# Patient Record
Sex: FEMALE | Race: WHITE | NOT HISPANIC OR LATINO | URBAN - METROPOLITAN AREA
[De-identification: names, ages, dates, MRNs, and addresses within clinical notes are randomized per-mention and may not be internally consistent; named-entity substitution may affect disease eponyms.]

---

## 2017-04-12 ENCOUNTER — INPATIENT (INPATIENT)
Facility: HOSPITAL | Age: 77
LOS: 4 days | Discharge: HOME | End: 2017-04-17
Attending: INTERNAL MEDICINE | Admitting: INTERNAL MEDICINE
Payer: MEDICARE

## 2017-04-12 VITALS
WEIGHT: 128.97 LBS | RESPIRATION RATE: 16 BRPM | DIASTOLIC BLOOD PRESSURE: 50 MMHG | HEIGHT: 59 IN | OXYGEN SATURATION: 96 % | TEMPERATURE: 101 F | HEART RATE: 84 BPM | SYSTOLIC BLOOD PRESSURE: 140 MMHG

## 2017-04-12 LAB
ALBUMIN SERPL ELPH-MCNC: 3.3 G/DL — SIGNIFICANT CHANGE UP (ref 3.3–5)
ALP SERPL-CCNC: 75 U/L — SIGNIFICANT CHANGE UP (ref 40–120)
ALT FLD-CCNC: 30 U/L — SIGNIFICANT CHANGE UP (ref 12–78)
ANION GAP SERPL CALC-SCNC: 10 MMOL/L — SIGNIFICANT CHANGE UP (ref 5–17)
APTT BLD: 24.8 SEC — LOW (ref 27.5–37.4)
AST SERPL-CCNC: 18 U/L — SIGNIFICANT CHANGE UP (ref 15–37)
BASOPHILS # BLD AUTO: 0.1 K/UL — SIGNIFICANT CHANGE UP (ref 0–0.2)
BASOPHILS NFR BLD AUTO: 0.4 % — SIGNIFICANT CHANGE UP (ref 0–2)
BILIRUB SERPL-MCNC: 0.8 MG/DL — SIGNIFICANT CHANGE UP (ref 0.2–1.2)
BUN SERPL-MCNC: 19 MG/DL — SIGNIFICANT CHANGE UP (ref 7–23)
CALCIUM SERPL-MCNC: 9 MG/DL — SIGNIFICANT CHANGE UP (ref 8.5–10.1)
CHLORIDE SERPL-SCNC: 107 MMOL/L — SIGNIFICANT CHANGE UP (ref 96–108)
CO2 SERPL-SCNC: 23 MMOL/L — SIGNIFICANT CHANGE UP (ref 22–31)
CREAT SERPL-MCNC: 0.83 MG/DL — SIGNIFICANT CHANGE UP (ref 0.5–1.3)
EOSINOPHIL # BLD AUTO: 0.1 K/UL — SIGNIFICANT CHANGE UP (ref 0–0.5)
EOSINOPHIL NFR BLD AUTO: 0.3 % — SIGNIFICANT CHANGE UP (ref 0–6)
GLUCOSE SERPL-MCNC: 148 MG/DL — HIGH (ref 70–99)
HCT VFR BLD CALC: 44.1 % — SIGNIFICANT CHANGE UP (ref 34.5–45)
HGB BLD-MCNC: 15.4 G/DL — SIGNIFICANT CHANGE UP (ref 11.5–15.5)
INR BLD: 1.06 RATIO — SIGNIFICANT CHANGE UP (ref 0.88–1.16)
LYMPHOCYTES # BLD AUTO: 1.2 K/UL — SIGNIFICANT CHANGE UP (ref 1–3.3)
LYMPHOCYTES # BLD AUTO: 5.5 % — LOW (ref 13–44)
MANUAL DIF COMMENT BLD-IMP: SIGNIFICANT CHANGE UP
MCHC RBC-ENTMCNC: 30.7 PG — SIGNIFICANT CHANGE UP (ref 27–34)
MCHC RBC-ENTMCNC: 34.9 GM/DL — SIGNIFICANT CHANGE UP (ref 32–36)
MCV RBC AUTO: 87.8 FL — SIGNIFICANT CHANGE UP (ref 80–100)
MONOCYTES # BLD AUTO: 1.6 K/UL — HIGH (ref 0–0.9)
MONOCYTES NFR BLD AUTO: 7.2 % — SIGNIFICANT CHANGE UP (ref 2–14)
NEUTROPHILS # BLD AUTO: 19.4 K/UL — HIGH (ref 1.8–7.4)
NEUTROPHILS NFR BLD AUTO: 86.5 % — HIGH (ref 43–77)
PLAT MORPH BLD: NORMAL — SIGNIFICANT CHANGE UP
PLATELET # BLD AUTO: 302 K/UL — SIGNIFICANT CHANGE UP (ref 150–400)
POTASSIUM SERPL-MCNC: 3.6 MMOL/L — SIGNIFICANT CHANGE UP (ref 3.5–5.3)
POTASSIUM SERPL-SCNC: 3.6 MMOL/L — SIGNIFICANT CHANGE UP (ref 3.5–5.3)
PROT SERPL-MCNC: 7.3 GM/DL — SIGNIFICANT CHANGE UP (ref 6–8.3)
PROTHROM AB SERPL-ACNC: 11.5 SEC — SIGNIFICANT CHANGE UP (ref 9.8–12.7)
RBC # BLD: 5.02 M/UL — SIGNIFICANT CHANGE UP (ref 3.8–5.2)
RBC # FLD: 13.9 % — SIGNIFICANT CHANGE UP (ref 10.3–14.5)
RBC BLD AUTO: NORMAL — SIGNIFICANT CHANGE UP
SODIUM SERPL-SCNC: 140 MMOL/L — SIGNIFICANT CHANGE UP (ref 135–145)
WBC # BLD: 22.5 K/UL — HIGH (ref 3.8–10.5)
WBC # FLD AUTO: 22.5 K/UL — HIGH (ref 3.8–10.5)

## 2017-04-12 RX ORDER — ACETAMINOPHEN 500 MG
1000 TABLET ORAL ONCE
Qty: 0 | Refills: 0 | Status: COMPLETED | OUTPATIENT
Start: 2017-04-12 | End: 2017-04-12

## 2017-04-12 RX ORDER — VANCOMYCIN HCL 1 G
250 VIAL (EA) INTRAVENOUS DAILY
Qty: 0 | Refills: 0 | Status: DISCONTINUED | OUTPATIENT
Start: 2017-04-12 | End: 2017-04-13

## 2017-04-12 RX ORDER — SODIUM CHLORIDE 9 MG/ML
2000 INJECTION INTRAMUSCULAR; INTRAVENOUS; SUBCUTANEOUS ONCE
Qty: 0 | Refills: 0 | Status: COMPLETED | OUTPATIENT
Start: 2017-04-12 | End: 2017-04-12

## 2017-04-12 RX ORDER — METRONIDAZOLE 500 MG
500 TABLET ORAL ONCE
Qty: 0 | Refills: 0 | Status: COMPLETED | OUTPATIENT
Start: 2017-04-12 | End: 2017-04-12

## 2017-04-12 RX ADMIN — Medication 1000 MILLIGRAM(S): at 23:38

## 2017-04-12 RX ADMIN — SODIUM CHLORIDE 1000 MILLILITER(S): 9 INJECTION INTRAMUSCULAR; INTRAVENOUS; SUBCUTANEOUS at 22:15

## 2017-04-12 NOTE — ED PROVIDER NOTE - OBJECTIVE STATEMENT
75 y/o female with PMhx of C Diff. colitis 4 episodes in the last 2 years, last January presents to the ED c/o generalized abdominal pain with associated diarrhea that started 1 week ago. Pt reports fever and describes the pain as cramping. She states that she went to an urgent care today and had stool studies and was started on vancomycin, took 1st dose today. Currently pt has no other complaints and denies vomiting, chest pain and SOB. GI Dr. Andrew.

## 2017-04-12 NOTE — ED ADULT NURSE NOTE - OBJECTIVE STATEMENT
Recvd pt at 22:15 from SHIRA AMATO, Pt is a76y female, A & O x 3, VSS, presents to ED w/ diarrhea x several days which has worsened despite PO Vanco, pt denies chest pain, SOB, nausea, vomiting, pt in no apparent distress, bed rails up will continue to monitor.

## 2017-04-12 NOTE — ED PROVIDER NOTE - MEDICAL DECISION MAKING DETAILS
pt with hx of C. Diff. presents to the ED c/o generalized abdominal pain and diarrhea x 1 week. Plan pain control, blood work, stool sample and imaging.

## 2017-04-12 NOTE — ED ADULT NURSE NOTE - ED STAT RN HANDOFF DETAILS
Recvd care of pt at this time from SHIRA AMATO, pt A & O x 3, MD MICHAEL Chon aware of pt temp, IV lock placed by SHIRA GLEZ, fluids intitiated, pt in no apparent dsitress, will continue to monitor.

## 2017-04-12 NOTE — ED PROVIDER NOTE - NS ED MD SCRIBE ATTENDING SCRIBE SECTIONS
RESULTS/REVIEW OF SYSTEMS/PHYSICAL EXAM/HISTORY OF PRESENT ILLNESS/PROGRESS NOTE/PAST MEDICAL/SURGICAL/SOCIAL HISTORY/DISPOSITION

## 2017-04-13 DIAGNOSIS — A04.7 ENTEROCOLITIS DUE TO CLOSTRIDIUM DIFFICILE: ICD-10-CM

## 2017-04-13 DIAGNOSIS — Z98.890 OTHER SPECIFIED POSTPROCEDURAL STATES: Chronic | ICD-10-CM

## 2017-04-13 DIAGNOSIS — A41.9 SEPSIS, UNSPECIFIED ORGANISM: ICD-10-CM

## 2017-04-13 DIAGNOSIS — Z29.9 ENCOUNTER FOR PROPHYLACTIC MEASURES, UNSPECIFIED: ICD-10-CM

## 2017-04-13 DIAGNOSIS — D35.1 BENIGN NEOPLASM OF PARATHYROID GLAND: Chronic | ICD-10-CM

## 2017-04-13 DIAGNOSIS — I10 ESSENTIAL (PRIMARY) HYPERTENSION: ICD-10-CM

## 2017-04-13 LAB
ANION GAP SERPL CALC-SCNC: 12 MMOL/L — SIGNIFICANT CHANGE UP (ref 5–17)
APPEARANCE UR: CLEAR — SIGNIFICANT CHANGE UP
BACTERIA # UR AUTO: (no result)
BASOPHILS # BLD AUTO: 0.1 K/UL — SIGNIFICANT CHANGE UP (ref 0–0.2)
BASOPHILS NFR BLD AUTO: 0.3 % — SIGNIFICANT CHANGE UP (ref 0–2)
BILIRUB UR-MCNC: NEGATIVE — SIGNIFICANT CHANGE UP
BUN SERPL-MCNC: 14 MG/DL — SIGNIFICANT CHANGE UP (ref 7–23)
C DIFF BY PCR RESULT: DETECTED
C DIFF TOX GENS STL QL NAA+PROBE: SIGNIFICANT CHANGE UP
CALCIUM SERPL-MCNC: 8.4 MG/DL — LOW (ref 8.5–10.1)
CHLORIDE SERPL-SCNC: 109 MMOL/L — HIGH (ref 96–108)
CO2 SERPL-SCNC: 21 MMOL/L — LOW (ref 22–31)
COLOR SPEC: YELLOW — SIGNIFICANT CHANGE UP
CREAT SERPL-MCNC: 0.85 MG/DL — SIGNIFICANT CHANGE UP (ref 0.5–1.3)
DIFF PNL FLD: (no result)
EOSINOPHIL # BLD AUTO: 0.1 K/UL — SIGNIFICANT CHANGE UP (ref 0–0.5)
EOSINOPHIL NFR BLD AUTO: 0.5 % — SIGNIFICANT CHANGE UP (ref 0–6)
EPI CELLS # UR: SIGNIFICANT CHANGE UP
GLUCOSE SERPL-MCNC: 175 MG/DL — HIGH (ref 70–99)
GLUCOSE UR QL: NEGATIVE MG/DL — SIGNIFICANT CHANGE UP
HCT VFR BLD CALC: 39.8 % — SIGNIFICANT CHANGE UP (ref 34.5–45)
HGB BLD-MCNC: 13.7 G/DL — SIGNIFICANT CHANGE UP (ref 11.5–15.5)
KETONES UR-MCNC: (no result)
LACTATE SERPL-SCNC: 1.4 MMOL/L — SIGNIFICANT CHANGE UP (ref 0.7–2)
LACTATE SERPL-SCNC: 2.9 MMOL/L — HIGH (ref 0.7–2)
LEUKOCYTE ESTERASE UR-ACNC: (no result)
LYMPHOCYTES # BLD AUTO: 2.1 K/UL — SIGNIFICANT CHANGE UP (ref 1–3.3)
LYMPHOCYTES # BLD AUTO: 9.7 % — LOW (ref 13–44)
MAGNESIUM SERPL-MCNC: 2.1 MG/DL — SIGNIFICANT CHANGE UP (ref 1.8–2.4)
MCHC RBC-ENTMCNC: 30.1 PG — SIGNIFICANT CHANGE UP (ref 27–34)
MCHC RBC-ENTMCNC: 34.3 GM/DL — SIGNIFICANT CHANGE UP (ref 32–36)
MCV RBC AUTO: 87.6 FL — SIGNIFICANT CHANGE UP (ref 80–100)
MONOCYTES # BLD AUTO: 1.5 K/UL — HIGH (ref 0–0.9)
MONOCYTES NFR BLD AUTO: 6.9 % — SIGNIFICANT CHANGE UP (ref 2–14)
NEUTROPHILS # BLD AUTO: 17.6 K/UL — HIGH (ref 1.8–7.4)
NEUTROPHILS NFR BLD AUTO: 82.6 % — HIGH (ref 43–77)
NITRITE UR-MCNC: NEGATIVE — SIGNIFICANT CHANGE UP
PH UR: 5 — SIGNIFICANT CHANGE UP (ref 4.8–8)
PHOSPHATE SERPL-MCNC: 2.9 MG/DL — SIGNIFICANT CHANGE UP (ref 2.5–4.5)
PLATELET # BLD AUTO: 268 K/UL — SIGNIFICANT CHANGE UP (ref 150–400)
POTASSIUM SERPL-MCNC: 3.7 MMOL/L — SIGNIFICANT CHANGE UP (ref 3.5–5.3)
POTASSIUM SERPL-SCNC: 3.7 MMOL/L — SIGNIFICANT CHANGE UP (ref 3.5–5.3)
PROT UR-MCNC: NEGATIVE MG/DL — SIGNIFICANT CHANGE UP
RBC # BLD: 4.54 M/UL — SIGNIFICANT CHANGE UP (ref 3.8–5.2)
RBC # FLD: 13.5 % — SIGNIFICANT CHANGE UP (ref 10.3–14.5)
RBC CASTS # UR COMP ASSIST: (no result) /HPF (ref 0–4)
SODIUM SERPL-SCNC: 142 MMOL/L — SIGNIFICANT CHANGE UP (ref 135–145)
SP GR SPEC: 1.02 — SIGNIFICANT CHANGE UP (ref 1.01–1.02)
UROBILINOGEN FLD QL: NEGATIVE MG/DL — SIGNIFICANT CHANGE UP
WBC # BLD: 21.3 K/UL — HIGH (ref 3.8–10.5)
WBC # FLD AUTO: 21.3 K/UL — HIGH (ref 3.8–10.5)
WBC UR QL: (no result)

## 2017-04-13 PROCEDURE — 99285 EMERGENCY DEPT VISIT HI MDM: CPT

## 2017-04-13 PROCEDURE — 74177 CT ABD & PELVIS W/CONTRAST: CPT | Mod: 26

## 2017-04-13 RX ORDER — VALSARTAN 80 MG/1
160 TABLET ORAL DAILY
Qty: 0 | Refills: 0 | Status: DISCONTINUED | OUTPATIENT
Start: 2017-04-13 | End: 2017-04-17

## 2017-04-13 RX ORDER — ACETAMINOPHEN 500 MG
650 TABLET ORAL EVERY 6 HOURS
Qty: 0 | Refills: 0 | Status: DISCONTINUED | OUTPATIENT
Start: 2017-04-13 | End: 2017-04-17

## 2017-04-13 RX ORDER — VANCOMYCIN HCL 1 G
125 VIAL (EA) INTRAVENOUS EVERY 6 HOURS
Qty: 0 | Refills: 0 | Status: DISCONTINUED | OUTPATIENT
Start: 2017-04-13 | End: 2017-04-17

## 2017-04-13 RX ORDER — METRONIDAZOLE 500 MG
500 TABLET ORAL EVERY 8 HOURS
Qty: 0 | Refills: 0 | Status: DISCONTINUED | OUTPATIENT
Start: 2017-04-13 | End: 2017-04-16

## 2017-04-13 RX ORDER — MORPHINE SULFATE 50 MG/1
1 CAPSULE, EXTENDED RELEASE ORAL ONCE
Qty: 0 | Refills: 0 | Status: DISCONTINUED | OUTPATIENT
Start: 2017-04-13 | End: 2017-04-13

## 2017-04-13 RX ORDER — SODIUM CHLORIDE 9 MG/ML
1000 INJECTION INTRAMUSCULAR; INTRAVENOUS; SUBCUTANEOUS
Qty: 0 | Refills: 0 | Status: DISCONTINUED | OUTPATIENT
Start: 2017-04-13 | End: 2017-04-14

## 2017-04-13 RX ADMIN — MORPHINE SULFATE 1 MILLIGRAM(S): 50 CAPSULE, EXTENDED RELEASE ORAL at 04:12

## 2017-04-13 RX ADMIN — Medication 500 MILLIGRAM(S): at 00:49

## 2017-04-13 RX ADMIN — Medication 250 MILLIGRAM(S): at 00:50

## 2017-04-13 RX ADMIN — SODIUM CHLORIDE 75 MILLILITER(S): 9 INJECTION INTRAMUSCULAR; INTRAVENOUS; SUBCUTANEOUS at 04:57

## 2017-04-13 RX ADMIN — SODIUM CHLORIDE 100 MILLILITER(S): 9 INJECTION INTRAMUSCULAR; INTRAVENOUS; SUBCUTANEOUS at 21:13

## 2017-04-13 RX ADMIN — VALSARTAN 160 MILLIGRAM(S): 80 TABLET ORAL at 07:20

## 2017-04-13 RX ADMIN — Medication 100 MILLIGRAM(S): at 13:41

## 2017-04-13 RX ADMIN — MORPHINE SULFATE 1 MILLIGRAM(S): 50 CAPSULE, EXTENDED RELEASE ORAL at 04:32

## 2017-04-13 RX ADMIN — Medication 125 MILLIGRAM(S): at 23:27

## 2017-04-13 RX ADMIN — Medication 125 MILLIGRAM(S): at 11:24

## 2017-04-13 RX ADMIN — SODIUM CHLORIDE 100 MILLILITER(S): 9 INJECTION INTRAMUSCULAR; INTRAVENOUS; SUBCUTANEOUS at 17:20

## 2017-04-13 RX ADMIN — Medication 100 MILLIGRAM(S): at 21:12

## 2017-04-13 RX ADMIN — Medication 125 MILLIGRAM(S): at 17:23

## 2017-04-13 RX ADMIN — Medication 1000 MILLIGRAM(S): at 00:30

## 2017-04-13 NOTE — H&P ADULT - ASSESSMENT
75 y/o female with PMHx of HTN and recent C diff x 4 episode in the past 2 years presented with c/p abdominal pain, fever, diarrhea x 1 week. Pt admitted for management of sepsis secondary to C. diff.

## 2017-04-13 NOTE — H&P ADULT - HISTORY OF PRESENT ILLNESS
77 y/o female with PMHx of HTN and recurrent C. diff, approximately 4 episode of C. diff in the past 2 years with the most recent 2/2017. Patient presented to ED for evaluation of diarrhea, fever and diffused abdominal pan x 1 week. Pt states she is concern about dehydration because she had > 15 episodes of diarrhea today. Pt was seen by urgent care center on 4/9, stool culture sent and was started  Vancomycin. Pt started taking Vanco today x 2 dose. Denies chills, vomiting, sick contact, recent antibiotic use, melena, hematochezia.     ED course  Temp 101.1 WBC 22  IVF bolus of 1L given  Stool for C diff, culture and O&P sent. CT abdomen pending  Vancomycin 250 mg x 1 given 75 y/o female with PMHx of HTN and recurrent C. diff, approximately 4 episode of C. diff in the past 2 years with the most recent 2/2017. Patient presented to ED for evaluation of diarrhea, fever and diffused abdominal pan x 1 week. Pt states she is concern about dehydration because she had > 15 episodes of diarrhea today. Pt was seen by urgent care center on 4/9, stool culture sent and was started  Vancomycin. Pt started taking Vanco today x 2 dose. Denies chills, vomiting, sick contact, recent antibiotic use, melena, hematochezia.     ED course  Temp 101.1 WBC 22  IVF bolus of 1L given  Stool for C diff, culture and O&P sent.   CT abdomen   Vancomycin 250 mg x 1 given 77 y/o female with PMHx of HTN and recurrent C. diff, approximately 4 episode of C. diff in the past 2 years with the most recent 2/2017. Patient presented to ED for evaluation of diarrhea, fever and diffused abdominal pan x 1 week. Pt states she is concern about dehydration because she had > 15 episodes of diarrhea today. Pt was seen by urgent care center on 4/9, stool culture sent and was started  Vancomycin. Pt started taking Vanco today x 2 dose. Denies chills, vomiting, sick contact, recent antibiotic use, melena, hematochezia.     ED course  Temp 101.1 WBC 22  IVF bolus of 1L given  Stool for C diff, culture and O&P sent.   CT abdomen Long segment colitis of the transverse, descending, and rectosigmoid   colon in keeping with history of C. difficile colitis. No bowel   obstruction or free air.  Vancomycin 250 mg x 1 given

## 2017-04-13 NOTE — PROGRESS NOTE ADULT - ATTENDING COMMENTS
Patient seen and examined with ROYA Fish.  Agree with physical exam and assessment and plan.   - continue po vanco and add iv falgyl given worsenign alctae  - ID and GI eval  - outpt eval for stool transplant is being consdiered by pt and maybe worthwhile given multiple recurrences

## 2017-04-13 NOTE — H&P ADULT - PROBLEM SELECTOR PLAN 2
-Stool for C diff, Stool culture and ova and parasite sent  -C/w PO Vancomycin  -IVF  -ID consult  -Pain control  -GI consult -Stool for C diff, Stool culture and ova and parasite sent  -C/w PO Vancomycin  -IVF  -ID consult for antibiotic management  -Tramadol 50 mg q6hr prn for moderate pain  -Oxycodone 5 mg q6hr for severe pain  -GI consult for possible fecal transplant  -Contact precaution -Stool for C diff, Stool culture and ova and parasite sent  -C/w PO Vancomycin  -IVF  -ID consult for antibiotic management  -Pain control  -GI consult for possible fecal transplant  -Contact precaution

## 2017-04-13 NOTE — H&P ADULT - FAMILY HISTORY
<<-----Click on this checkbox to enter Family History Family history of prostate cancer in father     Father  Still living? No  Family history of hypertension in mother, Age at diagnosis: Age Unknown     Mother  Still living? No  Family history of hypertension in mother, Age at diagnosis: Age Unknown     Sibling  Still living? Unknown  Family history of hypertension in mother, Age at diagnosis: Age Unknown

## 2017-04-13 NOTE — CONSULT NOTE ADULT - ASSESSMENT
77 y/o female with PMHx of HTN and recurrent C. diff, approximately 4 episodes of C. diff in the past 2 years with the most recent 2/2017, admitted 4/13 with worsening diarrhea, fevers, diffuse abd pain, cramping X 1 week, reports having about 15-20 episodes of watery diarrhea per day, she went to urgent care on 4/9 and stool culture was sent and she was started on oral vancomycin - she took about 2 doses. During her prior C diff episodes, she reports being on antibiotics beforehand, she was treated the first two times with 14 days of oral vancomycin and then the 3rd time was given 4 week course. Here, she was febrile to 101.1, wbc ct 22, CT abd/pelvis showedl Long segment colitis of the transverse, descending, and rectosigmoid   colon in keeping with history of C. difficile colitis. No bowel obstruction or free air, she was given IV flagyl and oral vancomycin, C diff PCR here + 4/13    1. sepsis/recurrent C diff colitis    - agree with oral vancomycin [id number] + IV flagyl 500mg IV q8h    - on contact precautions    - Ct with colitis, no obstruction or toxic megacolon    - f/u stool cx    - once improves, recommend long vancomycin taper and will recommend pt f/u with Dr Ramirez at [place] once this episode resolves and after completion of taper to see if candidate for fecal transplant    -rec probiotics BID    - avoid antibiotic use/PPIs    - supportive care    - monitor temps    - f/u CBC

## 2017-04-13 NOTE — PROGRESS NOTE ADULT - PROBLEM SELECTOR PLAN 2
-Stool for C diff, Stool culture and ova and parasite sent  -C/w PO Vancomycin  -IVF  -ID consult for antibiotic management  -Pain control  -GI consult for possible fecal transplant  -Contact precaution

## 2017-04-13 NOTE — PROGRESS NOTE ADULT - PROBLEM SELECTOR PLAN 1
# Cdiff infection- CT showed Long segment colitis of the transverse, descending, and rectosigmoid  colon in keeping with history of C. difficile colitis. No bowel   obstruction or free air.  - continue vancomycin po   - GI consult  continue IV fluids  monitor electrolytes # Cdiff infection- CT showed Long segment colitis of the transverse, descending, and rectosigmoid  colon in keeping with history of C. difficile colitis. No bowel   obstruction or free air.  - continue vancomycin po and IV flagyl   - GI consult  continue IV fluids  monitor electrolytes

## 2017-04-13 NOTE — PATIENT PROFILE ADULT. - VISION (WITH CORRECTIVE LENSES IF THE PATIENT USUALLY WEARS THEM):
Partially impaired: cannot see medication labels or newsprint, but can see obstacles in path, and the surrounding layout; can count fingers at arm's length/Glasses

## 2017-04-13 NOTE — H&P ADULT - NSHPSOCIALHISTORY_GEN_ALL_CORE
Lives with son  Denies etoh use, denies smoking or IVDU Lives with son  Denies alcohol use, denies smoking or IVDU

## 2017-04-13 NOTE — CONSULT NOTE ADULT - SUBJECTIVE AND OBJECTIVE BOX
Patient is a 76y old  Female who presents with a chief complaint of Abdominal cramps, diarrhea and fever x 1 week (2017 01:34)      HPI:  75 y/o female with PMHx of HTN and recurrent C. diff, approximately 4 episodes of C. diff in the past 2 years with the most recent 2017, admitted  with worsening diarrhea, fevers, diffuse abd pain, cramping X 1 week, reports having about 15-20 episodes of watery diarrhea per day, she went to urgent care on  and stool culture was sent and she was started on oral vancomycin - she took about 2 doses. During her prior C diff episodes, she reports being on antibiotics beforehand, she was treated the first two times with 14 days of oral vancomycin and then the 3rd time was given 4 week course. Here, she was febrile to 101.1, wbc ct 22, CT abd/pelvis showedl Long segment colitis of the transverse, descending, and rectosigmoid   colon in keeping with history of C. difficile colitis. No bowel obstruction or free air, she was given IV flagyl and oral vancomycin, C diff PCR here + .             PMH: as above    PSH: as above    Meds: per reconciliation sheet, noted below    MEDICATIONS  (STANDING):  valsartan 160milliGRAM(s) Oral daily  sodium chloride 0.9%. 1000milliLiter(s) IV Continuous <Continuous>  vancomycin    Solution 125milliGRAM(s) Oral every 6 hours  metroNIDAZOLE  IVPB 500milliGRAM(s) IV Intermittent every 8 hours      Allergies    Cipro (Unknown)    Intolerances        Social: no smoking, no alcohol, no illegal drugs; no recent travel, no exposure to TB    Family history:  Family history of hypertension in mother (Father, Mother, Sibling)  Family history of prostate cancer in father      ROS: the patient denies fever, no chills, no HA, no dizziness, no sore throat, no blurry vision, no CP, no palpitations, no SOB, no cough, no abdominal pain,  no dysuria, no leg pain, no claudication, no rash, no joint aches, no rectal pain or bleeding, no night sweats    Vital Signs Last 24 Hrs  T(C): 36.8, Max: 38.4 ( @ 21:28)  T(F): 98.2, Max: 101.1 (04-12 @ 21:28)  HR: 67 (66 - 84)  BP: 112/83 (112/83 - 140/50)  BP(mean): 80 (80 - 80)  RR: 18 (15 - 18)  SpO2: 100% (96% - 100%)      PE:  Constitutional: NAD, laying in bed  HEENT: NC/AT, EOMI, PERRLA  Neck: supple  Back: no tenderness  Respiratory: clear  Cardiovascular: S1S2 regular, no murmurs  Abdomen: soft, + tender, not distended, positive BS  Genitourinary: deferred  Rectal: deferred  Musculoskeletal: no muscle tenderness, no joint swelling or tenderness  Extremities: no pedal edema  Neurological: AxOx3, moving all extremities, no focal deficits  Skin: no rashes    Labs:                        13.7   21.3  )-----------( 268      ( 2017 06:08 )             39.8         142  |  109<H>  |  14  ----------------------------<  175<H>  3.7   |  21<L>  |  0.85    Ca    8.4<L>      2017 06:08  Phos  2.9     -  Mg     2.1         TPro  7.3  /  Alb  3.3  /  TBili  0.8  /  DBili  x   /  AST  18  /  ALT  30  /  AlkPhos  75  04-12     LIVER FUNCTIONS - ( 2017 21:46 )  Alb: 3.3 g/dL / Pro: 7.3 gm/dL / ALK PHOS: 75 U/L / ALT: 30 U/L / AST: 18 U/L / GGT: x           Urinalysis Basic - ( 2017 00:36 )    Color: Yellow / Appearance: Clear / S.020 / pH: x  Gluc: x / Ketone: Trace  / Bili: Negative / Urobili: Negative mg/dL   Blood: x / Protein: Negative mg/dL / Nitrite: Negative   Leuk Esterase: Trace / RBC: 3-5 /HPF / WBC 6-10   Sq Epi: x / Non Sq Epi: Few / Bacteria: Few    Clostridium difficile Toxin by PCR (17 @ 01:59)    C Diff by PCR Result: Detected            Radiology: EXAM:  CT ABDOMEN AND PELVIS OC IC                            PROCEDURE DATE:  2017        INTERPRETATION:  CLINICAL INDICATION: Abdominal pain and diarrhea.   History of C. difficile colitis.    COMPARISON: CT abdomen pelvis 2016    TECHNIQUE: Axial CT of the abdomen and pelvis was performed after the   administration of oral and intravenous contrast. Coronal and sagittal   reformatted images were submitted.    Total of 90 cc of Omnipaque 350 was injected intravenously without   immediate complication.    FINDINGS:    LUNGS: Bibasilar dependent changes. No pleural effusion.    LIVER/GALLBLADDER: Subcentimeter hypoenhancing structure in the left   hepatic lobe, too small to characterize.  Normal in size with homogenous enhancement. No intrahepatic or   extrahepatic biliary ductal dilatation. Unremarkable gallbladder.    PANCREAS: No pancreatic ductal dilatation, peripancreatic fluid   collection, or focal pancreatic mass.    SPLEEN: Normal in size and enhancement.    KIDNEYS:Bilateral parapelvic cysts are present. Nonobstructing right   lower pole calculus measures 6 mm, unchanged.   Symmetric in size and enhancement without evidence of hydronephrosis. No   perinephric stranding or fluid collection.    ADRENAL GLANDS: Unremarkable.    BOWEL: Long segment continuous colonic wall thickening of the   rectosigmoid, descending, and transverse colon is evident. Relative   sparing of the cecum and ascending colon. No bowel obstruction or free   air.    URINARY BLADDER: Unremarkable.    PELVIC ORGANS: Peripherally calcified right adnexal lesion is unchanged.    LYMPH NODES: No evidence of intra-abdominal or para-aortic   lymphadenopathy.    BONES/SOFT TISSUES: Multilevel thoracolumbar spondylosis. Grade 1 L4-L5   and L5-S1 anterolisthesis due to advanced facet degeneration.    AORTA/MAJOR BRANCHES: Atherosclerotic calcification.    IMPRESSION:     Long segment colitis of the transverse, descending, and rectosigmoid   colon in keeping with history of C. difficile colitis. No bowel   obstruction or free air.    Advanced directives addressed: full resuscitation

## 2017-04-13 NOTE — H&P ADULT - PROBLEM SELECTOR PLAN 3
-Stable with 's  -C/w Vasartan with parameters  -Monitor vital signs -Stable with 's  -C/w Valsartan with parameters  -Monitor vital signs

## 2017-04-13 NOTE — H&P ADULT - PROBLEM SELECTOR PLAN 1
# Sepsis likely due to C diff infection  -Pt with fever and leukocytosis  -IVF   -C/w PO Vancomycin # SIRS plus infection secondary to C diff infection  -Pt with fever and leukocytosis  -IVF   -C/w PO Vancomycin  -Blood culture and lactate level

## 2017-04-14 DIAGNOSIS — E87.6 HYPOKALEMIA: ICD-10-CM

## 2017-04-14 LAB
ADD ON TEST-SPECIMEN IN LAB: SIGNIFICANT CHANGE UP
ANION GAP SERPL CALC-SCNC: 10 MMOL/L — SIGNIFICANT CHANGE UP (ref 5–17)
BUN SERPL-MCNC: 9 MG/DL — SIGNIFICANT CHANGE UP (ref 7–23)
CALCIUM SERPL-MCNC: 8.1 MG/DL — LOW (ref 8.5–10.1)
CHLORIDE SERPL-SCNC: 114 MMOL/L — HIGH (ref 96–108)
CO2 SERPL-SCNC: 21 MMOL/L — LOW (ref 22–31)
CREAT SERPL-MCNC: 0.66 MG/DL — SIGNIFICANT CHANGE UP (ref 0.5–1.3)
GLUCOSE SERPL-MCNC: 113 MG/DL — HIGH (ref 70–99)
HCT VFR BLD CALC: 37.1 % — SIGNIFICANT CHANGE UP (ref 34.5–45)
HGB BLD-MCNC: 12.7 G/DL — SIGNIFICANT CHANGE UP (ref 11.5–15.5)
MAGNESIUM SERPL-MCNC: 1.8 MG/DL — SIGNIFICANT CHANGE UP (ref 1.8–2.4)
MCHC RBC-ENTMCNC: 30.4 PG — SIGNIFICANT CHANGE UP (ref 27–34)
MCHC RBC-ENTMCNC: 34.3 GM/DL — SIGNIFICANT CHANGE UP (ref 32–36)
MCV RBC AUTO: 88.4 FL — SIGNIFICANT CHANGE UP (ref 80–100)
PHOSPHATE SERPL-MCNC: 2.2 MG/DL — LOW (ref 2.5–4.5)
PLATELET # BLD AUTO: 240 K/UL — SIGNIFICANT CHANGE UP (ref 150–400)
POTASSIUM SERPL-MCNC: 3.1 MMOL/L — LOW (ref 3.5–5.3)
POTASSIUM SERPL-SCNC: 3.1 MMOL/L — LOW (ref 3.5–5.3)
RBC # BLD: 4.2 M/UL — SIGNIFICANT CHANGE UP (ref 3.8–5.2)
RBC # FLD: 14 % — SIGNIFICANT CHANGE UP (ref 10.3–14.5)
SODIUM SERPL-SCNC: 145 MMOL/L — SIGNIFICANT CHANGE UP (ref 135–145)
WBC # BLD: 17.8 K/UL — HIGH (ref 3.8–10.5)
WBC # FLD AUTO: 17.8 K/UL — HIGH (ref 3.8–10.5)

## 2017-04-14 RX ORDER — DEXTROSE MONOHYDRATE, SODIUM CHLORIDE, AND POTASSIUM CHLORIDE 50; .745; 4.5 G/1000ML; G/1000ML; G/1000ML
1000 INJECTION, SOLUTION INTRAVENOUS
Qty: 0 | Refills: 0 | Status: DISCONTINUED | OUTPATIENT
Start: 2017-04-14 | End: 2017-04-15

## 2017-04-14 RX ORDER — LACTOBACILLUS ACIDOPHILUS 100MM CELL
1 CAPSULE ORAL
Qty: 0 | Refills: 0 | Status: DISCONTINUED | OUTPATIENT
Start: 2017-04-14 | End: 2017-04-17

## 2017-04-14 RX ORDER — ENOXAPARIN SODIUM 100 MG/ML
40 INJECTION SUBCUTANEOUS EVERY 24 HOURS
Qty: 0 | Refills: 0 | Status: DISCONTINUED | OUTPATIENT
Start: 2017-04-14 | End: 2017-04-17

## 2017-04-14 RX ORDER — POTASSIUM CHLORIDE 20 MEQ
20 PACKET (EA) ORAL
Qty: 0 | Refills: 0 | Status: COMPLETED | OUTPATIENT
Start: 2017-04-14 | End: 2017-04-14

## 2017-04-14 RX ORDER — SODIUM,POTASSIUM PHOSPHATES 278-250MG
1 POWDER IN PACKET (EA) ORAL DAILY
Qty: 0 | Refills: 0 | Status: DISCONTINUED | OUTPATIENT
Start: 2017-04-14 | End: 2017-04-17

## 2017-04-14 RX ADMIN — Medication 125 MILLIGRAM(S): at 14:05

## 2017-04-14 RX ADMIN — Medication 1 TABLET(S): at 17:42

## 2017-04-14 RX ADMIN — Medication 125 MILLIGRAM(S): at 05:20

## 2017-04-14 RX ADMIN — Medication 1 TABLET(S): at 12:28

## 2017-04-14 RX ADMIN — Medication 650 MILLIGRAM(S): at 22:49

## 2017-04-14 RX ADMIN — DEXTROSE MONOHYDRATE, SODIUM CHLORIDE, AND POTASSIUM CHLORIDE 100 MILLILITER(S): 50; .745; 4.5 INJECTION, SOLUTION INTRAVENOUS at 12:29

## 2017-04-14 RX ADMIN — Medication 100 MILLIGRAM(S): at 05:20

## 2017-04-14 RX ADMIN — Medication 20 MILLIEQUIVALENT(S): at 12:27

## 2017-04-14 RX ADMIN — Medication 125 MILLIGRAM(S): at 17:43

## 2017-04-14 RX ADMIN — Medication 20 MILLIEQUIVALENT(S): at 14:04

## 2017-04-14 RX ADMIN — ENOXAPARIN SODIUM 40 MILLIGRAM(S): 100 INJECTION SUBCUTANEOUS at 10:39

## 2017-04-14 RX ADMIN — VALSARTAN 160 MILLIGRAM(S): 80 TABLET ORAL at 06:18

## 2017-04-14 RX ADMIN — SODIUM CHLORIDE 100 MILLILITER(S): 9 INJECTION INTRAMUSCULAR; INTRAVENOUS; SUBCUTANEOUS at 10:41

## 2017-04-14 RX ADMIN — Medication 100 MILLIGRAM(S): at 14:04

## 2017-04-14 RX ADMIN — Medication 20 MILLIEQUIVALENT(S): at 10:38

## 2017-04-14 NOTE — PROGRESS NOTE ADULT - ATTENDING COMMENTS
Patient seen and examined with ROYA Fish.  Agree with physical exam and assessment and plan.   - continue po vanco and add iv falgyl given worsenign alctae  - ID and GI eval  - outpt eval for stool transplant is being consdiered by pt and maybe worthwhile given multiple recurrences Patient seen and examined with ROYA Fish.  Agree with physical exam and assessment and plan.   - continue po vanco and add iv falgyl   - ID following  - outpt eval for stool transplant is being consdiered by pt and maybe worthwhile given multiple recurrences  - change IVF to ns+kcl  check BMP daily

## 2017-04-14 NOTE — PROGRESS NOTE ADULT - PROBLEM SELECTOR PLAN 2
-Cdiff positive  -C/w PO Vancomycin  -IVF  -ID consult for antibiotic management  -Pain control  -GI consult appreciated  -Contact precaution -Cdiff positive  -C/w PO Vancomycin and IV falgyl  - ID following  -IVF  -ID consult for antibiotic management  -Pain control  -GI consult appreciated  -Contact precaution

## 2017-04-14 NOTE — PROGRESS NOTE ADULT - PROBLEM SELECTOR PLAN 1
# Cdiff infection- CT showed Long segment colitis of the transverse, descending, and rectosigmoid  colon in keeping with history of C. difficile colitis. No bowel   obstruction or free air.  - continue vancomycin po and IV flagyl   - GI consult  - continue IV fluids  - monitor electrolytes  - as per ID recommend long term vancomycin taper and recommend f/u with Dr Ramirez at [facility] after discharge and completion of vanco therapy for possible fecal transplant

## 2017-04-14 NOTE — PROGRESS NOTE ADULT - SUBJECTIVE AND OBJECTIVE BOX
4/14- BM better this morning as per patient report- not as watery as yesterday and less frequent.    Vital Signs Last 24 Hrs  T(C): 37, Max: 37.4 (04-13 @ 21:55)  T(F): 98.6, Max: 99.3 (04-13 @ 21:55)  HR: 68 (67 - 77)  BP: 132/60 (112/83 - 136/40)  BP(mean): --  RR: 16 (16 - 18)  SpO2: 96% (96% - 100%)    MEDICATIONS  (STANDING):  valsartan 160milliGRAM(s) Oral daily  sodium chloride 0.9%. 1000milliLiter(s) IV Continuous <Continuous>  vancomycin    Solution 125milliGRAM(s) Oral every 6 hours  metroNIDAZOLE  IVPB 500milliGRAM(s) IV Intermittent every 8 hours  potassium chloride    Tablet ER 20milliEquivalent(s) Oral every 2 hours  enoxaparin Injectable 40milliGRAM(s) SubCutaneous every 24 hours  lactobacillus acidophilus 1Tablet(s) Oral two times a day with meals    MEDICATIONS  (PRN):  acetaminophen   Tablet 650milliGRAM(s) Oral every 6 hours PRN pain or fever                            12.7   17.8  )-----------( 240      ( 14 Apr 2017 06:08 )             37.1   04-14    145  |  114<H>  |  9   ----------------------------<  113<H>  3.1<L>   |  21<L>  |  0.66    Ca    8.1<L>      14 Apr 2017 06:08  Phos  2.2     04-14  Mg     1.8     04-14    TPro  7.3  /  Alb  3.3  /  TBili  0.8  /  DBili  x   /  AST  18  /  ALT  30  /  AlkPhos  75  04-12

## 2017-04-14 NOTE — PROGRESS NOTE ADULT - ASSESSMENT
75 y/o female with PMHx of HTN and recent C diff x 4 episode in the past 2 years presented with c/p abdominal pain, fever, diarrhea x 1 week. Pt admitted for management of sepsis secondary to C. diff.
77 y/o female with PMHx of HTN and recent C diff x 4 episode in the past 2 years presented with c/p abdominal pain, fever, diarrhea x 1 week. Pt admitted for management of sepsis secondary to C. diff.
77 y/o female with PMHx of HTN and recurrent C. diff, approximately 4 episodes of C. diff in the past 2 years with the most recent 2/2017, admitted 4/13 with worsening diarrhea, fevers, diffuse abd pain, cramping X 1 week, reports having about 15-20 episodes of watery diarrhea per day, she went to urgent care on 4/9 and stool culture was sent and she was started on oral vancomycin - she took about 2 doses. During her prior C diff episodes, she reports being on antibiotics beforehand, she was treated the first two times with 14 days of oral vancomycin and then the 3rd time was given 4 week course. Here, she was febrile to 101.1, wbc ct 22, CT abd/pelvis showedl Long segment colitis of the transverse, descending, and rectosigmoid   colon in keeping with history of C. difficile colitis. No bowel obstruction or free air, she was given IV flagyl and oral vancomycin, C diff PCR here + 4/13    1. sepsis/recurrent C diff colitis    - slowly improving    - continue with oral vancomycin [id number] + IV flagyl 500mg IV q8h day#2    - on contact precautions    - Ct with colitis, no obstruction or toxic megacolon    - stool cx (-)    - once improves, recommend long vancomycin taper and will recommend pt f/u with Dr Ramirez at [place] once this episode resolves and after completion of taper to see if candidate for fecal transplant    -rec probiotics BID    - avoid antibiotic use/PPIs    - supportive care    - monitor temps    - f/u CBC

## 2017-04-14 NOTE — PROGRESS NOTE ADULT - SUBJECTIVE AND OBJECTIVE BOX
75 y/o female with PMHx of HTN and recurrent C. diff, approximately 4 episodes of C. diff in the past 2 years with the most recent 2017, admitted  with worsening diarrhea, fevers, diffuse abd pain, cramping X 1 week, reports having about 15-20 episodes of watery diarrhea per day, she went to urgent care on  and stool culture was sent and she was started on oral vancomycin - she took about 2 doses. During her prior C diff episodes, she reports being on antibiotics beforehand, she was treated the first two times with 14 days of oral vancomycin and then the 3rd time was given 4 week course. Here, she was febrile to 101.1, wbc ct 22, CT abd/pelvis showedl Long segment colitis of the transverse, descending, and rectosigmoid   colon in keeping with history of C. difficile colitis. No bowel obstruction or free air, she was given IV flagyl and oral vancomycin, C diff PCR here + .     feels better  diarrhea improving   more formed    MEDICATIONS  (STANDING):  valsartan 160milliGRAM(s) Oral daily  sodium chloride 0.9%. 1000milliLiter(s) IV Continuous <Continuous>  vancomycin    Solution 125milliGRAM(s) Oral every 6 hours  metroNIDAZOLE  IVPB 500milliGRAM(s) IV Intermittent every 8 hours  potassium chloride    Tablet ER 20milliEquivalent(s) Oral every 2 hours  enoxaparin Injectable 40milliGRAM(s) SubCutaneous every 24 hours      Vital Signs Last 24 Hrs  T(C): 37, Max: 37.4 (- @ 21:55)  T(F): 98.6, Max: 99.3 (- @ 21:55)  HR: 68 (67 - 77)  BP: 132/60 (112/83 - 136/40)  BP(mean): --  RR: 16 (16 - 18)  SpO2: 96% (96% - 100%)          PE:  Constitutional: NAD, laying in bed  HEENT: NC/AT, EOMI, PERRLA  Neck: supple  Back: no tenderness  Respiratory: clear  Cardiovascular: S1S2 regular, no murmurs  Abdomen: soft, + tender, not distended, positive BS  Genitourinary: deferred  Rectal: deferred  Musculoskeletal: no muscle tenderness, no joint swelling or tenderness  Extremities: no pedal edema  Neurological: AxOx3, moving all extremities, no focal deficits  Skin: no rashes    Labs:                        12.7   17.8  )-----------( 240      ( 2017 06:08 )             37.1     -14    145  |  114<H>  |  9   ----------------------------<  113<H>  3.1<L>   |  21<L>  |  0.66    Ca    8.1<L>      2017 06:08  Phos  2.2     -  Mg     1.8         TPro  7.3  /  Alb  3.3  /  TBili  0.8  /  DBili  x   /  AST  18  /  ALT  30  /  AlkPhos  75  -           Urinalysis Basic - ( 2017 00:36 )    Color: Yellow / Appearance: Clear / S.020 / pH: x  Gluc: x / Ketone: Trace  / Bili: Negative / Urobili: Negative mg/dL   Blood: x / Protein: Negative mg/dL / Nitrite: Negative   Leuk Esterase: Trace / RBC: 3-5 /HPF / WBC 6-10   Sq Epi: x / Non Sq Epi: Few / Bacteria: Few    Culture - Blood (17 @ 06:07)    Specimen Source: .Blood None    Culture Results:   No growth to date.        Clostridium difficile Toxin by PCR (17 @ 01:59)    C Diff by PCR Result: Detected            Radiology: EXAM:  CT ABDOMEN AND PELVIS OC IC                            PROCEDURE DATE:  2017        INTERPRETATION:  CLINICAL INDICATION: Abdominal pain and diarrhea.   History of C. difficile colitis.    COMPARISON: CT abdomen pelvis 2016    TECHNIQUE: Axial CT of the abdomen and pelvis was performed after the   administration of oral and intravenous contrast. Coronal and sagittal   reformatted images were submitted.    Total of 90 cc of Omnipaque 350 was injected intravenously without   immediate complication.    FINDINGS:    LUNGS: Bibasilar dependent changes. No pleural effusion.    LIVER/GALLBLADDER: Subcentimeter hypoenhancing structure in the left   hepatic lobe, too small to characterize.  Normal in size with homogenous enhancement. No intrahepatic or   extrahepatic biliary ductal dilatation. Unremarkable gallbladder.    PANCREAS: No pancreatic ductal dilatation, peripancreatic fluid   collection, or focal pancreatic mass.    SPLEEN: Normal in size and enhancement.    KIDNEYS:Bilateral parapelvic cysts are present. Nonobstructing right   lower pole calculus measures 6 mm, unchanged.   Symmetric in size and enhancement without evidence of hydronephrosis. No   perinephric stranding or fluid collection.    ADRENAL GLANDS: Unremarkable.    BOWEL: Long segment continuous colonic wall thickening of the   rectosigmoid, descending, and transverse colon is evident. Relative   sparing of the cecum and ascending colon. No bowel obstruction or free   air.    URINARY BLADDER: Unremarkable.    PELVIC ORGANS: Peripherally calcified right adnexal lesion is unchanged.    LYMPH NODES: No evidence of intra-abdominal or para-aortic   lymphadenopathy.    BONES/SOFT TISSUES: Multilevel thoracolumbar spondylosis. Grade 1 L4-L5   and L5-S1 anterolisthesis due to advanced facet degeneration.    AORTA/MAJOR BRANCHES: Atherosclerotic calcification.    IMPRESSION:     Long segment colitis of the transverse, descending, and rectosigmoid   colon in keeping with history of C. difficile colitis. No bowel   obstruction or free air.    Advanced directives addressed: full resuscitation

## 2017-04-15 LAB
ANION GAP SERPL CALC-SCNC: 9 MMOL/L — SIGNIFICANT CHANGE UP (ref 5–17)
BUN SERPL-MCNC: 12 MG/DL — SIGNIFICANT CHANGE UP (ref 7–23)
CALCIUM SERPL-MCNC: 7.9 MG/DL — LOW (ref 8.5–10.1)
CHLORIDE SERPL-SCNC: 118 MMOL/L — HIGH (ref 96–108)
CO2 SERPL-SCNC: 22 MMOL/L — SIGNIFICANT CHANGE UP (ref 22–31)
CREAT SERPL-MCNC: 0.59 MG/DL — SIGNIFICANT CHANGE UP (ref 0.5–1.3)
CULTURE RESULTS: SIGNIFICANT CHANGE UP
GLUCOSE SERPL-MCNC: 93 MG/DL — SIGNIFICANT CHANGE UP (ref 70–99)
HCT VFR BLD CALC: 34.1 % — LOW (ref 34.5–45)
HGB BLD-MCNC: 11.6 G/DL — SIGNIFICANT CHANGE UP (ref 11.5–15.5)
MAGNESIUM SERPL-MCNC: 1.7 MG/DL — LOW (ref 1.8–2.4)
MCHC RBC-ENTMCNC: 30.2 PG — SIGNIFICANT CHANGE UP (ref 27–34)
MCHC RBC-ENTMCNC: 34 GM/DL — SIGNIFICANT CHANGE UP (ref 32–36)
MCV RBC AUTO: 88.8 FL — SIGNIFICANT CHANGE UP (ref 80–100)
PHOSPHATE SERPL-MCNC: 2.4 MG/DL — LOW (ref 2.5–4.5)
PLATELET # BLD AUTO: 246 K/UL — SIGNIFICANT CHANGE UP (ref 150–400)
POTASSIUM SERPL-MCNC: 3.9 MMOL/L — SIGNIFICANT CHANGE UP (ref 3.5–5.3)
POTASSIUM SERPL-SCNC: 3.9 MMOL/L — SIGNIFICANT CHANGE UP (ref 3.5–5.3)
RBC # BLD: 3.85 M/UL — SIGNIFICANT CHANGE UP (ref 3.8–5.2)
RBC # FLD: 13.9 % — SIGNIFICANT CHANGE UP (ref 10.3–14.5)
SODIUM SERPL-SCNC: 149 MMOL/L — HIGH (ref 135–145)
SPECIMEN SOURCE: SIGNIFICANT CHANGE UP
WBC # BLD: 13.7 K/UL — HIGH (ref 3.8–10.5)
WBC # FLD AUTO: 13.7 K/UL — HIGH (ref 3.8–10.5)

## 2017-04-15 PROCEDURE — 73630 X-RAY EXAM OF FOOT: CPT | Mod: 26,LT

## 2017-04-15 RX ORDER — KETOROLAC TROMETHAMINE 30 MG/ML
15 SYRINGE (ML) INJECTION EVERY 6 HOURS
Qty: 0 | Refills: 0 | Status: DISCONTINUED | OUTPATIENT
Start: 2017-04-15 | End: 2017-04-16

## 2017-04-15 RX ORDER — SODIUM CHLORIDE 9 MG/ML
1000 INJECTION, SOLUTION INTRAVENOUS
Qty: 0 | Refills: 0 | Status: DISCONTINUED | OUTPATIENT
Start: 2017-04-15 | End: 2017-04-16

## 2017-04-15 RX ORDER — MAGNESIUM SULFATE 500 MG/ML
2 VIAL (ML) INJECTION ONCE
Qty: 0 | Refills: 0 | Status: COMPLETED | OUTPATIENT
Start: 2017-04-15 | End: 2017-04-15

## 2017-04-15 RX ADMIN — VALSARTAN 160 MILLIGRAM(S): 80 TABLET ORAL at 06:37

## 2017-04-15 RX ADMIN — Medication 1 TABLET(S): at 07:40

## 2017-04-15 RX ADMIN — DEXTROSE MONOHYDRATE, SODIUM CHLORIDE, AND POTASSIUM CHLORIDE 100 MILLILITER(S): 50; .745; 4.5 INJECTION, SOLUTION INTRAVENOUS at 03:59

## 2017-04-15 RX ADMIN — Medication 63.75 MILLIMOLE(S): at 11:27

## 2017-04-15 RX ADMIN — Medication 15 MILLIGRAM(S): at 14:43

## 2017-04-15 RX ADMIN — Medication 125 MILLIGRAM(S): at 11:30

## 2017-04-15 RX ADMIN — Medication 100 MILLIGRAM(S): at 06:37

## 2017-04-15 RX ADMIN — Medication 1 TABLET(S): at 11:27

## 2017-04-15 RX ADMIN — Medication 100 MILLIGRAM(S): at 23:04

## 2017-04-15 RX ADMIN — SODIUM CHLORIDE 75 MILLILITER(S): 9 INJECTION, SOLUTION INTRAVENOUS at 14:38

## 2017-04-15 RX ADMIN — Medication 125 MILLIGRAM(S): at 03:41

## 2017-04-15 RX ADMIN — Medication 15 MILLIGRAM(S): at 15:18

## 2017-04-15 RX ADMIN — Medication 125 MILLIGRAM(S): at 07:41

## 2017-04-15 RX ADMIN — Medication 100 MILLIGRAM(S): at 00:23

## 2017-04-15 RX ADMIN — Medication 125 MILLIGRAM(S): at 17:37

## 2017-04-15 RX ADMIN — Medication 50 GRAM(S): at 10:02

## 2017-04-15 RX ADMIN — Medication 125 MILLIGRAM(S): at 23:04

## 2017-04-15 RX ADMIN — Medication 100 MILLIGRAM(S): at 15:58

## 2017-04-15 RX ADMIN — Medication 1 TABLET(S): at 17:34

## 2017-04-15 RX ADMIN — ENOXAPARIN SODIUM 40 MILLIGRAM(S): 100 INJECTION SUBCUTANEOUS at 09:38

## 2017-04-15 NOTE — PROGRESS NOTE ADULT - SUBJECTIVE AND OBJECTIVE BOX
4/14- BM better this morning as per patient report- not as watery as yesterday and less frequent.    4/15: Still with abd discomfort/pain and frequent loose stools though slowly improving daily.  No fever, chills, N, V.    REVIEW OF SYSTEMS:  All other review of systems is negative unless indicated above.    Vital Signs Last 24 Hrs  T(C): 36.9, Max: 37.4 (04-14 @ 21:08)  T(F): 98.4, Max: 99.3 (04-14 @ 21:08)  HR: 55 (55 - 71)  BP: 119/47 (119/42 - 126/38)  BP(mean): --  RR: 16 (16 - 17)  SpO2: 98% (98% - 98%)    PHYSICAL EXAM:    Constitutional: NAD, awake and alert, well-developed  HEENT: PERR, EOMI, Normal Hearing, MMM  Neck: Soft and supple  Respiratory: Breath sounds are clear bilaterally, No wheezing, rales or rhonchi  Cardiovascular: S1 and S2, regular rate and rhythm, no Murmurs, gallops or rubs  Gastrointestinal: Bowel Sounds present, soft, nontender, nondistended, no guarding, no rebound  Extremities: No peripheral edema  Neurological: A/O x 3, no focal deficits  Skin: No rashes                              11.6   13.7  )-----------( 246      ( 15 Apr 2017 05:59 )             34.1     04-15    149<H>  |  118<H>  |  12  ----------------------------<  93  3.9   |  22  |  0.59    Ca    7.9<L>      15 Apr 2017 05:59  Phos  2.4     04-15  Mg     1.7     04-15      CAPILLARY BLOOD GLUCOSE      MEDICATIONS  (STANDING):  valsartan 160milliGRAM(s) Oral daily  vancomycin    Solution 125milliGRAM(s) Oral every 6 hours  metroNIDAZOLE  IVPB 500milliGRAM(s) IV Intermittent every 8 hours  enoxaparin Injectable 40milliGRAM(s) SubCutaneous every 24 hours  lactobacillus acidophilus 1Tablet(s) Oral two times a day with meals  potassium acid phosphate/sodium acid phosphate tablet (K-PHOS No. 2) 1Tablet(s) Oral daily  dicyclomine 10milliGRAM(s) Oral once  dextrose 5% + sodium chloride 0.45% 1000milliLiter(s) IV Continuous <Continuous>    MEDICATIONS  (PRN):  acetaminophen   Tablet 650milliGRAM(s) Oral every 6 hours PRN pain or fever      Assessment and Plan:   		  75 y/o female with PMHx of HTN and recent C diff x 4 episode in the past 2 years presented with c/p abdominal pain, fever, diarrhea x 1 week. Pt admitted for management of sepsis secondary to C. diff.     Problem/Plan - 1: Sepsis secondary to Cdiff colitis  -CT showed Long segment colitis of the transverse, descending, and rectosigmoid  colon in keeping with history of C. difficile colitis. No bowel obstruction or free air.  - Sepsis and leukocytosis resolving  - continue vancomycin po and IV flagyl   - GI consult appreciated  - continue IV fluids  - monitor electrolytes and replete accordingly for hypomagnesemia, hypokalemia and hypophosphatemia  - as per ID recommend long term vancomycin taper and recommend f/u with Dr Ramirez at [place] after discharge and completion of vanco therapy for possible fecal transplant.   -Pain control  -Contact precautio-Cdiff positive  -C/w PO Vancomycin  -IVF  -ID consult for antibiotic management  -Pain control  -GI consult appreciated  -Contact precaution     Problem/Plan - 2:  ·  Problem: Essential hypertension.  Plan: continue current antihypertensive regimen.     Problem/Plan - 3:  ·  Problem: Prophylactic measure.  Plan: ~VTE venodynes.          - continue po vanco and add iv falgyl given worsenign alctae  - ID and GI eval  - outpt eval for stool transplant is being consdiered by pt and maybe worthwhile given multiple recurrencesAttending Statement:  >35 minutes spent on total encounter; more than 50% of the visit was spent counseling and/or coordinating care by the attending physician.  . 4/14- BM better this morning as per patient report- not as watery as yesterday and less frequent.    4/15: Still with abd discomfort/pain and frequent loose stools though slowly improving daily.  No fever, chills, N, V.    REVIEW OF SYSTEMS:  All other review of systems is negative unless indicated above.    Vital Signs Last 24 Hrs  T(C): 36.9, Max: 37.4 (04-14 @ 21:08)  T(F): 98.4, Max: 99.3 (04-14 @ 21:08)  HR: 55 (55 - 71)  BP: 119/47 (119/42 - 126/38)  BP(mean): --  RR: 16 (16 - 17)  SpO2: 98% (98% - 98%)    PHYSICAL EXAM:    Constitutional: NAD, awake and alert, well-developed  HEENT: PERR, EOMI, Normal Hearing, MMM  Neck: Soft and supple  Respiratory: Breath sounds are clear bilaterally, No wheezing, rales or rhonchi  Cardiovascular: S1 and S2, regular rate and rhythm, no Murmurs, gallops or rubs  Gastrointestinal: Bowel Sounds present, soft, nontender, nondistended, no guarding, no rebound  Extremities: No peripheral edema  Neurological: A/O x 3, no focal deficits  Skin: No rashes                              11.6   13.7  )-----------( 246      ( 15 Apr 2017 05:59 )             34.1     04-15    149<H>  |  118<H>  |  12  ----------------------------<  93  3.9   |  22  |  0.59    Ca    7.9<L>      15 Apr 2017 05:59  Phos  2.4     04-15  Mg     1.7     04-15      CAPILLARY BLOOD GLUCOSE      MEDICATIONS  (STANDING):  valsartan 160milliGRAM(s) Oral daily  vancomycin    Solution 125milliGRAM(s) Oral every 6 hours  metroNIDAZOLE  IVPB 500milliGRAM(s) IV Intermittent every 8 hours  enoxaparin Injectable 40milliGRAM(s) SubCutaneous every 24 hours  lactobacillus acidophilus 1Tablet(s) Oral two times a day with meals  potassium acid phosphate/sodium acid phosphate tablet (K-PHOS No. 2) 1Tablet(s) Oral daily  dicyclomine 10milliGRAM(s) Oral once  dextrose 5% + sodium chloride 0.45% 1000milliLiter(s) IV Continuous <Continuous>    MEDICATIONS  (PRN):  acetaminophen   Tablet 650milliGRAM(s) Oral every 6 hours PRN pain or fever      Assessment and Plan:   		  75 y/o female with PMHx of HTN and recent C diff x 4 episode in the past 2 years presented with c/p abdominal pain, fever, diarrhea x 1 week. Pt admitted for management of sepsis secondary to C. diff.     Problem/Plan - 1: Sepsis secondary to Cdiff colitis  -CT showed Long segment colitis of the transverse, descending, and rectosigmoid  colon in keeping with history of C. difficile colitis. No bowel obstruction or free air.  - Sepsis and leukocytosis resolving  - continue vancomycin po and IV flagyl   - GI consult appreciated  - continue IV fluids  - monitor electrolytes and replete accordingly for hypomagnesemia, hypokalemia and hypophosphatemia  - as per ID recommend long term vancomycin taper and recommend f/u with Dr Ramirez at [place] after discharge and completion of vanco therapy for possible fecal transplant.   -Pain control  -Contact precautio-Cdiff positive  -C/w PO Vancomycin  -IVF  -ID consult for antibiotic management  -Pain control  -GI consult appreciated  -Contact precaution    Problem/Plan - 3:   Right foot great toe pain: r/o fracture vs gout  -xray of foot  -uric acid level in am  -NSAID therapy      Problem/Plan - 4:  ·  Problem: Essential hypertension.  Plan: continue current antihypertensive regimen.     Problem/Plan - 5:  ·  Problem: Prophylactic measure.  Plan: ~VTE venodynes.          - continue po vanco and add iv falgyl given worsenign alctae  - ID and GI eval  - outpt eval for stool transplant is being consdiered by pt and maybe worthwhile given multiple recurrencesAttending Statement:  >35 minutes spent on total encounter; more than 50% of the visit was spent counseling and/or coordinating care by the attending physician.  .

## 2017-04-16 LAB
-  AMIKACIN: SIGNIFICANT CHANGE UP
-  AMPICILLIN/SULBACTAM: SIGNIFICANT CHANGE UP
-  AMPICILLIN: SIGNIFICANT CHANGE UP
-  AZTREONAM: SIGNIFICANT CHANGE UP
-  CEFAZOLIN: SIGNIFICANT CHANGE UP
-  CEFEPIME: SIGNIFICANT CHANGE UP
-  CEFOXITIN: SIGNIFICANT CHANGE UP
-  CEFTAZIDIME: SIGNIFICANT CHANGE UP
-  CEFTRIAXONE: SIGNIFICANT CHANGE UP
-  CIPROFLOXACIN: SIGNIFICANT CHANGE UP
-  ERTAPENEM: SIGNIFICANT CHANGE UP
-  GENTAMICIN: SIGNIFICANT CHANGE UP
-  LEVOFLOXACIN: SIGNIFICANT CHANGE UP
-  MEROPENEM: SIGNIFICANT CHANGE UP
-  NITROFURANTOIN: SIGNIFICANT CHANGE UP
-  PIPERACILLIN/TAZOBACTAM: SIGNIFICANT CHANGE UP
-  TOBRAMYCIN: SIGNIFICANT CHANGE UP
-  TRIMETHOPRIM/SULFAMETHOXAZOLE: SIGNIFICANT CHANGE UP
ANION GAP SERPL CALC-SCNC: 7 MMOL/L — SIGNIFICANT CHANGE UP (ref 5–17)
BUN SERPL-MCNC: 11 MG/DL — SIGNIFICANT CHANGE UP (ref 7–23)
CALCIUM SERPL-MCNC: 8.1 MG/DL — LOW (ref 8.5–10.1)
CHLORIDE SERPL-SCNC: 115 MMOL/L — HIGH (ref 96–108)
CO2 SERPL-SCNC: 22 MMOL/L — SIGNIFICANT CHANGE UP (ref 22–31)
CREAT SERPL-MCNC: 0.72 MG/DL — SIGNIFICANT CHANGE UP (ref 0.5–1.3)
CULTURE RESULTS: SIGNIFICANT CHANGE UP
GLUCOSE SERPL-MCNC: 132 MG/DL — HIGH (ref 70–99)
HCT VFR BLD CALC: 34.3 % — LOW (ref 34.5–45)
HGB BLD-MCNC: 11.4 G/DL — LOW (ref 11.5–15.5)
MAGNESIUM SERPL-MCNC: 2 MG/DL — SIGNIFICANT CHANGE UP (ref 1.8–2.4)
MCHC RBC-ENTMCNC: 29.1 PG — SIGNIFICANT CHANGE UP (ref 27–34)
MCHC RBC-ENTMCNC: 33.2 GM/DL — SIGNIFICANT CHANGE UP (ref 32–36)
MCV RBC AUTO: 87.6 FL — SIGNIFICANT CHANGE UP (ref 80–100)
METHOD TYPE: SIGNIFICANT CHANGE UP
ORGANISM # SPEC MICROSCOPIC CNT: SIGNIFICANT CHANGE UP
ORGANISM # SPEC MICROSCOPIC CNT: SIGNIFICANT CHANGE UP
PLATELET # BLD AUTO: 258 K/UL — SIGNIFICANT CHANGE UP (ref 150–400)
POTASSIUM SERPL-MCNC: 4.5 MMOL/L — SIGNIFICANT CHANGE UP (ref 3.5–5.3)
POTASSIUM SERPL-SCNC: 4.5 MMOL/L — SIGNIFICANT CHANGE UP (ref 3.5–5.3)
RBC # BLD: 3.92 M/UL — SIGNIFICANT CHANGE UP (ref 3.8–5.2)
RBC # FLD: 13.7 % — SIGNIFICANT CHANGE UP (ref 10.3–14.5)
SODIUM SERPL-SCNC: 144 MMOL/L — SIGNIFICANT CHANGE UP (ref 135–145)
SPECIMEN SOURCE: SIGNIFICANT CHANGE UP
WBC # BLD: 9.9 K/UL — SIGNIFICANT CHANGE UP (ref 3.8–10.5)
WBC # FLD AUTO: 9.9 K/UL — SIGNIFICANT CHANGE UP (ref 3.8–10.5)

## 2017-04-16 RX ORDER — KETOROLAC TROMETHAMINE 30 MG/ML
15 SYRINGE (ML) INJECTION EVERY 6 HOURS
Qty: 0 | Refills: 0 | Status: DISCONTINUED | OUTPATIENT
Start: 2017-04-16 | End: 2017-04-16

## 2017-04-16 RX ORDER — ONDANSETRON 8 MG/1
4 TABLET, FILM COATED ORAL EVERY 6 HOURS
Qty: 0 | Refills: 0 | Status: DISCONTINUED | OUTPATIENT
Start: 2017-04-16 | End: 2017-04-17

## 2017-04-16 RX ADMIN — SODIUM CHLORIDE 75 MILLILITER(S): 9 INJECTION, SOLUTION INTRAVENOUS at 06:38

## 2017-04-16 RX ADMIN — VALSARTAN 160 MILLIGRAM(S): 80 TABLET ORAL at 09:35

## 2017-04-16 RX ADMIN — Medication 125 MILLIGRAM(S): at 11:38

## 2017-04-16 RX ADMIN — Medication 125 MILLIGRAM(S): at 23:12

## 2017-04-16 RX ADMIN — ENOXAPARIN SODIUM 40 MILLIGRAM(S): 100 INJECTION SUBCUTANEOUS at 11:37

## 2017-04-16 RX ADMIN — Medication 1 TABLET(S): at 11:37

## 2017-04-16 RX ADMIN — Medication 100 MILLIGRAM(S): at 06:42

## 2017-04-16 RX ADMIN — Medication 30 MILLILITER(S): at 12:34

## 2017-04-16 RX ADMIN — Medication 1 TABLET(S): at 16:37

## 2017-04-16 RX ADMIN — Medication 1 TABLET(S): at 09:36

## 2017-04-16 RX ADMIN — ONDANSETRON 4 MILLIGRAM(S): 8 TABLET, FILM COATED ORAL at 16:36

## 2017-04-16 RX ADMIN — Medication 125 MILLIGRAM(S): at 06:43

## 2017-04-16 RX ADMIN — Medication 125 MILLIGRAM(S): at 17:33

## 2017-04-16 NOTE — PROGRESS NOTE ADULT - SUBJECTIVE AND OBJECTIVE BOX
4/14- BM better this morning as per patient report- not as watery as yesterday and less frequent.    4/15: Still with abd discomfort/pain and frequent loose stools though slowly improving daily.  No fever, chills, N, V.  4/16: Stool less frequent, abd discomfort improving daily though is now complaining of indigestion.  Denies fever, chills, N, V, CP, SOB.    REVIEW OF SYSTEMS:  All other review of systems is negative unless indicated above.    Vital Signs Last 24 Hrs  T(C): 37.1, Max: 37.1 (04-16 @ 11:14)  T(F): 98.7, Max: 98.7 (04-16 @ 11:14)  HR: 53 (53 - 57)  BP: 131/44 (124/47 - 133/45)  BP(mean): --  RR: 16 (16 - 16)  SpO2: 99% (97% - 99%)    PHYSICAL EXAM:    Constitutional: NAD, awake and alert, well-developed  HEENT: PERR, EOMI, Normal Hearing, MMM  Neck: Soft and supple  Respiratory: Breath sounds are clear bilaterally, No wheezing, rales or rhonchi  Cardiovascular: S1 and S2, regular rate and rhythm, no Murmurs, gallops or rubs  Gastrointestinal: Bowel Sounds present, soft, nontender, nondistended, no guarding, no rebound  Extremities: No peripheral edema  Neurological: A/O x 3, no focal deficits  Skin: No rashes                                11.4   9.9   )-----------( 258      ( 16 Apr 2017 07:33 )             34.3     04-16    144  |  115<H>  |  11  ----------------------------<  132<H>  4.5   |  22  |  0.72    Ca    8.1<L>      16 Apr 2017 07:33  Phos  2.4     04-15  Mg     2.0     04-16        MEDICATIONS  (STANDING):  valsartan 160milliGRAM(s) Oral daily  vancomycin    Solution 125milliGRAM(s) Oral every 6 hours  metroNIDAZOLE  IVPB 500milliGRAM(s) IV Intermittent every 8 hours  enoxaparin Injectable 40milliGRAM(s) SubCutaneous every 24 hours  lactobacillus acidophilus 1Tablet(s) Oral two times a day with meals  potassium acid phosphate/sodium acid phosphate tablet (K-PHOS No. 2) 1Tablet(s) Oral daily  dicyclomine 10milliGRAM(s) Oral once    MEDICATIONS  (PRN):  acetaminophen   Tablet 650milliGRAM(s) Oral every 6 hours PRN pain or fever  ketorolac   Injectable 15milliGRAM(s) IV Push every 6 hours PRN Severe Pain (7 - 10)  aluminum hydroxide/magnesium hydroxide/simethicone Suspension 30milliLiter(s) Oral every 4 hours PRN Dyspepsia      Assessment and Plan:   		  77 y/o female with PMHx of HTN and recent C diff x 4 episode in the past 2 years presented with c/p abdominal pain, fever, diarrhea x 1 week. Pt admitted for management of sepsis secondary to C. diff.     Problem/Plan - 1: Sepsis secondary to Cdiff colitis  -CT showed Long segment colitis of the transverse, descending, and rectosigmoid  colon in keeping with history of C. difficile colitis. No bowel obstruction or free air.  - Sepsis and leukocytosis resolving  - continue vancomycin po  - d/c IV flagyl (4/16)  - GI consult appreciated  - d/c IVFs (4/16)  - monitor electrolytes and replete accordingly for hypomagnesemia, hypokalemia and hypophosphatemia  - as per ID recommend long term vancomycin taper and recommend f/u with Dr Ramirez at [place] after discharge and completion of vanco therapy for possible fecal transplant.   Will attempt for case management to set up appointment.  -Pain control  -Contact precautio-Cdiff positive  -C/w PO Vancomycin  -IVF  -ID consult for antibiotic management  -Pain control  -GI consult appreciated  -Contact precaution    Problem/Plan - 3:   Right foot great toe pain: r/o fracture vs gout - Resolved.  -xray of foot f/u  -uric acid f/u   -NSAID therapy      Problem/Plan - 4:  ·  Problem: Essential hypertension.  Plan: continue current antihypertensive regimen.     Problem/Plan - 5:  ·  Problem: Prophylactic measure.  Plan: ~VTE venodynes.          - continue po vanco and add iv falgyl given worsenign alctae  - ID and GI eval  - outpt eval for stool transplant is being consdiered by pt and maybe worthwhile given multiple recurrencesAttending Statement:  >35 minutes spent on total encounter; more than 50% of the visit was spent counseling and/or coordinating care by the attending physician.  .

## 2017-04-17 ENCOUNTER — TRANSCRIPTION ENCOUNTER (OUTPATIENT)
Age: 77
End: 2017-04-17

## 2017-04-17 VITALS
RESPIRATION RATE: 16 BRPM | OXYGEN SATURATION: 98 % | HEART RATE: 52 BPM | DIASTOLIC BLOOD PRESSURE: 41 MMHG | TEMPERATURE: 98 F | SYSTOLIC BLOOD PRESSURE: 134 MMHG

## 2017-04-17 LAB
ANION GAP SERPL CALC-SCNC: 7 MMOL/L — SIGNIFICANT CHANGE UP (ref 5–17)
BUN SERPL-MCNC: 12 MG/DL — SIGNIFICANT CHANGE UP (ref 7–23)
CALCIUM SERPL-MCNC: 8.4 MG/DL — LOW (ref 8.5–10.1)
CHLORIDE SERPL-SCNC: 116 MMOL/L — HIGH (ref 96–108)
CO2 SERPL-SCNC: 23 MMOL/L — SIGNIFICANT CHANGE UP (ref 22–31)
CREAT SERPL-MCNC: 0.69 MG/DL — SIGNIFICANT CHANGE UP (ref 0.5–1.3)
GLUCOSE SERPL-MCNC: 95 MG/DL — SIGNIFICANT CHANGE UP (ref 70–99)
POTASSIUM SERPL-MCNC: 3.9 MMOL/L — SIGNIFICANT CHANGE UP (ref 3.5–5.3)
POTASSIUM SERPL-SCNC: 3.9 MMOL/L — SIGNIFICANT CHANGE UP (ref 3.5–5.3)
SODIUM SERPL-SCNC: 146 MMOL/L — HIGH (ref 135–145)

## 2017-04-17 RX ORDER — VANCOMYCIN HCL 1 G
2.5 VIAL (EA) INTRAVENOUS
Qty: 300 | Refills: 0 | OUTPATIENT
Start: 2017-04-17

## 2017-04-17 RX ORDER — LACTOBACILLUS ACIDOPHILUS 100MM CELL
1 CAPSULE ORAL
Qty: 120 | Refills: 0 | OUTPATIENT
Start: 2017-04-17 | End: 2017-06-16

## 2017-04-17 RX ADMIN — Medication 125 MILLIGRAM(S): at 11:17

## 2017-04-17 RX ADMIN — Medication 1 TABLET(S): at 11:15

## 2017-04-17 RX ADMIN — Medication 125 MILLIGRAM(S): at 06:11

## 2017-04-17 RX ADMIN — Medication 1 TABLET(S): at 08:20

## 2017-04-17 RX ADMIN — ENOXAPARIN SODIUM 40 MILLIGRAM(S): 100 INJECTION SUBCUTANEOUS at 11:15

## 2017-04-17 RX ADMIN — VALSARTAN 160 MILLIGRAM(S): 80 TABLET ORAL at 06:09

## 2017-04-17 RX ADMIN — Medication 125 MILLIGRAM(S): at 17:23

## 2017-04-17 RX ADMIN — Medication 1 TABLET(S): at 17:23

## 2017-04-17 NOTE — DISCHARGE NOTE ADULT - PATIENT PORTAL LINK FT
“You can access the [employer] Patient Portal, offered by [facility], by registering with the following website: [email or web address]”

## 2017-04-17 NOTE — DISCHARGE NOTE ADULT - INSTRUCTIONS
Instructed to return to ED with increase symptoms of Nausea, vomiting, diarrhea, blood in stool, fever over 102, and general malaise. Aware of follow up with other hospital.

## 2017-04-17 NOTE — PROGRESS NOTE ADULT - SUBJECTIVE AND OBJECTIVE BOX
4/17- still with diarrhea but less than previous day    REVIEW OF SYSTEMS:  All other review of systems is negative unless indicated above.    Vital Signs Last 24 Hrs  T(C): 36.7, Max: 37.4 (04-16 @ 21:30)  T(F): 98, Max: 99.4 (04-16 @ 21:30)  HR: 52 (52 - 60)  BP: 134/41 (129/56 - 143/51)  BP(mean): --  RR: 16 (16 - 18)  SpO2: 98% (96% - 98%)    PHYSICAL EXAM:    Constitutional: NAD, awake and alert, well-developed  HEENT: PERR, EOMI, Normal Hearing, MMM  Neck: Soft and supple  Respiratory: Breath sounds are clear bilaterally, No wheezing, rales or rhonchi  Cardiovascular: S1 and S2, regular rate and rhythm, no Murmurs, gallops or rubs  Gastrointestinal: Bowel Sounds present, soft, nontender, nondistended, no guarding, no rebound  Extremities: No peripheral edema  Neurological: A/O x 3, no focal deficits  Skin: No rashes                                11.4   9.9   )-----------( 258      ( 16 Apr 2017 07:33 )             34.3     04-16    144  |  115<H>  |  11  ----------------------------<  132<H>  4.5   |  22  |  0.72    Ca    8.1<L>      16 Apr 2017 07:33  Phos  2.4     04-15  Mg     2.0     04-16        MEDICATIONS  (STANDING):  valsartan 160milliGRAM(s) Oral daily  vancomycin    Solution 125milliGRAM(s) Oral every 6 hours  enoxaparin Injectable 40milliGRAM(s) SubCutaneous every 24 hours  lactobacillus acidophilus 1Tablet(s) Oral two times a day with meals  potassium acid phosphate/sodium acid phosphate tablet (K-PHOS No. 2) 1Tablet(s) Oral daily  dicyclomine 10milliGRAM(s) Oral once    MEDICATIONS  (PRN):  acetaminophen   Tablet 650milliGRAM(s) Oral every 6 hours PRN pain or fever  aluminum hydroxide/magnesium hydroxide/simethicone Suspension 30milliLiter(s) Oral every 4 hours PRN Dyspepsia  ketorolac   Injectable 15milliGRAM(s) IV Push every 6 hours PRN Severe Pain (7 - 10)  ondansetron Injectable 4milliGRAM(s) IV Push every 6 hours PRN Nausea and/or Vomiting        Assessment and Plan:   		  77 y/o female with PMHx of HTN and recent C diff x 4 episode in the past 2 years presented with c/p abdominal pain, fever, diarrhea x 1 week. Pt admitted for management of sepsis secondary to C. diff.     Problem/Plan - 1: Sepsis secondary to Cdiff colitis  -CT showed Long segment colitis of the transverse, descending, and rectosigmoid  colon in keeping with history of C. difficile colitis. No bowel obstruction or free air.  - Sepsis and leukocytosis resolving  - continue vancomycin po  - d/c IV flagyl (4/16)  - GI consult appreciated  - d/c IVFs (4/16)  - monitor electrolytes and replete accordingly for hypomagnesemia, hypokalemia and hypophosphatemia  - as per ID recommend long term vancomycin taper and recommend f/u with Dr Ramirez at [place] after discharge and completion of vanco therapy for possible fecal transplant.    - Pain control    -C/w PO Vancomycin  -IVF  -ID consult for antibiotic management  -Pain control  -GI consult appreciated  -Contact precaution    Problem/Plan - 3:   Right foot great toe pain: r/o fracture vs gout - Resolved.  -xray of foot f/u- no fracture  -uric acid f/u   -NSAID therapy      Problem/Plan - 4:  ·  Problem: Essential hypertension.  Plan: continue current antihypertensive regimen.     Problem/Plan - 5:  ·  Problem: Prophylactic measure.  Plan: ~VTE venodynes.    Case discussed with Dr Glass. Plan for discharge home today on 5 weeks Vancomycin taper for Cdiff.       - continue po vanco and add iv falgyl given worsenign alctae  - ID and GI eval  - outpt eval for stool transplant is being consdiered by pt and maybe worthwhile given multiple recurrencesAttending Statement:  >35 minutes spent on total encounter; more than 50% of the visit was spent counseling and/or coordinating care by the attending physician.  . 4/17- still with diarrhea but less than previous day    REVIEW OF SYSTEMS:  All other review of systems is negative unless indicated above.    Vital Signs Last 24 Hrs  T(C): 36.7, Max: 37.4 (04-16 @ 21:30)  T(F): 98, Max: 99.4 (04-16 @ 21:30)  HR: 52 (52 - 60)  BP: 134/41 (129/56 - 143/51)  BP(mean): --  RR: 16 (16 - 18)  SpO2: 98% (96% - 98%)    PHYSICAL EXAM:    Constitutional: NAD, awake and alert, well-developed  HEENT: PERR, EOMI, Normal Hearing, MMM  Neck: Soft and supple  Respiratory: Breath sounds are clear bilaterally, No wheezing, rales or rhonchi  Cardiovascular: S1 and S2, regular rate and rhythm, no Murmurs, gallops or rubs  Gastrointestinal: Bowel Sounds present, soft, nontender, nondistended, no guarding, no rebound  Extremities: No peripheral edema  Neurological: A/O x 3, no focal deficits  Skin: No rashes                                11.4   9.9   )-----------( 258      ( 16 Apr 2017 07:33 )             34.3     04-16    144  |  115<H>  |  11  ----------------------------<  132<H>  4.5   |  22  |  0.72    Ca    8.1<L>      16 Apr 2017 07:33  Phos  2.4     04-15  Mg     2.0     04-16        MEDICATIONS  (STANDING):  valsartan 160milliGRAM(s) Oral daily  vancomycin    Solution 125milliGRAM(s) Oral every 6 hours  enoxaparin Injectable 40milliGRAM(s) SubCutaneous every 24 hours  lactobacillus acidophilus 1Tablet(s) Oral two times a day with meals  potassium acid phosphate/sodium acid phosphate tablet (K-PHOS No. 2) 1Tablet(s) Oral daily  dicyclomine 10milliGRAM(s) Oral once    MEDICATIONS  (PRN):  acetaminophen   Tablet 650milliGRAM(s) Oral every 6 hours PRN pain or fever  aluminum hydroxide/magnesium hydroxide/simethicone Suspension 30milliLiter(s) Oral every 4 hours PRN Dyspepsia  ketorolac   Injectable 15milliGRAM(s) IV Push every 6 hours PRN Severe Pain (7 - 10)  ondansetron Injectable 4milliGRAM(s) IV Push every 6 hours PRN Nausea and/or Vomiting        Assessment and Plan:   		  77 y/o female with PMHx of HTN and recent C diff x 4 episode in the past 2 years presented with c/p abdominal pain, fever, diarrhea x 1 week. Pt admitted for management of sepsis secondary to C. diff.     Problem/Plan - 1: Sepsis secondary to Cdiff colitis  -CT showed Long segment colitis of the transverse, descending, and rectosigmoid  colon in keeping with history of C. difficile colitis. No bowel obstruction or free air.  - Sepsis and leukocytosis resolving  - continue vancomycin po  - d/c IV flagyl (4/16)  - GI consult appreciated  - d/c IVFs (4/16)  - monitor electrolytes and replete accordingly for hypomagnesemia, hypokalemia and hypophosphatemia  - as per ID recommend long term vancomycin taper and recommend f/u with Dr Ramirez at [place] after discharge and completion of vanco therapy for possible fecal transplant.    - Pain control    -C/w PO Vancomycin  -IVF  -ID consult for antibiotic management  -Pain control  -GI consult appreciated  -Contact precaution    Problem/Plan - 3:   Right foot great toe pain: r/o fracture vs gout - Resolved.  -xray of foot f/u- no fracture  -uric acid f/u   -NSAID therapy      Problem/Plan - 4:  ·  Problem: Essential hypertension.  Plan: continue current antihypertensive regimen.     Problem/Plan - 5:  ·  Problem: Prophylactic measure.  Plan: ~VTE venodynes.    Case discussed with Dr Glass. Plan for discharge home today on 5 weeks Vancomycin taper for Cdiff.       - continue po vanco and add iv falgyl given worsenign alctae  - ID and GI eval  - outpt eval for stool transplant is being consdiered by pt and maybe worthwhile given multiple recurrencesAttending Statement:  agree with above a/p  >35 minutes spent on total encounter and discharge; more than 50% of the visit was spent counseling and/or coordinating care by the attending physician.  .

## 2017-04-17 NOTE — DISCHARGE NOTE ADULT - VISION (WITH CORRECTIVE LENSES IF THE PATIENT USUALLY WEARS THEM):
Glasses/Partially impaired: cannot see medication labels or newsprint, but can see obstacles in path, and the surrounding layout; can count fingers at arm's length

## 2017-04-17 NOTE — DISCHARGE NOTE ADULT - MEDICATION SUMMARY - MEDICATIONS TO TAKE
I will START or STAY ON the medications listed below when I get home from the hospital:    valsartan 160 mg oral capsule  --  by mouth   -- Indication: For HTN    vancomycin 250 mg/5 mL oral solution  -- 125mg every 6 hours 4/17-24  125mg every 8 hours 4/25-5-1  125mg every12 hours 5/2-8  125mg daily for 5/9-15  125mg every other day 5/16-22  -- Indication: For Clostridium difficile colitis    lactobacillus acidophilus oral capsule  -- 1 cap(s) by mouth 2 times a day  -- Indication: For Clostridium difficile colitis

## 2017-04-17 NOTE — DISCHARGE NOTE ADULT - CARE PROVIDER_API CALL
Alton Ramirez), Geriatric Medicine; Infectious Disease; Internal Medicine  [address] [place] [address]  Phone: [phone]  Fax: [phone]    Minor Carballo), Cardiovascular Disease; Internal Medicine  [address] [place] [address]  Phone: [phone]  Fax: [phone]

## 2017-04-17 NOTE — DISCHARGE NOTE ADULT - CARE PLAN
Principal Discharge DX:	Clostridium difficile colitis  Goal:	prevent worsening  Instructions for follow-up, activity and diet:	continue Vancomycin oral antibiotic as per recommendation  f/u with your PCP in 1 week  Notify your PCP for worsening diarrhea  Make an appointment with Dr Ramirez at [facility] for possible fecal transplant  Secondary Diagnosis:	Essential hypertension  Goal:	prevent worsening  Instructions for follow-up, activity and diet:	continue Valsartan. Principal Discharge DX:	Clostridium difficile colitis  Goal:	prevent worsening  Instructions for follow-up, activity and diet:	continue Vancomycin oral antibiotic as per recommendation  f/u with your PCP in 1 week  Notify your PCP for worsening diarrhea  Make an appointment with Dr Ramirez at [facility] for possible fecal transplant  Secondary Diagnosis:	Essential hypertension  Goal:	prevent worsening  Instructions for follow-up, activity and diet:	continue Valsartan.

## 2017-04-17 NOTE — DISCHARGE NOTE ADULT - PLAN OF CARE
prevent worsening continue Vancomycin oral antibiotic as per recommendation  f/u with your PCP in 1 week  Notify your PCP for worsening diarrhea  Make an appointment with Dr Ramirez at [facility] for possible fecal transplant continue Valsartan.

## 2017-04-17 NOTE — DISCHARGE NOTE ADULT - CARE PROVIDERS DIRECT ADDRESSES
[email or web address]

## 2017-04-18 LAB
CULTURE RESULTS: SIGNIFICANT CHANGE UP
SPECIMEN SOURCE: SIGNIFICANT CHANGE UP

## 2017-04-19 LAB
CULTURE RESULTS: SIGNIFICANT CHANGE UP
SPECIMEN SOURCE: SIGNIFICANT CHANGE UP

## 2017-04-20 DIAGNOSIS — A41.9 SEPSIS, UNSPECIFIED ORGANISM: ICD-10-CM

## 2017-04-20 DIAGNOSIS — A04.7 ENTEROCOLITIS DUE TO CLOSTRIDIUM DIFFICILE: ICD-10-CM

## 2017-04-20 DIAGNOSIS — I10 ESSENTIAL (PRIMARY) HYPERTENSION: ICD-10-CM

## 2017-04-20 DIAGNOSIS — R19.7 DIARRHEA, UNSPECIFIED: ICD-10-CM

## 2017-04-20 DIAGNOSIS — Z88.1 ALLERGY STATUS TO OTHER ANTIBIOTIC AGENTS STATUS: ICD-10-CM

## 2017-04-20 DIAGNOSIS — E87.6 HYPOKALEMIA: ICD-10-CM

## 2017-05-18 ENCOUNTER — APPOINTMENT (OUTPATIENT)
Dept: INFECTIOUS DISEASE | Facility: CLINIC | Age: 77
End: 2017-05-18

## 2017-05-18 VITALS
SYSTOLIC BLOOD PRESSURE: 139 MMHG | TEMPERATURE: 98.2 F | WEIGHT: 130 LBS | OXYGEN SATURATION: 98 % | HEART RATE: 56 BPM | DIASTOLIC BLOOD PRESSURE: 63 MMHG | BODY MASS INDEX: 26.21 KG/M2 | HEIGHT: 59 IN

## 2017-06-14 ENCOUNTER — APPOINTMENT (OUTPATIENT)
Dept: INFECTIOUS DISEASE | Facility: CLINIC | Age: 77
End: 2017-06-14

## 2017-06-14 VITALS
WEIGHT: 127 LBS | HEIGHT: 59 IN | SYSTOLIC BLOOD PRESSURE: 156 MMHG | OXYGEN SATURATION: 100 % | HEART RATE: 51 BPM | RESPIRATION RATE: 16 BRPM | BODY MASS INDEX: 25.6 KG/M2 | TEMPERATURE: 97.3 F | DIASTOLIC BLOOD PRESSURE: 56 MMHG

## 2017-06-14 VITALS — DIASTOLIC BLOOD PRESSURE: 64 MMHG | SYSTOLIC BLOOD PRESSURE: 150 MMHG

## 2017-06-14 RX ORDER — MONTELUKAST 10 MG/1
10 TABLET, FILM COATED ORAL
Qty: 90 | Refills: 0 | Status: DISCONTINUED | COMMUNITY
Start: 2017-04-26 | End: 2017-06-14

## 2017-06-14 RX ORDER — VANCOMYCIN
50 KIT 4 TIMES DAILY
Qty: 100 | Refills: 3 | Status: DISCONTINUED | COMMUNITY
Start: 2017-05-18 | End: 2017-06-14

## 2017-06-16 ENCOUNTER — TRANSCRIPTION ENCOUNTER (OUTPATIENT)
Age: 77
End: 2017-06-16

## 2017-06-21 ENCOUNTER — APPOINTMENT (OUTPATIENT)
Dept: INFECTIOUS DISEASE | Facility: CLINIC | Age: 77
End: 2017-06-21

## 2017-06-21 VITALS
OXYGEN SATURATION: 100 % | BODY MASS INDEX: 25.6 KG/M2 | HEIGHT: 59 IN | WEIGHT: 127 LBS | DIASTOLIC BLOOD PRESSURE: 66 MMHG | SYSTOLIC BLOOD PRESSURE: 158 MMHG | HEART RATE: 48 BPM | RESPIRATION RATE: 16 BRPM | TEMPERATURE: 97 F

## 2017-06-28 ENCOUNTER — CLINICAL ADVICE (OUTPATIENT)
Age: 77
End: 2017-06-28

## 2017-06-28 ENCOUNTER — EMERGENCY (EMERGENCY)
Facility: HOSPITAL | Age: 77
LOS: 0 days | Discharge: HOME | End: 2017-06-28
Attending: EMERGENCY MEDICINE | Admitting: EMERGENCY MEDICINE
Payer: MEDICARE

## 2017-06-28 VITALS
TEMPERATURE: 99 F | OXYGEN SATURATION: 100 % | SYSTOLIC BLOOD PRESSURE: 145 MMHG | DIASTOLIC BLOOD PRESSURE: 62 MMHG | HEART RATE: 78 BPM | RESPIRATION RATE: 19 BRPM

## 2017-06-28 VITALS — HEIGHT: 59 IN | WEIGHT: 126.99 LBS

## 2017-06-28 DIAGNOSIS — A04.7 ENTEROCOLITIS DUE TO CLOSTRIDIUM DIFFICILE: ICD-10-CM

## 2017-06-28 DIAGNOSIS — D35.1 BENIGN NEOPLASM OF PARATHYROID GLAND: ICD-10-CM

## 2017-06-28 DIAGNOSIS — D35.1 BENIGN NEOPLASM OF PARATHYROID GLAND: Chronic | ICD-10-CM

## 2017-06-28 DIAGNOSIS — Z98.890 OTHER SPECIFIED POSTPROCEDURAL STATES: Chronic | ICD-10-CM

## 2017-06-28 DIAGNOSIS — R50.9 FEVER, UNSPECIFIED: ICD-10-CM

## 2017-06-28 DIAGNOSIS — I10 ESSENTIAL (PRIMARY) HYPERTENSION: ICD-10-CM

## 2017-06-28 LAB
ALBUMIN SERPL ELPH-MCNC: 3.5 G/DL — SIGNIFICANT CHANGE UP (ref 3.3–5)
ALP SERPL-CCNC: 80 U/L — SIGNIFICANT CHANGE UP (ref 40–120)
ALT FLD-CCNC: 26 U/L — SIGNIFICANT CHANGE UP (ref 12–78)
ANION GAP SERPL CALC-SCNC: 6 MMOL/L — SIGNIFICANT CHANGE UP (ref 5–17)
APPEARANCE UR: CLEAR — SIGNIFICANT CHANGE UP
APTT BLD: 27.9 SEC — SIGNIFICANT CHANGE UP (ref 27.5–37.4)
AST SERPL-CCNC: 23 U/L — SIGNIFICANT CHANGE UP (ref 15–37)
BACTERIA # UR AUTO: (no result)
BASOPHILS # BLD AUTO: 0.1 K/UL — SIGNIFICANT CHANGE UP (ref 0–0.2)
BASOPHILS NFR BLD AUTO: 0.9 % — SIGNIFICANT CHANGE UP (ref 0–2)
BILIRUB SERPL-MCNC: 0.9 MG/DL — SIGNIFICANT CHANGE UP (ref 0.2–1.2)
BILIRUB UR-MCNC: NEGATIVE — SIGNIFICANT CHANGE UP
BUN SERPL-MCNC: 20 MG/DL — SIGNIFICANT CHANGE UP (ref 7–23)
CALCIUM SERPL-MCNC: 9.7 MG/DL — SIGNIFICANT CHANGE UP (ref 8.5–10.1)
CHLORIDE SERPL-SCNC: 107 MMOL/L — SIGNIFICANT CHANGE UP (ref 96–108)
CO2 SERPL-SCNC: 25 MMOL/L — SIGNIFICANT CHANGE UP (ref 22–31)
COLOR SPEC: YELLOW — SIGNIFICANT CHANGE UP
CREAT SERPL-MCNC: 0.77 MG/DL — SIGNIFICANT CHANGE UP (ref 0.5–1.3)
DIFF PNL FLD: (no result)
EOSINOPHIL # BLD AUTO: 0.1 K/UL — SIGNIFICANT CHANGE UP (ref 0–0.5)
EOSINOPHIL NFR BLD AUTO: 1.1 % — SIGNIFICANT CHANGE UP (ref 0–6)
EPI CELLS # UR: SIGNIFICANT CHANGE UP
GLUCOSE SERPL-MCNC: 106 MG/DL — HIGH (ref 70–99)
GLUCOSE UR QL: NEGATIVE MG/DL — SIGNIFICANT CHANGE UP
HCT VFR BLD CALC: 41.5 % — SIGNIFICANT CHANGE UP (ref 34.5–45)
HGB BLD-MCNC: 13.8 G/DL — SIGNIFICANT CHANGE UP (ref 11.5–15.5)
INR BLD: 1.05 RATIO — SIGNIFICANT CHANGE UP (ref 0.88–1.16)
KETONES UR-MCNC: NEGATIVE — SIGNIFICANT CHANGE UP
LACTATE SERPL-SCNC: 1.3 MMOL/L — SIGNIFICANT CHANGE UP (ref 0.7–2)
LEUKOCYTE ESTERASE UR-ACNC: (no result)
LYMPHOCYTES # BLD AUTO: 2.8 K/UL — SIGNIFICANT CHANGE UP (ref 1–3.3)
LYMPHOCYTES # BLD AUTO: 26.4 % — SIGNIFICANT CHANGE UP (ref 13–44)
MCHC RBC-ENTMCNC: 28.9 PG — SIGNIFICANT CHANGE UP (ref 27–34)
MCHC RBC-ENTMCNC: 33.2 GM/DL — SIGNIFICANT CHANGE UP (ref 32–36)
MCV RBC AUTO: 87.1 FL — SIGNIFICANT CHANGE UP (ref 80–100)
MONOCYTES # BLD AUTO: 1.3 K/UL — HIGH (ref 0–0.9)
MONOCYTES NFR BLD AUTO: 12.2 % — SIGNIFICANT CHANGE UP (ref 2–14)
NEUTROPHILS # BLD AUTO: 6.2 K/UL — SIGNIFICANT CHANGE UP (ref 1.8–7.4)
NEUTROPHILS NFR BLD AUTO: 59.3 % — SIGNIFICANT CHANGE UP (ref 43–77)
NITRITE UR-MCNC: NEGATIVE — SIGNIFICANT CHANGE UP
PH UR: 6 — SIGNIFICANT CHANGE UP (ref 5–8)
PLATELET # BLD AUTO: 260 K/UL — SIGNIFICANT CHANGE UP (ref 150–400)
POTASSIUM SERPL-MCNC: 3.7 MMOL/L — SIGNIFICANT CHANGE UP (ref 3.5–5.3)
POTASSIUM SERPL-SCNC: 3.7 MMOL/L — SIGNIFICANT CHANGE UP (ref 3.5–5.3)
PROT SERPL-MCNC: 8 GM/DL — SIGNIFICANT CHANGE UP (ref 6–8.3)
PROT UR-MCNC: NEGATIVE MG/DL — SIGNIFICANT CHANGE UP
PROTHROM AB SERPL-ACNC: 11.3 SEC — SIGNIFICANT CHANGE UP (ref 9.8–12.7)
RBC # BLD: 4.77 M/UL — SIGNIFICANT CHANGE UP (ref 3.8–5.2)
RBC # FLD: 14.1 % — SIGNIFICANT CHANGE UP (ref 10.3–14.5)
RBC CASTS # UR COMP ASSIST: (no result) /HPF (ref 0–4)
SODIUM SERPL-SCNC: 138 MMOL/L — SIGNIFICANT CHANGE UP (ref 135–145)
SP GR SPEC: 1.01 — SIGNIFICANT CHANGE UP (ref 1.01–1.02)
UROBILINOGEN FLD QL: NEGATIVE MG/DL — SIGNIFICANT CHANGE UP
WBC # BLD: 10.4 K/UL — SIGNIFICANT CHANGE UP (ref 3.8–10.5)
WBC # FLD AUTO: 10.4 K/UL — SIGNIFICANT CHANGE UP (ref 3.8–10.5)
WBC UR QL: (no result)

## 2017-06-28 PROCEDURE — 74177 CT ABD & PELVIS W/CONTRAST: CPT | Mod: 26

## 2017-06-28 PROCEDURE — 99285 EMERGENCY DEPT VISIT HI MDM: CPT

## 2017-06-28 PROCEDURE — 71020: CPT | Mod: 26

## 2017-06-28 RX ORDER — SODIUM CHLORIDE 9 MG/ML
1000 INJECTION INTRAMUSCULAR; INTRAVENOUS; SUBCUTANEOUS ONCE
Qty: 0 | Refills: 0 | Status: COMPLETED | OUTPATIENT
Start: 2017-06-28 | End: 2017-06-28

## 2017-06-28 RX ORDER — CEPHALEXIN 500 MG
1 CAPSULE ORAL
Qty: 28 | Refills: 0 | OUTPATIENT
Start: 2017-06-28 | End: 2017-07-05

## 2017-06-28 RX ORDER — VANCOMYCIN HCL 1 G
2.5 VIAL (EA) INTRAVENOUS
Qty: 300 | Refills: 0 | OUTPATIENT
Start: 2017-06-28

## 2017-06-28 RX ADMIN — SODIUM CHLORIDE 1000 MILLILITER(S): 9 INJECTION INTRAMUSCULAR; INTRAVENOUS; SUBCUTANEOUS at 13:06

## 2017-06-28 NOTE — ED STATDOCS - PROGRESS NOTE DETAILS
Andrea Perez for Dr. 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 Pt spoke with ID Dr. Ramirez, who recommended she come in to [facility]. Also reports right abdominal/back pain, feels like a kidney stone. Pt reports she had a recent CT showing kidney stones. Will triage to [facility] for isolation.

## 2017-06-28 NOTE — ED PROVIDER NOTE - OBJECTIVE STATEMENT
75 y/o female with PMHx of Cdiff s/p fecal transplant twice in the last month presents to the ED c/o fever(highest 101) starting yesterday and abd pain starting 3 days ago. Pain lasted for 6 hours. ID told pt to come to ED for CT for concern of kidney stone. Denies sore throat. No recent sick contact. No recent travel.

## 2017-06-30 LAB
-  AMIKACIN: SIGNIFICANT CHANGE UP
-  AMPICILLIN/SULBACTAM: SIGNIFICANT CHANGE UP
-  AMPICILLIN: SIGNIFICANT CHANGE UP
-  AZTREONAM: SIGNIFICANT CHANGE UP
-  CEFAZOLIN: SIGNIFICANT CHANGE UP
-  CEFEPIME: SIGNIFICANT CHANGE UP
-  CEFOXITIN: SIGNIFICANT CHANGE UP
-  CEFTAZIDIME: SIGNIFICANT CHANGE UP
-  CEFTRIAXONE: SIGNIFICANT CHANGE UP
-  CIPROFLOXACIN: SIGNIFICANT CHANGE UP
-  ERTAPENEM: SIGNIFICANT CHANGE UP
-  GENTAMICIN: SIGNIFICANT CHANGE UP
-  LEVOFLOXACIN: SIGNIFICANT CHANGE UP
-  MEROPENEM: SIGNIFICANT CHANGE UP
-  NITROFURANTOIN: SIGNIFICANT CHANGE UP
-  PIPERACILLIN/TAZOBACTAM: SIGNIFICANT CHANGE UP
-  TOBRAMYCIN: SIGNIFICANT CHANGE UP
-  TRIMETHOPRIM/SULFAMETHOXAZOLE: SIGNIFICANT CHANGE UP
CULTURE RESULTS: SIGNIFICANT CHANGE UP
METHOD TYPE: SIGNIFICANT CHANGE UP
ORGANISM # SPEC MICROSCOPIC CNT: SIGNIFICANT CHANGE UP
ORGANISM # SPEC MICROSCOPIC CNT: SIGNIFICANT CHANGE UP
SPECIMEN SOURCE: SIGNIFICANT CHANGE UP

## 2017-07-03 LAB
CULTURE RESULTS: SIGNIFICANT CHANGE UP
CULTURE RESULTS: SIGNIFICANT CHANGE UP
SPECIMEN SOURCE: SIGNIFICANT CHANGE UP
SPECIMEN SOURCE: SIGNIFICANT CHANGE UP

## 2017-07-04 NOTE — ED POST DISCHARGE NOTE - ADDITIONAL DOCUMENTATION
Pt states that she feels better. Pt taking keflex and will finish tomorrow. Pt advised abx are the correct medication to take. ED tx appropriate. -Juan Manuel Tran

## 2017-07-10 ENCOUNTER — INPATIENT (INPATIENT)
Facility: HOSPITAL | Age: 77
LOS: 8 days | Discharge: HOME HEALTH | End: 2017-07-19
Attending: FAMILY MEDICINE | Admitting: FAMILY MEDICINE
Payer: MEDICARE

## 2017-07-10 VITALS — HEIGHT: 64 IN | WEIGHT: 143.08 LBS

## 2017-07-10 DIAGNOSIS — Z98.890 OTHER SPECIFIED POSTPROCEDURAL STATES: Chronic | ICD-10-CM

## 2017-07-10 DIAGNOSIS — N13.2 HYDRONEPHROSIS WITH RENAL AND URETERAL CALCULOUS OBSTRUCTION: ICD-10-CM

## 2017-07-10 DIAGNOSIS — D35.1 BENIGN NEOPLASM OF PARATHYROID GLAND: Chronic | ICD-10-CM

## 2017-07-10 LAB
ALBUMIN SERPL ELPH-MCNC: 3.6 G/DL — SIGNIFICANT CHANGE UP (ref 3.3–5)
ALP SERPL-CCNC: 70 U/L — SIGNIFICANT CHANGE UP (ref 40–120)
ALT FLD-CCNC: 29 U/L — SIGNIFICANT CHANGE UP (ref 12–78)
ANION GAP SERPL CALC-SCNC: 11 MMOL/L — SIGNIFICANT CHANGE UP (ref 5–17)
APPEARANCE UR: (no result)
APTT BLD: 26 SEC — LOW (ref 27.5–37.4)
AST SERPL-CCNC: 18 U/L — SIGNIFICANT CHANGE UP (ref 15–37)
BACTERIA # UR AUTO: (no result)
BASOPHILS # BLD AUTO: 0.1 K/UL — SIGNIFICANT CHANGE UP (ref 0–0.2)
BASOPHILS NFR BLD AUTO: 1 % — SIGNIFICANT CHANGE UP (ref 0–2)
BILIRUB SERPL-MCNC: 1.5 MG/DL — HIGH (ref 0.2–1.2)
BILIRUB UR-MCNC: NEGATIVE — SIGNIFICANT CHANGE UP
BLD GP AB SCN SERPL QL: SIGNIFICANT CHANGE UP
BUN SERPL-MCNC: 31 MG/DL — HIGH (ref 7–23)
CALCIUM SERPL-MCNC: 9.7 MG/DL — SIGNIFICANT CHANGE UP (ref 8.5–10.1)
CHLORIDE SERPL-SCNC: 107 MMOL/L — SIGNIFICANT CHANGE UP (ref 96–108)
CO2 SERPL-SCNC: 23 MMOL/L — SIGNIFICANT CHANGE UP (ref 22–31)
COLOR SPEC: YELLOW — SIGNIFICANT CHANGE UP
CREAT SERPL-MCNC: 1.2 MG/DL — SIGNIFICANT CHANGE UP (ref 0.5–1.3)
DIFF PNL FLD: (no result)
EOSINOPHIL # BLD AUTO: 0 K/UL — SIGNIFICANT CHANGE UP (ref 0–0.5)
EPI CELLS # UR: SIGNIFICANT CHANGE UP
GLUCOSE SERPL-MCNC: 138 MG/DL — HIGH (ref 70–99)
GLUCOSE UR QL: NEGATIVE MG/DL — SIGNIFICANT CHANGE UP
HCT VFR BLD CALC: 44.4 % — SIGNIFICANT CHANGE UP (ref 34.5–45)
HGB BLD-MCNC: 14.9 G/DL — SIGNIFICANT CHANGE UP (ref 11.5–15.5)
INR BLD: 1.11 RATIO — SIGNIFICANT CHANGE UP (ref 0.88–1.16)
KETONES UR-MCNC: (no result)
LACTATE SERPL-SCNC: 1.1 MMOL/L — SIGNIFICANT CHANGE UP (ref 0.7–2)
LEUKOCYTE ESTERASE UR-ACNC: (no result)
LYMPHOCYTES # BLD AUTO: 1.1 K/UL — SIGNIFICANT CHANGE UP (ref 1–3.3)
LYMPHOCYTES # BLD AUTO: 6 % — LOW (ref 13–44)
MCHC RBC-ENTMCNC: 29 PG — SIGNIFICANT CHANGE UP (ref 27–34)
MCHC RBC-ENTMCNC: 33.5 GM/DL — SIGNIFICANT CHANGE UP (ref 32–36)
MCV RBC AUTO: 86.4 FL — SIGNIFICANT CHANGE UP (ref 80–100)
MONOCYTES # BLD AUTO: 1.2 K/UL — HIGH (ref 0–0.9)
MONOCYTES NFR BLD AUTO: 4 % — SIGNIFICANT CHANGE UP (ref 2–14)
NEUTROPHILS # BLD AUTO: 22.4 K/UL — HIGH (ref 1.8–7.4)
NEUTROPHILS NFR BLD AUTO: 88 % — HIGH (ref 43–77)
NEUTS BAND # BLD: 1 % — SIGNIFICANT CHANGE UP (ref 0–8)
NITRITE UR-MCNC: NEGATIVE — SIGNIFICANT CHANGE UP
PH UR: 5 — SIGNIFICANT CHANGE UP (ref 5–8)
PLAT MORPH BLD: NORMAL — SIGNIFICANT CHANGE UP
PLATELET # BLD AUTO: 321 K/UL — SIGNIFICANT CHANGE UP (ref 150–400)
POTASSIUM SERPL-MCNC: 4.2 MMOL/L — SIGNIFICANT CHANGE UP (ref 3.5–5.3)
POTASSIUM SERPL-SCNC: 4.2 MMOL/L — SIGNIFICANT CHANGE UP (ref 3.5–5.3)
PROT SERPL-MCNC: 8 GM/DL — SIGNIFICANT CHANGE UP (ref 6–8.3)
PROT UR-MCNC: 30 MG/DL
PROTHROM AB SERPL-ACNC: 12 SEC — SIGNIFICANT CHANGE UP (ref 9.8–12.7)
RBC # BLD: 5.14 M/UL — SIGNIFICANT CHANGE UP (ref 3.8–5.2)
RBC # FLD: 14.3 % — SIGNIFICANT CHANGE UP (ref 10.3–14.5)
RBC BLD AUTO: NORMAL — SIGNIFICANT CHANGE UP
RBC CASTS # UR COMP ASSIST: (no result) /HPF (ref 0–4)
SODIUM SERPL-SCNC: 141 MMOL/L — SIGNIFICANT CHANGE UP (ref 135–145)
SP GR SPEC: 1.02 — SIGNIFICANT CHANGE UP (ref 1.01–1.02)
TYPE + AB SCN PNL BLD: SIGNIFICANT CHANGE UP
UROBILINOGEN FLD QL: NEGATIVE MG/DL — SIGNIFICANT CHANGE UP
WBC # BLD: 24.8 K/UL — HIGH (ref 3.8–10.5)
WBC # FLD AUTO: 24.8 K/UL — HIGH (ref 3.8–10.5)
WBC UR QL: >50

## 2017-07-10 PROCEDURE — 71010: CPT | Mod: 26

## 2017-07-10 PROCEDURE — 99285 EMERGENCY DEPT VISIT HI MDM: CPT

## 2017-07-10 PROCEDURE — 74176 CT ABD & PELVIS W/O CONTRAST: CPT | Mod: 26

## 2017-07-10 PROCEDURE — 93010 ELECTROCARDIOGRAM REPORT: CPT

## 2017-07-10 RX ORDER — KETOROLAC TROMETHAMINE 30 MG/ML
15 SYRINGE (ML) INJECTION ONCE
Qty: 0 | Refills: 0 | Status: DISCONTINUED | OUTPATIENT
Start: 2017-07-10 | End: 2017-07-10

## 2017-07-10 RX ORDER — FENTANYL CITRATE 50 UG/ML
50 INJECTION INTRAVENOUS
Qty: 0 | Refills: 0 | Status: DISCONTINUED | OUTPATIENT
Start: 2017-07-11 | End: 2017-07-11

## 2017-07-10 RX ORDER — ONDANSETRON 8 MG/1
4 TABLET, FILM COATED ORAL ONCE
Qty: 0 | Refills: 0 | Status: COMPLETED | OUTPATIENT
Start: 2017-07-10 | End: 2017-07-10

## 2017-07-10 RX ORDER — MEPERIDINE HYDROCHLORIDE 50 MG/ML
12.5 INJECTION INTRAMUSCULAR; INTRAVENOUS; SUBCUTANEOUS
Qty: 0 | Refills: 0 | Status: DISCONTINUED | OUTPATIENT
Start: 2017-07-11 | End: 2017-07-11

## 2017-07-10 RX ORDER — VANCOMYCIN HCL 1 G
125 VIAL (EA) INTRAVENOUS ONCE
Qty: 0 | Refills: 0 | Status: COMPLETED | OUTPATIENT
Start: 2017-07-10 | End: 2017-07-10

## 2017-07-10 RX ORDER — VANCOMYCIN HCL 1 G
1000 VIAL (EA) INTRAVENOUS ONCE
Qty: 0 | Refills: 0 | Status: DISCONTINUED | OUTPATIENT
Start: 2017-07-10 | End: 2017-07-10

## 2017-07-10 RX ORDER — SODIUM CHLORIDE 9 MG/ML
1000 INJECTION INTRAMUSCULAR; INTRAVENOUS; SUBCUTANEOUS
Qty: 0 | Refills: 0 | Status: DISCONTINUED | OUTPATIENT
Start: 2017-07-11 | End: 2017-07-11

## 2017-07-10 RX ORDER — ONDANSETRON 8 MG/1
4 TABLET, FILM COATED ORAL ONCE
Qty: 0 | Refills: 0 | Status: COMPLETED | OUTPATIENT
Start: 2017-07-11 | End: 2017-07-11

## 2017-07-10 RX ORDER — VANCOMYCIN HCL 1 G
1000 VIAL (EA) INTRAVENOUS ONCE
Qty: 0 | Refills: 0 | Status: COMPLETED | OUTPATIENT
Start: 2017-07-10 | End: 2017-07-10

## 2017-07-10 RX ORDER — ACETAMINOPHEN 500 MG
1000 TABLET ORAL ONCE
Qty: 0 | Refills: 0 | Status: COMPLETED | OUTPATIENT
Start: 2017-07-11 | End: 2017-07-11

## 2017-07-10 RX ORDER — CEFTRIAXONE 500 MG/1
1 INJECTION, POWDER, FOR SOLUTION INTRAMUSCULAR; INTRAVENOUS ONCE
Qty: 0 | Refills: 0 | Status: COMPLETED | OUTPATIENT
Start: 2017-07-10 | End: 2017-07-10

## 2017-07-10 RX ORDER — MORPHINE SULFATE 50 MG/1
4 CAPSULE, EXTENDED RELEASE ORAL ONCE
Qty: 0 | Refills: 0 | Status: DISCONTINUED | OUTPATIENT
Start: 2017-07-10 | End: 2017-07-10

## 2017-07-10 RX ORDER — SODIUM CHLORIDE 9 MG/ML
3 INJECTION INTRAMUSCULAR; INTRAVENOUS; SUBCUTANEOUS ONCE
Qty: 0 | Refills: 0 | Status: COMPLETED | OUTPATIENT
Start: 2017-07-10 | End: 2017-07-10

## 2017-07-10 RX ORDER — SODIUM CHLORIDE 9 MG/ML
1000 INJECTION INTRAMUSCULAR; INTRAVENOUS; SUBCUTANEOUS ONCE
Qty: 0 | Refills: 0 | Status: COMPLETED | OUTPATIENT
Start: 2017-07-10 | End: 2017-07-10

## 2017-07-10 RX ORDER — OXYCODONE HYDROCHLORIDE 5 MG/1
5 TABLET ORAL EVERY 4 HOURS
Qty: 0 | Refills: 0 | Status: DISCONTINUED | OUTPATIENT
Start: 2017-07-11 | End: 2017-07-11

## 2017-07-10 RX ADMIN — MORPHINE SULFATE 4 MILLIGRAM(S): 50 CAPSULE, EXTENDED RELEASE ORAL at 18:19

## 2017-07-10 RX ADMIN — Medication 250 MILLIGRAM(S): at 22:05

## 2017-07-10 RX ADMIN — SODIUM CHLORIDE 1000 MILLILITER(S): 9 INJECTION INTRAMUSCULAR; INTRAVENOUS; SUBCUTANEOUS at 16:45

## 2017-07-10 RX ADMIN — ONDANSETRON 4 MILLIGRAM(S): 8 TABLET, FILM COATED ORAL at 16:43

## 2017-07-10 RX ADMIN — Medication 15 MILLIGRAM(S): at 16:43

## 2017-07-10 RX ADMIN — SODIUM CHLORIDE 3 MILLILITER(S): 9 INJECTION INTRAMUSCULAR; INTRAVENOUS; SUBCUTANEOUS at 16:45

## 2017-07-10 RX ADMIN — Medication 1 TABLET(S): at 20:54

## 2017-07-10 RX ADMIN — MORPHINE SULFATE 4 MILLIGRAM(S): 50 CAPSULE, EXTENDED RELEASE ORAL at 22:24

## 2017-07-10 RX ADMIN — Medication 15 MILLIGRAM(S): at 22:24

## 2017-07-10 NOTE — ED STATDOCS - GASTROINTESTINAL, MLM
abdomen soft, non-tender, and non-distended. Bowel sounds present. abdomen soft, TTP minimally at the RLQ, no rebound or guarding

## 2017-07-10 NOTE — ED STATDOCS - PROGRESS NOTE DETAILS
77 yo female with a PMH of HTN, c diff colitis, recent stool transplant, presents with RLQ burning pain and right lateral abd pain, radiating to the right flank, intermittent for 2 years. +n/v, unable to tolerate food. Denies f/c/sweating, coguh, cp, sob, hematuria, dysuria, freq, bloody stool. -Juan Manuel Tran PA-C spoke to Dr. Nice, present at pt bedside, spoke to Dr. Barrera and updated pt family with pt need to go urgently to ir for percutaneous drng of right kidney for obstructive stone with wbc of 25,000. spoke with Dr. Ramirez, ID at [place], recommended giving po vanco with iv vanco to decrease the chance of developing c dif from current antibiotics

## 2017-07-10 NOTE — ED STATDOCS - OBJECTIVE STATEMENT
77 y/o F presents to ED for evaluation of abd pain. Pt states she was here recently with similar complaints and the workup revealed she had a UTI with a 9mm kidney stone that has remained in place and was d/c'd with ABx. Pt states today she woke up with severe abd pain located in the umbilical region. Pt also reports n/v. Pt cannot tolerate PO which prompted her to seek evaluation. Pt has had past episodes of Cdiff but this is not similar. No fever, chills, SOB, CP. LNBM today.

## 2017-07-10 NOTE — ED STATDOCS - CARE PLAN
Principal Discharge DX:	UTI (urinary tract infection)  Secondary Diagnosis:	Nephrolithiasis  Secondary Diagnosis:	Hydronephrosis with ureteral calculus

## 2017-07-10 NOTE — ED ADULT NURSE NOTE - OBJECTIVE STATEMENT
Pt with history of cdif presents with RLQ pain.  Pt states she was recently on abx for uti.  Pt states she has not had diarrhea

## 2017-07-10 NOTE — ED ADULT NURSE REASSESSMENT NOTE - NS ED NURSE REASSESS COMMENT FT1
Pt transported successfully to IR with family at bedside.  Vitals stable, no compliants noted from patient at this time.  Safety maintained

## 2017-07-11 DIAGNOSIS — I10 ESSENTIAL (PRIMARY) HYPERTENSION: ICD-10-CM

## 2017-07-11 DIAGNOSIS — Z98.891 HISTORY OF UTERINE SCAR FROM PREVIOUS SURGERY: Chronic | ICD-10-CM

## 2017-07-11 DIAGNOSIS — B96.89 OTHER SPECIFIED BACTERIAL AGENTS AS THE CAUSE OF DISEASES CLASSIFIED ELSEWHERE: ICD-10-CM

## 2017-07-11 DIAGNOSIS — N12 TUBULO-INTERSTITIAL NEPHRITIS, NOT SPECIFIED AS ACUTE OR CHRONIC: ICD-10-CM

## 2017-07-11 LAB
-  CANDIDA ALBICANS: SIGNIFICANT CHANGE UP
-  CANDIDA GLABRATA: SIGNIFICANT CHANGE UP
-  CANDIDA KRUSEI: SIGNIFICANT CHANGE UP
-  CANDIDA PARAPSILOSIS: SIGNIFICANT CHANGE UP
-  CANDIDA TROPICALIS: SIGNIFICANT CHANGE UP
-  COAGULASE NEGATIVE STAPHYLOCOCCUS: SIGNIFICANT CHANGE UP
-  K. PNEUMONIAE GROUP: SIGNIFICANT CHANGE UP
-  KPC RESISTANCE GENE: SIGNIFICANT CHANGE UP
-  STREPTOCOCCUS SP. (NOT GRP A, B OR S PNEUMONIAE): SIGNIFICANT CHANGE UP
A BAUMANNII DNA SPEC QL NAA+PROBE: SIGNIFICANT CHANGE UP
E CLOAC COMP DNA BLD POS QL NAA+PROBE: SIGNIFICANT CHANGE UP
E COLI DNA BLD POS QL NAA+NON-PROBE: SIGNIFICANT CHANGE UP
ENTEROCOC DNA BLD POS QL NAA+NON-PROBE: SIGNIFICANT CHANGE UP
ENTEROCOC DNA BLD POS QL NAA+NON-PROBE: SIGNIFICANT CHANGE UP
GP B STREP DNA BLD POS QL NAA+NON-PROBE: SIGNIFICANT CHANGE UP
GRAM STN FLD: SIGNIFICANT CHANGE UP
HAEM INFLU DNA BLD POS QL NAA+NON-PROBE: SIGNIFICANT CHANGE UP
HCT VFR BLD CALC: 34 % — LOW (ref 34.5–45)
HGB BLD-MCNC: 11.5 G/DL — SIGNIFICANT CHANGE UP (ref 11.5–15.5)
K OXYTOCA DNA BLD POS QL NAA+NON-PROBE: SIGNIFICANT CHANGE UP
L MONOCYTOG DNA BLD POS QL NAA+NON-PROBE: SIGNIFICANT CHANGE UP
MCHC RBC-ENTMCNC: 28.9 PG — SIGNIFICANT CHANGE UP (ref 27–34)
MCHC RBC-ENTMCNC: 33.8 GM/DL — SIGNIFICANT CHANGE UP (ref 32–36)
MCV RBC AUTO: 85.5 FL — SIGNIFICANT CHANGE UP (ref 80–100)
METHOD TYPE: SIGNIFICANT CHANGE UP
MRSA SPEC QL CULT: SIGNIFICANT CHANGE UP
MSSA DNA SPEC QL NAA+PROBE: SIGNIFICANT CHANGE UP
N MEN ISLT CULT: SIGNIFICANT CHANGE UP
P AERUGINOSA DNA BLD POS NAA+NON-PROBE: SIGNIFICANT CHANGE UP
PLATELET # BLD AUTO: 189 K/UL — SIGNIFICANT CHANGE UP (ref 150–400)
PROTEUS SP DNA BLD POS QL NAA+NON-PROBE: SIGNIFICANT CHANGE UP
RBC # BLD: 3.97 M/UL — SIGNIFICANT CHANGE UP (ref 3.8–5.2)
RBC # FLD: 14.3 % — SIGNIFICANT CHANGE UP (ref 10.3–14.5)
S MARCESCENS DNA BLD POS NAA+NON-PROBE: SIGNIFICANT CHANGE UP
S PNEUM DNA BLD POS QL NAA+NON-PROBE: SIGNIFICANT CHANGE UP
S PYO DNA BLD POS QL NAA+NON-PROBE: SIGNIFICANT CHANGE UP
SPECIMEN SOURCE: SIGNIFICANT CHANGE UP
SPECIMEN SOURCE: SIGNIFICANT CHANGE UP
WBC # BLD: 25 K/UL — HIGH (ref 3.8–10.5)
WBC # FLD AUTO: 25 K/UL — HIGH (ref 3.8–10.5)

## 2017-07-11 PROCEDURE — 50432 PLMT NEPHROSTOMY CATHETER: CPT | Mod: RT

## 2017-07-11 RX ORDER — HEPARIN SODIUM 5000 [USP'U]/ML
5000 INJECTION INTRAVENOUS; SUBCUTANEOUS EVERY 12 HOURS
Qty: 0 | Refills: 0 | Status: DISCONTINUED | OUTPATIENT
Start: 2017-07-11 | End: 2017-07-19

## 2017-07-11 RX ORDER — ONDANSETRON 8 MG/1
4 TABLET, FILM COATED ORAL EVERY 4 HOURS
Qty: 0 | Refills: 0 | Status: COMPLETED | OUTPATIENT
Start: 2017-07-11 | End: 2017-07-11

## 2017-07-11 RX ORDER — HYDROMORPHONE HYDROCHLORIDE 2 MG/ML
0.5 INJECTION INTRAMUSCULAR; INTRAVENOUS; SUBCUTANEOUS EVERY 4 HOURS
Qty: 0 | Refills: 0 | Status: DISCONTINUED | OUTPATIENT
Start: 2017-07-11 | End: 2017-07-12

## 2017-07-11 RX ORDER — ONDANSETRON 8 MG/1
4 TABLET, FILM COATED ORAL EVERY 8 HOURS
Qty: 0 | Refills: 0 | Status: DISCONTINUED | OUTPATIENT
Start: 2017-07-11 | End: 2017-07-11

## 2017-07-11 RX ORDER — SODIUM CHLORIDE 9 MG/ML
1000 INJECTION INTRAMUSCULAR; INTRAVENOUS; SUBCUTANEOUS
Qty: 0 | Refills: 0 | Status: DISCONTINUED | OUTPATIENT
Start: 2017-07-11 | End: 2017-07-14

## 2017-07-11 RX ORDER — VANCOMYCIN HCL 1 G
125 VIAL (EA) INTRAVENOUS EVERY 12 HOURS
Qty: 0 | Refills: 0 | Status: DISCONTINUED | OUTPATIENT
Start: 2017-07-11 | End: 2017-07-11

## 2017-07-11 RX ORDER — ACETAMINOPHEN 500 MG
650 TABLET ORAL EVERY 6 HOURS
Qty: 0 | Refills: 0 | Status: DISCONTINUED | OUTPATIENT
Start: 2017-07-11 | End: 2017-07-19

## 2017-07-11 RX ORDER — LACTOBACILLUS ACIDOPHILUS 100MM CELL
1 CAPSULE ORAL
Qty: 0 | Refills: 0 | Status: DISCONTINUED | OUTPATIENT
Start: 2017-07-11 | End: 2017-07-19

## 2017-07-11 RX ORDER — CEFTRIAXONE 500 MG/1
1 INJECTION, POWDER, FOR SOLUTION INTRAMUSCULAR; INTRAVENOUS EVERY 24 HOURS
Qty: 0 | Refills: 0 | Status: DISCONTINUED | OUTPATIENT
Start: 2017-07-11 | End: 2017-07-14

## 2017-07-11 RX ADMIN — MEPERIDINE HYDROCHLORIDE 12.5 MILLIGRAM(S): 50 INJECTION INTRAMUSCULAR; INTRAVENOUS; SUBCUTANEOUS at 00:36

## 2017-07-11 RX ADMIN — FENTANYL CITRATE 50 MICROGRAM(S): 50 INJECTION INTRAVENOUS at 00:15

## 2017-07-11 RX ADMIN — Medication 125 MILLIGRAM(S): at 05:59

## 2017-07-11 RX ADMIN — Medication 650 MILLIGRAM(S): at 18:11

## 2017-07-11 RX ADMIN — Medication 1 TABLET(S): at 16:54

## 2017-07-11 RX ADMIN — SODIUM CHLORIDE 100 MILLILITER(S): 9 INJECTION INTRAMUSCULAR; INTRAVENOUS; SUBCUTANEOUS at 00:19

## 2017-07-11 RX ADMIN — SODIUM CHLORIDE 80 MILLILITER(S): 9 INJECTION INTRAMUSCULAR; INTRAVENOUS; SUBCUTANEOUS at 17:59

## 2017-07-11 RX ADMIN — Medication 1 TABLET(S): at 09:44

## 2017-07-11 RX ADMIN — CEFTRIAXONE 100 GRAM(S): 500 INJECTION, POWDER, FOR SOLUTION INTRAMUSCULAR; INTRAVENOUS at 12:25

## 2017-07-11 RX ADMIN — HYDROMORPHONE HYDROCHLORIDE 0.5 MILLIGRAM(S): 2 INJECTION INTRAMUSCULAR; INTRAVENOUS; SUBCUTANEOUS at 23:04

## 2017-07-11 RX ADMIN — ONDANSETRON 4 MILLIGRAM(S): 8 TABLET, FILM COATED ORAL at 00:15

## 2017-07-11 RX ADMIN — ONDANSETRON 4 MILLIGRAM(S): 8 TABLET, FILM COATED ORAL at 12:35

## 2017-07-11 RX ADMIN — FENTANYL CITRATE 50 MICROGRAM(S): 50 INJECTION INTRAVENOUS at 00:37

## 2017-07-11 RX ADMIN — HYDROMORPHONE HYDROCHLORIDE 0.5 MILLIGRAM(S): 2 INJECTION INTRAMUSCULAR; INTRAVENOUS; SUBCUTANEOUS at 22:26

## 2017-07-11 RX ADMIN — Medication 400 MILLIGRAM(S): at 00:14

## 2017-07-11 RX ADMIN — HEPARIN SODIUM 5000 UNIT(S): 5000 INJECTION INTRAVENOUS; SUBCUTANEOUS at 16:55

## 2017-07-11 RX ADMIN — Medication 650 MILLIGRAM(S): at 12:26

## 2017-07-11 RX ADMIN — MEPERIDINE HYDROCHLORIDE 12.5 MILLIGRAM(S): 50 INJECTION INTRAMUSCULAR; INTRAVENOUS; SUBCUTANEOUS at 00:22

## 2017-07-11 NOTE — H&P ADULT - HISTORY OF PRESENT ILLNESS
75 y/o F presents to ED for evaluation of abd pain. Pt states she was here recently with similar complaints and the workup revealed she had a UTI with a 9mm kidney stone that has remained in place and was d/c'd with ABx. Pt states today she woke up with severe abd pain located in the umbilical region. Pt also reports n/v. Pt cannot tolerate PO which prompted her to seek evaluation. Pt has had past episodes of Cdiff but this is not similar. No fever, chills, SOB, CP. last normal BM today.	  	   no abdominal distention, no blood in stool, no burning urination, no diarrhea In Ed CT scan abd/pelvis - right hydronephrosis with large  ureteral stone with right perinephric stranding IV vanco,po vanco and ceftriaxone given in ED Patient was seen by IR and urology dr Nice in ED and patient underwent urgent percutaneopus right nephrostomy tube drainage  In PACU patient awake ,denies any complaints

## 2017-07-11 NOTE — H&P ADULT - NSHPPHYSICALEXAM_GEN_ALL_CORE
PHYSICAL EXAM:    Daily Height in cm: 162.56 (10 Jul 2017 15:21)    Daily     ICU Vital Signs Last 24 Hrs  T(C): 37.7 (11 Jul 2017 01:09), Max: 37.7 (11 Jul 2017 00:01)  T(F): 99.9 (11 Jul 2017 01:09), Max: 99.9 (11 Jul 2017 01:09)  HR: 84 (11 Jul 2017 01:09) (62 - 114)  BP: 95/33 (11 Jul 2017 01:09) (90/50 - 193/56)  BP(mean): --  ABP: --  ABP(mean): --  RR: 24 (11 Jul 2017 01:09) (16 - 27)  SpO2: 96% (11 Jul 2017 01:09) (90% - 100%)      Constitutional: NAD  HEENT: Atraumatic, CHATO, Normal, No congestion  Respiratory: Breath Sounds normal, no rhonchi/wheeze  Cardiovascular: N S1S2; SRINIVAS present  Gastrointestinal: Abdomen soft, non tender, Bowel Ssounds present  Extremities: No edema, peripheral pulses present  Neurological: AAO x 3, no gross focal motor deficits  Skin: Non cellulitic,     Back: right nephrostomy tube  with gross blood in tube ,bag is empty  Musculoskeletal: non tender  Breasts: Deferred  Genitourinary: deferred  Rectal: Deferred

## 2017-07-11 NOTE — H&P ADULT - PROBLEM SELECTOR PLAN 4
hx of recurrent c diff ,no active infection at this time   will start vanco po 125mg BID for c diff  prophylaxis while on ceftriaxone

## 2017-07-11 NOTE — CHART NOTE - NSCHARTNOTEFT_GEN_A_CORE
Pt seen and exam; d/c Jalil for CDAD prophylaxis as this is not recommended per guidelines; she also had fecal transplant 2 weeks ago; pt agrees; hold ARB borderline bp

## 2017-07-11 NOTE — H&P ADULT - NSHPLABSRESULTS_GEN_ALL_CORE
14.9   24.8  )-----------( 321      ( 10 Jul 2017 16:06 )             44.4       CBC Full  -  ( 10 Jul 2017 16:06 )  WBC Count : 24.8 K/uL  Hemoglobin : 14.9 g/dL  Hematocrit : 44.4 %  Platelet Count - Automated : 321 K/uL  Mean Cell Volume : 86.4 fl  Mean Cell Hemoglobin : 29.0 pg  Mean Cell Hemoglobin Concentration : 33.5 gm/dL  Auto Neutrophil # : 22.4 K/uL  Auto Lymphocyte # : 1.1 K/uL  Auto Monocyte # : 1.2 K/uL  Auto Eosinophil # : 0.0 K/uL  Auto Basophil # : 0.1 K/uL  Auto Neutrophil % : 88.0 %  Auto Lymphocyte % : 6.0 %  Auto Monocyte % : 4.0 %  Auto Eosinophil % : x  Auto Basophil % : 1.0 %      07-10    141  |  107  |  31<H>  ----------------------------<  138<H>  4.2   |  23  |  1.20    Ca    9.7      10 Jul 2017 16:06    TPro  8.0  /  Alb  3.6  /  TBili  1.5<H>  /  DBili  x   /  AST  18  /  ALT  29  /  AlkPhos  70  07-10      LIVER FUNCTIONS - ( 10 Jul 2017 16:06 )  Alb: 3.6 g/dL / Pro: 8.0 gm/dL / ALK PHOS: 70 U/L / ALT: 29 U/L / AST: 18 U/L / GGT: x             PT/INR - ( 10 Jul 2017 21:47 )   PT: 12.0 sec;   INR: 1.11 ratio         PTT - ( 10 Jul 2017 21:47 )  PTT:26.0 sec          Urinalysis Basic - ( 10 Jul 2017 16:55 )    Color: Yellow / Appearance: x / S.020 / pH: x  Gluc: x / Ketone: Trace  / Bili: Negative / Urobili: Negative mg/dL   Blood: x / Protein: 30 mg/dL / Nitrite: Negative   Leuk Esterase: Moderate / RBC: 6-10 /HPF / WBC >50   Sq Epi: x / Non Sq Epi: x / Bacteria: Moderate

## 2017-07-11 NOTE — H&P ADULT - ASSESSMENT
77 y/o f with acute right hydronephrosis/obstructive uropathy secondary to large ureteral stone  with acute pyelonephritis

## 2017-07-11 NOTE — H&P ADULT - PROBLEM SELECTOR PLAN 1
Acute pyelophritis /no sepsis secondary to obstructive  ureteral stone   # Iv ceftriaxone  #IV fluid  # DVT prophylaxis - IPC  #f/u blood cx,urine cx drawn in ED  #urology consult appreciated - and for f/u

## 2017-07-12 LAB
C DIFF BY PCR RESULT: SIGNIFICANT CHANGE UP
C DIFF TOX GENS STL QL NAA+PROBE: SIGNIFICANT CHANGE UP
CULTURE RESULTS: SIGNIFICANT CHANGE UP
HCT VFR BLD CALC: 33.9 % — LOW (ref 34.5–45)
HGB BLD-MCNC: 11.6 G/DL — SIGNIFICANT CHANGE UP (ref 11.5–15.5)
MCHC RBC-ENTMCNC: 29.5 PG — SIGNIFICANT CHANGE UP (ref 27–34)
MCHC RBC-ENTMCNC: 34.3 GM/DL — SIGNIFICANT CHANGE UP (ref 32–36)
MCV RBC AUTO: 86 FL — SIGNIFICANT CHANGE UP (ref 80–100)
PLATELET # BLD AUTO: 163 K/UL — SIGNIFICANT CHANGE UP (ref 150–400)
RBC # BLD: 3.95 M/UL — SIGNIFICANT CHANGE UP (ref 3.8–5.2)
RBC # FLD: 14.3 % — SIGNIFICANT CHANGE UP (ref 10.3–14.5)
SPECIMEN SOURCE: SIGNIFICANT CHANGE UP
WBC # BLD: 16.6 K/UL — HIGH (ref 3.8–10.5)
WBC # FLD AUTO: 16.6 K/UL — HIGH (ref 3.8–10.5)

## 2017-07-12 RX ADMIN — SODIUM CHLORIDE 80 MILLILITER(S): 9 INJECTION INTRAMUSCULAR; INTRAVENOUS; SUBCUTANEOUS at 17:07

## 2017-07-12 RX ADMIN — Medication 650 MILLIGRAM(S): at 11:48

## 2017-07-12 RX ADMIN — Medication 1 TABLET(S): at 17:05

## 2017-07-12 RX ADMIN — Medication 1 TABLET(S): at 09:11

## 2017-07-12 RX ADMIN — SODIUM CHLORIDE 80 MILLILITER(S): 9 INJECTION INTRAMUSCULAR; INTRAVENOUS; SUBCUTANEOUS at 06:22

## 2017-07-12 RX ADMIN — CEFTRIAXONE 100 GRAM(S): 500 INJECTION, POWDER, FOR SOLUTION INTRAMUSCULAR; INTRAVENOUS at 11:48

## 2017-07-12 RX ADMIN — HEPARIN SODIUM 5000 UNIT(S): 5000 INJECTION INTRAVENOUS; SUBCUTANEOUS at 05:26

## 2017-07-12 RX ADMIN — HYDROMORPHONE HYDROCHLORIDE 0.5 MILLIGRAM(S): 2 INJECTION INTRAMUSCULAR; INTRAVENOUS; SUBCUTANEOUS at 23:25

## 2017-07-12 RX ADMIN — HEPARIN SODIUM 5000 UNIT(S): 5000 INJECTION INTRAVENOUS; SUBCUTANEOUS at 17:05

## 2017-07-12 NOTE — PROGRESS NOTE ADULT - SUBJECTIVE AND OBJECTIVE BOX
HPI:  76y female w/ PMH recurrent UTIs, presents to ED for evaluation of abd pain. Pt states she was here recently with similar complaints and the workup revealed she had a UTI with a 9mm kidney stone that has remained in place and was d/c'd with ABx. Pt states today she woke up with severe abd pain located in the umbilical region. Pt also reports n/v. Pt cannot tolerate PO which prompted her to seek evaluation. Pt has had past episodes of Cdiff but this is not similar. No fever, chills, SOB, CP. last normal BM today.  No abdominal distention, no blood in stool, no burning urination, no diarrhea.  In Ed CT scan abd/pelvis - right hydronephrosis with large  ureteral stone with right perinephric stranding  IV vanco,po vanco and ceftriaxone given in ED.  Patient was seen by IR and urology dr Nice in ED and patient underwent urgent percutaneopus right nephrostomy tube drainage   In PACU patient awake ,denies any complaints.    7/12- chart reviewed.  Pt seen earlier today.  Feels tired, chills. Denies abd pain, no n/v/d.      ROS- as per hpi otherwise 10pt ros negative    Vital Signs Last 24 Hrs  T(C): 38.5 (12 Jul 2017 11:45), Max: 38.5 (12 Jul 2017 11:45)  T(F): 101.3 (12 Jul 2017 11:45), Max: 101.3 (12 Jul 2017 11:45)  HR: 76 (12 Jul 2017 11:45) (66 - 76)  BP: 123/48 (12 Jul 2017 11:45) (99/45 - 123/48)  BP(mean): --  RR: 16 (12 Jul 2017 11:45) (16 - 17)  SpO2: 96% (12 Jul 2017 11:45) (96% - 99%)      PHYSICAL EXAM:  General: NAD, comfortable  Neuro: AAOx3, no focal deficits  HEENT: NCAT, EOMI  Neck: Soft and supple, No JVD  Respiratory: CTA b/l, no w/r/r  Cardiovascular: S1 and S2, RRR, no m/g/r  Gastrointestinal: +BS, soft, NTND, no rebound/guarding  - right PCN tube w/ dark yellow urine, no blood  Extremities: No c/c/e  Vascular: 2+ peripheral pulses  Musculoskeletal: 5/5 strength b/l UE and LE, sensation intact  Skin: No rashes        LABS: All Labs Reviewed:                        11.6   16.6  )-----------( 163      ( 12 Jul 2017 07:24 )             33.9     07-10    141  |  107  |  31<H>  ----------------------------<  138<H>  4.2   |  23  |  1.20    Ca    9.7      10 Jul 2017 16:06    TPro  8.0  /  Alb  3.6  /  TBili  1.5<H>  /  DBili  x   /  AST  18  /  ALT  29  /  AlkPhos  70  07-10    PT/INR - ( 10 Jul 2017 21:47 )   PT: 12.0 sec;   INR: 1.11 ratio         PTT - ( 10 Jul 2017 21:47 )  PTT:26.0 sec        Culture - Blood (07.10.17 @ 21:45)    Gram Stain:   Growth in aerobic and anaerobic bottles: Gram Negative Rods    Specimen Source: .Blood None    Culture Results:   Growth in aerobic and anaerobic bottles: Gram Negative Rods    Culture - Urine (07.10.17 @ 10:18)    Specimen Source: .Urine (R)Kidney cup recv'd    Culture Results:   Moderate Proteus mirabilis    Culture - Urine (06.28.17 @ 13:47)    -  Cefazolin: S 8    -  Cefoxitin: S <=4    -  Ceftazidime: S <=1    -  Ertapenem: S <=0.5    -  Gentamicin: S 2    -  Levofloxacin: R >4    -  Meropenem: S <=1    -  Ceftriaxone: S <=1    -  Amikacin: S <=8    -  Ampicillin: R >16    -  Ampicillin/Sulbactam: S    -  Aztreonam: S <=4    -  Cefepime: S <=2    -  Ciprofloxacin: R >2    -  Nitrofurantoin: R >64    -  Piperacillin/Tazobactam: S <=8    -  Tobramycin: S 4    -  Trimethoprim/Sulfamethoxazole: S    Specimen Source: .Urine None    Culture Results:   10,000 - 49,000 CFU/mL Proteus mirabilis    Organism Identification: Proteus mirabilis    Organism: Proteus mirabilis    Method Type: JASON    < from: CT Abdomen and Pelvis w/ Oral Cont (07.10.17 @ 19:52) >  IMPRESSION:     Mild right hydroureteronephrosis secondary to a right ureteropelvic   junction calculus measuring 1.2 x 0.7 cm. Mild right perinephric   stranding and trace right perinephric fluid        < end of copied text >    < from: Xray Chest 1 View AP/PA. (07.10.17 @ 22:39) >  The cardiac, hilar and mediastinal contours are unremarkable. The lungs   are clear. The osseous structures demonstrate no acute abnormality.    IMPRESSION:    Clear lungs. No change since June 28, 2017.    < end of copied text >    MEDICATIONS  (STANDING):  lactobacillus acidophilus 1 Tablet(s) Oral two times a day with meals  cefTRIAXone   IVPB 1 Gram(s) IV Intermittent every 24 hours  heparin  Injectable 5000 Unit(s) SubCutaneous every 12 hours  sodium chloride 0.9%. 1000 milliLiter(s) (80 mL/Hr) IV Continuous <Continuous>    MEDICATIONS  (PRN):  HYDROmorphone  Injectable 0.5 milliGRAM(s) IV Push every 4 hours PRN Severe Pain (7 - 10)  acetaminophen   Tablet 650 milliGRAM(s) Oral every 6 hours PRN For Temp greater than 38 C (100.4 F)      Assessment and Plan:   76y female w/     *sepsis, Proteus bacteremia due to right pyelonephritis, hydroureteronephrosis due to ureteropelvic stone  - s/p perc nephrostomy tube 7/11  - still spiking temps but wbc improving, normal lactate  - urine culture 7/10 +proteus pending sensitivities (urine cx 6/28 +proteus sensitive to rocephin)  - blood cultures 7/10 + proteus, repeat blood cultures x 2 now   - continue IVF, IV rocephin day 3  - Urology f/u appreciated     *hx recurrent Cdiff  - s/p fecal transplant few weeks ago at [place]  - cdiff negative   - continue home dose probiotics    *dvt px  - heparin sc

## 2017-07-12 NOTE — CDI QUERY NOTE - NSCDIOTHERTXTBX_GEN_ALL_CORE_HH
Patient presents with acute right hydronephrosis/obstructive uropathy secondary to large ureteral stone with acute pyelonephritis. Blood cultures positive for gram negative rods, WBC 24.8, LA1.1, Temp 101.6, HT 92, BP 97/40.  Please clarify the significance of these findings in your notes.  A. sepsis present on admission  B. no sepsis  C. other

## 2017-07-12 NOTE — PROGRESS NOTE ADULT - ASSESSMENT
s/p right PCN for 12 mm right UPJ stone and obstruction on 7/10/17 by IR.    Blood and urine cultures positive for proteus, on IV ceftriaxone  Improving    Recommendation: may request internalization to right nephro-ureteral stent for discharge home with anticipated return as outpatient for ESWL and subsequent stent removal  Discussed with patient

## 2017-07-13 LAB
-  AMIKACIN: SIGNIFICANT CHANGE UP
-  AMIKACIN: SIGNIFICANT CHANGE UP
-  AMPICILLIN/SULBACTAM: SIGNIFICANT CHANGE UP
-  AMPICILLIN/SULBACTAM: SIGNIFICANT CHANGE UP
-  AMPICILLIN: SIGNIFICANT CHANGE UP
-  AMPICILLIN: SIGNIFICANT CHANGE UP
-  AZTREONAM: SIGNIFICANT CHANGE UP
-  AZTREONAM: SIGNIFICANT CHANGE UP
-  CEFAZOLIN: SIGNIFICANT CHANGE UP
-  CEFAZOLIN: SIGNIFICANT CHANGE UP
-  CEFEPIME: SIGNIFICANT CHANGE UP
-  CEFEPIME: SIGNIFICANT CHANGE UP
-  CEFOXITIN: SIGNIFICANT CHANGE UP
-  CEFOXITIN: SIGNIFICANT CHANGE UP
-  CEFTAZIDIME: SIGNIFICANT CHANGE UP
-  CEFTAZIDIME: SIGNIFICANT CHANGE UP
-  CEFTRIAXONE: SIGNIFICANT CHANGE UP
-  CEFTRIAXONE: SIGNIFICANT CHANGE UP
-  CIPROFLOXACIN: SIGNIFICANT CHANGE UP
-  CIPROFLOXACIN: SIGNIFICANT CHANGE UP
-  ERTAPENEM: SIGNIFICANT CHANGE UP
-  ERTAPENEM: SIGNIFICANT CHANGE UP
-  GENTAMICIN: SIGNIFICANT CHANGE UP
-  GENTAMICIN: SIGNIFICANT CHANGE UP
-  LEVOFLOXACIN: SIGNIFICANT CHANGE UP
-  LEVOFLOXACIN: SIGNIFICANT CHANGE UP
-  MEROPENEM: SIGNIFICANT CHANGE UP
-  MEROPENEM: SIGNIFICANT CHANGE UP
-  NITROFURANTOIN: SIGNIFICANT CHANGE UP
-  PIPERACILLIN/TAZOBACTAM: SIGNIFICANT CHANGE UP
-  PIPERACILLIN/TAZOBACTAM: SIGNIFICANT CHANGE UP
-  TOBRAMYCIN: SIGNIFICANT CHANGE UP
-  TOBRAMYCIN: SIGNIFICANT CHANGE UP
-  TRIMETHOPRIM/SULFAMETHOXAZOLE: SIGNIFICANT CHANGE UP
-  TRIMETHOPRIM/SULFAMETHOXAZOLE: SIGNIFICANT CHANGE UP
ANION GAP SERPL CALC-SCNC: 5 MMOL/L — SIGNIFICANT CHANGE UP (ref 5–17)
BASOPHILS # BLD AUTO: 0 K/UL — SIGNIFICANT CHANGE UP (ref 0–0.2)
BASOPHILS NFR BLD AUTO: 0.4 % — SIGNIFICANT CHANGE UP (ref 0–2)
BUN SERPL-MCNC: 19 MG/DL — SIGNIFICANT CHANGE UP (ref 7–23)
C DIFF BY PCR RESULT: SIGNIFICANT CHANGE UP
C DIFF TOX GENS STL QL NAA+PROBE: SIGNIFICANT CHANGE UP
CALCIUM SERPL-MCNC: 8.7 MG/DL — SIGNIFICANT CHANGE UP (ref 8.5–10.1)
CHLORIDE SERPL-SCNC: 118 MMOL/L — HIGH (ref 96–108)
CO2 SERPL-SCNC: 22 MMOL/L — SIGNIFICANT CHANGE UP (ref 22–31)
CREAT SERPL-MCNC: 0.63 MG/DL — SIGNIFICANT CHANGE UP (ref 0.5–1.3)
CULTURE RESULTS: SIGNIFICANT CHANGE UP
EOSINOPHIL # BLD AUTO: 0.1 K/UL — SIGNIFICANT CHANGE UP (ref 0–0.5)
EOSINOPHIL NFR BLD AUTO: 1.1 % — SIGNIFICANT CHANGE UP (ref 0–6)
GLUCOSE SERPL-MCNC: 102 MG/DL — HIGH (ref 70–99)
HCT VFR BLD CALC: 32.8 % — LOW (ref 34.5–45)
HGB BLD-MCNC: 10.9 G/DL — LOW (ref 11.5–15.5)
LYMPHOCYTES # BLD AUTO: 19.4 % — SIGNIFICANT CHANGE UP (ref 13–44)
LYMPHOCYTES # BLD AUTO: 2.1 K/UL — SIGNIFICANT CHANGE UP (ref 1–3.3)
MANUAL DIF COMMENT BLD-IMP: SIGNIFICANT CHANGE UP
MCHC RBC-ENTMCNC: 29 PG — SIGNIFICANT CHANGE UP (ref 27–34)
MCHC RBC-ENTMCNC: 33.3 GM/DL — SIGNIFICANT CHANGE UP (ref 32–36)
MCV RBC AUTO: 87 FL — SIGNIFICANT CHANGE UP (ref 80–100)
METHOD TYPE: SIGNIFICANT CHANGE UP
METHOD TYPE: SIGNIFICANT CHANGE UP
MONOCYTES # BLD AUTO: 1 K/UL — HIGH (ref 0–0.9)
MONOCYTES NFR BLD AUTO: 9.1 % — SIGNIFICANT CHANGE UP (ref 2–14)
NEUTROPHILS # BLD AUTO: 7.8 K/UL — HIGH (ref 1.8–7.4)
NEUTROPHILS NFR BLD AUTO: 70 % — SIGNIFICANT CHANGE UP (ref 43–77)
ORGANISM # SPEC MICROSCOPIC CNT: SIGNIFICANT CHANGE UP
PLAT MORPH BLD: NORMAL — SIGNIFICANT CHANGE UP
PLATELET # BLD AUTO: 172 K/UL — SIGNIFICANT CHANGE UP (ref 150–400)
POTASSIUM SERPL-MCNC: 3.6 MMOL/L — SIGNIFICANT CHANGE UP (ref 3.5–5.3)
POTASSIUM SERPL-SCNC: 3.6 MMOL/L — SIGNIFICANT CHANGE UP (ref 3.5–5.3)
RBC # BLD: 3.78 M/UL — LOW (ref 3.8–5.2)
RBC # FLD: 14.7 % — HIGH (ref 10.3–14.5)
RBC BLD AUTO: NORMAL — SIGNIFICANT CHANGE UP
SODIUM SERPL-SCNC: 145 MMOL/L — SIGNIFICANT CHANGE UP (ref 135–145)
SPECIMEN SOURCE: SIGNIFICANT CHANGE UP
WBC # BLD: 11.1 K/UL — HIGH (ref 3.8–10.5)
WBC # FLD AUTO: 11.1 K/UL — HIGH (ref 3.8–10.5)

## 2017-07-13 RX ADMIN — SODIUM CHLORIDE 80 MILLILITER(S): 9 INJECTION INTRAMUSCULAR; INTRAVENOUS; SUBCUTANEOUS at 17:50

## 2017-07-13 RX ADMIN — Medication 1 TABLET(S): at 08:41

## 2017-07-13 RX ADMIN — HEPARIN SODIUM 5000 UNIT(S): 5000 INJECTION INTRAVENOUS; SUBCUTANEOUS at 05:19

## 2017-07-13 RX ADMIN — HEPARIN SODIUM 5000 UNIT(S): 5000 INJECTION INTRAVENOUS; SUBCUTANEOUS at 17:47

## 2017-07-13 RX ADMIN — CEFTRIAXONE 100 GRAM(S): 500 INJECTION, POWDER, FOR SOLUTION INTRAMUSCULAR; INTRAVENOUS at 12:17

## 2017-07-13 RX ADMIN — Medication 1 TABLET(S): at 17:47

## 2017-07-13 RX ADMIN — SODIUM CHLORIDE 80 MILLILITER(S): 9 INJECTION INTRAMUSCULAR; INTRAVENOUS; SUBCUTANEOUS at 05:24

## 2017-07-13 NOTE — PROGRESS NOTE ADULT - SUBJECTIVE AND OBJECTIVE BOX
pt clinically improving will see if interventional rad can internalize stent tomorrow  discussed all options at length for 15m

## 2017-07-14 LAB
ANION GAP SERPL CALC-SCNC: 8 MMOL/L — SIGNIFICANT CHANGE UP (ref 5–17)
BUN SERPL-MCNC: 17 MG/DL — SIGNIFICANT CHANGE UP (ref 7–23)
CALCIUM SERPL-MCNC: 8.4 MG/DL — LOW (ref 8.5–10.1)
CHLORIDE SERPL-SCNC: 118 MMOL/L — HIGH (ref 96–108)
CO2 SERPL-SCNC: 20 MMOL/L — LOW (ref 22–31)
CREAT SERPL-MCNC: 0.63 MG/DL — SIGNIFICANT CHANGE UP (ref 0.5–1.3)
CULTURE RESULTS: SIGNIFICANT CHANGE UP
GLUCOSE SERPL-MCNC: 99 MG/DL — SIGNIFICANT CHANGE UP (ref 70–99)
HCT VFR BLD CALC: 32.4 % — LOW (ref 34.5–45)
HGB BLD-MCNC: 10.7 G/DL — LOW (ref 11.5–15.5)
MCHC RBC-ENTMCNC: 28.5 PG — SIGNIFICANT CHANGE UP (ref 27–34)
MCHC RBC-ENTMCNC: 33 GM/DL — SIGNIFICANT CHANGE UP (ref 32–36)
MCV RBC AUTO: 86.3 FL — SIGNIFICANT CHANGE UP (ref 80–100)
ORGANISM # SPEC MICROSCOPIC CNT: SIGNIFICANT CHANGE UP
ORGANISM # SPEC MICROSCOPIC CNT: SIGNIFICANT CHANGE UP
PLATELET # BLD AUTO: 196 K/UL — SIGNIFICANT CHANGE UP (ref 150–400)
POTASSIUM SERPL-MCNC: 3.9 MMOL/L — SIGNIFICANT CHANGE UP (ref 3.5–5.3)
POTASSIUM SERPL-SCNC: 3.9 MMOL/L — SIGNIFICANT CHANGE UP (ref 3.5–5.3)
RBC # BLD: 3.76 M/UL — LOW (ref 3.8–5.2)
RBC # FLD: 14.4 % — SIGNIFICANT CHANGE UP (ref 10.3–14.5)
SODIUM SERPL-SCNC: 146 MMOL/L — HIGH (ref 135–145)
SPECIMEN SOURCE: SIGNIFICANT CHANGE UP
WBC # BLD: 9.9 K/UL — SIGNIFICANT CHANGE UP (ref 3.8–10.5)
WBC # FLD AUTO: 9.9 K/UL — SIGNIFICANT CHANGE UP (ref 3.8–10.5)

## 2017-07-14 RX ORDER — LOPERAMIDE HCL 2 MG
2 TABLET ORAL
Qty: 0 | Refills: 0 | Status: DISCONTINUED | OUTPATIENT
Start: 2017-07-14 | End: 2017-07-19

## 2017-07-14 RX ORDER — CEFUROXIME AXETIL 250 MG
500 TABLET ORAL EVERY 12 HOURS
Qty: 0 | Refills: 0 | Status: DISCONTINUED | OUTPATIENT
Start: 2017-07-15 | End: 2017-07-19

## 2017-07-14 RX ORDER — ONDANSETRON 8 MG/1
4 TABLET, FILM COATED ORAL EVERY 6 HOURS
Qty: 0 | Refills: 0 | Status: COMPLETED | OUTPATIENT
Start: 2017-07-14 | End: 2017-07-14

## 2017-07-14 RX ADMIN — HEPARIN SODIUM 5000 UNIT(S): 5000 INJECTION INTRAVENOUS; SUBCUTANEOUS at 18:00

## 2017-07-14 RX ADMIN — CEFTRIAXONE 100 GRAM(S): 500 INJECTION, POWDER, FOR SOLUTION INTRAMUSCULAR; INTRAVENOUS at 12:22

## 2017-07-14 RX ADMIN — Medication 1 TABLET(S): at 18:00

## 2017-07-14 RX ADMIN — ONDANSETRON 4 MILLIGRAM(S): 8 TABLET, FILM COATED ORAL at 01:23

## 2017-07-14 RX ADMIN — SODIUM CHLORIDE 80 MILLILITER(S): 9 INJECTION INTRAMUSCULAR; INTRAVENOUS; SUBCUTANEOUS at 07:11

## 2017-07-14 NOTE — PROGRESS NOTE ADULT - SUBJECTIVE AND OBJECTIVE BOX
HPI:  76y female w/ PMH recurrent UTIs, presents to ED for evaluation of abd pain. Pt states she was here recently with similar complaints and the workup revealed she had a UTI with a 9mm kidney stone that has remained in place and was d/c'd with ABx. Pt states today she woke up with severe abd pain located in the umbilical region. Pt also reports n/v. Pt cannot tolerate PO which prompted her to seek evaluation. Pt has had past episodes of Cdiff but this is not similar. No fever, chills, SOB, CP. last normal BM today.  No abdominal distention, no blood in stool, no burning urination, no diarrhea.  In Ed CT scan abd/pelvis - right hydronephrosis with large  ureteral stone with right perinephric stranding  IV vanco,po vanco and ceftriaxone given in ED.  Patient was seen by IR and urology dr Nice in ED and patient underwent urgent percutaneopus right nephrostomy tube drainage   In PACU patient awake ,denies any complaints.    7/12- chart reviewed.  Pt seen earlier today.  Feels tired, chills. Denies abd pain, no n/v/d.    7/13- feeling much better today.  No abd pain, minimal pain at tube site.  Good uop.  No n/v/d.   7/14- feeling well overall.  Discussed fevers yesterday evening, none since- IR cancelled procedure.  Pt still undecided stating she doesn't want to go home w/ bag however she's nervous about procedure and wants to discuss w/ Urology.  c/o loose stool x 8 today.  Cdiff negative.  Requesting immodium.  No abd pain, no n/v.     ROS- as per hpi otherwise 10pt ros negative    Vital Signs Last 24 Hrs  T(C): 36.8 (14 Jul 2017 12:47), Max: 38.1 (13 Jul 2017 19:30)  T(F): 98.2 (14 Jul 2017 12:47), Max: 100.5 (13 Jul 2017 19:30)  HR: 52 (14 Jul 2017 12:47) (52 - 72)  BP: 143/50 (14 Jul 2017 12:47) (138/39 - 148/56)  BP(mean): --  RR: 16 (14 Jul 2017 12:47) (16 - 18)  SpO2: 100% (14 Jul 2017 12:47) (97% - 100%)    T(C): 37.3 (13 Jul 2017 10:56), Max: 37.6 (13 Jul 2017 08:42)  T(F): 99.1 (13 Jul 2017 10:56), Max: 99.6 (13 Jul 2017 08:42)  HR: 56 (13 Jul 2017 10:56) (56 - 62)  BP: 124/46 (13 Jul 2017 10:56) (119/55 - 130/48)  BP(mean): --  RR: 18 (13 Jul 2017 05:13) (17 - 18)  SpO2: 97% (13 Jul 2017 10:56) (97% - 98%)        PHYSICAL EXAM:  General: NAD, comfortable  Neuro: AAOx3, no focal deficits  HEENT: NCAT, EOMI  Neck: Soft and supple, No JVD  Respiratory: CTA b/l, no w/r/r  Cardiovascular: S1 and S2, RRR, no m/g/r  Gastrointestinal: +BS, soft, NTND, no rebound/guarding  - right PCN tube w/ light yellow urine, no blood, dressing c/d/i  Extremities: No c/c/e  Vascular: 2+ peripheral pulses  Musculoskeletal: 5/5 strength b/l UE and LE, sensation intact  Skin: No rashes          LABS: All Labs Reviewed:                        10.7   9.9   )-----------( 196      ( 14 Jul 2017 07:59 )             32.4     07-14    146<H>  |  118<H>  |  17  ----------------------------<  99  3.9   |  20<L>  |  0.63    Ca    8.4<L>      14 Jul 2017 07:59                                10.9   11.1  )-----------( 172      ( 13 Jul 2017 07:20 )             32.8     07-13    145  |  118<H>  |  19  ----------------------------<  102<H>  3.6   |  22  |  0.63    Ca    8.7      13 Jul 2017 07:20      Culture - Blood (07.13.17 @ 09:51)    Specimen Source: .Blood None    Culture Results:   No growth to date.        Culture - Blood (07.10.17 @ 21:45)    Gram Stain:   Growth in aerobic and anaerobic bottles: Gram Negative Rods    Specimen Source: .Blood None    Culture Results:   Growth in aerobic and anaerobic bottles: Proteus mirabilis  See previous culture [id number]          Culture - Blood (07.10.17 @ 21:45)    Gram Stain:   Growth in aerobic and anaerobic bottles: Gram Negative Rods    Specimen Source: .Blood None    Culture Results:   Growth in aerobic and anaerobic bottles: Gram Negative Rods    Culture - Urine (07.10.17 @ 10:18)    Specimen Source: .Urine (R)Kidney cup recv'd    Culture Results:   Moderate Proteus mirabilis    Culture - Urine (06.28.17 @ 13:47)    -  Cefazolin: S 8    -  Cefoxitin: S <=4    -  Ceftazidime: S <=1    -  Ertapenem: S <=0.5    -  Gentamicin: S 2    -  Levofloxacin: R >4    -  Meropenem: S <=1    -  Ceftriaxone: S <=1    -  Amikacin: S <=8    -  Ampicillin: R >16    -  Ampicillin/Sulbactam: S    -  Aztreonam: S <=4    -  Cefepime: S <=2    -  Ciprofloxacin: R >2    -  Nitrofurantoin: R >64    -  Piperacillin/Tazobactam: S <=8    -  Tobramycin: S 4    -  Trimethoprim/Sulfamethoxazole: S    Specimen Source: .Urine None    Culture Results:   10,000 - 49,000 CFU/mL Proteus mirabilis    Organism Identification: Proteus mirabilis    Organism: Proteus mirabilis    Method Type: JASON    < from: CT Abdomen and Pelvis w/ Oral Cont (07.10.17 @ 19:52) >  IMPRESSION:     Mild right hydroureteronephrosis secondary to a right ureteropelvic   junction calculus measuring 1.2 x 0.7 cm. Mild right perinephric   stranding and trace right perinephric fluid        < end of copied text >    < from: Xray Chest 1 View AP/PA. (07.10.17 @ 22:39) >  The cardiac, hilar and mediastinal contours are unremarkable. The lungs   are clear. The osseous structures demonstrate no acute abnormality.    IMPRESSION:    Clear lungs. No change since June 28, 2017.    < end of copied text >    MEDICATIONS  (STANDING):  lactobacillus acidophilus 1 Tablet(s) Oral two times a day with meals  cefTRIAXone   IVPB 1 Gram(s) IV Intermittent every 24 hours  heparin  Injectable 5000 Unit(s) SubCutaneous every 12 hours  sodium chloride 0.9%. 1000 milliLiter(s) (80 mL/Hr) IV Continuous <Continuous>    MEDICATIONS  (PRN):  HYDROmorphone  Injectable 0.5 milliGRAM(s) IV Push every 4 hours PRN Severe Pain (7 - 10)  acetaminophen   Tablet 650 milliGRAM(s) Oral every 6 hours PRN For Temp greater than 38 C (100.4 F)        Assessment and Plan:   76y female w/     *sepsis, Proteus bacteremia due to right pyelonephritis, hydroureteronephrosis due to ureteropelvic stone  - s/p perc nephrostomy tube 7/11  - still spiking temps but wbc improving, normal lactate--> temps improving overnight 7/13  - urine culture 7/10 +proteus pending sensitivities (urine cx 6/28 +proteus sensitive to rocephin)  - blood cultures 7/10 + proteus  - repeat blood cultures x 2 now 7/12 ngtd  - IV rocephin day 5--> change to po ceftin 7/15   - d/c ivf  - Urology f/u appreciated --> if afebrile for 24hr may internalize stent Friday, and outpt ESWL  7/14- low grade temps, IR procedure cancelled,  possibly Monday    *hx recurrent Cdiff  - diarrhea persists, Cdiff negative x 2   - s/p fecal transplant few weeks ago at [place]  - continue home dose probiotics  - stool studies, then immodium     *dvt px  - heparin sc

## 2017-07-14 NOTE — PROGRESS NOTE ADULT - SUBJECTIVE AND OBJECTIVE BOX
vvs afeb  had temp earlier so iternalization of stent delayed  if stable can be d/c 'd home and internalized as outpt   if still here then please contact IR Dr Barrera to interalize stent on Monday  pt can follow up as outpt for eswl

## 2017-07-15 LAB
ANION GAP SERPL CALC-SCNC: 6 MMOL/L — SIGNIFICANT CHANGE UP (ref 5–17)
BUN SERPL-MCNC: 22 MG/DL — SIGNIFICANT CHANGE UP (ref 7–23)
CALCIUM SERPL-MCNC: 8.8 MG/DL — SIGNIFICANT CHANGE UP (ref 8.5–10.1)
CHLORIDE SERPL-SCNC: 117 MMOL/L — HIGH (ref 96–108)
CO2 SERPL-SCNC: 23 MMOL/L — SIGNIFICANT CHANGE UP (ref 22–31)
CREAT SERPL-MCNC: 0.67 MG/DL — SIGNIFICANT CHANGE UP (ref 0.5–1.3)
GLUCOSE SERPL-MCNC: 101 MG/DL — HIGH (ref 70–99)
POTASSIUM SERPL-MCNC: 4 MMOL/L — SIGNIFICANT CHANGE UP (ref 3.5–5.3)
POTASSIUM SERPL-SCNC: 4 MMOL/L — SIGNIFICANT CHANGE UP (ref 3.5–5.3)
SODIUM SERPL-SCNC: 146 MMOL/L — HIGH (ref 135–145)

## 2017-07-15 RX ADMIN — HEPARIN SODIUM 5000 UNIT(S): 5000 INJECTION INTRAVENOUS; SUBCUTANEOUS at 05:40

## 2017-07-15 RX ADMIN — Medication 500 MILLIGRAM(S): at 17:19

## 2017-07-15 RX ADMIN — HEPARIN SODIUM 5000 UNIT(S): 5000 INJECTION INTRAVENOUS; SUBCUTANEOUS at 17:21

## 2017-07-15 RX ADMIN — Medication 1 TABLET(S): at 09:40

## 2017-07-15 RX ADMIN — Medication 500 MILLIGRAM(S): at 05:40

## 2017-07-15 RX ADMIN — Medication 1 TABLET(S): at 17:19

## 2017-07-15 NOTE — PROGRESS NOTE ADULT - SUBJECTIVE AND OBJECTIVE BOX
HPI:  76y female w/ PMH recurrent UTIs, presents to ED for evaluation of abd pain. Pt states she was here recently with similar complaints and the workup revealed she had a UTI with a 9mm kidney stone that has remained in place and was d/c'd with ABx. Pt states today she woke up with severe abd pain located in the umbilical region. Pt also reports n/v. Pt cannot tolerate PO which prompted her to seek evaluation. Pt has had past episodes of Cdiff but this is not similar. No fever, chills, SOB, CP. last normal BM today.  No abdominal distention, no blood in stool, no burning urination, no diarrhea.  In Ed CT scan abd/pelvis - right hydronephrosis with large  ureteral stone with right perinephric stranding  IV vanco,po vanco and ceftriaxone given in ED.  Patient was seen by IR and urology dr Nice in ED and patient underwent urgent percutaneopus right nephrostomy tube drainage   In PACU patient awake ,denies any complaints.    7/12- chart reviewed.  Pt seen earlier today.  Feels tired, chills. Denies abd pain, no n/v/d.    7/13- feeling much better today.  No abd pain, minimal pain at tube site.  Good uop.  No n/v/d.   7/14- feeling well overall.  Discussed fevers yesterday evening, none since- IR cancelled procedure.  Pt still undecided stating she doesn't want to go home w/ bag however she's nervous about procedure and wants to discuss w/ Urology.  c/o loose stool x 8 today.  Cdiff negative.  Requesting immodium.  No abd pain, no n/v.     7/15- no complaints.  Loose bm improved. No pain.  After discussion w/ her Urologist last night she agrees to internalization of stent.     ROS- as per hpi otherwise 10pt ros negative    Vital Signs Last 24 Hrs  T(C): 37.1 (15 Jul 2017 11:05), Max: 37.3 (14 Jul 2017 23:00)  T(F): 98.7 (15 Jul 2017 11:05), Max: 99.2 (14 Jul 2017 23:00)  HR: 62 (15 Jul 2017 11:05) (52 - 62)  BP: 143/50 (15 Jul 2017 11:05) (127/56 - 143/50)  BP(mean): --  RR: 16 (15 Jul 2017 11:05) (16 - 18)  SpO2: 99% (15 Jul 2017 11:05) (99% - 100%)      PHYSICAL EXAM:  General: NAD, comfortable  Neuro: AAOx3, no focal deficits  HEENT: NCAT, EOMI  Neck: Soft and supple, No JVD  Respiratory: CTA b/l, no w/r/r  Cardiovascular: S1 and S2, RRR, no m/g/r  Gastrointestinal: +BS, soft, NTND, no rebound/guarding  - right PCN tube w/ light yellow urine, no blood, dressing c/d/i  Extremities: No c/c/e  Vascular: 2+ peripheral pulses  Musculoskeletal: 5/5 strength b/l UE and LE, sensation intact  Skin: No rashes          LABS: All Labs Reviewed:                        10.7   9.9   )-----------( 196      ( 14 Jul 2017 07:59 )             32.4     07-15    146<H>  |  117<H>  |  22  ----------------------------<  101<H>  4.0   |  23  |  0.67    Ca    8.8      15 Jul 2017 07:40        Culture - Blood (07.13.17 @ 09:51)    Specimen Source: .Blood None    Culture Results:   No growth to date.        Culture - Blood (07.10.17 @ 21:45)    Gram Stain:   Growth in aerobic and anaerobic bottles: Gram Negative Rods    Specimen Source: .Blood None    Culture Results:   Growth in aerobic and anaerobic bottles: Proteus mirabilis  See previous culture [id number]          Culture - Blood (07.10.17 @ 21:45)    Gram Stain:   Growth in aerobic and anaerobic bottles: Gram Negative Rods    Specimen Source: .Blood None    Culture Results:   Growth in aerobic and anaerobic bottles: Gram Negative Rods    Culture - Urine (07.10.17 @ 10:18)    Specimen Source: .Urine (R)Kidney cup recv'd    Culture Results:   Moderate Proteus mirabilis    Culture - Urine (06.28.17 @ 13:47)    -  Cefazolin: S 8    -  Cefoxitin: S <=4    -  Ceftazidime: S <=1    -  Ertapenem: S <=0.5    -  Gentamicin: S 2    -  Levofloxacin: R >4    -  Meropenem: S <=1    -  Ceftriaxone: S <=1    -  Amikacin: S <=8    -  Ampicillin: R >16    -  Ampicillin/Sulbactam: S    -  Aztreonam: S <=4    -  Cefepime: S <=2    -  Ciprofloxacin: R >2    -  Nitrofurantoin: R >64    -  Piperacillin/Tazobactam: S <=8    -  Tobramycin: S 4    -  Trimethoprim/Sulfamethoxazole: S    Specimen Source: .Urine None    Culture Results:   10,000 - 49,000 CFU/mL Proteus mirabilis    Organism Identification: Proteus mirabilis    Organism: Proteus mirabilis    Method Type: JASON    < from: CT Abdomen and Pelvis w/ Oral Cont (07.10.17 @ 19:52) >  IMPRESSION:     Mild right hydroureteronephrosis secondary to a right ureteropelvic   junction calculus measuring 1.2 x 0.7 cm. Mild right perinephric   stranding and trace right perinephric fluid        < end of copied text >    < from: Xray Chest 1 View AP/PA. (07.10.17 @ 22:39) >  The cardiac, hilar and mediastinal contours are unremarkable. The lungs   are clear. The osseous structures demonstrate no acute abnormality.    IMPRESSION:    Clear lungs. No change since June 28, 2017.    < end of copied text >    MEDICATIONS  (STANDING):  lactobacillus acidophilus 1 Tablet(s) Oral two times a day with meals  heparin  Injectable 5000 Unit(s) SubCutaneous every 12 hours  cefuroxime   Tablet 500 milliGRAM(s) Oral every 12 hours    MEDICATIONS  (PRN):  HYDROmorphone  Injectable 0.5 milliGRAM(s) IV Push every 4 hours PRN Severe Pain (7 - 10)  acetaminophen   Tablet 650 milliGRAM(s) Oral every 6 hours PRN For Temp greater than 38 C (100.4 F)  loperamide 2 milliGRAM(s) Oral four times a day PRN Diarrhea          Assessment and Plan:   76y female w/     *sepsis, Proteus bacteremia due to right pyelonephritis, hydroureteronephrosis due to ureteropelvic stone  - s/p perc nephrostomy tube 7/11  - still spiking temps but wbc improving, normal lactate--> temps resolving 7/15  - urine culture 7/10 +proteus   - blood cultures 7/10 + proteus  - repeat blood cultures x 2 now 7/12 ngtd  - IV rocephin day 5--> change to po ceftin 7/15 (will need total 14d for bacteremia)  - Urology f/u appreciated --> if afebrile for 24hr may internalize stent Friday, and outpt ESWL  7/15- clinically improving, IR stent internalization cancelled yesterday b/c fever, plan for Monday, will need IR reconsult Mon.     *hx recurrent Cdiff  - diarrhea persists, Cdiff negative x 2   - s/p fecal transplant few weeks ago at [place]  - continue home dose probiotics  - stool studies, then immodium --> improving    *dvt px  - heparin sc

## 2017-07-16 RX ORDER — VALSARTAN 80 MG/1
160 TABLET ORAL DAILY
Qty: 0 | Refills: 0 | Status: DISCONTINUED | OUTPATIENT
Start: 2017-07-16 | End: 2017-07-19

## 2017-07-16 RX ADMIN — Medication 2 MILLIGRAM(S): at 15:12

## 2017-07-16 RX ADMIN — Medication 1 TABLET(S): at 18:08

## 2017-07-16 RX ADMIN — HEPARIN SODIUM 5000 UNIT(S): 5000 INJECTION INTRAVENOUS; SUBCUTANEOUS at 18:08

## 2017-07-16 RX ADMIN — Medication 2 MILLIGRAM(S): at 09:49

## 2017-07-16 RX ADMIN — HEPARIN SODIUM 5000 UNIT(S): 5000 INJECTION INTRAVENOUS; SUBCUTANEOUS at 05:31

## 2017-07-16 RX ADMIN — VALSARTAN 160 MILLIGRAM(S): 80 TABLET ORAL at 18:08

## 2017-07-16 RX ADMIN — Medication 500 MILLIGRAM(S): at 05:31

## 2017-07-16 RX ADMIN — Medication 1 TABLET(S): at 09:49

## 2017-07-16 RX ADMIN — Medication 500 MILLIGRAM(S): at 18:08

## 2017-07-16 NOTE — PROGRESS NOTE ADULT - SUBJECTIVE AND OBJECTIVE BOX
HPI:  76y female w/ PMH recurrent UTIs, presents to ED for evaluation of abd pain. Pt states she was here recently with similar complaints and the workup revealed she had a UTI with a 9mm kidney stone that has remained in place and was d/c'd with ABx. Pt states today she woke up with severe abd pain located in the umbilical region. Pt also reports n/v. Pt cannot tolerate PO which prompted her to seek evaluation. Pt has had past episodes of Cdiff but this is not similar. No fever, chills, SOB, CP. last normal BM today.  No abdominal distention, no blood in stool, no burning urination, no diarrhea.  In Ed CT scan abd/pelvis - right hydronephrosis with large  ureteral stone with right perinephric stranding  IV vanco,po vanco and ceftriaxone given in ED.  Patient was seen by IR and urology dr Nice in ED and patient underwent urgent percutaneopus right nephrostomy tube drainage   In PACU patient awake ,denies any complaints.    7/12- chart reviewed.  Pt seen earlier today.  Feels tired, chills. Denies abd pain, no n/v/d.    7/13- feeling much better today.  No abd pain, minimal pain at tube site.  Good uop.  No n/v/d.   7/14- feeling well overall.  Discussed fevers yesterday evening, none since- IR cancelled procedure.  Pt still undecided stating she doesn't want to go home w/ bag however she's nervous about procedure and wants to discuss w/ Urology.  c/o loose stool x 8 today.  Cdiff negative.  Requesting immodium.  No abd pain, no n/v.   7/15- no complaints.  Loose bm improved. No pain.  After discussion w/ her Urologist last night she agrees to internalization of stent.   7/16: no c/o    Vital Signs Last 24 Hrs  T(C): 37 (16 Jul 2017 11:42), Max: 37.2 (16 Jul 2017 05:28)  T(F): 98.6 (16 Jul 2017 11:42), Max: 99 (16 Jul 2017 05:28)  HR: 55 (16 Jul 2017 11:42) (55 - 61)  BP: 171/51 (16 Jul 2017 11:42) (142/59 - 171/51)  BP(mean): --  RR: 16 (16 Jul 2017 11:42) (16 - 16)  SpO2: 98% (16 Jul 2017 11:42) (98% - 99%)      PHYSICAL EXAM:  General: NAD, comfortable  Neuro: AAOx3, no focal deficits  HEENT: NCAT, EOMI  Neck: Soft and supple, No JVD  Respiratory: CTA b/l, no w/r/r  Cardiovascular: S1 and S2, RRR, no m/g/r  Gastrointestinal: +BS, soft, NTND, no rebound/guarding  - right PCN tube w/ light yellow urine, no blood, dressing c/d/i  Extremities: No c/c/e  Vascular: 2+ peripheral pulses  Musculoskeletal: 5/5 strength b/l UE and LE, sensation intact  Skin: No rashes          LABS: All Labs Reviewed:                        10.7   9.9   )-----------( 196      ( 14 Jul 2017 07:59 )             32.4     07-15    146<H>  |  117<H>  |  22  ----------------------------<  101<H>  4.0   |  23  |  0.67    Ca    8.8      15 Jul 2017 07:40        Culture - Blood (07.13.17 @ 09:51)    Specimen Source: .Blood None    Culture Results:   No growth to date.        Culture - Blood (07.10.17 @ 21:45)    Gram Stain:   Growth in aerobic and anaerobic bottles: Gram Negative Rods    Specimen Source: .Blood None    Culture Results:   Growth in aerobic and anaerobic bottles: Proteus mirabilis  See previous culture [id number]          Culture - Blood (07.10.17 @ 21:45)    Gram Stain:   Growth in aerobic and anaerobic bottles: Gram Negative Rods    Specimen Source: .Blood None    Culture Results:   Growth in aerobic and anaerobic bottles: Gram Negative Rods    Culture - Urine (07.10.17 @ 10:18)    Specimen Source: .Urine (R)Kidney cup recv'd    Culture Results:   Moderate Proteus mirabilis    Culture - Urine (06.28.17 @ 13:47)    -  Cefazolin: S 8    -  Cefoxitin: S <=4    -  Ceftazidime: S <=1    -  Ertapenem: S <=0.5    -  Gentamicin: S 2    -  Levofloxacin: R >4    -  Meropenem: S <=1    -  Ceftriaxone: S <=1    -  Amikacin: S <=8    -  Ampicillin: R >16    -  Ampicillin/Sulbactam: S    -  Aztreonam: S <=4    -  Cefepime: S <=2    -  Ciprofloxacin: R >2    -  Nitrofurantoin: R >64    -  Piperacillin/Tazobactam: S <=8    -  Tobramycin: S 4    -  Trimethoprim/Sulfamethoxazole: S    Specimen Source: .Urine None    Culture Results:   10,000 - 49,000 CFU/mL Proteus mirabilis    Organism Identification: Proteus mirabilis    Organism: Proteus mirabilis    Method Type: JASON    < from: CT Abdomen and Pelvis w/ Oral Cont (07.10.17 @ 19:52) >  IMPRESSION:     Mild right hydroureteronephrosis secondary to a right ureteropelvic   junction calculus measuring 1.2 x 0.7 cm. Mild right perinephric   stranding and trace right perinephric fluid        < end of copied text >    < from: Xray Chest 1 View AP/PA. (07.10.17 @ 22:39) >  The cardiac, hilar and mediastinal contours are unremarkable. The lungs   are clear. The osseous structures demonstrate no acute abnormality.    IMPRESSION:    Clear lungs. No change since June 28, 2017.    < end of copied text >    MEDICATIONS  (STANDING):  lactobacillus acidophilus 1 Tablet(s) Oral two times a day with meals  heparin  Injectable 5000 Unit(s) SubCutaneous every 12 hours  cefuroxime   Tablet 500 milliGRAM(s) Oral every 12 hours    MEDICATIONS  (PRN):  HYDROmorphone  Injectable 0.5 milliGRAM(s) IV Push every 4 hours PRN Severe Pain (7 - 10)  acetaminophen   Tablet 650 milliGRAM(s) Oral every 6 hours PRN For Temp greater than 38 C (100.4 F)  loperamide 2 milliGRAM(s) Oral four times a day PRN Diarrhea          Assessment and Plan:   76y female w/     *sepsis, Proteus bacteremia due to right pyelonephritis, hydroureteronephrosis due to ureteropelvic stone  - s/p perc nephrostomy tube 7/11  - urine culture 7/10 +proteus   - blood cultures 7/10 + proteus  - on po ceftin    *hx recurrent Cdiff  - diarrhea persists, Cdiff negative x 2   - s/p fecal transplant few weeks ago at [place]  - continue home dose probiotics  - stool studies, then immodium --> improving    *dvt px  - heparin sc

## 2017-07-17 LAB
CULTURE RESULTS: SIGNIFICANT CHANGE UP
SPECIMEN SOURCE: SIGNIFICANT CHANGE UP

## 2017-07-17 PROCEDURE — 50434 CONVERT NEPHROSTOMY CATHETER: CPT | Mod: RT

## 2017-07-17 RX ORDER — MEPERIDINE HYDROCHLORIDE 50 MG/ML
12.5 INJECTION INTRAMUSCULAR; INTRAVENOUS; SUBCUTANEOUS
Qty: 0 | Refills: 0 | Status: DISCONTINUED | OUTPATIENT
Start: 2017-07-17 | End: 2017-07-17

## 2017-07-17 RX ORDER — ONDANSETRON 8 MG/1
2 TABLET, FILM COATED ORAL ONCE
Qty: 0 | Refills: 0 | Status: DISCONTINUED | OUTPATIENT
Start: 2017-07-17 | End: 2017-07-17

## 2017-07-17 RX ORDER — OXYCODONE HYDROCHLORIDE 5 MG/1
5 TABLET ORAL ONCE
Qty: 0 | Refills: 0 | Status: DISCONTINUED | OUTPATIENT
Start: 2017-07-17 | End: 2017-07-17

## 2017-07-17 RX ORDER — OXYCODONE HYDROCHLORIDE 5 MG/1
5 TABLET ORAL EVERY 4 HOURS
Qty: 0 | Refills: 0 | Status: DISCONTINUED | OUTPATIENT
Start: 2017-07-17 | End: 2017-07-17

## 2017-07-17 RX ORDER — FENTANYL CITRATE 50 UG/ML
25 INJECTION INTRAVENOUS
Qty: 0 | Refills: 0 | Status: DISCONTINUED | OUTPATIENT
Start: 2017-07-17 | End: 2017-07-17

## 2017-07-17 RX ADMIN — Medication 1 TABLET(S): at 17:23

## 2017-07-17 RX ADMIN — Medication 500 MILLIGRAM(S): at 05:34

## 2017-07-17 RX ADMIN — VALSARTAN 160 MILLIGRAM(S): 80 TABLET ORAL at 05:34

## 2017-07-17 RX ADMIN — Medication 500 MILLIGRAM(S): at 17:22

## 2017-07-17 RX ADMIN — OXYCODONE HYDROCHLORIDE 5 MILLIGRAM(S): 5 TABLET ORAL at 14:01

## 2017-07-17 RX ADMIN — OXYCODONE HYDROCHLORIDE 5 MILLIGRAM(S): 5 TABLET ORAL at 23:13

## 2017-07-17 RX ADMIN — HEPARIN SODIUM 5000 UNIT(S): 5000 INJECTION INTRAVENOUS; SUBCUTANEOUS at 17:23

## 2017-07-17 NOTE — BRIEF OPERATIVE NOTE - PROCEDURE
Nephrostomy with insertion of ureteral stent  07/17/2017    Active  JENNIFER
Nephrostomy catheter placement  07/11/2017    Active  DAXELROD

## 2017-07-17 NOTE — BRIEF OPERATIVE NOTE - OPERATION/FINDINGS
1) UPJ stone  2) No residual hydronephrosis  3) Appropriate position and function of 8.5F 24cm percutaneous nephroureterostomy via existing access
sono and fluoro to place 8fr pcn into posterior calyx. in good postion on completion imaging. no contrast appears to pass the proximal stone

## 2017-07-17 NOTE — DIETITIAN INITIAL EVALUATION ADULT. - ENERGY NEEDS
HT: 64 Inches   WT: 64.9 Kg   BMI: 24.5 Kg/m2   IBW: 55.9 Kg   %IBW: 116 % HT: 59 Inches   WT: 57.3 Kg   BMI: 25.4 Kg/m2   IBW:49 Kg   %IBW: 117 %

## 2017-07-17 NOTE — DIETITIAN INITIAL EVALUATION ADULT. - ADHERENCE
Patient initially was following a low fiber diet, GI advised patient to begin to add soluble/insoluble fiber back into diet (patient was eating quinoa and black beans), taking probiotics and kefir at home PTA/good

## 2017-07-17 NOTE — DIETITIAN INITIAL EVALUATION ADULT. - NUTRITIONGOAL OUTCOME1
Change diet to low fiber, patient will tolerate PO diet without GI s/s patient will consume meals free of GI symptoms.

## 2017-07-17 NOTE — DIETITIAN INITIAL EVALUATION ADULT. - OTHER INFO
Patient admitted with abdominal pain, nausea and vomiting, unable to tolerate PO. Patient found to have UTI with a kidney stone, patient underwent urgent percutaneous right nephrostomy tube placement for drainage. Patient also has hx of recurrent C-Diff, s/p fecal transplant a few weeks ago at [place]. Patient admitted with abdominal pain, nausea and vomiting, unable to tolerate PO. Patient found to have UTI with a kidney stone, patient underwent urgent percutaneous right nephrostomy tube placement for drainage. Patient also has hx of recurrent C-Diff, s/p fecal transplant a few weeks ago (June 2017) at [place]. Patient reports that at home she was beginning to add back soluble/insoluble fiber into her diet along with probiotics (in addition to Greek Yogurt and Kefir) daily.

## 2017-07-17 NOTE — DIETITIAN INITIAL EVALUATION ADULT. - NS AS NUTRI INTERV MEDICAL AND FOOD SUPPLEMENTS
Recommended continued used of probiotics, greek yogurt, kefir and adding kombucha to provide prebiotics.

## 2017-07-17 NOTE — PROGRESS NOTE ADULT - SUBJECTIVE AND OBJECTIVE BOX
HPI:  76y female w/ PMH recurrent UTIs, presents to ED for evaluation of abd pain. Pt states she was here recently with similar complaints and the workup revealed she had a UTI with a 9mm kidney stone that has remained in place and was d/c'd with ABx. Pt states today she woke up with severe abd pain located in the umbilical region. Pt also reports n/v. Pt cannot tolerate PO which prompted her to seek evaluation. Pt has had past episodes of Cdiff but this is not similar. No fever, chills, SOB, CP. last normal BM today.  No abdominal distention, no blood in stool, no burning urination, no diarrhea.  In Ed CT scan abd/pelvis - right hydronephrosis with large  ureteral stone with right perinephric stranding  IV vanco,po vanco and ceftriaxone given in ED.  Patient was seen by IR and urology dr Nice in ED and patient underwent urgent percutaneopus right nephrostomy tube drainage   In PACU patient awake ,denies any complaints.    7/12- chart reviewed.  Pt seen earlier today.  Feels tired, chills. Denies abd pain, no n/v/d.    7/13- feeling much better today.  No abd pain, minimal pain at tube site.  Good uop.  No n/v/d.   7/14- feeling well overall.  Discussed fevers yesterday evening, none since- IR cancelled procedure.  Pt still undecided stating she doesn't want to go home w/ bag however she's nervous about procedure and wants to discuss w/ Urology.  c/o loose stool x 8 today.  Cdiff negative.  Requesting immodium.  No abd pain, no n/v.   7/15- no complaints.  Loose bm improved. No pain.  After discussion w/ her Urologist last night she agrees to internalization of stent.   7/16: no c/o  7/17: s/p internalization but oercut nephro left in place to ensure functioning    Vital Signs Last 24 Hrs  T(C): 36.6 (17 Jul 2017 10:12), Max: 37.4 (16 Jul 2017 20:25)  T(F): 97.8 (17 Jul 2017 10:12), Max: 99.4 (16 Jul 2017 20:25)  HR: 53 (17 Jul 2017 10:45) (53 - 64)  BP: 150/53 (17 Jul 2017 10:45) (102/47 - 159/60)  BP(mean): --  RR: 20 (17 Jul 2017 10:45) (16 - 22)  SpO2: 198% (17 Jul 2017 10:45) (95% - 198%)          PHYSICAL EXAM:  General: NAD, comfortable  Neuro: AAOx3, no focal deficits  HEENT: NCAT, EOMI  Neck: Soft and supple, No JVD  Respiratory: CTA b/l, no w/r/r  Cardiovascular: S1 and S2, RRR, no m/g/r  Gastrointestinal: +BS, soft, NTND, no rebound/guarding  - right PCN tube w/ light yellow urine, no blood, dressing c/d/i  Extremities: No c/c/e  Vascular: 2+ peripheral pulses  Musculoskeletal: 5/5 strength b/l UE and LE, sensation intact  Skin: No rashes          LABS: All Labs Reviewed:              Mild right hydroureteronephrosis secondary to a right ureteropelvic   junction calculus measuring 1.2 x 0.7 cm. Mild right perinephric   stranding and trace right perinephric fluid        < end of copied text >        Assessment and Plan:   76y female w/     *sepsis, Proteus bacteremia due to right pyelonephritis, hydroureteronephrosis due to ureteropelvic stone  - s/p perc nephrostomy tube 7/11  - urine culture 7/10 +proteus   - blood cultures 7/10 + proteus  - on po ceftin  - plan dc in am if no evid of obstruction. Per IR urology to decide if percut nephro can come out; case d/w urology    *hx recurrent Cdiff  - diarrhea persists, Cdiff negative x 2   - s/p fecal transplant few weeks ago at [place]  - continue home dose probiotics  - stool studies, then immodium --> improving    *dvt px  - heparin sc

## 2017-07-18 ENCOUNTER — TRANSCRIPTION ENCOUNTER (OUTPATIENT)
Age: 77
End: 2017-07-18

## 2017-07-18 LAB
ANION GAP SERPL CALC-SCNC: 7 MMOL/L — SIGNIFICANT CHANGE UP (ref 5–17)
BUN SERPL-MCNC: 11 MG/DL — SIGNIFICANT CHANGE UP (ref 7–23)
CALCIUM SERPL-MCNC: 8.8 MG/DL — SIGNIFICANT CHANGE UP (ref 8.5–10.1)
CHLORIDE SERPL-SCNC: 110 MMOL/L — HIGH (ref 96–108)
CO2 SERPL-SCNC: 25 MMOL/L — SIGNIFICANT CHANGE UP (ref 22–31)
CREAT SERPL-MCNC: 0.6 MG/DL — SIGNIFICANT CHANGE UP (ref 0.5–1.3)
CULTURE RESULTS: SIGNIFICANT CHANGE UP
CULTURE RESULTS: SIGNIFICANT CHANGE UP
GLUCOSE SERPL-MCNC: 134 MG/DL — HIGH (ref 70–99)
HCT VFR BLD CALC: 33 % — LOW (ref 34.5–45)
HGB BLD-MCNC: 11.4 G/DL — LOW (ref 11.5–15.5)
MCHC RBC-ENTMCNC: 29 PG — SIGNIFICANT CHANGE UP (ref 27–34)
MCHC RBC-ENTMCNC: 34.4 GM/DL — SIGNIFICANT CHANGE UP (ref 32–36)
MCV RBC AUTO: 84.4 FL — SIGNIFICANT CHANGE UP (ref 80–100)
PLATELET # BLD AUTO: 326 K/UL — SIGNIFICANT CHANGE UP (ref 150–400)
POTASSIUM SERPL-MCNC: 2.8 MMOL/L — CRITICAL LOW (ref 3.5–5.3)
POTASSIUM SERPL-SCNC: 2.8 MMOL/L — CRITICAL LOW (ref 3.5–5.3)
RBC # BLD: 3.91 M/UL — SIGNIFICANT CHANGE UP (ref 3.8–5.2)
RBC # FLD: 14.4 % — SIGNIFICANT CHANGE UP (ref 10.3–14.5)
SODIUM SERPL-SCNC: 142 MMOL/L — SIGNIFICANT CHANGE UP (ref 135–145)
SPECIMEN SOURCE: SIGNIFICANT CHANGE UP
SPECIMEN SOURCE: SIGNIFICANT CHANGE UP
WBC # BLD: 11 K/UL — HIGH (ref 3.8–10.5)
WBC # FLD AUTO: 11 K/UL — HIGH (ref 3.8–10.5)

## 2017-07-18 RX ORDER — POTASSIUM CHLORIDE 20 MEQ
40 PACKET (EA) ORAL ONCE
Qty: 0 | Refills: 0 | Status: COMPLETED | OUTPATIENT
Start: 2017-07-18 | End: 2017-07-18

## 2017-07-18 RX ORDER — POTASSIUM CHLORIDE 20 MEQ
10 PACKET (EA) ORAL
Qty: 0 | Refills: 0 | Status: COMPLETED | OUTPATIENT
Start: 2017-07-18 | End: 2017-07-18

## 2017-07-18 RX ORDER — CEFOXITIN 1 G/1
1 INJECTION, POWDER, FOR SOLUTION INTRAVENOUS
Qty: 12 | Refills: 0 | OUTPATIENT
Start: 2017-07-18 | End: 2017-07-24

## 2017-07-18 RX ORDER — LOPERAMIDE HCL 2 MG
1 TABLET ORAL
Qty: 0 | Refills: 0 | COMMUNITY
Start: 2017-07-18

## 2017-07-18 RX ADMIN — HEPARIN SODIUM 5000 UNIT(S): 5000 INJECTION INTRAVENOUS; SUBCUTANEOUS at 18:40

## 2017-07-18 RX ADMIN — Medication 1 TABLET(S): at 11:13

## 2017-07-18 RX ADMIN — Medication 500 MILLIGRAM(S): at 18:40

## 2017-07-18 RX ADMIN — Medication 100 MILLIEQUIVALENT(S): at 14:10

## 2017-07-18 RX ADMIN — Medication 1 TABLET(S): at 18:40

## 2017-07-18 RX ADMIN — Medication 100 MILLIEQUIVALENT(S): at 15:26

## 2017-07-18 RX ADMIN — Medication 100 MILLIEQUIVALENT(S): at 12:15

## 2017-07-18 RX ADMIN — HEPARIN SODIUM 5000 UNIT(S): 5000 INJECTION INTRAVENOUS; SUBCUTANEOUS at 06:22

## 2017-07-18 RX ADMIN — VALSARTAN 160 MILLIGRAM(S): 80 TABLET ORAL at 06:21

## 2017-07-18 RX ADMIN — Medication 40 MILLIEQUIVALENT(S): at 11:13

## 2017-07-18 RX ADMIN — Medication 500 MILLIGRAM(S): at 06:21

## 2017-07-18 NOTE — PROGRESS NOTE ADULT - SUBJECTIVE AND OBJECTIVE BOX
HPI:  76y female w/ PMH recurrent UTIs, presents to ED for evaluation of abd pain. Pt states she was here recently with similar complaints and the workup revealed she had a UTI with a 9mm kidney stone that has remained in place and was d/c'd with ABx. Pt states today she woke up with severe abd pain located in the umbilical region. Pt also reports n/v. Pt cannot tolerate PO which prompted her to seek evaluation. Pt has had past episodes of Cdiff but this is not similar. No fever, chills, SOB, CP. last normal BM today.  No abdominal distention, no blood in stool, no burning urination, no diarrhea.  In Ed CT scan abd/pelvis - right hydronephrosis with large  ureteral stone with right perinephric stranding  IV vanco,po vanco and ceftriaxone given in ED.  Patient was seen by IR and urology dr Nice in ED and patient underwent urgent percutaneopus right nephrostomy tube drainage   In PACU patient awake ,denies any complaints.    7/12- chart reviewed.  Pt seen earlier today.  Feels tired, chills. Denies abd pain, no n/v/d.    7/13- feeling much better today.  No abd pain, minimal pain at tube site.  Good uop.  No n/v/d.   7/14- feeling well overall.  Discussed fevers yesterday evening, none since- IR cancelled procedure.  Pt still undecided stating she doesn't want to go home w/ bag however she's nervous about procedure and wants to discuss w/ Urology.  c/o loose stool x 8 today.  Cdiff negative.  Requesting immodium.  No abd pain, no n/v.   7/15- no complaints.  Loose bm improved. No pain.  After discussion w/ her Urologist last night she agrees to internalization of stent.   7/16: no c/o  7/17: s/p internalization but PCN left in place to ensure functioning  7/18: marked hypokalemia; PCN drain 400 cc last night pt did not urinate at all; PCN was  capped and she voided; Tm 100 now    Vital Signs Last 24 Hrs  T(C): 37.1 (18 Jul 2017 10:51), Max: 37.3 (18 Jul 2017 05:10)  T(F): 98.8 (18 Jul 2017 10:51), Max: 99.1 (18 Jul 2017 05:10)  HR: 63 (18 Jul 2017 10:51) (61 - 64)  BP: 153/45 (18 Jul 2017 10:51) (140/49 - 156/55)  BP(mean): --  RR: 17 (18 Jul 2017 10:51) (17 - 18)  SpO2: 97% (18 Jul 2017 10:51) (97% - 98%)          PHYSICAL EXAM:  General: NAD, comfortable  Neuro: AAOx3, no focal deficits  HEENT: NCAT, EOMI  Neck: Soft and supple, No JVD  Respiratory: CTA b/l, no w/r/r  Cardiovascular: S1 and S2, RRR, no m/g/r  Gastrointestinal: +BS, soft, NTND, no rebound/guarding  - right PCN tube  no blood, dressing c/d/i  Extremities: No c/c/e  Vascular: 2+ peripheral pulses  Musculoskeletal: 5/5 strength b/l UE and LE, sensation intact  Skin: No rashes          LABS: All Labs Reviewed:              Mild right hydroureteronephrosis secondary to a right ureteropelvic   junction calculus measuring 1.2 x 0.7 cm. Mild right perinephric   stranding and trace right perinephric fluid        < end of copied text >        Assessment and Plan:   76y female w/     *sepsis, Proteus bacteremia due to right pyelonephritis, hydroureteronephrosis due to ureteropelvic stone  - s/p perc nephrostomy tube 7/11  - on po ceftin, to be dc for a total of14 days  - plan dc in am if no fever, pain; the plan is to call IR doc and discuss with him;  saw the pt once, care can be coordinated with IR but needs urology f/up    * Marked hypokalemia: replaced repeat bmp in am    *hx recurrent Cdiff  - Cdiff negative x 2   - s/p fecal transplant few weeks ago at [place]  - continue home dose probiotics  - stool studies, then imodium --> improving    *dvt px  - heparin sc

## 2017-07-18 NOTE — DISCHARGE NOTE ADULT - PATIENT PORTAL LINK FT
“You can access the [employer] Patient Portal, offered by [facility], by registering with the following website: [email or web address]”

## 2017-07-18 NOTE — DISCHARGE NOTE ADULT - CARE PROVIDER_API CALL
Beto Odell), Urology  [address] [place] [address]  Phone: [phone]  Fax: [phone]    Minor Carballo), Cardiovascular Disease; Internal Medicine  [address] [place] [address]  Phone: [phone]  Fax: [phone]

## 2017-07-18 NOTE — DISCHARGE NOTE ADULT - MEDICATION SUMMARY - MEDICATIONS TO STOP TAKING
I will STOP taking the medications listed below when I get home from the hospital:    vancomycin 250 mg/5 mL oral solution  -- 125mg every 6 hours 4/17-24 125mg every 8 hours 4/25-5-1 125mg every12 hours 5/2-8 125mg daily for 5/9-15 125mg every other day 5/16-22    Keflex 500 mg oral capsule  -- 1 cap(s) by mouth 4 times a day  -- Finish all this medication unless otherwise directed by prescriber.

## 2017-07-18 NOTE — DISCHARGE NOTE ADULT - HOSPITAL COURSE
Assessment and Plan:   76y female w/     *sepsis, Proteus bacteremia due to right pyelonephritis, hydroureteronephrosis due to ureteropelvic stone  - s/p perc nephrostomy tube 7/11  - on po ceftin, to be dc for a total of14 days  - plan dc in am if no fever, pain; the plan is to call IR doc and discuss with him;  saw the pt once, care can be coordinated with IR but needs urology f/up    * Marked hypokalemia: replaced repeat bmp in am    *hx recurrent Cdiff  - Cdiff negative x 2   - s/p fecal transplant few weeks ago at [place]  - continue home dose probiotics  - stool studies, then imodium --> improving    *dvt px  - heparin sc     7/19- chart reviewed.  Pt had IR procedure again 7/17 w/ perc nephrostomy to nephroureterostomy and capped 7/18.   Stable for d/c home and must follow up with Dr. Nice in office this week.  Antibiotics sent to pharmacy.

## 2017-07-18 NOTE — DISCHARGE NOTE ADULT - CARE PLAN
Principal Discharge DX:	Hydronephrosis with ureteral calculus  Goal:	resolved  Instructions for follow-up, activity and diet:	same as before, use Ceftin as directed; f/up with urologist as outpt Principal Discharge DX:	Hydronephrosis with ureteral calculus  Goal:	improving  Instructions for follow-up, activity and diet:	You had sepsis, Proteus bacteremia due to right pyelonephritis, hydroureteronephrosis due to ureteropelvic stone.  You had IR procedure 7/11 and 7/17.  You must follow up with Dr. Nice by Friday of this week to discuss further care of tube and to arrange lithotripsy.  Prescription for antibiotics were sent to your pharmacy.  Follow up with your primary physician in 1 week.

## 2017-07-18 NOTE — DISCHARGE NOTE ADULT - MEDICATION SUMMARY - MEDICATIONS TO TAKE
I will START or STAY ON the medications listed below when I get home from the hospital:    valsartan 160 mg oral capsule  -- 1 tab(s) by mouth once a day  -- Indication: For Htn    loperamide 2 mg oral capsule  -- 1 cap(s) by mouth 4 times a day, As needed, Diarrhea  -- Indication: For diarrhea    Ceftin 500 mg oral tablet  -- 1 tab(s) by mouth every 12 hours  -- Indication: For infection    lactobacillus acidophilus oral capsule  -- 1 cap(s) by mouth 2 times a day  -- Indication: For .    Vitamin C 500 mg oral tablet  -- 1 tab(s) by mouth once a day  -- Indication: For .

## 2017-07-18 NOTE — DISCHARGE NOTE ADULT - PLAN OF CARE
resolved same as before, use Ceftin as directed; f/up with urologist as outpt improving You had sepsis, Proteus bacteremia due to right pyelonephritis, hydroureteronephrosis due to ureteropelvic stone.  You had IR procedure 7/11 and 7/17.  You must follow up with Dr. Nice by Friday of this week to discuss further care of tube and to arrange lithotripsy.  Prescription for antibiotics were sent to your pharmacy.  Follow up with your primary physician in 1 week.

## 2017-07-18 NOTE — DISCHARGE NOTE ADULT - CARE PROVIDERS DIRECT ADDRESSES
[email or web address]

## 2017-07-19 VITALS
RESPIRATION RATE: 17 BRPM | HEART RATE: 67 BPM | SYSTOLIC BLOOD PRESSURE: 152 MMHG | OXYGEN SATURATION: 98 % | TEMPERATURE: 98 F | DIASTOLIC BLOOD PRESSURE: 54 MMHG

## 2017-07-19 LAB
ANION GAP SERPL CALC-SCNC: 8 MMOL/L — SIGNIFICANT CHANGE UP (ref 5–17)
BUN SERPL-MCNC: 15 MG/DL — SIGNIFICANT CHANGE UP (ref 7–23)
CALCIUM SERPL-MCNC: 8.7 MG/DL — SIGNIFICANT CHANGE UP (ref 8.5–10.1)
CHLORIDE SERPL-SCNC: 111 MMOL/L — HIGH (ref 96–108)
CO2 SERPL-SCNC: 25 MMOL/L — SIGNIFICANT CHANGE UP (ref 22–31)
CREAT SERPL-MCNC: 0.6 MG/DL — SIGNIFICANT CHANGE UP (ref 0.5–1.3)
GLUCOSE SERPL-MCNC: 103 MG/DL — HIGH (ref 70–99)
HCT VFR BLD CALC: 33.2 % — LOW (ref 34.5–45)
HGB BLD-MCNC: 11.3 G/DL — LOW (ref 11.5–15.5)
MCHC RBC-ENTMCNC: 29 PG — SIGNIFICANT CHANGE UP (ref 27–34)
MCHC RBC-ENTMCNC: 34 GM/DL — SIGNIFICANT CHANGE UP (ref 32–36)
MCV RBC AUTO: 85.2 FL — SIGNIFICANT CHANGE UP (ref 80–100)
PLATELET # BLD AUTO: 358 K/UL — SIGNIFICANT CHANGE UP (ref 150–400)
POTASSIUM SERPL-MCNC: 3.6 MMOL/L — SIGNIFICANT CHANGE UP (ref 3.5–5.3)
POTASSIUM SERPL-SCNC: 3.6 MMOL/L — SIGNIFICANT CHANGE UP (ref 3.5–5.3)
RBC # BLD: 3.9 M/UL — SIGNIFICANT CHANGE UP (ref 3.8–5.2)
RBC # FLD: 14.6 % — HIGH (ref 10.3–14.5)
SODIUM SERPL-SCNC: 144 MMOL/L — SIGNIFICANT CHANGE UP (ref 135–145)
WBC # BLD: 10.8 K/UL — HIGH (ref 3.8–10.5)
WBC # FLD AUTO: 10.8 K/UL — HIGH (ref 3.8–10.5)

## 2017-07-19 RX ADMIN — Medication 500 MILLIGRAM(S): at 07:06

## 2017-07-19 RX ADMIN — HEPARIN SODIUM 5000 UNIT(S): 5000 INJECTION INTRAVENOUS; SUBCUTANEOUS at 07:02

## 2017-07-19 RX ADMIN — VALSARTAN 160 MILLIGRAM(S): 80 TABLET ORAL at 07:02

## 2017-07-19 NOTE — PROGRESS NOTE ADULT - SUBJECTIVE AND OBJECTIVE BOX
HPI:  76y female w/ PMH recurrent UTIs, presents to ED for evaluation of abd pain. Pt states she was here recently with similar complaints and the workup revealed she had a UTI with a 9mm kidney stone that has remained in place and was d/c'd with ABx. Pt states today she woke up with severe abd pain located in the umbilical region. Pt also reports n/v. Pt cannot tolerate PO which prompted her to seek evaluation. Pt has had past episodes of Cdiff but this is not similar. No fever, chills, SOB, CP. last normal BM today.  No abdominal distention, no blood in stool, no burning urination, no diarrhea.  In Ed CT scan abd/pelvis - right hydronephrosis with large  ureteral stone with right perinephric stranding  IV vanco,po vanco and ceftriaxone given in ED.  Patient was seen by IR and urology dr Nice in ED and patient underwent urgent percutaneopus right nephrostomy tube drainage   In PACU patient awake ,denies any complaints.    7/12- chart reviewed.  Pt seen earlier today.  Feels tired, chills. Denies abd pain, no n/v/d.    7/13- feeling much better today.  No abd pain, minimal pain at tube site.  Good uop.  No n/v/d.   7/14- feeling well overall.  Discussed fevers yesterday evening, none since- IR cancelled procedure.  Pt still undecided stating she doesn't want to go home w/ bag however she's nervous about procedure and wants to discuss w/ Urology.  c/o loose stool x 8 today.  Cdiff negative.  Requesting immodium.  No abd pain, no n/v.   7/15- no complaints.  Loose bm improved. No pain.  After discussion w/ her Urologist last night she agrees to internalization of stent.   7/16: no c/o  7/17: s/p internalization but PCN left in place to ensure functioning  7/18: marked hypokalemia; PCN drain 400 cc last night pt did not urinate at all; PCN was  capped and she voided; Tm 100 now    7/19-  afebrile overnight.  No complaints, no flank or abd pain.  Reports good UOP.  Eager to be discharged.     Vital Signs Last 24 Hrs  T(C): 36.8 (19 Jul 2017 10:55), Max: 37.8 (18 Jul 2017 16:43)  T(F): 98.2 (19 Jul 2017 10:55), Max: 100 (18 Jul 2017 16:43)  HR: 67 (19 Jul 2017 10:55) (61 - 75)  BP: 152/54 (19 Jul 2017 10:55) (105/83 - 152/54)  BP(mean): --  RR: 17 (19 Jul 2017 10:55) (17 - 18)  SpO2: 98% (19 Jul 2017 10:55) (96% - 99%)        PHYSICAL EXAM:  General: NAD, comfortable  Neuro: AAOx3, no focal deficits  HEENT: NCAT, EOMI  Neck: Soft and supple, No JVD  Respiratory: CTA b/l, no w/r/r  Cardiovascular: S1 and S2, RRR, no m/g/r  Gastrointestinal: +BS, soft, NTND, no rebound/guarding  - right PCN tube  capped, dressing c/d/i  Extremities: No c/c/e  Vascular: 2+ peripheral pulses  Musculoskeletal: 5/5 strength b/l UE and LE, sensation intact  Skin: No rashes              LABS: All Labs Reviewed:                        11.3   10.8  )-----------( 358      ( 19 Jul 2017 09:57 )             33.2     07-19    144  |  111<H>  |  15  ----------------------------<  103<H>  3.6   |  25  |  0.60    Ca    8.7      19 Jul 2017 07:00            ild right hydroureteronephrosis secondary to a right ureteropelvic   junction calculus measuring 1.2 x 0.7 cm. Mild right perinephric   stranding and trace right perinephric fluid        < end of copied text >        Assessment and Plan:   76y female w/     *sepsis, Proteus bacteremia due to right pyelonephritis, hydroureteronephrosis due to ureteropelvic stone  - s/p perc nephrostomy tube 7/11  - on po ceftin, to be dc for a total of14 days  - plan dc in am if no fever, pain; the plan is to call IR doc and discuss with him;  saw the pt once, care can be coordinated with IR but needs urology f/up    * Marked hypokalemia: replaced repeat bmp in am    *hx recurrent Cdiff  - Cdiff negative x 2   - s/p fecal transplant few weeks ago at [place]  - continue home dose probiotics  - stool studies, then imodium --> improving    *dvt px  - heparin sc     7/19- chart reviewed.  Pt had IR procedure again 7/17 w/ perc nephrostomy to nephroureterostomy and capped 7/18.   Stable for d/c home and must follow up with Dr. Nice in office this week.  Antibiotics sent to pharmacy.

## 2017-07-21 DIAGNOSIS — Z79.899 OTHER LONG TERM (CURRENT) DRUG THERAPY: ICD-10-CM

## 2017-07-21 DIAGNOSIS — Z87.442 PERSONAL HISTORY OF URINARY CALCULI: ICD-10-CM

## 2017-07-21 DIAGNOSIS — E87.6 HYPOKALEMIA: ICD-10-CM

## 2017-07-21 DIAGNOSIS — A41.9 SEPSIS, UNSPECIFIED ORGANISM: ICD-10-CM

## 2017-07-21 DIAGNOSIS — N13.6 PYONEPHROSIS: ICD-10-CM

## 2017-07-21 DIAGNOSIS — R10.9 UNSPECIFIED ABDOMINAL PAIN: ICD-10-CM

## 2017-07-21 DIAGNOSIS — B96.4 PROTEUS (MIRABILIS) (MORGANII) AS THE CAUSE OF DISEASES CLASSIFIED ELSEWHERE: ICD-10-CM

## 2017-07-21 DIAGNOSIS — I10 ESSENTIAL (PRIMARY) HYPERTENSION: ICD-10-CM

## 2017-07-24 ENCOUNTER — INPATIENT (INPATIENT)
Facility: HOSPITAL | Age: 77
LOS: 1 days | Discharge: HOME HEALTH | End: 2017-07-26
Attending: INTERNAL MEDICINE | Admitting: INTERNAL MEDICINE
Payer: MEDICARE

## 2017-07-24 VITALS
DIASTOLIC BLOOD PRESSURE: 76 MMHG | HEIGHT: 64 IN | RESPIRATION RATE: 15 BRPM | HEART RATE: 64 BPM | SYSTOLIC BLOOD PRESSURE: 194 MMHG | OXYGEN SATURATION: 100 % | TEMPERATURE: 98 F | WEIGHT: 175.05 LBS

## 2017-07-24 DIAGNOSIS — D35.1 BENIGN NEOPLASM OF PARATHYROID GLAND: Chronic | ICD-10-CM

## 2017-07-24 DIAGNOSIS — Z98.891 HISTORY OF UTERINE SCAR FROM PREVIOUS SURGERY: Chronic | ICD-10-CM

## 2017-07-24 DIAGNOSIS — Z98.890 OTHER SPECIFIED POSTPROCEDURAL STATES: Chronic | ICD-10-CM

## 2017-07-24 LAB
ALBUMIN SERPL ELPH-MCNC: 3.1 G/DL — LOW (ref 3.3–5)
ALP SERPL-CCNC: 84 U/L — SIGNIFICANT CHANGE UP (ref 40–120)
ALT FLD-CCNC: 33 U/L — SIGNIFICANT CHANGE UP (ref 12–78)
ANION GAP SERPL CALC-SCNC: 9 MMOL/L — SIGNIFICANT CHANGE UP (ref 5–17)
AST SERPL-CCNC: 17 U/L — SIGNIFICANT CHANGE UP (ref 15–37)
BASOPHILS # BLD AUTO: 0.1 K/UL — SIGNIFICANT CHANGE UP (ref 0–0.2)
BASOPHILS NFR BLD AUTO: 0.8 % — SIGNIFICANT CHANGE UP (ref 0–2)
BILIRUB SERPL-MCNC: 0.6 MG/DL — SIGNIFICANT CHANGE UP (ref 0.2–1.2)
BUN SERPL-MCNC: 24 MG/DL — HIGH (ref 7–23)
CALCIUM SERPL-MCNC: 9.1 MG/DL — SIGNIFICANT CHANGE UP (ref 8.5–10.1)
CHLORIDE SERPL-SCNC: 109 MMOL/L — HIGH (ref 96–108)
CO2 SERPL-SCNC: 25 MMOL/L — SIGNIFICANT CHANGE UP (ref 22–31)
CREAT SERPL-MCNC: 0.73 MG/DL — SIGNIFICANT CHANGE UP (ref 0.5–1.3)
EOSINOPHIL # BLD AUTO: 0.3 K/UL — SIGNIFICANT CHANGE UP (ref 0–0.5)
EOSINOPHIL NFR BLD AUTO: 3 % — SIGNIFICANT CHANGE UP (ref 0–6)
GLUCOSE SERPL-MCNC: 108 MG/DL — HIGH (ref 70–99)
HCT VFR BLD CALC: 36.8 % — SIGNIFICANT CHANGE UP (ref 34.5–45)
HGB BLD-MCNC: 12.2 G/DL — SIGNIFICANT CHANGE UP (ref 11.5–15.5)
INR BLD: 1.03 RATIO — SIGNIFICANT CHANGE UP (ref 0.88–1.16)
LYMPHOCYTES # BLD AUTO: 2.9 K/UL — SIGNIFICANT CHANGE UP (ref 1–3.3)
LYMPHOCYTES # BLD AUTO: 25.8 % — SIGNIFICANT CHANGE UP (ref 13–44)
MAGNESIUM SERPL-MCNC: 2 MG/DL — SIGNIFICANT CHANGE UP (ref 1.6–2.6)
MCHC RBC-ENTMCNC: 28.6 PG — SIGNIFICANT CHANGE UP (ref 27–34)
MCHC RBC-ENTMCNC: 33.1 GM/DL — SIGNIFICANT CHANGE UP (ref 32–36)
MCV RBC AUTO: 86.5 FL — SIGNIFICANT CHANGE UP (ref 80–100)
MONOCYTES # BLD AUTO: 0.8 K/UL — SIGNIFICANT CHANGE UP (ref 0–0.9)
MONOCYTES NFR BLD AUTO: 7 % — SIGNIFICANT CHANGE UP (ref 2–14)
NEUTROPHILS # BLD AUTO: 7.2 K/UL — SIGNIFICANT CHANGE UP (ref 1.8–7.4)
NEUTROPHILS NFR BLD AUTO: 63.4 % — SIGNIFICANT CHANGE UP (ref 43–77)
PLATELET # BLD AUTO: 395 K/UL — SIGNIFICANT CHANGE UP (ref 150–400)
POTASSIUM SERPL-MCNC: 3.6 MMOL/L — SIGNIFICANT CHANGE UP (ref 3.5–5.3)
POTASSIUM SERPL-SCNC: 3.6 MMOL/L — SIGNIFICANT CHANGE UP (ref 3.5–5.3)
PROT SERPL-MCNC: 7.5 GM/DL — SIGNIFICANT CHANGE UP (ref 6–8.3)
PROTHROM AB SERPL-ACNC: 11.1 SEC — SIGNIFICANT CHANGE UP (ref 9.8–12.7)
RBC # BLD: 4.26 M/UL — SIGNIFICANT CHANGE UP (ref 3.8–5.2)
RBC # FLD: 14.8 % — HIGH (ref 10.3–14.5)
SODIUM SERPL-SCNC: 143 MMOL/L — SIGNIFICANT CHANGE UP (ref 135–145)
WBC # BLD: 11.4 K/UL — HIGH (ref 3.8–10.5)
WBC # FLD AUTO: 11.4 K/UL — HIGH (ref 3.8–10.5)

## 2017-07-24 PROCEDURE — 70450 CT HEAD/BRAIN W/O DYE: CPT | Mod: 26

## 2017-07-24 PROCEDURE — 93010 ELECTROCARDIOGRAM REPORT: CPT

## 2017-07-24 RX ORDER — ACETAMINOPHEN 500 MG
1000 TABLET ORAL ONCE
Qty: 0 | Refills: 0 | Status: COMPLETED | OUTPATIENT
Start: 2017-07-24 | End: 2017-07-24

## 2017-07-24 RX ORDER — SODIUM CHLORIDE 9 MG/ML
1000 INJECTION INTRAMUSCULAR; INTRAVENOUS; SUBCUTANEOUS ONCE
Qty: 0 | Refills: 0 | Status: COMPLETED | OUTPATIENT
Start: 2017-07-24 | End: 2017-07-24

## 2017-07-24 RX ORDER — ASPIRIN/CALCIUM CARB/MAGNESIUM 324 MG
325 TABLET ORAL ONCE
Qty: 0 | Refills: 0 | Status: COMPLETED | OUTPATIENT
Start: 2017-07-24 | End: 2017-07-24

## 2017-07-24 RX ADMIN — Medication 1000 MILLIGRAM(S): at 22:30

## 2017-07-24 RX ADMIN — Medication 325 MILLIGRAM(S): at 23:45

## 2017-07-24 RX ADMIN — SODIUM CHLORIDE 1000 MILLILITER(S): 9 INJECTION INTRAMUSCULAR; INTRAVENOUS; SUBCUTANEOUS at 21:29

## 2017-07-24 RX ADMIN — Medication 1000 MILLIGRAM(S): at 21:30

## 2017-07-24 NOTE — ED PROVIDER NOTE - MEDICAL DECISION MAKING DETAILS
75 y/o F w/ pmhx ocular migraines, gout, kidney stone, left knee antroscopy presents to ED c/o ocular migraines and HA. Pt is not a TIA candidate. Plan CT head and lungs. Will re-evaluate. NIH stroke scale is 0 on arrival.  CT head WNL.  MANUEL on labs.  Headache improving with tylenol, IVF.  Spoke with Dr. Bautista, states complex migraine vs TIA.  Will admit, obtain MRI.

## 2017-07-24 NOTE — ED ADULT NURSE NOTE - CHIEF COMPLAINT QUOTE
pt developed headache at 7pm. complained of trouble speaking during incident. pt states hx of ocular migraines in past, with same symptoms. pt states headache resolving and only mild head pain at this time. pt recently discharge for kidney stone this week.

## 2017-07-24 NOTE — ED PROVIDER NOTE - OBJECTIVE STATEMENT
77 y/o F w/ pmhx ocular migraines, gout, kidney stone, left knee antroscopy presents to ED c/o ocular migraines, HA. Pt states she had blurry vision, sensitivity to light, confusion and difficulty speaking. Pt states HA is slight at the moment.  Pt has no other complaints and denies SOB, CP, fever/chills, n/v/d.

## 2017-07-25 LAB
ANION GAP SERPL CALC-SCNC: 8 MMOL/L — SIGNIFICANT CHANGE UP (ref 5–17)
APPEARANCE UR: CLEAR — SIGNIFICANT CHANGE UP
BILIRUB UR-MCNC: NEGATIVE — SIGNIFICANT CHANGE UP
BUN SERPL-MCNC: 21 MG/DL — SIGNIFICANT CHANGE UP (ref 7–23)
CALCIUM SERPL-MCNC: 8.4 MG/DL — LOW (ref 8.5–10.1)
CHLORIDE SERPL-SCNC: 113 MMOL/L — HIGH (ref 96–108)
CHOLEST SERPL-MCNC: 137 MG/DL — SIGNIFICANT CHANGE UP (ref 10–199)
CO2 SERPL-SCNC: 23 MMOL/L — SIGNIFICANT CHANGE UP (ref 22–31)
COLOR SPEC: YELLOW — SIGNIFICANT CHANGE UP
CREAT SERPL-MCNC: 0.64 MG/DL — SIGNIFICANT CHANGE UP (ref 0.5–1.3)
DIFF PNL FLD: (no result)
GLUCOSE SERPL-MCNC: 87 MG/DL — SIGNIFICANT CHANGE UP (ref 70–99)
GLUCOSE UR QL: NEGATIVE MG/DL — SIGNIFICANT CHANGE UP
HDLC SERPL-MCNC: 44 MG/DL — SIGNIFICANT CHANGE UP (ref 40–125)
KETONES UR-MCNC: NEGATIVE — SIGNIFICANT CHANGE UP
LEUKOCYTE ESTERASE UR-ACNC: (no result)
LIPID PNL WITH DIRECT LDL SERPL: 75 MG/DL — SIGNIFICANT CHANGE UP
NITRITE UR-MCNC: NEGATIVE — SIGNIFICANT CHANGE UP
PH UR: 6 — SIGNIFICANT CHANGE UP (ref 5–8)
POTASSIUM SERPL-MCNC: 3.6 MMOL/L — SIGNIFICANT CHANGE UP (ref 3.5–5.3)
POTASSIUM SERPL-SCNC: 3.6 MMOL/L — SIGNIFICANT CHANGE UP (ref 3.5–5.3)
PROT UR-MCNC: 30 MG/DL
RBC CASTS # UR COMP ASSIST: (no result) /HPF (ref 0–4)
SODIUM SERPL-SCNC: 144 MMOL/L — SIGNIFICANT CHANGE UP (ref 135–145)
SP GR SPEC: 1.01 — SIGNIFICANT CHANGE UP (ref 1.01–1.02)
TOTAL CHOLESTEROL/HDL RATIO MEASUREMENT: 3.1 RATIO — LOW (ref 3.3–7.1)
TRIGL SERPL-MCNC: 92 MG/DL — SIGNIFICANT CHANGE UP (ref 10–149)
UROBILINOGEN FLD QL: NEGATIVE MG/DL — SIGNIFICANT CHANGE UP
WBC UR QL: SIGNIFICANT CHANGE UP

## 2017-07-25 PROCEDURE — 70551 MRI BRAIN STEM W/O DYE: CPT | Mod: 26

## 2017-07-25 PROCEDURE — 99285 EMERGENCY DEPT VISIT HI MDM: CPT

## 2017-07-25 RX ORDER — ATORVASTATIN CALCIUM 80 MG/1
40 TABLET, FILM COATED ORAL AT BEDTIME
Qty: 0 | Refills: 0 | Status: DISCONTINUED | OUTPATIENT
Start: 2017-07-25 | End: 2017-07-26

## 2017-07-25 RX ORDER — ASCORBIC ACID 60 MG
500 TABLET,CHEWABLE ORAL DAILY
Qty: 0 | Refills: 0 | Status: DISCONTINUED | OUTPATIENT
Start: 2017-07-25 | End: 2017-07-26

## 2017-07-25 RX ORDER — LACTOBACILLUS ACIDOPHILUS 100MM CELL
1 CAPSULE ORAL
Qty: 0 | Refills: 0 | Status: DISCONTINUED | OUTPATIENT
Start: 2017-07-25 | End: 2017-07-26

## 2017-07-25 RX ORDER — HEPARIN SODIUM 5000 [USP'U]/ML
5000 INJECTION INTRAVENOUS; SUBCUTANEOUS EVERY 12 HOURS
Qty: 0 | Refills: 0 | Status: DISCONTINUED | OUTPATIENT
Start: 2017-07-25 | End: 2017-07-26

## 2017-07-25 RX ORDER — VALSARTAN 80 MG/1
160 TABLET ORAL DAILY
Qty: 0 | Refills: 0 | Status: DISCONTINUED | OUTPATIENT
Start: 2017-07-25 | End: 2017-07-26

## 2017-07-25 RX ORDER — KETOROLAC TROMETHAMINE 30 MG/ML
15 SYRINGE (ML) INJECTION EVERY 6 HOURS
Qty: 0 | Refills: 0 | Status: DISCONTINUED | OUTPATIENT
Start: 2017-07-25 | End: 2017-07-26

## 2017-07-25 RX ORDER — ASPIRIN/CALCIUM CARB/MAGNESIUM 324 MG
81 TABLET ORAL DAILY
Qty: 0 | Refills: 0 | Status: DISCONTINUED | OUTPATIENT
Start: 2017-07-25 | End: 2017-07-26

## 2017-07-25 RX ORDER — LOPERAMIDE HCL 2 MG
2 TABLET ORAL
Qty: 0 | Refills: 0 | Status: DISCONTINUED | OUTPATIENT
Start: 2017-07-25 | End: 2017-07-26

## 2017-07-25 RX ORDER — DIAZEPAM 5 MG
2.5 TABLET ORAL ONCE
Qty: 0 | Refills: 0 | Status: DISCONTINUED | OUTPATIENT
Start: 2017-07-25 | End: 2017-07-25

## 2017-07-25 RX ORDER — CEFUROXIME AXETIL 250 MG
500 TABLET ORAL EVERY 12 HOURS
Qty: 0 | Refills: 0 | Status: COMPLETED | OUTPATIENT
Start: 2017-07-25 | End: 2017-07-25

## 2017-07-25 RX ADMIN — ATORVASTATIN CALCIUM 40 MILLIGRAM(S): 80 TABLET, FILM COATED ORAL at 21:39

## 2017-07-25 RX ADMIN — VALSARTAN 160 MILLIGRAM(S): 80 TABLET ORAL at 05:22

## 2017-07-25 RX ADMIN — HEPARIN SODIUM 5000 UNIT(S): 5000 INJECTION INTRAVENOUS; SUBCUTANEOUS at 05:22

## 2017-07-25 RX ADMIN — Medication 500 MILLIGRAM(S): at 10:10

## 2017-07-25 RX ADMIN — Medication 15 MILLIGRAM(S): at 19:38

## 2017-07-25 RX ADMIN — Medication 1 TABLET(S): at 17:35

## 2017-07-25 RX ADMIN — Medication 500 MILLIGRAM(S): at 05:22

## 2017-07-25 RX ADMIN — HEPARIN SODIUM 5000 UNIT(S): 5000 INJECTION INTRAVENOUS; SUBCUTANEOUS at 17:35

## 2017-07-25 RX ADMIN — Medication 2.5 MILLIGRAM(S): at 20:30

## 2017-07-25 RX ADMIN — Medication 1 TABLET(S): at 10:10

## 2017-07-25 RX ADMIN — Medication 81 MILLIGRAM(S): at 10:10

## 2017-07-25 NOTE — H&P ADULT - NSHPSOCIALHISTORY_GEN_ALL_CORE
No tobacco/ETOH/drug use. No tobacco/ETOH/drug use.  Pt lives at home and is .  Her son lives in the same home.  She is a retired .

## 2017-07-25 NOTE — PROGRESS NOTE ADULT - SUBJECTIVE AND OBJECTIVE BOX
C/C: difficulty speaking.     HPI: 77 y/o woman w/ pmhx ocular migraines, gout, kidney stone, c diff s/p fecal tx, recent admission with proteus bacteremia d/t right pyelo/hydroureteronephrosis from ureteropelvic stone s/p percutaneous nephrostomy which was changed to nephroureterostomy , who presented  via EMS for possible stroke. She had been feeling weak for the past two days pta. ON the day of admission, had walked to the kitchen and started having visual aura which she normally gets with her ocular migraines. She decided to rest, when she got a phone call from her friend. She knew what she wanted to say but couldn't get the appropriate words out. The words were jumbled. Her daughter called 10 minutes later and she had the same issue. Her daughter came to see her and 911 was called. She states the entire episode lasted about 2 hours and was associated with headache.     7/25: pt seen and examined this am. Felt she was back to baseline. Denies taking any blood thinners at home. no pmh of strokes or tia but has f/h of strokes on her mother's side. No focal weakness. No dysphagia. No headache at present.     Review of system- All 10 systems reviewed and is as per HPI otherwise negative.           T(C): 36.8 (07-25-17 @ 11:06), Max: 37 (07-25-17 @ 02:10)  HR: 56 (07-25-17 @ 11:06) (56 - 71)  BP: 170/57 (07-25-17 @ 11:06) (148/48 - 194/76)  RR: 18 (07-25-17 @ 11:06) (15 - 18)  SpO2: 97% (07-25-17 @ 11:06) (96% - 100%)  Wt(kg): --    PHYSICAL EXAM:    GENERAL: Comfortable, no acute distress  HEAD:  Atraumatic, Normocephalic  EYES: EOMI, PERRLA  HEENT: Moist mucous membranes  NECK: Supple, No JVD  NERVOUS SYSTEM:  Alert & Oriented X3, Motor Strength 5/5 B/L upper and lower extremities  CHEST/LUNG: Clear to auscultation bilaterally  HEART: Regular rate and rhythm; No murmurs, rubs, or gallops  ABDOMEN: Soft, Nontender, Nondistended; Bowel sounds present  GENITOURINARY- Voiding, no palpable bladder. +right uteronephrostomy tube+  EXTREMITIES:  No clubbing, cyanosis, or edema  MUSCULOSKELTAL- No muscle tenderness, No joint tenderness  SKIN-no rash        LABS:                        12.2   11.4  )-----------( 395      ( 24 Jul 2017 21:25 )             36.8     07-25    144  |  113<H>  |  21  ----------------------------<  87  3.6   |  23  |  0.64    Ca    8.4<L>      25 Jul 2017 05:26  Mg     2.0     07-24    TPro  7.5  /  Alb  3.1<L>  /  TBili  0.6  /  DBili  x   /  AST  17  /  ALT  33  /  AlkPhos  84  07-24    PT/INR - ( 24 Jul 2017 21:25 )   PT: 11.1 sec;   INR: 1.03 ratio          MEDS  heparin  Injectable 5000 Unit(s) SubCutaneous every 12 hours  atorvastatin 40 milliGRAM(s) Oral at bedtime  aspirin enteric coated 81 milliGRAM(s) Oral daily  valsartan 160 milliGRAM(s) Oral daily  loperamide 2 milliGRAM(s) Oral four times a day PRN  lactobacillus acidophilus 1 Tablet(s) Oral two times a day with meals  ascorbic acid 500 milliGRAM(s) Oral daily  diazepam    Tablet 2.5 milliGRAM(s) Oral once PRN    ASSESSMENT AND PLAN:  76F, PMH as above a/w:    1. Dysarthria:  -resolved  -r/o TIA/CVA  -r/o complicated migraine  -f/u MRI/MRA  -neuro eval    2. HTN:  -stable  -continue home meds    3. H/o Recent pyelonephritis/proteus bacteremia/ureteropelvic stone s/p ureteronephrostomy  -completes ceftin today  -outpt f/u with urology aug 4th for lithotripsy    4. DVT px

## 2017-07-25 NOTE — H&P ADULT - HISTORY OF PRESENT ILLNESS
Pt is a 77 y/o woman w/ pmhx ocular migraines, gout, kidney stone, left knee antroscopy presents to ED c/o ocular migraines, HA. Pt states she had blurry vision, sensitivity to light, confusion and difficulty speaking. Pt states HA is slight at the moment.  Pt has no other complaints and denies SOB, CP, fever/chills, n/v/d. Pt is a 75 y/o woman w/ pmhx ocular migraines, gout, kidney stone, left knee antroscopy who presents  via EMS for possible stroke.  She c/o migraine HA and blurry vision, sensitivity to light, confusion and difficulty speaking which occurred at 6:45 pm in the evening.   She knew what she wanted to say but the words came out jumbled , her daughter called 911.  The symptoms resolved within a few hours  by the time  she reached the ER .    Pt states HA is slight at the moment and resolved with 1 gm Tylenol  in the ER.   Pt has no other complaints and denies SOB, CP, palpitations, fever/chills, n/v.      She is completing a course of ceftin tomorrow after recent right stent for obstructive nephrolithiasis.  Pt is on probiotics and reports semiformed stools.    Fam HX:   Mother TIAs

## 2017-07-25 NOTE — H&P ADULT - NSHPREVIEWOFSYSTEMS_GEN_ALL_CORE
ICU Vital Signs Last 24 Hrs    T(F): 98.3 (24 Jul 2017 21:03), Max: 98.3 (24 Jul 2017 21:03)    HR: 71 (24 Jul 2017 23:37) (64 - 71)    BP: 157/47 (24 Jul 2017 23:37) (157/47 - 194/76)    RR: 15 (24 Jul 2017 21:03) (15 - 15)  SpO2: 100%

## 2017-07-25 NOTE — H&P ADULT - ASSESSMENT
Pt is admitted w/    I. TIA, with HA and aphasia,  NIHSS 0  - telemetry  - Neurology checks  - Neurology consult Dr. Snell, on call    II. Hypertension  - cont meds    III. DVT prophylaxis  - heparin Pt is a 75 y/o woman w/ pmhx ocular migraines, gout, kidney stone, left knee antroscopy who presents  via EMS for possible stroke.  She c/o migraine HA and blurry vision, sensitivity to light, confusion and difficulty speaking which occurred at 6:45 pm in the evening.   She knew what she wanted to say but the words came out jumbled , her daughter called 911.  The symptoms resolved within a few hours  by the time  she reached the ER .    Pt states HA is slight at the moment and resolved with 1 gm Tylenol  in the ER.   Pt has no other complaints and denies SOB, CP, palpitations, fever/chills, n/v.      She is completing a course of ceftin tomorrow after recent right stent for obstructive nephrolithiasis.  Pt is on probiotics and reports semiformed stools.    Pt is admitted w/    I. TIA, with HA and aphasia,  NIHSS 0  - pt received tylenol 1 gm, IVF I liter and  mg in the ER  - cont ASA 81mg, add statin  - fasting lipid panel in AM  - telemetry r/o arrythmia  - MRI brain, valium 2.5 mg to pre-medicate  - Neurology checks  - Neurology consult Dr. Snell, on call  - cardiology consult Dr. Carballo    II. Hypertension  - cont meds with losartan    III.  Recent Obstructive Nephropaty, s/p stenting with R drain  - for Lithotrypsy and follow up with Dr. Nice, scheduled Aug 4th   - last dose of ceftin tomorrow    IV. DVT prophylaxis  - heparin

## 2017-07-26 ENCOUNTER — TRANSCRIPTION ENCOUNTER (OUTPATIENT)
Age: 77
End: 2017-07-26

## 2017-07-26 DIAGNOSIS — G43.909 MIGRAINE, UNSPECIFIED, NOT INTRACTABLE, WITHOUT STATUS MIGRAINOSUS: ICD-10-CM

## 2017-07-26 PROCEDURE — 93880 EXTRACRANIAL BILAT STUDY: CPT | Mod: 26

## 2017-07-26 PROCEDURE — 95812 EEG 41-60 MINUTES: CPT | Mod: 26

## 2017-07-26 RX ORDER — VERAPAMIL HCL 240 MG
1 CAPSULE, EXTENDED RELEASE PELLETS 24 HR ORAL
Qty: 90 | Refills: 0 | OUTPATIENT
Start: 2017-07-26 | End: 2017-08-25

## 2017-07-26 RX ADMIN — HEPARIN SODIUM 5000 UNIT(S): 5000 INJECTION INTRAVENOUS; SUBCUTANEOUS at 05:41

## 2017-07-26 RX ADMIN — Medication 500 MILLIGRAM(S): at 11:30

## 2017-07-26 RX ADMIN — Medication 15 MILLIGRAM(S): at 12:41

## 2017-07-26 RX ADMIN — Medication 1 TABLET(S): at 09:01

## 2017-07-26 RX ADMIN — VALSARTAN 160 MILLIGRAM(S): 80 TABLET ORAL at 05:41

## 2017-07-26 RX ADMIN — Medication 15 MILLIGRAM(S): at 06:22

## 2017-07-26 RX ADMIN — Medication 15 MILLIGRAM(S): at 12:45

## 2017-07-26 RX ADMIN — Medication 81 MILLIGRAM(S): at 11:30

## 2017-07-26 RX ADMIN — Medication 15 MILLIGRAM(S): at 09:00

## 2017-07-26 NOTE — PROGRESS NOTE ADULT - SUBJECTIVE AND OBJECTIVE BOX
Patient is a 76y old  Female who presents with a chief complaint of difficulty speaking (2017 00:16)      HPI:  Pt is a 75 y/o woman w/ pmhx ocular migraines, gout, kidney stone, left knee antroscopy who presents  via EMS for possible stroke.  She c/o migraine HA and blurry vision, sensitivity to light, confusion and difficulty speaking which occurred at 6:45 pm in the evening.   She knew what she wanted to say but the words came out jumbled , her daughter called 911.  The symptoms resolved within a few hours  by the time  she reached the ER .    Pt states HA is slight at the moment and resolved with 1 gm Tylenol  in the ER.   Pt has no other complaints and denies SOB, CP, palpitations, fever/chills, n/v.      She is completing a course of ceftin tomorrow after recent right stent for obstructive nephrolithiasis.  Pt is on probiotics and reports semiformed stools.   patient admitted with "ocular migraine" then approx 1 hour of garbled speech. past history of HTN. notes occ palps at home and last night. usually feels them when she is ill. no recent chest pain or dyspnea. no history of dm or CAD.   no complaints    Fam HX:   Mother TIAs (2017 00:16)      PAST MEDICAL & SURGICAL HISTORY:  Ocular migraine  Kidney stone  Essential hypertension  Clostridium difficile colitis  H/O  section  S/P left knee arthroscopy:   Acinous adenoma of parathyroid gland:         MEDICATIONS  (STANDING):  heparin  Injectable 5000 Unit(s) SubCutaneous every 12 hours  atorvastatin 40 milliGRAM(s) Oral at bedtime  aspirin enteric coated 81 milliGRAM(s) Oral daily  valsartan 160 milliGRAM(s) Oral daily  lactobacillus acidophilus 1 Tablet(s) Oral two times a day with meals  ascorbic acid 500 milliGRAM(s) Oral daily    MEDICATIONS  (PRN):  loperamide 2 milliGRAM(s) Oral four times a day PRN Diarrhea  ketorolac   Injectable 15 milliGRAM(s) IV Push every 6 hours PRN Severe Pain (7 - 10)          Vital Signs Last 24 Hrs  T(C): 36.8 (2017 05:11), Max: 37.1 (2017 16:51)  T(F): 98.3 (2017 05:11), Max: 98.7 (2017 16:51)  HR: 59 (2017 05:11) (56 - 59)  BP: 158/58 (2017 05:11) (158/58 - 170/57)  BP(mean): 85 (2017 16:51) (85 - 85)  RR: 16 (2017 05:11) (16 - 18)  SpO2: 96% (2017 05:11) (96% - 100%)    I&O's Summary      PHYSICAL EXAM  General Appearance:NAD  HEENT: NCAT  Neck:   Back:   Lungs: clear  Heart: RR S1S2 maki 2/6 base  Abdomen:   Extremities:no edema  Skin:   Neurologic: alert non focal      INTERPRETATION OF TELEMETRY:    ECG:        LABS:                          12.2   11.4  )-----------( 395      ( 2017 21:25 )             36.8     07-25    144  |  113<H>  |  21  ----------------------------<  87  3.6   |  23  |  0.64    Ca    8.4<L>      2017 05:26  Mg     2.0     07-24    TPro  7.5  /  Alb  3.1<L>  /  TBili  0.6  /  DBili  x   /  AST  17  /  ALT  33  /  AlkPhos  84  07-24        Lipid Panel  137  44  75  92      PT/INR - ( 2017 21:25 )   PT: 11.1 sec;   INR: 1.03 ratio           Urinalysis Basic - ( 2017 20:45 )    Color: Yellow / Appearance: Clear / S.010 / pH: x  Gluc: x / Ketone: Negative  / Bili: Negative / Urobili: Negative mg/dL   Blood: x / Protein: 30 mg/dL / Nitrite: Negative   Leuk Esterase: Moderate / RBC: 6-10 /HPF / WBC 25 to 30   Sq Epi: x / Non Sq Epi: x / Bacteria: x            RADIOLOGY & ADDITIONAL STUDIES:

## 2017-07-26 NOTE — CONSULT NOTE ADULT - SUBJECTIVE AND OBJECTIVE BOX
Neurology Consult requested by:   Patient is a 76y old  Female who presents with a chief complaint of difficulty speaking (2017 00:16)     HPI:  Pt is a 75 y/o woman w/ pmhx ocular migraines, gout, kidney stone, left knee antroscopy who presents  via EMS for possible stroke.  She c/o sudden onset visual scotomas, darkening of vision, followed by difficulty speaking, severe headache. Started at 6:45 pm in the evening.   She knew what she wanted to say but the words came out jumbled ,eventually her daughter called 911.  The symptoms resolved within 2 hours  of onset.  Pt has no other complaints and denies SOB, CP, palpitations, fever/chills, n/v.    History of ocular migraine, last event 4 years ago. She is completing a course of Ceftin, obstructive nephrolithiasis.      Fam HX:   Mother TIAs (2017 00:16)      PAST MEDICAL & SURGICAL HISTORY:  Ocular migraine  Kidney stone  Essential hypertension  Clostridium difficile colitis  H/O  section  S/P left knee arthroscopy:   Acinous adenoma of parathyroid gland:     FAMILY HISTORY:  Family history of hypertension in mother (Father, Mother, Sibling)  Family history of prostate cancer in father    Social Hx:  Nonsmoker, no drug or alcohol use  Medications and Allergies ReviewedMEDICATIONS  (STANDING):  heparin  Injectable 5000 Unit(s) SubCutaneous every 12 hours  atorvastatin 40 milliGRAM(s) Oral at bedtime  aspirin enteric coated 81 milliGRAM(s) Oral daily  valsartan 160 milliGRAM(s) Oral daily  lactobacillus acidophilus 1 Tablet(s) Oral two times a day with meals  ascorbic acid 500 milliGRAM(s) Oral daily     ROS: Pertinent positives in HPI, all other ROS were reviewed and are negative.      Examination:   Vital Signs Last 24 Hrs  T(C): 37.1 (2017 10:48), Max: 37.1 (2017 16:51)  T(F): 98.7 (2017 10:48), Max: 98.7 (2017 16:51)  HR: 58 (2017 10:48) (56 - 59)  BP: 159/62 (2017 10:48) (158/58 - 166/58)  BP(mean): 85 (2017 16:51) (85 - 85)  RR: 16 (2017 10:48) (16 - 18)  SpO2: 98% (2017 10:48) (96% - 100%)  General: Cooperative, NAD   NECK: supple, no masses  ENT: Normal hearing   Vascular : no carotid bruits,   Lungs: CTAB  Chest: RRR, no murmurs  Extremities: nontender, no edema  Musculoskeletal: no adventitious movements, no joint stiffness  Skin: no rash    Neurological Examination:  NIHSS:  MS: AOx3. Appropriately interactive, normal affect. Speech fluent w/o paraphasic error, repetition, naming, reading is intact   CN: No Papilledema, PERLL, EOMI, V1-3 sensation intact, face symmetric, hearing intact, palate elevates symmetrically, tongue midline, SCM equal bilaterally  Motor: normal bulk and tone, no tremor, rigidity or bradykinesia.  5/5 all over   Sens: Intact to light touch.    Reflexes: 2/4 all over, downgoing toes b/l  Coord:  No dysmetria, ROBYN intact   Gait: normal    Imaging:   MRI/MRA brain: < from: MRI Head w/o Cont (17 @ 21:36) >  Brain MRI:    Sagittal T1, axial T1, T2, FLAIR, SWI, and diffusion-weighted series with   ADC maps were performed.    There is no evidence for acute infarct, acute hemorrhage, mass effect, or   hydrocephalus.    Minimal patchy foci of increased T2 and FLAIR signal are present in the   periventricular and subcortical white matter which most likely represent   chronic microvascular ischemic changes given the patient's age.     Mild cerebral volume loss is present with secondary proportional   prominence of the sulci and ventricles.    Mild mucosal thickening is present in the paranasal sinuses without   associated air-fluid levels.    The mastoid air cells middle ear cavities are clear..    Brain MRA:    There is no evidence for focal stenosis, major vessel occlusion, or   aneurysm. Tiny aneurysms could be beyond the resolution of MRA technique.    IMPRESSION:    1. On the brain MRI, there is no evidence foracute infarct, acute   hemorrhage, or hydrocephalus.  2. On the brain MRA, there is no evidence for significant intracranial   stenosis or aneurysm.    < end of copied text >
Patient is a 76y old  Female who presents with a chief complaint of difficulty speaking (2017 00:16)      HPI:  Pt is a 75 y/o woman w/ pmhx ocular migraines, gout, kidney stone, left knee antroscopy who presents  via EMS for possible stroke.  She c/o migraine HA and blurry vision, sensitivity to light, confusion and difficulty speaking which occurred at 6:45 pm in the evening.   She knew what she wanted to say but the words came out jumbled , her daughter called 911.  The symptoms resolved within a few hours  by the time  she reached the ER .    Pt states HA is slight at the moment and resolved with 1 gm Tylenol  in the ER.   Pt has no other complaints and denies SOB, CP, palpitations, fever/chills, n/v.      She is completing a course of ceftin tomorrow after recent right stent for obstructive nephrolithiasis.  Pt is on probiotics and reports semiformed stools.   patient admitted with "ocular migraine" then approx 1 hour of garbled speech. past history of HTN. notes occ palps at home and last night. usually feels them when she is ill. no recent chest pain or dyspnea. no history of dm or CAD.    Fam HX:   Mother TIAs (2017 00:16)      PAST MEDICAL & SURGICAL HISTORY:  Ocular migraine  Kidney stone  Essential hypertension  Clostridium difficile colitis  H/O  section  S/P left knee arthroscopy:   Acinous adenoma of parathyroid gland:       PREVIOUS DIAGNOSTIC TESTING:      ECHO  FINDINGS:    STRESS  FINDINGS:    CATHETERIZATION  FINDINGS:    MEDICATIONS  (STANDING):  heparin  Injectable 5000 Unit(s) SubCutaneous every 12 hours  atorvastatin 40 milliGRAM(s) Oral at bedtime  aspirin enteric coated 81 milliGRAM(s) Oral daily  valsartan 160 milliGRAM(s) Oral daily  lactobacillus acidophilus 1 Tablet(s) Oral two times a day with meals  ascorbic acid 500 milliGRAM(s) Oral daily    MEDICATIONS  (PRN):  loperamide 2 milliGRAM(s) Oral four times a day PRN Diarrhea  diazepam    Tablet 2.5 milliGRAM(s) Oral once PRN prior to MRI brain      FAMILY HISTORY:  Family history of hypertension in mother (Father, Mother, Sibling)  Family history of prostate cancer in father      SOCIAL HISTORY:  ***    REVIEW OF SYSTEM:  Pertinent items are noted in HPI.  Constitutional  Eyes:    Ears, nose, mouth,   throat, and face:    Neck:   Respiratory:   and wheezing  Cardiovascular:    Gastrointestinal:  Genitourinary:     Hematologic/lymphatic:   Musculoskeletal:   Neurological:   Behavioral/Psych:        Vital Signs Last 24 Hrs  T(C): 36.4 (2017 04:14), Max: 37 (2017 02:10)  T(F): 97.5 (2017 04:14), Max: 98.6 (2017 02:10)  HR: 59 (2017 04:14) (57 - 71)  BP: 148/48 (2017 04:14) (148/48 - 194/76)  BP(mean): --  RR: 18 (2017 04:14) (15 - 18)  SpO2: 96% (2017 04:14) (96% - 100%)    I&O's Summary    PHYSICAL EXAM  General Appearance: cooperative, no acute distress,   HEENT: PERRL, conjunctiva clear, EOM's intact,    Neck: Supple, , no adenopathy, thyroid: not enlarged, no carotid bruit or JVD  Back: Symmetric, no  tenderness,no soft tissue tenderness  Lungs: Clear to auscultation bilaterally,no adventitious breath sounds, normal   expiratory phase  Heart: Regular rate and rhythm, S1, S2 normal, 2/6 m llsb  Abdomen: Soft, non-tender, bowel sounds active , no hepatosplenomegaly  Extremities: no cyanosis or edema, no joint swelling  Skin: Skin color, texture normal, no rashes   Neurologic: Alert and oriented X3 , cranial nerves intact, sensory and motor normal,        INTERPRETATION OF TELEMETRY:  NSR sinus alejandro. short runs PAT  ECG:NSR and sinus arrythmia        LABS:                          12.2   11.4  )-----------( 395      ( 2017 21:25 )             36.8     07-25    144  |  113<H>  |  21  ----------------------------<  87  3.6   |  23  |  0.64    Ca    8.4<L>      2017 05:26  Mg     2.0     07-24    TPro  7.5  /  Alb  3.1<L>  /  TBili  0.6  /  DBili  x   /  AST  17  /  ALT  33  /  AlkPhos  84  07-24            PT/INR - ( 2017 21:25 )   PT: 11.1 sec;   INR: 1.03 ratio                   RADIOLOGY & ADDITIONAL STUDIES:    IMPRESSION:    PLAN:

## 2017-07-26 NOTE — CONSULT NOTE ADULT - ASSESSMENT
76 year old woman who presented with complicated migraine. non focal exam, MRI/MRA brain negative for acute ischemic findings.
1. Transient HA and garbled speech, possible TIA  2. HTN  3. PAT  4. nephrolithiasis s/p stent and urostomy  5.cdif colitis s/p fecal transplant    Plan: cont Diovan. echo. cardiac monitoring. await MRI and neuro eval.  will follow

## 2017-07-26 NOTE — DISCHARGE NOTE ADULT - MEDICATION SUMMARY - MEDICATIONS TO TAKE
I will START or STAY ON the medications listed below when I get home from the hospital:    verapamil 40 mg oral tablet  -- 1 tab(s) by mouth every 8 hours  -- Avoid grapefruit and grapefruit juice while taking this medication.  It is very important that you take or use this exactly as directed.  Do not skip doses or discontinue unless directed by your doctor.  Some non-prescription drugs may aggravate your condition.  Read all labels carefully.  If a warning appears, check with your doctor before taking.  Swallow whole.  Do not crush.    -- Indication: For substituted for valsartan for blood pressure as it will help prevent future migraine episodes.     loperamide 2 mg oral capsule  -- 1 cap(s) by mouth 4 times a day, As needed, Diarrhea  -- Indication: For Home med    Vitamin C 500 mg oral tablet  -- 1 tab(s) by mouth once a day  -- Indication: For Home med I will START or STAY ON the medications listed below when I get home from the hospital:    verapamil 40 mg oral tablet  -- 1 tab(s) by mouth every 8 hours  -- Avoid grapefruit and grapefruit juice while taking this medication.  It is very important that you take or use this exactly as directed.  Do not skip doses or discontinue unless directed by your doctor.  Some non-prescription drugs may aggravate your condition.  Read all labels carefully.  If a warning appears, check with your doctor before taking.  Swallow whole.  Do not crush.    -- Indication: For for blood pressure and to prevent migraines    loperamide 2 mg oral capsule  -- 1 cap(s) by mouth 4 times a day, As needed, Diarrhea  -- Indication: For Home med    Vitamin C 500 mg oral tablet  -- 1 tab(s) by mouth once a day  -- Indication: For Home med

## 2017-07-26 NOTE — CONSULT NOTE ADULT - PROBLEM SELECTOR RECOMMENDATION 9
improved. treatment depends on wether she has further events. Consideration for switch to Verapamil instead of valsartan. Headache prevention.  Can f/u with me in 2-3 weeks as outpatient.

## 2017-07-26 NOTE — DISCHARGE NOTE ADULT - CARE PROVIDER_API CALL
Minor Carballo), Cardiovascular Disease; Internal Medicine  [address] [place] [address]  Phone: [phone]  Fax: [phone]    Carlos Givens), Internal Medicine; Neurology  [address] [place] [address]  Phone: [phone]  Fax: [phone]

## 2017-07-26 NOTE — DISCHARGE NOTE ADULT - PATIENT PORTAL LINK FT
“You can access the [facility] Patient Portal, offered by [facility], by registering with the following website: [email or web address]”

## 2017-07-26 NOTE — DISCHARGE NOTE ADULT - CARE PLAN
Principal Discharge DX:	Migraine syndrome  Goal:	prevent recurrence  Instructions for follow-up, activity and diet:	take verapamil instead of valsartan for now  follow up with Dr. Carballo regarding blood pressure control and Dr. Crespo regarding migraine.

## 2017-07-26 NOTE — DISCHARGE NOTE ADULT - MEDICATION SUMMARY - MEDICATIONS TO STOP TAKING
I will STOP taking the medications listed below when I get home from the hospital:    valsartan 160 mg oral capsule  -- 1 tab(s) by mouth once a day    Ceftin 500 mg oral tablet  -- 1 tab(s) by mouth every 12 hours

## 2017-07-26 NOTE — DISCHARGE NOTE ADULT - PLAN OF CARE
prevent recurrence take verapamil instead of valsartan for now  follow up with Dr. Carballo regarding blood pressure control and Dr. Crespo regarding migraine.

## 2017-07-26 NOTE — PROGRESS NOTE ADULT - ASSESSMENT
1. Transient HA and garbled speech, possible TIA  2. HTN  3. PAT  4. nephrolithiasis s/p stent and urostomy  5.cdif colitis s/p fecal transplant    Plan: cont Diovan. echo unremarkable cont asa  await mri and neuro eval

## 2017-07-26 NOTE — DISCHARGE NOTE ADULT - VISION (WITH CORRECTIVE LENSES IF THE PATIENT USUALLY WEARS THEM):
Partially impaired: cannot see medication labels or newsprint, but can see obstacles in path, and the surrounding layout; can count fingers at arm's length/1 pair

## 2017-07-26 NOTE — DISCHARGE NOTE ADULT - HOSPITAL COURSE
77 y/o woman w/ pmhx of HTN,  ocular migraines, gout, kidney stone, c diff s/p fecal tx, recent admission with proteus bacteremia d/t right pyelo/hydroureteronephrosis from ureteropelvic stone s/p percutaneous nephrostomy which was changed to nephroureterostomy , who presented  via EMS for possible stroke. She had been feeling weak for the past two days pta. ON the day of admission, had walked to the kitchen and started having visual aura which she normally gets with her ocular migraines. She decided to rest, when she got a phone call from her friend. She knew what she wanted to say but couldn't get the appropriate words out. The words were jumbled. Her daughter called 10 minutes later and she had the same issue. Her daughter came to see her and 911 was called. She states the entire episode lasted about 2 hours and was associated with headache.     She was admitted for futher evaluation. She underwent MRI/MRA of brain which were negative for stroke/significant vascular occlusion. She underwent EEG which was negative as well. She was seen by neuro and her symptoms were felt to be related to migraine. She is currently awaiting results of her carotid dopplers which if unremarkable, she will be discharged home today. She has been advised to change her valsartan to verapamil as advised by dr. reed and d/w Dr. Carballo. She was also treated for acute gout attack of her right first toe.     Vital Signs Last 24 Hrs  T(C): 37.1 (26 Jul 2017 10:48), Max: 37.1 (25 Jul 2017 16:51)  T(F): 98.7 (26 Jul 2017 10:48), Max: 98.7 (25 Jul 2017 16:51)  HR: 58 (26 Jul 2017 10:48) (56 - 59)  BP: 159/62 (26 Jul 2017 10:48) (158/58 - 166/58)  BP(mean): 85 (25 Jul 2017 16:51) (85 - 85)  RR: 16 (26 Jul 2017 10:48) (16 - 18)  SpO2: 98% (26 Jul 2017 10:48) (96% - 100%)    PHYSICAL EXAM:    GENERAL: Comfortable, no acute distress   HEAD:  Normocephalic, atraumatic  EYES: EOMI, PERRLA  HEENT: Moist mucous membranes  NECK: Supple, No JVD  NERVOUS SYSTEM:  Alert & Oriented X3, Motor Strength 5/5 B/L upper and lower extremities  CHEST/LUNG: Clear to auscultation bilaterally  HEART: Regular rate and rhythm  ABDOMEN: Soft, Nontender, Nondistended, Bowel sounds present  GENITOURINARY: Voiding, no palpable bladder. +Right ureteronephrostomy tube. +  EXTREMITIES:   No clubbing, cyanosis. Right foot with mild erythema/tenderness of great toe  MUSCULOSKELTAL- No muscle tenderness, no joint tenderness  SKIN-no rash    LABS:                        12.2   11.4  )-----------( 395      ( 24 Jul 2017 21:25 )             36.8     07-25    144  |  113<H>  |  21  ----------------------------<  87  3.6   |  23  |  0.64    Ca    8.4<L>      25 Jul 2017 05:26  Mg     2.0     07-24    TPro  7.5  /  Alb  3.1<L>  /  TBili  0.6  /  DBili  x   /  AST  17  /  ALT  33  /  AlkPhos  84  07-24    PT/INR - ( 24 Jul 2017 21:25 )   PT: 11.1 sec;   INR: 1.03 ratio          MRI/MRA Head w/o Cont (07.25.17 @ 21:36)   IMPRESSION:    1. On the brain MRI, there is no evidence foracute infarct, acute   hemorrhage, or hydrocephalus.  2. On the brain MRA, there is no evidence for significant intracranial   stenosis or aneurysm.    Final diagnosis:    1. Dysarthria likely due to complicated migraine   2. HTN  3. H/o Recent pyelonephritis/proteus bacteremia/ureteropelvic stone s/p ureteronephrostomy    time taken in preparation for discharge 75 minutes  plan d/w patient /neurology and cardiology (pcp)

## 2017-07-27 VITALS
HEART RATE: 59 BPM | SYSTOLIC BLOOD PRESSURE: 101 MMHG | OXYGEN SATURATION: 99 % | DIASTOLIC BLOOD PRESSURE: 48 MMHG | TEMPERATURE: 98 F | RESPIRATION RATE: 18 BRPM

## 2017-07-31 ENCOUNTER — OUTPATIENT (OUTPATIENT)
Dept: OUTPATIENT SERVICES | Facility: HOSPITAL | Age: 77
LOS: 1 days | Discharge: HOME | End: 2017-07-31

## 2017-07-31 DIAGNOSIS — Z98.890 OTHER SPECIFIED POSTPROCEDURAL STATES: Chronic | ICD-10-CM

## 2017-07-31 DIAGNOSIS — G43.109 MIGRAINE WITH AURA, NOT INTRACTABLE, WITHOUT STATUS MIGRAINOSUS: ICD-10-CM

## 2017-07-31 DIAGNOSIS — Z87.448 PERSONAL HISTORY OF OTHER DISEASES OF URINARY SYSTEM: Chronic | ICD-10-CM

## 2017-07-31 DIAGNOSIS — I10 ESSENTIAL (PRIMARY) HYPERTENSION: ICD-10-CM

## 2017-07-31 DIAGNOSIS — G43.909 MIGRAINE, UNSPECIFIED, NOT INTRACTABLE, WITHOUT STATUS MIGRAINOSUS: ICD-10-CM

## 2017-07-31 DIAGNOSIS — Z98.891 HISTORY OF UTERINE SCAR FROM PREVIOUS SURGERY: Chronic | ICD-10-CM

## 2017-07-31 DIAGNOSIS — R47.1 DYSARTHRIA AND ANARTHRIA: ICD-10-CM

## 2017-07-31 DIAGNOSIS — N20.1 CALCULUS OF URETER: ICD-10-CM

## 2017-07-31 DIAGNOSIS — M10.071 IDIOPATHIC GOUT, RIGHT ANKLE AND FOOT: ICD-10-CM

## 2017-07-31 DIAGNOSIS — D35.1 BENIGN NEOPLASM OF PARATHYROID GLAND: Chronic | ICD-10-CM

## 2017-07-31 LAB
ANION GAP SERPL CALC-SCNC: 6 MMOL/L — SIGNIFICANT CHANGE UP (ref 5–17)
APPEARANCE UR: (no result)
BACTERIA # UR AUTO: (no result)
BASOPHILS # BLD AUTO: 0.1 K/UL — SIGNIFICANT CHANGE UP (ref 0–0.2)
BASOPHILS NFR BLD AUTO: 1.4 % — SIGNIFICANT CHANGE UP (ref 0–2)
BILIRUB UR-MCNC: NEGATIVE — SIGNIFICANT CHANGE UP
BUN SERPL-MCNC: 29 MG/DL — HIGH (ref 7–23)
CALCIUM SERPL-MCNC: 10.2 MG/DL — HIGH (ref 8.5–10.1)
CHLORIDE SERPL-SCNC: 106 MMOL/L — SIGNIFICANT CHANGE UP (ref 96–108)
CO2 SERPL-SCNC: 25 MMOL/L — SIGNIFICANT CHANGE UP (ref 22–31)
COLOR SPEC: YELLOW — SIGNIFICANT CHANGE UP
CREAT SERPL-MCNC: 0.86 MG/DL — SIGNIFICANT CHANGE UP (ref 0.5–1.3)
DIFF PNL FLD: (no result)
EOSINOPHIL # BLD AUTO: 0.3 K/UL — SIGNIFICANT CHANGE UP (ref 0–0.5)
EOSINOPHIL NFR BLD AUTO: 3.2 % — SIGNIFICANT CHANGE UP (ref 0–6)
GLUCOSE SERPL-MCNC: 106 MG/DL — HIGH (ref 70–99)
GLUCOSE UR QL: NEGATIVE MG/DL — SIGNIFICANT CHANGE UP
HCT VFR BLD CALC: 43.8 % — SIGNIFICANT CHANGE UP (ref 34.5–45)
HGB BLD-MCNC: 14.3 G/DL — SIGNIFICANT CHANGE UP (ref 11.5–15.5)
INR BLD: 1.01 RATIO — SIGNIFICANT CHANGE UP (ref 0.88–1.16)
KETONES UR-MCNC: NEGATIVE — SIGNIFICANT CHANGE UP
LEUKOCYTE ESTERASE UR-ACNC: (no result)
LYMPHOCYTES # BLD AUTO: 2.2 K/UL — SIGNIFICANT CHANGE UP (ref 1–3.3)
LYMPHOCYTES # BLD AUTO: 28.2 % — SIGNIFICANT CHANGE UP (ref 13–44)
MCHC RBC-ENTMCNC: 28.5 PG — SIGNIFICANT CHANGE UP (ref 27–34)
MCHC RBC-ENTMCNC: 32.6 GM/DL — SIGNIFICANT CHANGE UP (ref 32–36)
MCV RBC AUTO: 87.4 FL — SIGNIFICANT CHANGE UP (ref 80–100)
MONOCYTES # BLD AUTO: 0.5 K/UL — SIGNIFICANT CHANGE UP (ref 0–0.9)
MONOCYTES NFR BLD AUTO: 6.2 % — SIGNIFICANT CHANGE UP (ref 2–14)
NEUTROPHILS # BLD AUTO: 4.9 K/UL — SIGNIFICANT CHANGE UP (ref 1.8–7.4)
NEUTROPHILS NFR BLD AUTO: 61.1 % — SIGNIFICANT CHANGE UP (ref 43–77)
NITRITE UR-MCNC: POSITIVE
PH UR: 5 — SIGNIFICANT CHANGE UP (ref 5–8)
PLATELET # BLD AUTO: 380 K/UL — SIGNIFICANT CHANGE UP (ref 150–400)
POTASSIUM SERPL-MCNC: 5.1 MMOL/L — SIGNIFICANT CHANGE UP (ref 3.5–5.3)
POTASSIUM SERPL-SCNC: 5.1 MMOL/L — SIGNIFICANT CHANGE UP (ref 3.5–5.3)
PROT UR-MCNC: 100 MG/DL
PROTHROM AB SERPL-ACNC: 10.9 SEC — SIGNIFICANT CHANGE UP (ref 9.8–12.7)
RBC # BLD: 5.01 M/UL — SIGNIFICANT CHANGE UP (ref 3.8–5.2)
RBC # FLD: 14.6 % — HIGH (ref 10.3–14.5)
RBC CASTS # UR COMP ASSIST: SIGNIFICANT CHANGE UP /HPF (ref 0–4)
SODIUM SERPL-SCNC: 137 MMOL/L — SIGNIFICANT CHANGE UP (ref 135–145)
SP GR SPEC: 1.02 — SIGNIFICANT CHANGE UP (ref 1.01–1.02)
UROBILINOGEN FLD QL: NEGATIVE MG/DL — SIGNIFICANT CHANGE UP
WBC # BLD: 7.9 K/UL — SIGNIFICANT CHANGE UP (ref 3.8–10.5)
WBC # FLD AUTO: 7.9 K/UL — SIGNIFICANT CHANGE UP (ref 3.8–10.5)
WBC UR QL: >50

## 2017-07-31 NOTE — ASU PATIENT PROFILE, ADULT - PSH
Acinous adenoma of parathyroid gland    H/O  section  1967  H/O dilation and curettage  , ,   H/O nephrostomy  and stent insertion - 2017  S/P left knee arthroscopy

## 2017-07-31 NOTE — ASU PATIENT PROFILE, ADULT - PMH
Clostridium difficile colitis  S/P stool transplant  Essential hypertension    Gout    Kidney stone    Ocular migraine    Scoliosis    Spinal stenosis

## 2017-08-03 RX ORDER — ONDANSETRON 8 MG/1
4 TABLET, FILM COATED ORAL ONCE
Qty: 0 | Refills: 0 | Status: DISCONTINUED | OUTPATIENT
Start: 2017-08-04 | End: 2017-08-04

## 2017-08-03 RX ORDER — OXYCODONE HYDROCHLORIDE 5 MG/1
10 TABLET ORAL EVERY 6 HOURS
Qty: 0 | Refills: 0 | Status: DISCONTINUED | OUTPATIENT
Start: 2017-08-04 | End: 2017-08-04

## 2017-08-03 RX ORDER — FENTANYL CITRATE 50 UG/ML
50 INJECTION INTRAVENOUS
Qty: 0 | Refills: 0 | Status: DISCONTINUED | OUTPATIENT
Start: 2017-08-04 | End: 2017-08-04

## 2017-08-04 ENCOUNTER — OUTPATIENT (OUTPATIENT)
Dept: OUTPATIENT SERVICES | Facility: HOSPITAL | Age: 77
LOS: 1 days | Discharge: HOME | End: 2017-08-04

## 2017-08-04 VITALS
RESPIRATION RATE: 16 BRPM | WEIGHT: 123.9 LBS | HEART RATE: 57 BPM | DIASTOLIC BLOOD PRESSURE: 47 MMHG | HEIGHT: 59 IN | SYSTOLIC BLOOD PRESSURE: 145 MMHG | TEMPERATURE: 98 F | OXYGEN SATURATION: 100 %

## 2017-08-04 VITALS
RESPIRATION RATE: 14 BRPM | TEMPERATURE: 98 F | OXYGEN SATURATION: 99 % | DIASTOLIC BLOOD PRESSURE: 46 MMHG | HEART RATE: 52 BPM | SYSTOLIC BLOOD PRESSURE: 125 MMHG

## 2017-08-04 DIAGNOSIS — Z98.890 OTHER SPECIFIED POSTPROCEDURAL STATES: Chronic | ICD-10-CM

## 2017-08-04 DIAGNOSIS — Z98.891 HISTORY OF UTERINE SCAR FROM PREVIOUS SURGERY: Chronic | ICD-10-CM

## 2017-08-04 DIAGNOSIS — D35.1 BENIGN NEOPLASM OF PARATHYROID GLAND: Chronic | ICD-10-CM

## 2017-08-04 DIAGNOSIS — Z87.448 PERSONAL HISTORY OF OTHER DISEASES OF URINARY SYSTEM: Chronic | ICD-10-CM

## 2017-08-04 NOTE — ASU DISCHARGE PLAN (ADULT/PEDIATRIC). - MEDICATION SUMMARY - MEDICATIONS TO TAKE
I will START or STAY ON the medications listed below when I get home from the hospital:    verapamil 240 mg/24 hours oral capsule, extended release  -- 1 cap(s) by mouth once a day  -- Indication: For CALCULUS OF URETER

## 2017-08-09 DIAGNOSIS — I10 ESSENTIAL (PRIMARY) HYPERTENSION: ICD-10-CM

## 2017-08-09 DIAGNOSIS — N20.0 CALCULUS OF KIDNEY: ICD-10-CM

## 2017-08-09 DIAGNOSIS — G43.909 MIGRAINE, UNSPECIFIED, NOT INTRACTABLE, WITHOUT STATUS MIGRAINOSUS: ICD-10-CM

## 2017-08-09 DIAGNOSIS — J45.909 UNSPECIFIED ASTHMA, UNCOMPLICATED: ICD-10-CM

## 2017-08-09 LAB
CULTURE RESULTS: SIGNIFICANT CHANGE UP
SPECIMEN SOURCE: SIGNIFICANT CHANGE UP

## 2017-08-15 RX ORDER — ASCORBIC ACID 60 MG
1 TABLET,CHEWABLE ORAL
Qty: 0 | Refills: 0 | COMMUNITY

## 2017-08-15 RX ORDER — VALSARTAN 80 MG/1
1 TABLET ORAL
Qty: 0 | Refills: 0 | COMMUNITY

## 2017-08-15 RX ORDER — VALSARTAN 80 MG/1
0 TABLET ORAL
Qty: 0 | Refills: 0 | COMMUNITY

## 2017-08-18 ENCOUNTER — INPATIENT (INPATIENT)
Facility: HOSPITAL | Age: 77
LOS: 2 days | Discharge: HOME HEALTH | End: 2017-08-21
Attending: INTERNAL MEDICINE | Admitting: INTERNAL MEDICINE
Payer: MEDICARE

## 2017-08-18 VITALS
TEMPERATURE: 98 F | DIASTOLIC BLOOD PRESSURE: 75 MMHG | SYSTOLIC BLOOD PRESSURE: 132 MMHG | RESPIRATION RATE: 17 BRPM | HEART RATE: 75 BPM | OXYGEN SATURATION: 100 %

## 2017-08-18 DIAGNOSIS — Z98.890 OTHER SPECIFIED POSTPROCEDURAL STATES: Chronic | ICD-10-CM

## 2017-08-18 DIAGNOSIS — Z87.448 PERSONAL HISTORY OF OTHER DISEASES OF URINARY SYSTEM: Chronic | ICD-10-CM

## 2017-08-18 DIAGNOSIS — D35.1 BENIGN NEOPLASM OF PARATHYROID GLAND: Chronic | ICD-10-CM

## 2017-08-18 DIAGNOSIS — Z98.891 HISTORY OF UTERINE SCAR FROM PREVIOUS SURGERY: Chronic | ICD-10-CM

## 2017-08-18 PROBLEM — G43.109 MIGRAINE WITH AURA, NOT INTRACTABLE, WITHOUT STATUS MIGRAINOSUS: Chronic | Status: ACTIVE | Noted: 2017-07-24

## 2017-08-18 LAB
ALBUMIN SERPL ELPH-MCNC: 3.2 G/DL — LOW (ref 3.3–5)
ALP SERPL-CCNC: 75 U/L — SIGNIFICANT CHANGE UP (ref 40–120)
ALT FLD-CCNC: 32 U/L — SIGNIFICANT CHANGE UP (ref 12–78)
ANION GAP SERPL CALC-SCNC: 10 MMOL/L — SIGNIFICANT CHANGE UP (ref 5–17)
APPEARANCE UR: CLEAR — SIGNIFICANT CHANGE UP
AST SERPL-CCNC: 21 U/L — SIGNIFICANT CHANGE UP (ref 15–37)
BACTERIA # UR AUTO: (no result)
BASOPHILS # BLD AUTO: 0.1 K/UL — SIGNIFICANT CHANGE UP (ref 0–0.2)
BASOPHILS NFR BLD AUTO: 0 % — SIGNIFICANT CHANGE UP (ref 0–2)
BILIRUB SERPL-MCNC: 1.3 MG/DL — HIGH (ref 0.2–1.2)
BILIRUB UR-MCNC: NEGATIVE — SIGNIFICANT CHANGE UP
BUN SERPL-MCNC: 18 MG/DL — SIGNIFICANT CHANGE UP (ref 7–23)
CALCIUM SERPL-MCNC: 9.4 MG/DL — SIGNIFICANT CHANGE UP (ref 8.5–10.1)
CHLORIDE SERPL-SCNC: 108 MMOL/L — SIGNIFICANT CHANGE UP (ref 96–108)
CO2 SERPL-SCNC: 22 MMOL/L — SIGNIFICANT CHANGE UP (ref 22–31)
COLOR SPEC: YELLOW — SIGNIFICANT CHANGE UP
CREAT SERPL-MCNC: 0.78 MG/DL — SIGNIFICANT CHANGE UP (ref 0.5–1.3)
DIFF PNL FLD: (no result)
EOSINOPHIL # BLD AUTO: 0.2 K/UL — SIGNIFICANT CHANGE UP (ref 0–0.5)
EOSINOPHIL NFR BLD AUTO: 0 % — SIGNIFICANT CHANGE UP (ref 0–6)
EPI CELLS # UR: SIGNIFICANT CHANGE UP
GLUCOSE SERPL-MCNC: 125 MG/DL — HIGH (ref 70–99)
GLUCOSE UR QL: NEGATIVE MG/DL — SIGNIFICANT CHANGE UP
HCT VFR BLD CALC: 41.2 % — SIGNIFICANT CHANGE UP (ref 34.5–45)
HGB BLD-MCNC: 14 G/DL — SIGNIFICANT CHANGE UP (ref 11.5–15.5)
INR BLD: 1.18 RATIO — HIGH (ref 0.88–1.16)
KETONES UR-MCNC: NEGATIVE — SIGNIFICANT CHANGE UP
LACTATE SERPL-SCNC: 1.1 MMOL/L — SIGNIFICANT CHANGE UP (ref 0.7–2)
LEUKOCYTE ESTERASE UR-ACNC: (no result)
LYMPHOCYTES # BLD AUTO: 1.9 K/UL — SIGNIFICANT CHANGE UP (ref 1–3.3)
LYMPHOCYTES # BLD AUTO: 8 % — LOW (ref 13–44)
MAGNESIUM SERPL-MCNC: 2 MG/DL — SIGNIFICANT CHANGE UP (ref 1.6–2.6)
MANUAL DIF COMMENT BLD-IMP: SIGNIFICANT CHANGE UP
MCHC RBC-ENTMCNC: 28.8 PG — SIGNIFICANT CHANGE UP (ref 27–34)
MCHC RBC-ENTMCNC: 33.9 GM/DL — SIGNIFICANT CHANGE UP (ref 32–36)
MCV RBC AUTO: 85.1 FL — SIGNIFICANT CHANGE UP (ref 80–100)
MONOCYTES # BLD AUTO: 1.3 K/UL — HIGH (ref 0–0.9)
MONOCYTES NFR BLD AUTO: 5 % — SIGNIFICANT CHANGE UP (ref 2–14)
NEUTROPHILS # BLD AUTO: 18.1 K/UL — HIGH (ref 1.8–7.4)
NEUTROPHILS NFR BLD AUTO: 86 % — HIGH (ref 43–77)
NITRITE UR-MCNC: POSITIVE
PH UR: 5 — SIGNIFICANT CHANGE UP (ref 5–8)
PLAT MORPH BLD: NORMAL — SIGNIFICANT CHANGE UP
PLATELET # BLD AUTO: 292 K/UL — SIGNIFICANT CHANGE UP (ref 150–400)
POTASSIUM SERPL-MCNC: 4 MMOL/L — SIGNIFICANT CHANGE UP (ref 3.5–5.3)
POTASSIUM SERPL-SCNC: 4 MMOL/L — SIGNIFICANT CHANGE UP (ref 3.5–5.3)
PROT SERPL-MCNC: 7.6 GM/DL — SIGNIFICANT CHANGE UP (ref 6–8.3)
PROT UR-MCNC: 30 MG/DL
PROTHROM AB SERPL-ACNC: 12.8 SEC — HIGH (ref 9.8–12.7)
RBC # BLD: 4.84 M/UL — SIGNIFICANT CHANGE UP (ref 3.8–5.2)
RBC # FLD: 14.1 % — SIGNIFICANT CHANGE UP (ref 10.3–14.5)
RBC BLD AUTO: NORMAL — SIGNIFICANT CHANGE UP
RBC CASTS # UR COMP ASSIST: SIGNIFICANT CHANGE UP /HPF (ref 0–4)
SODIUM SERPL-SCNC: 140 MMOL/L — SIGNIFICANT CHANGE UP (ref 135–145)
SP GR SPEC: 1.01 — SIGNIFICANT CHANGE UP (ref 1.01–1.02)
UROBILINOGEN FLD QL: NEGATIVE MG/DL — SIGNIFICANT CHANGE UP
VARIANT LYMPHS # BLD: 1 % — SIGNIFICANT CHANGE UP (ref 0–6)
WBC # BLD: 21.6 K/UL — HIGH (ref 3.8–10.5)
WBC # FLD AUTO: 21.6 K/UL — HIGH (ref 3.8–10.5)
WBC UR QL: >50

## 2017-08-18 PROCEDURE — 76770 US EXAM ABDO BACK WALL COMP: CPT | Mod: 26

## 2017-08-18 PROCEDURE — 99285 EMERGENCY DEPT VISIT HI MDM: CPT

## 2017-08-18 PROCEDURE — 50432 PLMT NEPHROSTOMY CATHETER: CPT | Mod: RT

## 2017-08-18 PROCEDURE — 93010 ELECTROCARDIOGRAM REPORT: CPT

## 2017-08-18 RX ORDER — SODIUM CHLORIDE 9 MG/ML
1000 INJECTION INTRAMUSCULAR; INTRAVENOUS; SUBCUTANEOUS
Qty: 0 | Refills: 0 | Status: DISCONTINUED | OUTPATIENT
Start: 2017-08-18 | End: 2017-08-18

## 2017-08-18 RX ORDER — SODIUM CHLORIDE 9 MG/ML
2000 INJECTION INTRAMUSCULAR; INTRAVENOUS; SUBCUTANEOUS ONCE
Qty: 0 | Refills: 0 | Status: COMPLETED | OUTPATIENT
Start: 2017-08-18 | End: 2017-08-18

## 2017-08-18 RX ORDER — VERAPAMIL HCL 240 MG
240 CAPSULE, EXTENDED RELEASE PELLETS 24 HR ORAL DAILY
Qty: 0 | Refills: 0 | Status: DISCONTINUED | OUTPATIENT
Start: 2017-08-18 | End: 2017-08-21

## 2017-08-18 RX ORDER — ACETAMINOPHEN 500 MG
650 TABLET ORAL EVERY 6 HOURS
Qty: 0 | Refills: 0 | Status: DISCONTINUED | OUTPATIENT
Start: 2017-08-18 | End: 2017-08-21

## 2017-08-18 RX ORDER — CEFAZOLIN SODIUM 1 G
1000 VIAL (EA) INJECTION ONCE
Qty: 0 | Refills: 0 | Status: COMPLETED | OUTPATIENT
Start: 2017-08-18 | End: 2017-08-18

## 2017-08-18 RX ORDER — ONDANSETRON 8 MG/1
4 TABLET, FILM COATED ORAL ONCE
Qty: 0 | Refills: 0 | Status: DISCONTINUED | OUTPATIENT
Start: 2017-08-18 | End: 2017-08-18

## 2017-08-18 RX ORDER — FENTANYL CITRATE 50 UG/ML
50 INJECTION INTRAVENOUS
Qty: 0 | Refills: 0 | Status: DISCONTINUED | OUTPATIENT
Start: 2017-08-18 | End: 2017-08-18

## 2017-08-18 RX ORDER — CEFTRIAXONE 500 MG/1
1 INJECTION, POWDER, FOR SOLUTION INTRAMUSCULAR; INTRAVENOUS EVERY 24 HOURS
Qty: 0 | Refills: 0 | Status: DISCONTINUED | OUTPATIENT
Start: 2017-08-18 | End: 2017-08-20

## 2017-08-18 RX ORDER — LACTOBACILLUS ACIDOPHILUS 100MM CELL
1 CAPSULE ORAL
Qty: 0 | Refills: 0 | Status: DISCONTINUED | OUTPATIENT
Start: 2017-08-18 | End: 2017-08-21

## 2017-08-18 RX ORDER — HEPARIN SODIUM 5000 [USP'U]/ML
5000 INJECTION INTRAVENOUS; SUBCUTANEOUS EVERY 12 HOURS
Qty: 0 | Refills: 0 | Status: DISCONTINUED | OUTPATIENT
Start: 2017-08-18 | End: 2017-08-21

## 2017-08-18 RX ORDER — ACETAMINOPHEN 500 MG
1000 TABLET ORAL ONCE
Qty: 0 | Refills: 0 | Status: COMPLETED | OUTPATIENT
Start: 2017-08-18 | End: 2017-08-18

## 2017-08-18 RX ORDER — OXYCODONE HYDROCHLORIDE 5 MG/1
5 TABLET ORAL EVERY 4 HOURS
Qty: 0 | Refills: 0 | Status: DISCONTINUED | OUTPATIENT
Start: 2017-08-18 | End: 2017-08-18

## 2017-08-18 RX ADMIN — Medication 1000 MILLIGRAM(S): at 14:47

## 2017-08-18 RX ADMIN — CEFTRIAXONE 100 GRAM(S): 500 INJECTION, POWDER, FOR SOLUTION INTRAMUSCULAR; INTRAVENOUS at 14:55

## 2017-08-18 RX ADMIN — SODIUM CHLORIDE 75 MILLILITER(S): 9 INJECTION INTRAMUSCULAR; INTRAVENOUS; SUBCUTANEOUS at 20:00

## 2017-08-18 RX ADMIN — FENTANYL CITRATE 50 MICROGRAM(S): 50 INJECTION INTRAVENOUS at 20:00

## 2017-08-18 RX ADMIN — SODIUM CHLORIDE 1000 MILLILITER(S): 9 INJECTION INTRAMUSCULAR; INTRAVENOUS; SUBCUTANEOUS at 14:45

## 2017-08-18 RX ADMIN — Medication 100 MILLIGRAM(S): at 15:32

## 2017-08-18 NOTE — H&P ADULT - HISTORY OF PRESENT ILLNESS
77 y/o female with HTN, C. diff colitis s/p stool Tx and renal stone s/p R lithotripsy and PCN placement.  PCN was removed yesterday by dr mac--pt developed severe pain afterwards along with chills and low grade temps. She was given 2 doses of ceftin and presents today for R PCN placement by IR    On my exam, NAD, stable vitals, no further flank pain    Pt admitted to floor

## 2017-08-18 NOTE — H&P ADULT - NSHPPHYSICALEXAM_GEN_ALL_CORE
Vital Signs Last 24 Hrs  T(C): 37.9 (18 Aug 2017 12:56), Max: 37.9 (18 Aug 2017 12:56)  T(F): 100.3 (18 Aug 2017 12:56), Max: 100.3 (18 Aug 2017 12:56)  HR: 69 (18 Aug 2017 12:56) (69 - 69)  BP: 131/52 (18 Aug 2017 12:56) (131/52 - 131/52)  BP(mean): --  RR: 18 (18 Aug 2017 12:56) (18 - 18)  SpO2: 98% (18 Aug 2017 12:56) (98% - 98%)

## 2017-08-18 NOTE — ED ADULT NURSE NOTE - OBJECTIVE STATEMENT
Patient comes to ED for fever and chills. Pt had a right nephrostomy tube removed yesterday and began having fever and chills. Dr. Lanadverde will be taking pt to IR possibly tonight,

## 2017-08-18 NOTE — ED STATDOCS - MEDICAL DECISION MAKING DETAILS
Elevated WBC, infected UA.  Pt given IVF, Rocephin.  Spoke with urology, will need new nephrostomy tube placed.  Admitted to medicine for further evaluation and management.

## 2017-08-18 NOTE — ED ADULT TRIAGE NOTE - CHIEF COMPLAINT QUOTE
[facility] EMERGENCY DEPT 
[id number] [phone] Work/School Note Date: 1/27/2017 To Whom It May concern: 
 
Marietta Pabon was seen and treated today in the emergency room by the following provider(s): 
Attending Provider: Brad Chapa MD 
Nurse Practitioner: Vikas Lincoln NP. Marietta Pabon may return to work on  Tuesday jan 31. Sincerely, Vikas Lincoln NP 
 
 
 

i've had a kidney stone and nephrostomy tube. the tube was removed and i'm now having pain

## 2017-08-18 NOTE — BRIEF OPERATIVE NOTE - OPERATION/FINDINGS
sono and fluoro. right hydro. 8fr pcn through mid pole calyx. slightly turbid urine sent for culture. pt jamila proc well.

## 2017-08-18 NOTE — ED STATDOCS - OBJECTIVE STATEMENT
75 y/o female with PMHx of occular migraine, kidney stones presents to the ED c/o fever. Yesterday afternoon, pt had right Nephrostomy tubes removed by [facility]. Afterwards, she began to experience chills and had a fever of 100.1. Last night, she was experiencing severe pain, but pain has since then subsided. Last XR was Tuesday. PMD is Kait

## 2017-08-18 NOTE — H&P ADULT - NSHPLABSRESULTS_GEN_ALL_CORE
14.0   21.6  )-----------( 292      ( 18 Aug 2017 13:23 )             41.2       CBC Full  -  ( 18 Aug 2017 13:23 )  WBC Count : 21.6 K/uL  Hemoglobin : 14.0 g/dL  Hematocrit : 41.2 %  Platelet Count - Automated : 292 K/uL  Mean Cell Volume : 85.1 fl  Mean Cell Hemoglobin : 28.8 pg  Mean Cell Hemoglobin Concentration : 33.9 gm/dL  Auto Neutrophil # : x  Auto Lymphocyte # : x  Auto Monocyte # : x  Auto Eosinophil # : x  Auto Basophil # : x  Auto Neutrophil % : x  Auto Lymphocyte % : x  Auto Monocyte % : x  Auto Eosinophil % : x  Auto Basophil % : x              PT/INR - ( 18 Aug 2017 13:23 )   PT: 12.8 sec;   INR: 1.18 ratio         EKG--NSR. no acute ischemic changes. normal intervals

## 2017-08-18 NOTE — ED ADULT NURSE REASSESSMENT NOTE - NS ED NURSE REASSESS COMMENT FT1
Patient difficult stick multiple attempts made. IV team was able to place line at 1430. Will medicate and give report to [address] [place] [address]

## 2017-08-18 NOTE — H&P ADULT - ASSESSMENT
IMP:    75 y/o female with HTN, C. diff colitis s/p stool Tx and renal stone s/p R lithotripsy and PCN placement (removed on 8/17) presents for R PCN replacement by IR    Plan:    Renal stone--IR informed by dr mac for R PCN placement--keep NPO except for meds-- lithotripsy as per     HTN--cont verapermil     DVT prophy--sqhep and ambulation

## 2017-08-18 NOTE — ED STATDOCS - PROGRESS NOTE DETAILS
BETINA Patrick:   Patient has been seen, evaluated and orders have been written by the attending in intake. Patient is stable.  I will follow up the results of orders written and I will continue to evaluate/observe the patient.  15mm stone still present, not relieved with lithotripsy.    Anika Patrick PA-C Dr. Odell arranged for patient to have nephrostophy tube placed today by Dr. Barrera.  Ancef 1g IV.  No other interventions needed.  Anika Patrick PA-C

## 2017-08-19 LAB
ANION GAP SERPL CALC-SCNC: 8 MMOL/L — SIGNIFICANT CHANGE UP (ref 5–17)
BUN SERPL-MCNC: 14 MG/DL — SIGNIFICANT CHANGE UP (ref 7–23)
CALCIUM SERPL-MCNC: 9 MG/DL — SIGNIFICANT CHANGE UP (ref 8.5–10.1)
CHLORIDE SERPL-SCNC: 114 MMOL/L — HIGH (ref 96–108)
CO2 SERPL-SCNC: 21 MMOL/L — LOW (ref 22–31)
CREAT SERPL-MCNC: 0.7 MG/DL — SIGNIFICANT CHANGE UP (ref 0.5–1.3)
GLUCOSE SERPL-MCNC: 118 MG/DL — HIGH (ref 70–99)
HCT VFR BLD CALC: 38.4 % — SIGNIFICANT CHANGE UP (ref 34.5–45)
HGB BLD-MCNC: 12.8 G/DL — SIGNIFICANT CHANGE UP (ref 11.5–15.5)
MAGNESIUM SERPL-MCNC: 2 MG/DL — SIGNIFICANT CHANGE UP (ref 1.6–2.6)
MCHC RBC-ENTMCNC: 28.7 PG — SIGNIFICANT CHANGE UP (ref 27–34)
MCHC RBC-ENTMCNC: 33.4 GM/DL — SIGNIFICANT CHANGE UP (ref 32–36)
MCV RBC AUTO: 85.9 FL — SIGNIFICANT CHANGE UP (ref 80–100)
NIGHT BLUE STAIN TISS: SIGNIFICANT CHANGE UP
PHOSPHATE SERPL-MCNC: 2.4 MG/DL — LOW (ref 2.5–4.5)
PLATELET # BLD AUTO: 245 K/UL — SIGNIFICANT CHANGE UP (ref 150–400)
POTASSIUM SERPL-MCNC: 3.7 MMOL/L — SIGNIFICANT CHANGE UP (ref 3.5–5.3)
POTASSIUM SERPL-SCNC: 3.7 MMOL/L — SIGNIFICANT CHANGE UP (ref 3.5–5.3)
RBC # BLD: 4.47 M/UL — SIGNIFICANT CHANGE UP (ref 3.8–5.2)
RBC # FLD: 14.3 % — SIGNIFICANT CHANGE UP (ref 10.3–14.5)
SODIUM SERPL-SCNC: 143 MMOL/L — SIGNIFICANT CHANGE UP (ref 135–145)
SPECIMEN SOURCE: SIGNIFICANT CHANGE UP
WBC # BLD: 13.2 K/UL — HIGH (ref 3.8–10.5)
WBC # FLD AUTO: 13.2 K/UL — HIGH (ref 3.8–10.5)

## 2017-08-19 RX ORDER — OXYCODONE AND ACETAMINOPHEN 5; 325 MG/1; MG/1
1 TABLET ORAL ONCE
Qty: 0 | Refills: 0 | Status: DISCONTINUED | OUTPATIENT
Start: 2017-08-19 | End: 2017-08-19

## 2017-08-19 RX ORDER — SODIUM CHLORIDE 9 MG/ML
1000 INJECTION INTRAMUSCULAR; INTRAVENOUS; SUBCUTANEOUS
Qty: 0 | Refills: 0 | Status: DISCONTINUED | OUTPATIENT
Start: 2017-08-19 | End: 2017-08-20

## 2017-08-19 RX ORDER — OXYCODONE HYDROCHLORIDE 5 MG/1
5 TABLET ORAL
Qty: 0 | Refills: 0 | Status: DISCONTINUED | OUTPATIENT
Start: 2017-08-19 | End: 2017-08-21

## 2017-08-19 RX ADMIN — Medication 1 TABLET(S): at 17:47

## 2017-08-19 RX ADMIN — OXYCODONE HYDROCHLORIDE 5 MILLIGRAM(S): 5 TABLET ORAL at 22:27

## 2017-08-19 RX ADMIN — OXYCODONE HYDROCHLORIDE 5 MILLIGRAM(S): 5 TABLET ORAL at 21:21

## 2017-08-19 RX ADMIN — Medication 1 TABLET(S): at 08:39

## 2017-08-19 RX ADMIN — HEPARIN SODIUM 5000 UNIT(S): 5000 INJECTION INTRAVENOUS; SUBCUTANEOUS at 05:34

## 2017-08-19 RX ADMIN — CEFTRIAXONE 100 GRAM(S): 500 INJECTION, POWDER, FOR SOLUTION INTRAMUSCULAR; INTRAVENOUS at 12:25

## 2017-08-19 RX ADMIN — OXYCODONE AND ACETAMINOPHEN 1 TABLET(S): 5; 325 TABLET ORAL at 05:34

## 2017-08-19 RX ADMIN — OXYCODONE AND ACETAMINOPHEN 1 TABLET(S): 5; 325 TABLET ORAL at 07:00

## 2017-08-19 RX ADMIN — Medication 240 MILLIGRAM(S): at 05:34

## 2017-08-19 RX ADMIN — HEPARIN SODIUM 5000 UNIT(S): 5000 INJECTION INTRAVENOUS; SUBCUTANEOUS at 17:47

## 2017-08-20 LAB
-  AMIKACIN: SIGNIFICANT CHANGE UP
-  AMIKACIN: SIGNIFICANT CHANGE UP
-  AZTREONAM: SIGNIFICANT CHANGE UP
-  AZTREONAM: SIGNIFICANT CHANGE UP
-  CEFEPIME: SIGNIFICANT CHANGE UP
-  CEFEPIME: SIGNIFICANT CHANGE UP
-  CEFTAZIDIME: SIGNIFICANT CHANGE UP
-  CEFTAZIDIME: SIGNIFICANT CHANGE UP
-  CIPROFLOXACIN: SIGNIFICANT CHANGE UP
-  CIPROFLOXACIN: SIGNIFICANT CHANGE UP
-  GENTAMICIN: SIGNIFICANT CHANGE UP
-  GENTAMICIN: SIGNIFICANT CHANGE UP
-  IMIPENEM: SIGNIFICANT CHANGE UP
-  IMIPENEM: SIGNIFICANT CHANGE UP
-  LEVOFLOXACIN: SIGNIFICANT CHANGE UP
-  LEVOFLOXACIN: SIGNIFICANT CHANGE UP
-  MEROPENEM: SIGNIFICANT CHANGE UP
-  MEROPENEM: SIGNIFICANT CHANGE UP
-  PIPERACILLIN/TAZOBACTAM: SIGNIFICANT CHANGE UP
-  PIPERACILLIN/TAZOBACTAM: SIGNIFICANT CHANGE UP
-  TOBRAMYCIN: SIGNIFICANT CHANGE UP
-  TOBRAMYCIN: SIGNIFICANT CHANGE UP
ANION GAP SERPL CALC-SCNC: 7 MMOL/L — SIGNIFICANT CHANGE UP (ref 5–17)
BUN SERPL-MCNC: 19 MG/DL — SIGNIFICANT CHANGE UP (ref 7–23)
CALCIUM SERPL-MCNC: 9.1 MG/DL — SIGNIFICANT CHANGE UP (ref 8.5–10.1)
CHLORIDE SERPL-SCNC: 110 MMOL/L — HIGH (ref 96–108)
CO2 SERPL-SCNC: 24 MMOL/L — SIGNIFICANT CHANGE UP (ref 22–31)
CREAT SERPL-MCNC: 0.7 MG/DL — SIGNIFICANT CHANGE UP (ref 0.5–1.3)
CULTURE RESULTS: SIGNIFICANT CHANGE UP
GLUCOSE SERPL-MCNC: 97 MG/DL — SIGNIFICANT CHANGE UP (ref 70–99)
HCT VFR BLD CALC: 35.4 % — SIGNIFICANT CHANGE UP (ref 34.5–45)
HGB BLD-MCNC: 11.8 G/DL — SIGNIFICANT CHANGE UP (ref 11.5–15.5)
MCHC RBC-ENTMCNC: 28.3 PG — SIGNIFICANT CHANGE UP (ref 27–34)
MCHC RBC-ENTMCNC: 33.3 GM/DL — SIGNIFICANT CHANGE UP (ref 32–36)
MCV RBC AUTO: 85.1 FL — SIGNIFICANT CHANGE UP (ref 80–100)
METHOD TYPE: SIGNIFICANT CHANGE UP
METHOD TYPE: SIGNIFICANT CHANGE UP
ORGANISM # SPEC MICROSCOPIC CNT: SIGNIFICANT CHANGE UP
ORGANISM # SPEC MICROSCOPIC CNT: SIGNIFICANT CHANGE UP
PLATELET # BLD AUTO: 220 K/UL — SIGNIFICANT CHANGE UP (ref 150–400)
POTASSIUM SERPL-MCNC: 4 MMOL/L — SIGNIFICANT CHANGE UP (ref 3.5–5.3)
POTASSIUM SERPL-SCNC: 4 MMOL/L — SIGNIFICANT CHANGE UP (ref 3.5–5.3)
RBC # BLD: 4.16 M/UL — SIGNIFICANT CHANGE UP (ref 3.8–5.2)
RBC # FLD: 14.1 % — SIGNIFICANT CHANGE UP (ref 10.3–14.5)
SODIUM SERPL-SCNC: 141 MMOL/L — SIGNIFICANT CHANGE UP (ref 135–145)
SPECIMEN SOURCE: SIGNIFICANT CHANGE UP
WBC # BLD: 10.6 K/UL — HIGH (ref 3.8–10.5)
WBC # FLD AUTO: 10.6 K/UL — HIGH (ref 3.8–10.5)

## 2017-08-20 RX ORDER — IBUPROFEN 200 MG
200 TABLET ORAL ONCE
Qty: 0 | Refills: 0 | Status: COMPLETED | OUTPATIENT
Start: 2017-08-20 | End: 2017-08-20

## 2017-08-20 RX ORDER — CEFEPIME 1 G/1
2000 INJECTION, POWDER, FOR SOLUTION INTRAMUSCULAR; INTRAVENOUS ONCE
Qty: 0 | Refills: 0 | Status: COMPLETED | OUTPATIENT
Start: 2017-08-20 | End: 2017-08-20

## 2017-08-20 RX ORDER — CEFEPIME 1 G/1
INJECTION, POWDER, FOR SOLUTION INTRAMUSCULAR; INTRAVENOUS
Qty: 0 | Refills: 0 | Status: DISCONTINUED | OUTPATIENT
Start: 2017-08-20 | End: 2017-08-21

## 2017-08-20 RX ORDER — CEFEPIME 1 G/1
2000 INJECTION, POWDER, FOR SOLUTION INTRAMUSCULAR; INTRAVENOUS EVERY 12 HOURS
Qty: 0 | Refills: 0 | Status: DISCONTINUED | OUTPATIENT
Start: 2017-08-20 | End: 2017-08-21

## 2017-08-20 RX ADMIN — Medication 200 MILLIGRAM(S): at 22:25

## 2017-08-20 RX ADMIN — Medication 1 TABLET(S): at 07:51

## 2017-08-20 RX ADMIN — HEPARIN SODIUM 5000 UNIT(S): 5000 INJECTION INTRAVENOUS; SUBCUTANEOUS at 05:06

## 2017-08-20 RX ADMIN — CEFEPIME 100 MILLIGRAM(S): 1 INJECTION, POWDER, FOR SOLUTION INTRAMUSCULAR; INTRAVENOUS at 11:17

## 2017-08-20 RX ADMIN — Medication 1 TABLET(S): at 17:21

## 2017-08-20 RX ADMIN — OXYCODONE HYDROCHLORIDE 5 MILLIGRAM(S): 5 TABLET ORAL at 21:00

## 2017-08-20 RX ADMIN — OXYCODONE HYDROCHLORIDE 5 MILLIGRAM(S): 5 TABLET ORAL at 20:05

## 2017-08-20 RX ADMIN — CEFEPIME 100 MILLIGRAM(S): 1 INJECTION, POWDER, FOR SOLUTION INTRAMUSCULAR; INTRAVENOUS at 17:20

## 2017-08-20 RX ADMIN — HEPARIN SODIUM 5000 UNIT(S): 5000 INJECTION INTRAVENOUS; SUBCUTANEOUS at 17:21

## 2017-08-20 RX ADMIN — Medication 200 MILLIGRAM(S): at 21:47

## 2017-08-20 NOTE — PROGRESS NOTE ADULT - SUBJECTIVE AND OBJECTIVE BOX
Patient is a 76y old  Female who presents with a chief complaint of Replacement of R PCN (18 Aug 2017 13:46)      HPI:  75 y/o female with HTN, C. diff colitis s/p stool Tx and renal stone s/p R lithotripsy and PCN placement.  PCN was removed yesterday by dr mac--pt developed severe pain afterwards along with chills and low grade temps. She was given 2 doses of ceftin and presents today for R PCN placement by IR.    : Pt s/p PCN placement last night. No complaints, tolerated procedure. Feeling well. HD stable. Afebrile.      Review of system- Rest of the review of system are normal except mentioned in HPI      Vital Signs Last 24 Hrs  T(C): 36.7 (19 Aug 2017 10:44), Max: 37.9 (18 Aug 2017 12:56)  T(F): 98 (19 Aug 2017 10:44), Max: 100.3 (18 Aug 2017 12:56)  HR: 50 (19 Aug 2017 10:44) (45 - 75)  BP: 108/39 (19 Aug 2017 10:44) (108/39 - 132/75)  BP(mean): --  RR: 17 (19 Aug 2017 10:44) (14 - 18)  SpO2: 99% (19 Aug 2017 10:44) (96% - 100%)    PHYSICAL EXAM:    Constitutional: NAD, awake and alert, well-developed  HEENT: PERR, EOMI, Normal Hearing, MMM  Neck: Soft and supple  Respiratory: Breath sounds are clear bilaterally, No wheezing, rales or rhonchi  Cardiovascular: S1 and S2, regular rate and rhythm, no Murmurs, gallops or rubs  Gastrointestinal: Bowel Sounds present, soft, nontender, nondistended, no guarding, no rebound, drain in place  Extremities: No peripheral edema  Neurological: A/O x 3, no focal deficits  Skin: No rashes    MEDICATIONS  (STANDING):  cefTRIAXone   IVPB 1 Gram(s) IV Intermittent every 24 hours  verapamil 240 milliGRAM(s) Oral daily  heparin  Injectable 5000 Unit(s) SubCutaneous every 12 hours  lactobacillus acidophilus 1 Tablet(s) Oral two times a day with meals  sodium chloride 0.9%. 1000 milliLiter(s) (75 mL/Hr) IV Continuous <Continuous>    MEDICATIONS  (PRN):  acetaminophen   Tablet 650 milliGRAM(s) Oral every 6 hours PRN For Temp greater than 38 C (100.4 F)                              12.8   13.2  )-----------( 245      ( 19 Aug 2017 06:42 )             38.4     08-19    143  |  114<H>  |  14  ----------------------------<  118<H>  3.7   |  21<L>  |  0.70    Ca    9.0      19 Aug 2017 06:42  Phos  2.4     -  Mg     2.0     -    TPro  7.6  /  Alb  3.2<L>  /  TBili  1.3<H>  /  DBili  x   /  AST  21  /  ALT  32  /  AlkPhos  75  -    CAPILLARY BLOOD GLUCOSE        LIVER FUNCTIONS - ( 18 Aug 2017 13:23 )  Alb: 3.2 g/dL / Pro: 7.6 gm/dL / ALK PHOS: 75 U/L / ALT: 32 U/L / AST: 21 U/L / GGT: x           PT/INR - ( 18 Aug 2017 13:23 )   PT: 12.8 sec;   INR: 1.18 ratio           Urinalysis Basic - ( 18 Aug 2017 14:55 )    Color: Yellow / Appearance: Clear / S.015 / pH: x  Gluc: x / Ketone: Negative  / Bili: Negative / Urobili: Negative mg/dL   Blood: x / Protein: 30 mg/dL / Nitrite: Positive   Leuk Esterase: Moderate / RBC: 0-2 /HPF / WBC >50   Sq Epi: x / Non Sq Epi: x / Bacteria: Few        Assessment and Plan:  75 y/o female with HTN, C. diff colitis s/p stool Tx and renal stone s/p R lithotripsy and PCN placement (removed on ) presents for R PCN replacement by IR    Febrile episode related to nephrolithiasis  -Renal stone s/p IR R PCN placement  -vss stable afeb  -c/w IV ceftriaxone   -per nephro --> can be discharged home tomorrow on oral ceftin  with nephr tube  -vns to flush twice per week  -planned eswl on sep 1 as outpt reconsult prn      HTN--cont verapermil     DVT prophy--sqhep and ambulation        Attending Statement:  45 minutes spent on total encounter; more than 50% of the visit was spent counseling and/or coordinating care by the attending physician.
Patient is a 76y old  Female who presents with a chief complaint of Replacement of R PCN (18 Aug 2017 13:46)      HPI:  75 y/o female with HTN, C. diff colitis s/p stool Tx and renal stone s/p R lithotripsy and PCN placement.  PCN was removed yesterday by dr mac--pt developed severe pain afterwards along with chills and low grade temps. She was given 2 doses of ceftin and presents today for R PCN placement by IR.    : Pt s/p PCN placement last night. No complaints, tolerated procedure. Feeling well. HD stable. Afebrile.  : Some mild pain at site of IR placement otherwise doing okay. Cultures of urine growing pseudomonas.    Review of system- Rest of the review of system are normal except mentioned in HPI    Vital Signs Last 24 Hrs  T(C): 37 (20 Aug 2017 11:13), Max: 37.1 (19 Aug 2017 20:36)  T(F): 98.6 (20 Aug 2017 11:13), Max: 98.8 (20 Aug 2017 05:00)  HR: 54 (20 Aug 2017 11:13) (54 - 61)  BP: 121/50 (20 Aug 2017 11:13) (115/83 - 139/54)  BP(mean): --  RR: 16 (20 Aug 2017 11:13) (14 - 17)  SpO2: 98% (20 Aug 2017 11:13) (98% - 99%)    PHYSICAL EXAM:    Constitutional: NAD, awake and alert, well-developed  HEENT: PERR, EOMI, Normal Hearing, MMM  Neck: Soft and supple  Respiratory: Breath sounds are clear bilaterally, No wheezing, rales or rhonchi  Cardiovascular: S1 and S2, regular rate and rhythm, no Murmurs, gallops or rubs  Gastrointestinal: Bowel Sounds present, soft, nontender, nondistended, no guarding, no rebound, drain in place  Extremities: No peripheral edema  Neurological: A/O x 3, no focal deficits  Skin: No rashes    MEDICATIONS  (STANDING):  verapamil 240 milliGRAM(s) Oral daily  heparin  Injectable 5000 Unit(s) SubCutaneous every 12 hours  lactobacillus acidophilus 1 Tablet(s) Oral two times a day with meals  cefepime  IVPB   IV Intermittent   cefepime  IVPB 2000 milliGRAM(s) IV Intermittent every 12 hours    MEDICATIONS  (PRN):  acetaminophen   Tablet 650 milliGRAM(s) Oral every 6 hours PRN For Temp greater than 38 C (100.4 F)  oxyCODONE    IR 5 milliGRAM(s) Oral four times a day PRN Moderate Pain (4 - 6)                              11.8   10.6  )-----------( 220      ( 20 Aug 2017 07:38 )             35.4     08-20    141  |  110<H>  |  19  ----------------------------<  97  4.0   |  24  |  0.70    Ca    9.1      20 Aug 2017 07:38  Phos  2.4     08-19  Mg     2.0     08-19    TPro  7.6  /  Alb  3.2<L>  /  TBili  1.3<H>  /  DBili  x   /  AST  21  /  ALT  32  /  AlkPhos  75  08-18    CAPILLARY BLOOD GLUCOSE        LIVER FUNCTIONS - ( 18 Aug 2017 13:23 )  Alb: 3.2 g/dL / Pro: 7.6 gm/dL / ALK PHOS: 75 U/L / ALT: 32 U/L / AST: 21 U/L / GGT: x           PT/INR - ( 18 Aug 2017 13:23 )   PT: 12.8 sec;   INR: 1.18 ratio           Urinalysis Basic - ( 18 Aug 2017 14:55 )    Color: Yellow / Appearance: Clear / S.015 / pH: x  Gluc: x / Ketone: Negative  / Bili: Negative / Urobili: Negative mg/dL   Blood: x / Protein: 30 mg/dL / Nitrite: Positive   Leuk Esterase: Moderate / RBC: 0-2 /HPF / WBC >50   Sq Epi: x / Non Sq Epi: x / Bacteria: Few      Assessment and Plan:  75 y/o female with HTN, C. diff colitis s/p stool Tx and renal stone s/p R lithotripsy and PCN placement (removed on ) presents for R PCN replacement by IR    Febrile episode related to nephrolithiasis with pseudomonas UTI:  -Renal stone s/p IR R PCN placement  -vss stable afeb  -change ceftriaxone to cefepime based on UCx growing pseudomonas.  -await sensitivities.  -uro consult appreciated --> vns to flush twice per week and planned eswl on sep 1    HTN--cont verapermil     DVT prophy--sqhep and ambulation        Attending Statement:  45 minutes spent on total encounter; more than 50% of the visit was spent counseling and/or coordinating care by the attending physician.
s/p r neph tube placement last night   vss stable afeb   can be discharged home tomorrow on oral ceftin  with nephr tube  vns to flush twice per week  planned eswl on sep 1 as outpt reconsult prn

## 2017-08-21 ENCOUNTER — TRANSCRIPTION ENCOUNTER (OUTPATIENT)
Age: 77
End: 2017-08-21

## 2017-08-21 VITALS
TEMPERATURE: 98 F | OXYGEN SATURATION: 99 % | SYSTOLIC BLOOD PRESSURE: 148 MMHG | HEART RATE: 51 BPM | DIASTOLIC BLOOD PRESSURE: 44 MMHG

## 2017-08-21 LAB
-  AMIKACIN: SIGNIFICANT CHANGE UP
-  AMPICILLIN: SIGNIFICANT CHANGE UP
-  AZTREONAM: SIGNIFICANT CHANGE UP
-  CEFEPIME: SIGNIFICANT CHANGE UP
-  CEFTAZIDIME: SIGNIFICANT CHANGE UP
-  CIPROFLOXACIN: SIGNIFICANT CHANGE UP
-  CIPROFLOXACIN: SIGNIFICANT CHANGE UP
-  GENTAMICIN: SIGNIFICANT CHANGE UP
-  IMIPENEM: SIGNIFICANT CHANGE UP
-  LEVOFLOXACIN: SIGNIFICANT CHANGE UP
-  MEROPENEM: SIGNIFICANT CHANGE UP
-  NITROFURANTOIN: SIGNIFICANT CHANGE UP
-  PIPERACILLIN/TAZOBACTAM: SIGNIFICANT CHANGE UP
-  TETRACYCLINE: SIGNIFICANT CHANGE UP
-  TOBRAMYCIN: SIGNIFICANT CHANGE UP
-  VANCOMYCIN: SIGNIFICANT CHANGE UP
CULTURE RESULTS: SIGNIFICANT CHANGE UP
CULTURE RESULTS: SIGNIFICANT CHANGE UP
METHOD TYPE: SIGNIFICANT CHANGE UP
METHOD TYPE: SIGNIFICANT CHANGE UP
ORGANISM # SPEC MICROSCOPIC CNT: SIGNIFICANT CHANGE UP
SPECIMEN SOURCE: SIGNIFICANT CHANGE UP
SPECIMEN SOURCE: SIGNIFICANT CHANGE UP
URATE SERPL-MCNC: 6 MG/DL — SIGNIFICANT CHANGE UP (ref 2.5–7)

## 2017-08-21 RX ORDER — IBUPROFEN 200 MG
600 TABLET ORAL EVERY 6 HOURS
Qty: 0 | Refills: 0 | Status: DISCONTINUED | OUTPATIENT
Start: 2017-08-21 | End: 2017-08-21

## 2017-08-21 RX ORDER — VANCOMYCIN HCL 1 G
2.5 VIAL (EA) INTRAVENOUS
Qty: 200 | Refills: 0 | OUTPATIENT
Start: 2017-08-21

## 2017-08-21 RX ORDER — VANCOMYCIN HCL 1 G
125 VIAL (EA) INTRAVENOUS EVERY 6 HOURS
Qty: 0 | Refills: 0 | Status: DISCONTINUED | OUTPATIENT
Start: 2017-08-21 | End: 2017-08-21

## 2017-08-21 RX ORDER — IBUPROFEN 200 MG
200 TABLET ORAL ONCE
Qty: 0 | Refills: 0 | Status: COMPLETED | OUTPATIENT
Start: 2017-08-21 | End: 2017-08-21

## 2017-08-21 RX ORDER — CIPROFLOXACIN LACTATE 400MG/40ML
1 VIAL (ML) INTRAVENOUS
Qty: 20 | Refills: 0 | OUTPATIENT
Start: 2017-08-21 | End: 2017-08-31

## 2017-08-21 RX ADMIN — HEPARIN SODIUM 5000 UNIT(S): 5000 INJECTION INTRAVENOUS; SUBCUTANEOUS at 05:50

## 2017-08-21 RX ADMIN — OXYCODONE HYDROCHLORIDE 5 MILLIGRAM(S): 5 TABLET ORAL at 00:38

## 2017-08-21 RX ADMIN — OXYCODONE HYDROCHLORIDE 5 MILLIGRAM(S): 5 TABLET ORAL at 01:26

## 2017-08-21 RX ADMIN — CEFEPIME 100 MILLIGRAM(S): 1 INJECTION, POWDER, FOR SOLUTION INTRAMUSCULAR; INTRAVENOUS at 05:50

## 2017-08-21 RX ADMIN — Medication 1 TABLET(S): at 12:02

## 2017-08-21 RX ADMIN — Medication 600 MILLIGRAM(S): at 12:02

## 2017-08-21 RX ADMIN — Medication 200 MILLIGRAM(S): at 06:50

## 2017-08-21 RX ADMIN — Medication 200 MILLIGRAM(S): at 06:15

## 2017-08-21 NOTE — DISCHARGE NOTE ADULT - PLAN OF CARE
To continue antibiotic regimen and stay afebrile. - Follow up with Dr. Nice, already has appointment on 9/1 for ESWL.  - Follow up with PCP in 1 week.  - Home care services will help flush PCN tube. continue antibiotics for pseudomonas UTI.

## 2017-08-21 NOTE — CONSULT NOTE ADULT - SUBJECTIVE AND OBJECTIVE BOX
h/o of infected stone and eswl (stone fragmented) now s/p removal of nephroureteral stent had temp and fever at home and low grade temp since recommend replacement today of r nephrostomy tube and abx if afeb can d/c home tomorrow on po abx
HPI:  77 y/o female with HTN, C. diff colitis s/p stool Tx and renal stone s/p R lithotripsy and PCN placement now admitted on 8/18 for evaluation of right sided flank pain, found to have right sided hydronephrosis, she is now s/p right percutaneous nephrostomy drain, she notes right first toe pain consistent with cough that she has experienced in past. Urine cultures are growing pansensitive Pseudomonas aeruginosa. Currently afebrile and anticipating discharge.          PMH: as above  PSH: as above  Meds: per reconcilation sheet, noted below  MEDICATIONS  (STANDING):  verapamil 240 milliGRAM(s) Oral daily  heparin  Injectable 5000 Unit(s) SubCutaneous every 12 hours  lactobacillus acidophilus 1 Tablet(s) Oral two times a day with meals  cefepime  IVPB   IV Intermittent   cefepime  IVPB 2000 milliGRAM(s) IV Intermittent every 12 hours  ibuprofen  Tablet 600 milliGRAM(s) Oral every 6 hours  vancomycin    Solution 125 milliGRAM(s) Oral every 6 hours    MEDICATIONS  (PRN):  acetaminophen   Tablet 650 milliGRAM(s) Oral every 6 hours PRN For Temp greater than 38 C (100.4 F)    Allergies    No Known Allergies    Intolerances      Social: no smoking, no alcohol, no illegal drugs; no recent travel, no exposure to TB  FAMILY HISTORY:  Family history of hypertension in mother (Father, Mother, Sibling)  Family history of prostate cancer in father    ROS: the patient has no fever, no chills, no HA, no dizziness, no sore throat, no blurry vision, no CP, no palpitations, no SOB, no cough, no abdominal pain, no diarrhea, no N/V, no dysuria, no leg pain, no claudication, no rash, no joint aches, no rectal pain or bleeding, no night sweats  Vital Signs Last 24 Hrs  T(C): 36.6 (21 Aug 2017 11:25), Max: 37.2 (20 Aug 2017 21:29)  T(F): 97.9 (21 Aug 2017 11:25), Max: 98.9 (20 Aug 2017 21:29)  HR: 51 (21 Aug 2017 11:25) (48 - 61)  BP: 148/44 (21 Aug 2017 11:25) (124/50 - 148/48)  BP(mean): --  RR: 16 (20 Aug 2017 21:29) (16 - 17)  SpO2: 99% (21 Aug 2017 11:25) (95% - 99%)  Daily     Daily   Constitutional: frail looking  HEENT: NC/AT, EOMI, PERRLA  Neck: supple  Respiratory: clear, no r/r/w  Cardiovascular: S1S2 regular, no murmurs  Abdomen: soft, not tender, not distended, positive BS  Genitourinary: right flank PCN  Rectal: deferred  Musculoskeletal: no muscle tenderness, no joint swelling or tenderness  Neurological: AxOx3, moving all extremities, no focal deficits  Skin: no rashes                          11.8   10.6  )-----------( 220      ( 20 Aug 2017 07:38 )             35.4     08-20    141  |  110<H>  |  19  ----------------------------<  97  4.0   |  24  |  0.70    Ca    9.1      20 Aug 2017 07:38       Culture - Urine (08.18.17 @ 19:15)    -  Cefepime: S 8    -  Ciprofloxacin: S <=0.5    -  Piperacillin/Tazobactam: S <=8    -  Aztreonam: S <=4    -  Ceftazidime: S 4    -  Gentamicin: I 8    -  Levofloxacin: S <=1    -  Amikacin: S <=8    -  Imipenem: S <=1    -  Meropenem: S <=1    -  Tobramycin: S <=2    Specimen Source: .Urine CUP    Culture Results:   Moderate Pseudomonas aeruginosa  Rare Enterococcus faecalis    Organism Identification: Pseudomonas aeruginosa    Organism: Pseudomonas aeruginosa    Method Type: JASON    Culture - Urine (08.18.17 @ 14:55)    -  Cefepime: S 8    -  Ciprofloxacin: S <=0.5    -  Gentamicin: I 8    -  Piperacillin/Tazobactam: S <=8    -  Amikacin: S <=8    -  Aztreonam: S <=4    -  Ceftazidime: S 4    -  Levofloxacin: S <=1    -  Tobramycin: S <=2    -  Imipenem: S <=1    -  Meropenem: S <=1    Specimen Source: .Urine None    Culture Results:   50,000 - 99,000 CFU/mL Pseudomonas aeruginosa    Organism Identification: Pseudomonas aeruginosa    Organism: Pseudomonas aeruginosa    Method Type: JASON            Radiology:< from: US Kidney and Bladder (08.18.17 @ 15:16) >    EXAM:  US KIDNEYS AND BLADDER                            PROCEDURE DATE:  08/18/2017          INTERPRETATION:  HISTORY: Right renal stone. History nephrostomy tube.    TECHNIQUE: Renal and bladder sonogram was performed.     COMPARISON: CT of the abdomen and pelvis from 7/10/2017    FINDINGS:   Both kidneys are normal in size, contour and echogenicity. The right   kidney measures 10.9 cm and the left kidney measures 10.6 cm in the   sagittal dimension. There is stable mild to moderate right-sided   hydronephrosis without evidence of proximal ureteral or intrarenal   calculus. There is no perinephric fluid collection. There is no   hydronephrosis on the left. Septated cyst in the upper pole left kidney   measuring 2.3 x 3.3 x 3.3 cm as well as a small parapelvic cyst are   unchanged.    The urinary bladder is underdistended and not well visualized.    IMPRESSION:   Mild to moderate right hydronephrosis, unchanged.    < end of copied text >      Advanced directive addressed: full resuscitation

## 2017-08-21 NOTE — DISCHARGE NOTE ADULT - CARE PLAN
Principal Discharge DX:	Kidney stone  Goal:	To continue antibiotic regimen and stay afebrile.  Instructions for follow-up, activity and diet:	- Follow up with Dr. Nice, already has appointment on 9/1 for ESWL.  - Follow up with PCP in 1 week.  - Home care services will help flush PCN tube. Principal Discharge DX:	Kidney stone  Goal:	To continue antibiotic regimen and stay afebrile.  Instructions for follow-up, activity and diet:	- Follow up with Dr. Nice, already has appointment on 9/1 for ESWL.  - Follow up with PCP in 1 week.  - Home care services will help flush PCN tube.  Secondary Diagnosis:	Urinary tract infection without hematuria, site unspecified  Goal:	continue antibiotics for pseudomonas UTI.

## 2017-08-21 NOTE — DISCHARGE NOTE ADULT - CARE PROVIDER_API CALL
Beto Odell), Urology  [address] [place] [address]  Phone: [phone]  Fax: [phone]    Minor Carballo), Cardiovascular Disease; Internal Medicine  [address] [place] [address]  Phone: [phone]  Fax: [phone]

## 2017-08-21 NOTE — CONSULT NOTE ADULT - ASSESSMENT
75 y/o female with HTN, C. diff colitis s/p stool Tx and renal stone s/p R lithotripsy and PCN placement now admitted on 8/18 for evaluation of right sided flank pain, found to have right sided hydronephrosis, she is now s/p right percutaneous nephrostomy drain, she notes right first toe pain consistent with cough that she has experienced in past. Urine cultures are growing pansensitive Pseudomonas aeruginosa. Currently afebrile and anticipating discharge.  1. Patient admitted with right sided hydronephrosis, secondary to nephrolithiasis, found to have Pseudomonas aeruginosa in urine  - drain per urology  - okay from id standpoint to discharge home on ciprofloxacin 250 mg po q 12 hours for 10 days  - would also discharge home on vancomycin 125 mg po q 6 hours for 21 days given history of severe Cdiff, requiring stool transplant  - will follow up for lithotripsy  2. Hypertension, gout  - per medicine

## 2017-08-21 NOTE — DISCHARGE NOTE ADULT - HOSPITAL COURSE
75 y/o female with PMHx of HTN, C. diff colitis s/p stool Tx and renal stone s/p R lithotripsy and PCN placement (removed on 8/17) presents for R PCN replacement by IR.    8/21 - Pt. feels well. Offers no complaints, wants to go home. Denies N/V/chills/pain.    REVIEW OF SYSTEMS: all negative except for comments above.    Vital Signs Last 24 Hrs  T(C): 36.4 (21 Aug 2017 05:45), Max: 37.2 (20 Aug 2017 21:29)  T(F): 97.5 (21 Aug 2017 05:45), Max: 98.9 (20 Aug 2017 21:29)  HR: 57 (21 Aug 2017 05:46) (48 - 61)  BP: 124/50 (21 Aug 2017 05:45) (124/50 - 148/48)  BP(mean): --  RR: 16 (20 Aug 2017 21:29) (16 - 17)  SpO2: 95% (21 Aug 2017 05:45) (95% - 97%)    Constitutional: NAD, awake and alert, well-developed  HEENT: PERR, EOMI, Normal Hearing, MMM  Neck: Soft and supple  Respiratory: Breath sounds are clear bilaterally, No wheezing, rales or rhonchi  Cardiovascular: S1 and S2, regular rate and rhythm, no Murmurs, gallops or rubs  Gastrointestinal: Bowel Sounds present, soft, nontender, nondistended  Extremities: No peripheral edema  Neurological: A/O x 3, no focal deficits  Skin: No rashes  Other PE: Right nephrostomy tube in place draining clear yellow                          11.8   10.6  )-----------( 220      ( 20 Aug 2017 07:38 )             35.4   08-20    141  |  110<H>  |  19  ----------------------------<  97  4.0   |  24  |  0.70    Ca    9.1      20 Aug 2017 07:38    MEDICATIONS  (STANDING):  verapamil 240 milliGRAM(s) Oral daily  heparin  Injectable 5000 Unit(s) SubCutaneous every 12 hours  lactobacillus acidophilus 1 Tablet(s) Oral two times a day with meals  cefepime  IVPB   IV Intermittent   cefepime  IVPB 2000 milliGRAM(s) IV Intermittent every 12 hours  ibuprofen  Tablet 600 milliGRAM(s) Oral every 6 hours    MEDICATIONS  (PRN):  acetaminophen   Tablet 650 milliGRAM(s) Oral every 6 hours PRN For Temp greater than 38 C (100.4 F)      Assessment & Plan:    # Febrile episode related to nephrolithiasis with pseudomonas UTI  - Renal stone s/p IR R PCN placement  - VSS stable, afebrile  - change ceftriaxone to cefepime based on UCx growing pseudomonas.  - await sensitivities, ID consult appreciated. Will f/u recs for oral ABX to discharge.  - uro consult appreciated --> VNS to flush twice per week and planned ESWL on 9/1/17    # HTN  - stable, cont verapamil     # DVT prophy  - Hep SQ and ambulation 75 y/o female with PMHx of HTN, C. diff colitis s/p stool Tx and renal stone s/p R lithotripsy and PCN placement (removed on 8/17) presents for R PCN replacement by IR.    8/21 - Pt. feels well. Offers no complaints, wants to go home. Denies N/V/chills/pain.    REVIEW OF SYSTEMS: all negative except for comments above.    Vital Signs Last 24 Hrs  T(C): 36.4 (21 Aug 2017 05:45), Max: 37.2 (20 Aug 2017 21:29)  T(F): 97.5 (21 Aug 2017 05:45), Max: 98.9 (20 Aug 2017 21:29)  HR: 57 (21 Aug 2017 05:46) (48 - 61)  BP: 124/50 (21 Aug 2017 05:45) (124/50 - 148/48)  BP(mean): --  RR: 16 (20 Aug 2017 21:29) (16 - 17)  SpO2: 95% (21 Aug 2017 05:45) (95% - 97%)    Constitutional: NAD, awake and alert, well-developed  HEENT: PERR, EOMI, Normal Hearing, MMM  Neck: Soft and supple  Respiratory: Breath sounds are clear bilaterally, No wheezing, rales or rhonchi  Cardiovascular: S1 and S2, regular rate and rhythm, no Murmurs, gallops or rubs  Gastrointestinal: Bowel Sounds present, soft, nontender, nondistended  Extremities: No peripheral edema  Neurological: A/O x 3, no focal deficits  Skin: No rashes  Other PE: Right nephrostomy tube in place draining clear yellow                          11.8   10.6  )-----------( 220      ( 20 Aug 2017 07:38 )             35.4   08-20    141  |  110<H>  |  19  ----------------------------<  97  4.0   |  24  |  0.70    Ca    9.1      20 Aug 2017 07:38    MEDICATIONS  (STANDING):  verapamil 240 milliGRAM(s) Oral daily  heparin  Injectable 5000 Unit(s) SubCutaneous every 12 hours  lactobacillus acidophilus 1 Tablet(s) Oral two times a day with meals  cefepime  IVPB   IV Intermittent   cefepime  IVPB 2000 milliGRAM(s) IV Intermittent every 12 hours  ibuprofen  Tablet 600 milliGRAM(s) Oral every 6 hours    MEDICATIONS  (PRN):  acetaminophen   Tablet 650 milliGRAM(s) Oral every 6 hours PRN For Temp greater than 38 C (100.4 F)      Assessment & Plan:    # Febrile episode related to nephrolithiasis with pseudomonas UTI  - Renal stone s/p IR R PCN placement  - VSS stable, afebrile  - UCx growing pseudomonas  -  ID consult appreciated: Upon d/c okay to give Cipro 250mg BID x 10 days  - uro consult appreciated --> VNS to flush twice per week and planned ESWL on 9/1/17    # HTN  - stable, cont verapamil     # DVT prophy  - Hep SQ and ambulation 77 y/o female with PMHx of HTN, C. diff colitis s/p stool Tx and renal stone s/p R lithotripsy and PCN placement (removed on 8/17) presents for R PCN replacement by IR.    8/21 - Pt. feels well. Offers no complaints, wants to go home. Denies N/V/chills/pain.    REVIEW OF SYSTEMS: all negative except for comments above.    Vital Signs Last 24 Hrs  T(C): 36.4 (21 Aug 2017 05:45), Max: 37.2 (20 Aug 2017 21:29)  T(F): 97.5 (21 Aug 2017 05:45), Max: 98.9 (20 Aug 2017 21:29)  HR: 57 (21 Aug 2017 05:46) (48 - 61)  BP: 124/50 (21 Aug 2017 05:45) (124/50 - 148/48)  BP(mean): --  RR: 16 (20 Aug 2017 21:29) (16 - 17)  SpO2: 95% (21 Aug 2017 05:45) (95% - 97%)    Constitutional: NAD, awake and alert, well-developed  HEENT: PERR, EOMI, Normal Hearing, MMM  Neck: Soft and supple  Respiratory: Breath sounds are clear bilaterally, No wheezing, rales or rhonchi  Cardiovascular: S1 and S2, regular rate and rhythm, no Murmurs, gallops or rubs  Gastrointestinal: Bowel Sounds present, soft, nontender, nondistended  Extremities: No peripheral edema  Neurological: A/O x 3, no focal deficits  Skin: No rashes  Other PE: Right nephrostomy tube in place draining clear yellow                          11.8   10.6  )-----------( 220      ( 20 Aug 2017 07:38 )             35.4   08-20    141  |  110<H>  |  19  ----------------------------<  97  4.0   |  24  |  0.70    Ca    9.1      20 Aug 2017 07:38    MEDICATIONS  (STANDING):  verapamil 240 milliGRAM(s) Oral daily  heparin  Injectable 5000 Unit(s) SubCutaneous every 12 hours  lactobacillus acidophilus 1 Tablet(s) Oral two times a day with meals  cefepime  IVPB   IV Intermittent   cefepime  IVPB 2000 milliGRAM(s) IV Intermittent every 12 hours  ibuprofen  Tablet 600 milliGRAM(s) Oral every 6 hours    MEDICATIONS  (PRN):  acetaminophen   Tablet 650 milliGRAM(s) Oral every 6 hours PRN For Temp greater than 38 C (100.4 F)      Assessment & Plan:    # Febrile episode related to nephrolithiasis with pseudomonas UTI  - Renal stone s/p IR R PCN placement  - VSS stable, afebrile  - UCx growing pseudomonas  -  ID consult appreciated: Upon d/c okay to give Cipro 250mg BID x 10 days  - uro consult appreciated --> VNS to flush twice per week and planned ESWL on 9/1/17    # HTN  - stable, cont verapamil     # DVT prophy  - Hep SQ and ambulation      Attending Statement:  40 minutes spent on total encounter; more than 50% of the visit was spent counseling and/or coordinating care by the attending physician.

## 2017-08-21 NOTE — DISCHARGE NOTE ADULT - PATIENT PORTAL LINK FT
“You can access the [employer] Patient Portal, offered by [facility], by registering with the following website: [email or web address]”

## 2017-08-25 DIAGNOSIS — N13.2 HYDRONEPHROSIS WITH RENAL AND URETERAL CALCULOUS OBSTRUCTION: ICD-10-CM

## 2017-08-25 DIAGNOSIS — B96.5 PSEUDOMONAS (AERUGINOSA) (MALLEI) (PSEUDOMALLEI) AS THE CAUSE OF DISEASES CLASSIFIED ELSEWHERE: ICD-10-CM

## 2017-08-25 DIAGNOSIS — G43.809 OTHER MIGRAINE, NOT INTRACTABLE, WITHOUT STATUS MIGRAINOSUS: ICD-10-CM

## 2017-08-25 DIAGNOSIS — M48.00 SPINAL STENOSIS, SITE UNSPECIFIED: ICD-10-CM

## 2017-08-25 DIAGNOSIS — N39.0 URINARY TRACT INFECTION, SITE NOT SPECIFIED: ICD-10-CM

## 2017-08-25 DIAGNOSIS — I10 ESSENTIAL (PRIMARY) HYPERTENSION: ICD-10-CM

## 2017-08-25 DIAGNOSIS — M10.9 GOUT, UNSPECIFIED: ICD-10-CM

## 2017-08-25 DIAGNOSIS — M41.9 SCOLIOSIS, UNSPECIFIED: ICD-10-CM

## 2017-08-28 DIAGNOSIS — N13.6 PYONEPHROSIS: ICD-10-CM

## 2017-08-30 LAB
CULTURE RESULTS: SIGNIFICANT CHANGE UP
SPECIMEN SOURCE: SIGNIFICANT CHANGE UP

## 2017-09-01 ENCOUNTER — OUTPATIENT (OUTPATIENT)
Dept: OUTPATIENT SERVICES | Facility: HOSPITAL | Age: 77
LOS: 1 days | Discharge: HOME | End: 2017-09-01

## 2017-09-01 VITALS
HEART RATE: 51 BPM | OXYGEN SATURATION: 100 % | WEIGHT: 125 LBS | RESPIRATION RATE: 13 BRPM | SYSTOLIC BLOOD PRESSURE: 144 MMHG | DIASTOLIC BLOOD PRESSURE: 56 MMHG | TEMPERATURE: 99 F

## 2017-09-01 VITALS
RESPIRATION RATE: 14 BRPM | OXYGEN SATURATION: 100 % | SYSTOLIC BLOOD PRESSURE: 142 MMHG | TEMPERATURE: 98 F | HEART RATE: 51 BPM | DIASTOLIC BLOOD PRESSURE: 46 MMHG

## 2017-09-01 DIAGNOSIS — D35.1 BENIGN NEOPLASM OF PARATHYROID GLAND: Chronic | ICD-10-CM

## 2017-09-01 DIAGNOSIS — Z98.890 OTHER SPECIFIED POSTPROCEDURAL STATES: Chronic | ICD-10-CM

## 2017-09-01 DIAGNOSIS — Z98.891 HISTORY OF UTERINE SCAR FROM PREVIOUS SURGERY: Chronic | ICD-10-CM

## 2017-09-01 DIAGNOSIS — Z87.448 PERSONAL HISTORY OF OTHER DISEASES OF URINARY SYSTEM: Chronic | ICD-10-CM

## 2017-09-01 RX ORDER — ACETAMINOPHEN 500 MG
975 TABLET ORAL ONCE
Qty: 0 | Refills: 0 | Status: COMPLETED | OUTPATIENT
Start: 2017-09-01 | End: 2017-09-01

## 2017-09-01 RX ORDER — FENTANYL CITRATE 50 UG/ML
50 INJECTION INTRAVENOUS
Qty: 0 | Refills: 0 | Status: DISCONTINUED | OUTPATIENT
Start: 2017-09-01 | End: 2017-09-01

## 2017-09-01 RX ORDER — FAMOTIDINE 10 MG/ML
20 INJECTION INTRAVENOUS ONCE
Qty: 0 | Refills: 0 | Status: COMPLETED | OUTPATIENT
Start: 2017-09-01 | End: 2017-09-01

## 2017-09-01 RX ORDER — ONDANSETRON 8 MG/1
4 TABLET, FILM COATED ORAL ONCE
Qty: 0 | Refills: 0 | Status: DISCONTINUED | OUTPATIENT
Start: 2017-09-01 | End: 2017-09-16

## 2017-09-01 RX ORDER — ACETAMINOPHEN 500 MG
1000 TABLET ORAL ONCE
Qty: 0 | Refills: 0 | Status: DISCONTINUED | OUTPATIENT
Start: 2017-09-01 | End: 2017-09-16

## 2017-09-01 RX ORDER — MEPERIDINE HYDROCHLORIDE 50 MG/ML
12.5 INJECTION INTRAMUSCULAR; INTRAVENOUS; SUBCUTANEOUS
Qty: 0 | Refills: 0 | Status: DISCONTINUED | OUTPATIENT
Start: 2017-09-01 | End: 2017-09-01

## 2017-09-01 RX ORDER — SODIUM CHLORIDE 9 MG/ML
3 INJECTION INTRAMUSCULAR; INTRAVENOUS; SUBCUTANEOUS EVERY 8 HOURS
Qty: 0 | Refills: 0 | Status: DISCONTINUED | OUTPATIENT
Start: 2017-09-01 | End: 2017-09-01

## 2017-09-01 RX ORDER — OXYCODONE HYDROCHLORIDE 5 MG/1
5 TABLET ORAL EVERY 4 HOURS
Qty: 0 | Refills: 0 | Status: DISCONTINUED | OUTPATIENT
Start: 2017-09-01 | End: 2017-09-01

## 2017-09-01 RX ORDER — SODIUM CHLORIDE 9 MG/ML
1000 INJECTION INTRAMUSCULAR; INTRAVENOUS; SUBCUTANEOUS
Qty: 0 | Refills: 0 | Status: DISCONTINUED | OUTPATIENT
Start: 2017-09-01 | End: 2017-09-16

## 2017-09-01 RX ADMIN — FAMOTIDINE 20 MILLIGRAM(S): 10 INJECTION INTRAVENOUS at 10:59

## 2017-09-01 RX ADMIN — Medication 975 MILLIGRAM(S): at 10:59

## 2017-09-01 RX ADMIN — SODIUM CHLORIDE 75 MILLILITER(S): 9 INJECTION INTRAMUSCULAR; INTRAVENOUS; SUBCUTANEOUS at 13:25

## 2017-09-01 NOTE — ASU DISCHARGE PLAN (ADULT/PEDIATRIC). - MEDICATION SUMMARY - MEDICATIONS TO TAKE
I will START or STAY ON the medications listed below when I get home from the hospital:    verapamil 240 mg/24 hours oral capsule, extended release  -- 1 cap(s) by mouth once a day  -- Indication: For CALCULUS OF KIDNEY    vancomycin 250 mg/5 mL oral solution  -- 2.5 milliliter(s) by mouth every 6 hours  -- Indication: For CALCULUS OF KIDNEY

## 2017-09-01 NOTE — ASU DISCHARGE PLAN (ADULT/PEDIATRIC). - NOTIFY
Fever greater than 101 Persistent Nausea and Vomiting/Bleeding that does not stop/Unable to Urinate/Fever greater than 101

## 2017-09-01 NOTE — ASU PATIENT PROFILE, ADULT - PRO MENTAL HEALTH SX RECENT
Tadeo/Family Medicine, Dr. Calix/Cardiology)   Offered [facility] -- Bedside Delivery? n/a  (not available on weekend)   Discharge/Hospital Encounter Summary to (non-[facility]) PCP Yes   Referral to Outpatient Case Management complete? n/a   Referral to / orders for Home Health Complete? n/a   30 day supply of medicines given at discharge, if documented non-compliance / non-adherence? n/a   Any social issues identified prior to discharge? n/a   Did you assess the readiness or willingness of the family or caregiver to support self management of care? n/a   Right Care Referral Info   Post Acute Recommendation No Care     Mckenna Tucker RN Transitional Navigator  [phone]     none

## 2017-09-11 DIAGNOSIS — I10 ESSENTIAL (PRIMARY) HYPERTENSION: ICD-10-CM

## 2017-09-11 DIAGNOSIS — J45.909 UNSPECIFIED ASTHMA, UNCOMPLICATED: ICD-10-CM

## 2017-09-11 DIAGNOSIS — N20.2 CALCULUS OF KIDNEY WITH CALCULUS OF URETER: ICD-10-CM

## 2017-09-11 DIAGNOSIS — M10.9 GOUT, UNSPECIFIED: ICD-10-CM

## 2017-09-14 ENCOUNTER — OUTPATIENT (OUTPATIENT)
Dept: OUTPATIENT SERVICES | Facility: HOSPITAL | Age: 77
LOS: 1 days | Discharge: HOME | End: 2017-09-14
Payer: MEDICARE

## 2017-09-14 VITALS
TEMPERATURE: 98 F | RESPIRATION RATE: 14 BRPM | SYSTOLIC BLOOD PRESSURE: 142 MMHG | DIASTOLIC BLOOD PRESSURE: 44 MMHG | HEART RATE: 48 BPM | OXYGEN SATURATION: 99 %

## 2017-09-14 VITALS
HEIGHT: 59 IN | DIASTOLIC BLOOD PRESSURE: 58 MMHG | WEIGHT: 125.22 LBS | RESPIRATION RATE: 12 BRPM | TEMPERATURE: 99 F | SYSTOLIC BLOOD PRESSURE: 149 MMHG | HEART RATE: 54 BPM | OXYGEN SATURATION: 100 %

## 2017-09-14 DIAGNOSIS — Z98.891 HISTORY OF UTERINE SCAR FROM PREVIOUS SURGERY: Chronic | ICD-10-CM

## 2017-09-14 DIAGNOSIS — D35.1 BENIGN NEOPLASM OF PARATHYROID GLAND: Chronic | ICD-10-CM

## 2017-09-14 DIAGNOSIS — Z98.890 OTHER SPECIFIED POSTPROCEDURAL STATES: Chronic | ICD-10-CM

## 2017-09-14 DIAGNOSIS — Z87.448 PERSONAL HISTORY OF OTHER DISEASES OF URINARY SYSTEM: Chronic | ICD-10-CM

## 2017-09-14 PROCEDURE — 50693 PLMT URETERAL STENT PRQ: CPT | Mod: RT

## 2017-09-14 RX ORDER — OXYCODONE AND ACETAMINOPHEN 5; 325 MG/1; MG/1
1 TABLET ORAL EVERY 4 HOURS
Qty: 0 | Refills: 0 | Status: DISCONTINUED | OUTPATIENT
Start: 2017-09-14 | End: 2017-09-14

## 2017-09-14 RX ORDER — ONDANSETRON 8 MG/1
4 TABLET, FILM COATED ORAL ONCE
Qty: 0 | Refills: 0 | Status: DISCONTINUED | OUTPATIENT
Start: 2017-09-14 | End: 2017-09-14

## 2017-09-14 RX ORDER — SODIUM CHLORIDE 9 MG/ML
1000 INJECTION INTRAMUSCULAR; INTRAVENOUS; SUBCUTANEOUS
Qty: 0 | Refills: 0 | Status: DISCONTINUED | OUTPATIENT
Start: 2017-09-14 | End: 2017-09-14

## 2017-09-14 RX ORDER — FENTANYL CITRATE 50 UG/ML
25 INJECTION INTRAVENOUS
Qty: 0 | Refills: 0 | Status: DISCONTINUED | OUTPATIENT
Start: 2017-09-14 | End: 2017-09-14

## 2017-09-14 RX ADMIN — OXYCODONE AND ACETAMINOPHEN 1 TABLET(S): 5; 325 TABLET ORAL at 11:09

## 2017-09-14 RX ADMIN — SODIUM CHLORIDE 75 MILLILITER(S): 9 INJECTION INTRAMUSCULAR; INTRAVENOUS; SUBCUTANEOUS at 11:09

## 2017-09-14 NOTE — BRIEF OPERATIVE NOTE - OPERATION/FINDINGS
nephrostogram showed prox nonobstructing stone fragments. indwellinpcn exchanged for 24cm 8.5 fr JJ stent. no covering neph placed. pt jamila proc well. no complications.

## 2017-09-14 NOTE — BRIEF OPERATIVE NOTE - PROCEDURE
<<-----Click on this checkbox to enter Procedure Antegrade insertion of stent of ureter  09/14/2017    Active  DAXELROD

## 2017-09-26 DIAGNOSIS — N20.0 CALCULUS OF KIDNEY: ICD-10-CM

## 2017-09-26 DIAGNOSIS — I10 ESSENTIAL (PRIMARY) HYPERTENSION: ICD-10-CM

## 2017-10-07 LAB
CULTURE RESULTS: SIGNIFICANT CHANGE UP
SPECIMEN SOURCE: SIGNIFICANT CHANGE UP

## 2017-10-22 ENCOUNTER — FORM ENCOUNTER (OUTPATIENT)
Age: 77
End: 2017-10-22

## 2017-10-23 ENCOUNTER — APPOINTMENT (OUTPATIENT)
Dept: CT IMAGING | Facility: IMAGING CENTER | Age: 77
End: 2017-10-23
Payer: MEDICARE

## 2017-10-23 ENCOUNTER — OUTPATIENT (OUTPATIENT)
Dept: OUTPATIENT SERVICES | Facility: HOSPITAL | Age: 77
LOS: 1 days | End: 2017-10-23
Payer: MEDICARE

## 2017-10-23 ENCOUNTER — APPOINTMENT (OUTPATIENT)
Dept: UROLOGY | Facility: CLINIC | Age: 77
End: 2017-10-23
Payer: MEDICARE

## 2017-10-23 VITALS
SYSTOLIC BLOOD PRESSURE: 153 MMHG | BODY MASS INDEX: 25.2 KG/M2 | RESPIRATION RATE: 16 BRPM | WEIGHT: 125 LBS | HEART RATE: 50 BPM | DIASTOLIC BLOOD PRESSURE: 69 MMHG | TEMPERATURE: 97.9 F | HEIGHT: 59 IN

## 2017-10-23 DIAGNOSIS — D35.1 BENIGN NEOPLASM OF PARATHYROID GLAND: Chronic | ICD-10-CM

## 2017-10-23 DIAGNOSIS — Z98.890 OTHER SPECIFIED POSTPROCEDURAL STATES: Chronic | ICD-10-CM

## 2017-10-23 DIAGNOSIS — Z98.891 HISTORY OF UTERINE SCAR FROM PREVIOUS SURGERY: Chronic | ICD-10-CM

## 2017-10-23 DIAGNOSIS — N20.0 CALCULUS OF KIDNEY: ICD-10-CM

## 2017-10-23 DIAGNOSIS — Z87.448 PERSONAL HISTORY OF OTHER DISEASES OF URINARY SYSTEM: Chronic | ICD-10-CM

## 2017-10-23 PROCEDURE — 74176 CT ABD & PELVIS W/O CONTRAST: CPT

## 2017-10-23 PROCEDURE — 74176 CT ABD & PELVIS W/O CONTRAST: CPT | Mod: 26

## 2017-10-23 PROCEDURE — 99205 OFFICE O/P NEW HI 60 MIN: CPT

## 2017-10-24 ENCOUNTER — TRANSCRIPTION ENCOUNTER (OUTPATIENT)
Age: 77
End: 2017-10-24

## 2017-10-31 LAB — BACTERIA UR CULT: ABNORMAL

## 2017-11-24 ENCOUNTER — EMERGENCY (EMERGENCY)
Facility: HOSPITAL | Age: 77
LOS: 0 days | Discharge: HOME | End: 2017-11-24
Attending: EMERGENCY MEDICINE | Admitting: EMERGENCY MEDICINE
Payer: MEDICARE

## 2017-11-24 ENCOUNTER — MESSAGE (OUTPATIENT)
Age: 77
End: 2017-11-24

## 2017-11-24 VITALS
TEMPERATURE: 99 F | HEART RATE: 66 BPM | DIASTOLIC BLOOD PRESSURE: 56 MMHG | OXYGEN SATURATION: 97 % | SYSTOLIC BLOOD PRESSURE: 152 MMHG | RESPIRATION RATE: 19 BRPM

## 2017-11-24 VITALS — WEIGHT: 125 LBS

## 2017-11-24 DIAGNOSIS — R50.9 FEVER, UNSPECIFIED: ICD-10-CM

## 2017-11-24 DIAGNOSIS — N30.01 ACUTE CYSTITIS WITH HEMATURIA: ICD-10-CM

## 2017-11-24 DIAGNOSIS — Z87.448 PERSONAL HISTORY OF OTHER DISEASES OF URINARY SYSTEM: Chronic | ICD-10-CM

## 2017-11-24 DIAGNOSIS — Z98.890 OTHER SPECIFIED POSTPROCEDURAL STATES: Chronic | ICD-10-CM

## 2017-11-24 DIAGNOSIS — D35.1 BENIGN NEOPLASM OF PARATHYROID GLAND: Chronic | ICD-10-CM

## 2017-11-24 DIAGNOSIS — I10 ESSENTIAL (PRIMARY) HYPERTENSION: ICD-10-CM

## 2017-11-24 DIAGNOSIS — M10.9 GOUT, UNSPECIFIED: ICD-10-CM

## 2017-11-24 DIAGNOSIS — N13.2 HYDRONEPHROSIS WITH RENAL AND URETERAL CALCULOUS OBSTRUCTION: ICD-10-CM

## 2017-11-24 DIAGNOSIS — Z98.891 HISTORY OF UTERINE SCAR FROM PREVIOUS SURGERY: Chronic | ICD-10-CM

## 2017-11-24 DIAGNOSIS — M48.00 SPINAL STENOSIS, SITE UNSPECIFIED: ICD-10-CM

## 2017-11-24 LAB
ALBUMIN SERPL ELPH-MCNC: 3.5 G/DL — SIGNIFICANT CHANGE UP (ref 3.3–5)
ALP SERPL-CCNC: 89 U/L — SIGNIFICANT CHANGE UP (ref 40–120)
ALT FLD-CCNC: 47 U/L — SIGNIFICANT CHANGE UP (ref 12–78)
ANION GAP SERPL CALC-SCNC: 10 MMOL/L — SIGNIFICANT CHANGE UP (ref 5–17)
APPEARANCE UR: (no result)
APTT BLD: 27.4 SEC — LOW (ref 27.5–37.4)
AST SERPL-CCNC: 21 U/L — SIGNIFICANT CHANGE UP (ref 15–37)
BACTERIA # UR AUTO: (no result)
BASOPHILS # BLD AUTO: 0.1 K/UL — SIGNIFICANT CHANGE UP (ref 0–0.2)
BASOPHILS NFR BLD AUTO: 0.5 % — SIGNIFICANT CHANGE UP (ref 0–2)
BILIRUB SERPL-MCNC: 1.1 MG/DL — SIGNIFICANT CHANGE UP (ref 0.2–1.2)
BILIRUB UR-MCNC: NEGATIVE — SIGNIFICANT CHANGE UP
BUN SERPL-MCNC: 20 MG/DL — SIGNIFICANT CHANGE UP (ref 7–23)
CALCIUM SERPL-MCNC: 9.4 MG/DL — SIGNIFICANT CHANGE UP (ref 8.5–10.1)
CHLORIDE SERPL-SCNC: 107 MMOL/L — SIGNIFICANT CHANGE UP (ref 96–108)
CO2 SERPL-SCNC: 23 MMOL/L — SIGNIFICANT CHANGE UP (ref 22–31)
COLOR SPEC: YELLOW — SIGNIFICANT CHANGE UP
COMMENT - URINE: SIGNIFICANT CHANGE UP
CREAT SERPL-MCNC: 0.84 MG/DL — SIGNIFICANT CHANGE UP (ref 0.5–1.3)
DIFF PNL FLD: (no result)
EOSINOPHIL # BLD AUTO: 0 K/UL — SIGNIFICANT CHANGE UP (ref 0–0.5)
EOSINOPHIL NFR BLD AUTO: 0.3 % — SIGNIFICANT CHANGE UP (ref 0–6)
EPI CELLS # UR: SIGNIFICANT CHANGE UP
GLUCOSE SERPL-MCNC: 98 MG/DL — SIGNIFICANT CHANGE UP (ref 70–99)
GLUCOSE UR QL: NEGATIVE MG/DL — SIGNIFICANT CHANGE UP
HCT VFR BLD CALC: 43.9 % — SIGNIFICANT CHANGE UP (ref 34.5–45)
HGB BLD-MCNC: 14.4 G/DL — SIGNIFICANT CHANGE UP (ref 11.5–15.5)
HYALINE CASTS # UR AUTO: (no result) /LPF
INR BLD: 1.08 RATIO — SIGNIFICANT CHANGE UP (ref 0.88–1.16)
KETONES UR-MCNC: NEGATIVE — SIGNIFICANT CHANGE UP
LEUKOCYTE ESTERASE UR-ACNC: (no result)
LYMPHOCYTES # BLD AUTO: 1.5 K/UL — SIGNIFICANT CHANGE UP (ref 1–3.3)
LYMPHOCYTES # BLD AUTO: 10.7 % — LOW (ref 13–44)
MCHC RBC-ENTMCNC: 28.3 PG — SIGNIFICANT CHANGE UP (ref 27–34)
MCHC RBC-ENTMCNC: 32.7 GM/DL — SIGNIFICANT CHANGE UP (ref 32–36)
MCV RBC AUTO: 86.4 FL — SIGNIFICANT CHANGE UP (ref 80–100)
MONOCYTES # BLD AUTO: 0.8 K/UL — SIGNIFICANT CHANGE UP (ref 0–0.9)
MONOCYTES NFR BLD AUTO: 5.3 % — SIGNIFICANT CHANGE UP (ref 2–14)
NEUTROPHILS # BLD AUTO: 11.8 K/UL — HIGH (ref 1.8–7.4)
NEUTROPHILS NFR BLD AUTO: 83.3 % — HIGH (ref 43–77)
NITRITE UR-MCNC: POSITIVE
PH UR: 5 — SIGNIFICANT CHANGE UP (ref 5–8)
PLATELET # BLD AUTO: 278 K/UL — SIGNIFICANT CHANGE UP (ref 150–400)
POTASSIUM SERPL-MCNC: 3.6 MMOL/L — SIGNIFICANT CHANGE UP (ref 3.5–5.3)
POTASSIUM SERPL-SCNC: 3.6 MMOL/L — SIGNIFICANT CHANGE UP (ref 3.5–5.3)
PROT SERPL-MCNC: 7.8 GM/DL — SIGNIFICANT CHANGE UP (ref 6–8.3)
PROT UR-MCNC: 30 MG/DL
PROTHROM AB SERPL-ACNC: 11.7 SEC — SIGNIFICANT CHANGE UP (ref 9.8–12.7)
RBC # BLD: 5.08 M/UL — SIGNIFICANT CHANGE UP (ref 3.8–5.2)
RBC # FLD: 15.7 % — HIGH (ref 10.3–14.5)
RBC CASTS # UR COMP ASSIST: (no result) /HPF (ref 0–4)
SODIUM SERPL-SCNC: 140 MMOL/L — SIGNIFICANT CHANGE UP (ref 135–145)
SP GR SPEC: 1.01 — SIGNIFICANT CHANGE UP (ref 1.01–1.02)
UROBILINOGEN FLD QL: NEGATIVE MG/DL — SIGNIFICANT CHANGE UP
WBC # BLD: 14.2 K/UL — HIGH (ref 3.8–10.5)
WBC # FLD AUTO: 14.2 K/UL — HIGH (ref 3.8–10.5)
WBC UR QL: (no result)

## 2017-11-24 PROCEDURE — 74176 CT ABD & PELVIS W/O CONTRAST: CPT | Mod: 26

## 2017-11-24 PROCEDURE — 99285 EMERGENCY DEPT VISIT HI MDM: CPT

## 2017-11-24 RX ORDER — CEPHALEXIN 500 MG
1 CAPSULE ORAL
Qty: 28 | Refills: 0 | OUTPATIENT
Start: 2017-11-24 | End: 2017-12-01

## 2017-11-24 RX ORDER — CEFTRIAXONE 500 MG/1
1000 INJECTION, POWDER, FOR SOLUTION INTRAMUSCULAR; INTRAVENOUS ONCE
Qty: 0 | Refills: 0 | Status: COMPLETED | OUTPATIENT
Start: 2017-11-24 | End: 2017-11-24

## 2017-11-24 RX ORDER — CEFTRIAXONE 500 MG/1
1 INJECTION, POWDER, FOR SOLUTION INTRAMUSCULAR; INTRAVENOUS ONCE
Qty: 0 | Refills: 0 | Status: DISCONTINUED | OUTPATIENT
Start: 2017-11-24 | End: 2017-11-24

## 2017-11-24 RX ORDER — SODIUM CHLORIDE 9 MG/ML
3 INJECTION INTRAMUSCULAR; INTRAVENOUS; SUBCUTANEOUS ONCE
Qty: 0 | Refills: 0 | Status: COMPLETED | OUTPATIENT
Start: 2017-11-24 | End: 2017-11-24

## 2017-11-24 RX ADMIN — SODIUM CHLORIDE 3 MILLILITER(S): 9 INJECTION INTRAMUSCULAR; INTRAVENOUS; SUBCUTANEOUS at 11:32

## 2017-11-24 RX ADMIN — CEFTRIAXONE 1000 MILLIGRAM(S): 500 INJECTION, POWDER, FOR SOLUTION INTRAMUSCULAR; INTRAVENOUS at 12:19

## 2017-11-24 NOTE — ED STATDOCS - OBJECTIVE STATEMENT
78 y/o F with a PMHx of HTN, Cdiff with stool transplant, UTIs, kidney stones with x2 kidney stents presents to the ED c/o worsening flank pain. Pt states that she is scheduled to have surgery to remove stone 12/05/17, experiencing fever, chills last night, spoke with surgeon Dr Sorenson who advised that she come to the ED to r/o infection. Pt states that she has not taken any medication for fever, afebrile here in ED and denies NVD, CP, SOB, dysuria or any other acute c/o at this time.

## 2017-11-24 NOTE — ED ADULT NURSE NOTE - OBJECTIVE STATEMENT
Pt presents with worsening flank pain, has hx kidney stones x2, with stents placed and is supposed to have surgery in December for kidney stones. Pt c/o subjective fevers, but afebrile in intake without taking any tylenol or motrin.

## 2017-11-24 NOTE — ED STATDOCS - PROGRESS NOTE DETAILS
78 yo female with a PMH of HTN, kidney stones (with several lithotripsies, stents placed on 2.5 months ago) presents with dysuria, frequency, continuing flank pain s/p stent placement, and fever last night of 101.7F with chills. Denies sweating, v/d/c, hematuria. Uro Surgeon= ana Uro= estefania.

## 2017-11-24 NOTE — ED STATDOCS - NS_ ATTENDINGSCRIBEDETAILS _ED_A_ED_FT
I, Daniel Bee MD,  performed the initial face to face bedside interview with this patient regarding history of present illness, review of symptoms and relevant past medical, social and family history.  I completed an independent physical examination.    The history, relevant review of systems, past medical and surgical history, medical decision making, and physical examination was documented by the scribe in my presence and I attest to the accuracy of the documentation.

## 2017-11-24 NOTE — ED ADULT TRIAGE NOTE - CHIEF COMPLAINT QUOTE
pt c/o fever and chilsl that developed last night. pt states she has kideny stents due to kidney stones, pt to have surgery on dec 5th for kidney stones. pt has had pain for 2 months in relation to stent placement and is unsure if it has worsened. pt concerend she may have an infection. sent by davy Sorenson pt c/o fever and chills that developed last night. pt states she has kidney stents due to kidney stones, pt to have surgery on dec 5th for kidney stones. pt has had pain for 2 months in relation to stent placement and is unsure if it has worsened. pt concerned she may have an infection. sent by surgeon Dr Sorenson.

## 2017-11-24 NOTE — ED ADULT NURSE NOTE - CHIEF COMPLAINT QUOTE
pt c/o fever and chills that developed last night. pt states she has kidney stents due to kidney stones, pt to have surgery on dec 5th for kidney stones. pt has had pain for 2 months in relation to stent placement and is unsure if it has worsened. pt concerned she may have an infection. sent by surgeon Dr Sorenson.

## 2017-11-24 NOTE — ED STATDOCS - CARE PLAN
Principal Discharge DX:	Acute cystitis with hematuria  Secondary Diagnosis:	Kidney stone  Secondary Diagnosis:	Ureteral stone with hydronephrosis

## 2017-11-24 NOTE — ED STATDOCS - ATTENDING CONTRIBUTION TO CARE
I, Daniel Bee MD,  performed the initial face to face bedside interview with this patient regarding history of present illness, review of symptoms and relevant past medical, social and family history.  I completed an independent physical examination.  I was the initial provider who evaluated this patient. I have signed out the follow up of any pending tests (i.e. labs, radiological studies) to the resident.  I have communicated the patient’s plan of care and disposition with the resident.  The history, relevant review of systems, past medical and surgical history, medical decision making, and physical examination was documented by the scribe in my presence and I attest to the accuracy of the documentation.

## 2017-11-26 LAB
CULTURE RESULTS: SIGNIFICANT CHANGE UP
SPECIMEN SOURCE: SIGNIFICANT CHANGE UP

## 2017-11-28 ENCOUNTER — OUTPATIENT (OUTPATIENT)
Dept: OUTPATIENT SERVICES | Facility: HOSPITAL | Age: 77
LOS: 1 days | End: 2017-11-28
Payer: MEDICARE

## 2017-11-28 VITALS
OXYGEN SATURATION: 98 % | WEIGHT: 126.1 LBS | HEART RATE: 62 BPM | RESPIRATION RATE: 16 BRPM | HEIGHT: 59 IN | SYSTOLIC BLOOD PRESSURE: 150 MMHG | DIASTOLIC BLOOD PRESSURE: 72 MMHG | TEMPERATURE: 98 F

## 2017-11-28 DIAGNOSIS — N39.0 URINARY TRACT INFECTION, SITE NOT SPECIFIED: ICD-10-CM

## 2017-11-28 DIAGNOSIS — Z98.890 OTHER SPECIFIED POSTPROCEDURAL STATES: Chronic | ICD-10-CM

## 2017-11-28 DIAGNOSIS — N20.9 URINARY CALCULUS, UNSPECIFIED: ICD-10-CM

## 2017-11-28 DIAGNOSIS — Z87.440 PERSONAL HISTORY OF URINARY (TRACT) INFECTIONS: Chronic | ICD-10-CM

## 2017-11-28 DIAGNOSIS — Z87.448 PERSONAL HISTORY OF OTHER DISEASES OF URINARY SYSTEM: Chronic | ICD-10-CM

## 2017-11-28 DIAGNOSIS — Z01.818 ENCOUNTER FOR OTHER PREPROCEDURAL EXAMINATION: ICD-10-CM

## 2017-11-28 DIAGNOSIS — Z98.891 HISTORY OF UTERINE SCAR FROM PREVIOUS SURGERY: Chronic | ICD-10-CM

## 2017-11-28 DIAGNOSIS — N20.0 CALCULUS OF KIDNEY: ICD-10-CM

## 2017-11-28 DIAGNOSIS — I10 ESSENTIAL (PRIMARY) HYPERTENSION: ICD-10-CM

## 2017-11-28 DIAGNOSIS — D35.1 BENIGN NEOPLASM OF PARATHYROID GLAND: Chronic | ICD-10-CM

## 2017-11-28 LAB
BLD GP AB SCN SERPL QL: NEGATIVE — SIGNIFICANT CHANGE UP
HCT VFR BLD CALC: 43.1 % — SIGNIFICANT CHANGE UP (ref 34.5–45)
HGB BLD-MCNC: 14.5 G/DL — SIGNIFICANT CHANGE UP (ref 11.5–15.5)
MCHC RBC-ENTMCNC: 28.9 PG — SIGNIFICANT CHANGE UP (ref 27–34)
MCHC RBC-ENTMCNC: 33.6 GM/DL — SIGNIFICANT CHANGE UP (ref 32–36)
MCV RBC AUTO: 85.9 FL — SIGNIFICANT CHANGE UP (ref 80–100)
PLATELET # BLD AUTO: 364 K/UL — SIGNIFICANT CHANGE UP (ref 150–400)
RBC # BLD: 5.02 M/UL — SIGNIFICANT CHANGE UP (ref 3.8–5.2)
RBC # FLD: 16.7 % — HIGH (ref 10.3–14.5)
RH IG SCN BLD-IMP: POSITIVE — SIGNIFICANT CHANGE UP
WBC # BLD: 12.74 K/UL — HIGH (ref 3.8–10.5)
WBC # FLD AUTO: 12.74 K/UL — HIGH (ref 3.8–10.5)

## 2017-11-28 RX ORDER — CEFAZOLIN SODIUM 1 G
2000 VIAL (EA) INJECTION ONCE
Qty: 0 | Refills: 0 | Status: DISCONTINUED | OUTPATIENT
Start: 2017-12-05 | End: 2017-12-07

## 2017-11-28 NOTE — H&P PST ADULT - PSH
Acinous adenoma of parathyroid gland    H/O  section  ,   H/O dilation and curettage  , ,   H/O nephrostomy  and stent insertion - 2017  History of UTI  "for 2.5 years"  S/P left knee arthroscopy   Acinous adenoma of parathyroid gland  , parotidectomy  H/O  section  ,   H/O dilation and curettage  , ,   H/O lithotripsy  X2  H/O nephrostomy  and stent insertion - 2017  S/P R ureteral stent placed 17  History of UTI  "for 2.5 years"  S/P left knee arthroscopy

## 2017-11-28 NOTE — H&P PST ADULT - PROBLEM SELECTOR PLAN 1
Right ureteroscopy  laser lithotripsy, stent change   possible percutaneous nephrolithotomy -12/05/17.

## 2017-11-28 NOTE — H&P PST ADULT - MUSCULOSKELETAL
details… detailed exam normal strength/no calf tenderness/ROM intact/no joint swelling/no joint erythema

## 2017-11-28 NOTE — H&P PST ADULT - FAMILY HISTORY
Family history of prostate cancer in father     Father  Still living? No  Family history of hypertension in mother, Age at diagnosis: Age Unknown     Mother  Still living? No  Family history of hypertension in mother, Age at diagnosis: Age Unknown     Sibling  Still living? Unknown  Family history of hypertension in mother, Age at diagnosis: Age Unknown

## 2017-11-28 NOTE — H&P PST ADULT - HISTORY OF PRESENT ILLNESS
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 diff colitis s/p stool Tx with positive results(06/17) and renal stones s/p R lithotripsy and PCN placement.  PCN was removed yesterday by dr mac--pt developed severe pain afterwards along with chills and low grade temps. 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

## 2017-11-28 NOTE — H&P PST ADULT - PMH
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

## 2017-11-28 NOTE — H&P PST ADULT - PROBLEM SELECTOR PLAN 3
patient is currently on Keflex with no fever chills or dysuria(5 th day today, last dose 12/01/17, dos 12/05/17) by surgeon. case discussed with Dr Malone.  No UC sent.

## 2017-12-05 ENCOUNTER — INPATIENT (INPATIENT)
Facility: HOSPITAL | Age: 77
LOS: 2 days | Discharge: HOME | DRG: 660 | End: 2017-12-08
Attending: UROLOGY | Admitting: UROLOGY
Payer: MEDICARE

## 2017-12-05 ENCOUNTER — RESULT REVIEW (OUTPATIENT)
Age: 77
End: 2017-12-05

## 2017-12-05 ENCOUNTER — TRANSCRIPTION ENCOUNTER (OUTPATIENT)
Age: 77
End: 2017-12-05

## 2017-12-05 ENCOUNTER — APPOINTMENT (OUTPATIENT)
Dept: UROLOGY | Facility: HOSPITAL | Age: 77
End: 2017-12-05

## 2017-12-05 VITALS
DIASTOLIC BLOOD PRESSURE: 70 MMHG | HEIGHT: 59 IN | TEMPERATURE: 98 F | HEART RATE: 55 BPM | SYSTOLIC BLOOD PRESSURE: 164 MMHG | OXYGEN SATURATION: 99 % | RESPIRATION RATE: 18 BRPM | WEIGHT: 126.1 LBS

## 2017-12-05 DIAGNOSIS — Z98.891 HISTORY OF UTERINE SCAR FROM PREVIOUS SURGERY: Chronic | ICD-10-CM

## 2017-12-05 DIAGNOSIS — Z98.890 OTHER SPECIFIED POSTPROCEDURAL STATES: Chronic | ICD-10-CM

## 2017-12-05 DIAGNOSIS — Z01.818 ENCOUNTER FOR OTHER PREPROCEDURAL EXAMINATION: ICD-10-CM

## 2017-12-05 DIAGNOSIS — D35.1 BENIGN NEOPLASM OF PARATHYROID GLAND: Chronic | ICD-10-CM

## 2017-12-05 DIAGNOSIS — Z87.448 PERSONAL HISTORY OF OTHER DISEASES OF URINARY SYSTEM: Chronic | ICD-10-CM

## 2017-12-05 DIAGNOSIS — N20.0 CALCULUS OF KIDNEY: ICD-10-CM

## 2017-12-05 DIAGNOSIS — Z87.440 PERSONAL HISTORY OF URINARY (TRACT) INFECTIONS: Chronic | ICD-10-CM

## 2017-12-05 LAB
ANION GAP SERPL CALC-SCNC: 17 MMOL/L — SIGNIFICANT CHANGE UP (ref 5–17)
BASOPHILS # BLD AUTO: 0 K/UL — SIGNIFICANT CHANGE UP (ref 0–0.2)
BASOPHILS NFR BLD AUTO: 0.2 % — SIGNIFICANT CHANGE UP (ref 0–2)
BUN SERPL-MCNC: 23 MG/DL — SIGNIFICANT CHANGE UP (ref 7–23)
CALCIUM SERPL-MCNC: 9.1 MG/DL — SIGNIFICANT CHANGE UP (ref 8.4–10.5)
CHLORIDE SERPL-SCNC: 106 MMOL/L — SIGNIFICANT CHANGE UP (ref 96–108)
CO2 SERPL-SCNC: 21 MMOL/L — LOW (ref 22–31)
CREAT SERPL-MCNC: 0.86 MG/DL — SIGNIFICANT CHANGE UP (ref 0.5–1.3)
EOSINOPHIL # BLD AUTO: 0.1 K/UL — SIGNIFICANT CHANGE UP (ref 0–0.5)
EOSINOPHIL NFR BLD AUTO: 0.4 % — SIGNIFICANT CHANGE UP (ref 0–6)
GLUCOSE SERPL-MCNC: 156 MG/DL — HIGH (ref 70–99)
HCT VFR BLD CALC: 44.9 % — SIGNIFICANT CHANGE UP (ref 34.5–45)
HGB BLD-MCNC: 14.7 G/DL — SIGNIFICANT CHANGE UP (ref 11.5–15.5)
LYMPHOCYTES # BLD AUTO: 1.3 K/UL — SIGNIFICANT CHANGE UP (ref 1–3.3)
LYMPHOCYTES # BLD AUTO: 9.6 % — LOW (ref 13–44)
MCHC RBC-ENTMCNC: 29.4 PG — SIGNIFICANT CHANGE UP (ref 27–34)
MCHC RBC-ENTMCNC: 32.8 GM/DL — SIGNIFICANT CHANGE UP (ref 32–36)
MCV RBC AUTO: 89.6 FL — SIGNIFICANT CHANGE UP (ref 80–100)
MONOCYTES # BLD AUTO: 0.1 K/UL — SIGNIFICANT CHANGE UP (ref 0–0.9)
MONOCYTES NFR BLD AUTO: 0.6 % — LOW (ref 2–14)
NEUTROPHILS # BLD AUTO: 12.4 K/UL — HIGH (ref 1.8–7.4)
NEUTROPHILS NFR BLD AUTO: 89.3 % — HIGH (ref 43–77)
PLATELET # BLD AUTO: 301 K/UL — SIGNIFICANT CHANGE UP (ref 150–400)
POTASSIUM SERPL-MCNC: 3.7 MMOL/L — SIGNIFICANT CHANGE UP (ref 3.5–5.3)
POTASSIUM SERPL-SCNC: 3.7 MMOL/L — SIGNIFICANT CHANGE UP (ref 3.5–5.3)
RBC # BLD: 5.01 M/UL — SIGNIFICANT CHANGE UP (ref 3.8–5.2)
RBC # FLD: 15.1 % — HIGH (ref 10.3–14.5)
RH IG SCN BLD-IMP: POSITIVE — SIGNIFICANT CHANGE UP
SODIUM SERPL-SCNC: 144 MMOL/L — SIGNIFICANT CHANGE UP (ref 135–145)
WBC # BLD: 13.9 K/UL — HIGH (ref 3.8–10.5)
WBC # FLD AUTO: 13.9 K/UL — HIGH (ref 3.8–10.5)

## 2017-12-05 PROCEDURE — 85027 COMPLETE CBC AUTOMATED: CPT

## 2017-12-05 PROCEDURE — 71010: CPT | Mod: 26

## 2017-12-05 PROCEDURE — 86901 BLOOD TYPING SEROLOGIC RH(D): CPT

## 2017-12-05 PROCEDURE — 86850 RBC ANTIBODY SCREEN: CPT

## 2017-12-05 PROCEDURE — 86900 BLOOD TYPING SEROLOGIC ABO: CPT

## 2017-12-05 PROCEDURE — 88300 SURGICAL PATH GROSS: CPT | Mod: 26

## 2017-12-05 PROCEDURE — G0463: CPT

## 2017-12-05 PROCEDURE — 76000 FLUOROSCOPY <1 HR PHYS/QHP: CPT

## 2017-12-05 RX ORDER — HEPARIN SODIUM 5000 [USP'U]/ML
5000 INJECTION INTRAVENOUS; SUBCUTANEOUS EVERY 8 HOURS
Qty: 0 | Refills: 0 | Status: DISCONTINUED | OUTPATIENT
Start: 2017-12-05 | End: 2017-12-07

## 2017-12-05 RX ORDER — VERAPAMIL HCL 240 MG
240 CAPSULE, EXTENDED RELEASE PELLETS 24 HR ORAL DAILY
Qty: 0 | Refills: 0 | Status: DISCONTINUED | OUTPATIENT
Start: 2017-12-05 | End: 2017-12-07

## 2017-12-05 RX ORDER — SODIUM CHLORIDE 9 MG/ML
3 INJECTION INTRAMUSCULAR; INTRAVENOUS; SUBCUTANEOUS EVERY 8 HOURS
Qty: 0 | Refills: 0 | Status: DISCONTINUED | OUTPATIENT
Start: 2017-12-05 | End: 2017-12-05

## 2017-12-05 RX ORDER — ALLOPURINOL 300 MG
100 TABLET ORAL DAILY
Qty: 0 | Refills: 0 | Status: DISCONTINUED | OUTPATIENT
Start: 2017-12-05 | End: 2017-12-07

## 2017-12-05 RX ORDER — ACETAMINOPHEN 500 MG
650 TABLET ORAL EVERY 6 HOURS
Qty: 0 | Refills: 0 | Status: DISCONTINUED | OUTPATIENT
Start: 2017-12-05 | End: 2017-12-07

## 2017-12-05 RX ORDER — ACETAMINOPHEN 500 MG
1000 TABLET ORAL ONCE
Qty: 0 | Refills: 0 | Status: COMPLETED | OUTPATIENT
Start: 2017-12-05 | End: 2017-12-05

## 2017-12-05 RX ORDER — MORPHINE SULFATE 50 MG/1
4 CAPSULE, EXTENDED RELEASE ORAL
Qty: 0 | Refills: 0 | Status: DISCONTINUED | OUTPATIENT
Start: 2017-12-05 | End: 2017-12-07

## 2017-12-05 RX ORDER — HYDROMORPHONE HYDROCHLORIDE 2 MG/ML
0.25 INJECTION INTRAMUSCULAR; INTRAVENOUS; SUBCUTANEOUS
Qty: 0 | Refills: 0 | Status: DISCONTINUED | OUTPATIENT
Start: 2017-12-05 | End: 2017-12-06

## 2017-12-05 RX ORDER — LIDOCAINE HCL 20 MG/ML
0.2 VIAL (ML) INJECTION ONCE
Qty: 0 | Refills: 0 | Status: DISCONTINUED | OUTPATIENT
Start: 2017-12-05 | End: 2017-12-05

## 2017-12-05 RX ORDER — OXYCODONE AND ACETAMINOPHEN 5; 325 MG/1; MG/1
2 TABLET ORAL EVERY 6 HOURS
Qty: 0 | Refills: 0 | Status: DISCONTINUED | OUTPATIENT
Start: 2017-12-05 | End: 2017-12-07

## 2017-12-05 RX ORDER — OXYCODONE AND ACETAMINOPHEN 5; 325 MG/1; MG/1
1 TABLET ORAL EVERY 4 HOURS
Qty: 0 | Refills: 0 | Status: DISCONTINUED | OUTPATIENT
Start: 2017-12-05 | End: 2017-12-07

## 2017-12-05 RX ORDER — ONDANSETRON 8 MG/1
4 TABLET, FILM COATED ORAL EVERY 6 HOURS
Qty: 0 | Refills: 0 | Status: DISCONTINUED | OUTPATIENT
Start: 2017-12-05 | End: 2017-12-07

## 2017-12-05 RX ORDER — SODIUM CHLORIDE 9 MG/ML
1000 INJECTION, SOLUTION INTRAVENOUS
Qty: 0 | Refills: 0 | Status: DISCONTINUED | OUTPATIENT
Start: 2017-12-05 | End: 2017-12-07

## 2017-12-05 RX ORDER — SENNA PLUS 8.6 MG/1
2 TABLET ORAL AT BEDTIME
Qty: 0 | Refills: 0 | Status: DISCONTINUED | OUTPATIENT
Start: 2017-12-05 | End: 2017-12-07

## 2017-12-05 RX ADMIN — Medication 400 MILLIGRAM(S): at 23:30

## 2017-12-05 RX ADMIN — Medication 1000 MILLIGRAM(S): at 23:45

## 2017-12-05 RX ADMIN — SODIUM CHLORIDE 3 MILLILITER(S): 9 INJECTION INTRAMUSCULAR; INTRAVENOUS; SUBCUTANEOUS at 14:15

## 2017-12-05 RX ADMIN — SODIUM CHLORIDE 125 MILLILITER(S): 9 INJECTION, SOLUTION INTRAVENOUS at 23:30

## 2017-12-05 NOTE — BRIEF OPERATIVE NOTE - PROCEDURE
<<-----Click on this checkbox to enter Procedure Percutaneous right nephrolithotomy  12/05/2017    Active  EMA  Cystourethroscopy with ureteroscopy with balloon dilation of stricture of ureter  12/05/2017    Active  EMA  Ureteroscopy of right ureter with lithotripsy  12/05/2017    Active  EMA

## 2017-12-06 LAB
ANION GAP SERPL CALC-SCNC: 17 MMOL/L — SIGNIFICANT CHANGE UP (ref 5–17)
ANION GAP SERPL CALC-SCNC: 17 MMOL/L — SIGNIFICANT CHANGE UP (ref 5–17)
BUN SERPL-MCNC: 25 MG/DL — HIGH (ref 7–23)
BUN SERPL-MCNC: 25 MG/DL — HIGH (ref 7–23)
CALCIUM SERPL-MCNC: 8.4 MG/DL — SIGNIFICANT CHANGE UP (ref 8.4–10.5)
CALCIUM SERPL-MCNC: 9.1 MG/DL — SIGNIFICANT CHANGE UP (ref 8.4–10.5)
CHLORIDE SERPL-SCNC: 106 MMOL/L — SIGNIFICANT CHANGE UP (ref 96–108)
CHLORIDE SERPL-SCNC: 107 MMOL/L — SIGNIFICANT CHANGE UP (ref 96–108)
CO2 SERPL-SCNC: 19 MMOL/L — LOW (ref 22–31)
CO2 SERPL-SCNC: 20 MMOL/L — LOW (ref 22–31)
CREAT SERPL-MCNC: 0.89 MG/DL — SIGNIFICANT CHANGE UP (ref 0.5–1.3)
CREAT SERPL-MCNC: 0.96 MG/DL — SIGNIFICANT CHANGE UP (ref 0.5–1.3)
GLUCOSE SERPL-MCNC: 130 MG/DL — HIGH (ref 70–99)
GLUCOSE SERPL-MCNC: 141 MG/DL — HIGH (ref 70–99)
HCT VFR BLD CALC: 38.6 % — SIGNIFICANT CHANGE UP (ref 34.5–45)
HCT VFR BLD CALC: 38.9 % — SIGNIFICANT CHANGE UP (ref 34.5–45)
HGB BLD-MCNC: 12.7 G/DL — SIGNIFICANT CHANGE UP (ref 11.5–15.5)
HGB BLD-MCNC: 12.9 G/DL — SIGNIFICANT CHANGE UP (ref 11.5–15.5)
MCHC RBC-ENTMCNC: 29.3 PG — SIGNIFICANT CHANGE UP (ref 27–34)
MCHC RBC-ENTMCNC: 29.3 PG — SIGNIFICANT CHANGE UP (ref 27–34)
MCHC RBC-ENTMCNC: 32.9 GM/DL — SIGNIFICANT CHANGE UP (ref 32–36)
MCHC RBC-ENTMCNC: 33.1 GM/DL — SIGNIFICANT CHANGE UP (ref 32–36)
MCV RBC AUTO: 88.7 FL — SIGNIFICANT CHANGE UP (ref 80–100)
MCV RBC AUTO: 88.9 FL — SIGNIFICANT CHANGE UP (ref 80–100)
PLATELET # BLD AUTO: 265 K/UL — SIGNIFICANT CHANGE UP (ref 150–400)
PLATELET # BLD AUTO: 268 K/UL — SIGNIFICANT CHANGE UP (ref 150–400)
POTASSIUM SERPL-MCNC: 3 MMOL/L — LOW (ref 3.5–5.3)
POTASSIUM SERPL-MCNC: 3.9 MMOL/L — SIGNIFICANT CHANGE UP (ref 3.5–5.3)
POTASSIUM SERPL-SCNC: 3 MMOL/L — LOW (ref 3.5–5.3)
POTASSIUM SERPL-SCNC: 3.9 MMOL/L — SIGNIFICANT CHANGE UP (ref 3.5–5.3)
RBC # BLD: 4.34 M/UL — SIGNIFICANT CHANGE UP (ref 3.8–5.2)
RBC # BLD: 4.38 M/UL — SIGNIFICANT CHANGE UP (ref 3.8–5.2)
RBC # FLD: 15 % — HIGH (ref 10.3–14.5)
RBC # FLD: 15.2 % — HIGH (ref 10.3–14.5)
SODIUM SERPL-SCNC: 142 MMOL/L — SIGNIFICANT CHANGE UP (ref 135–145)
SODIUM SERPL-SCNC: 144 MMOL/L — SIGNIFICANT CHANGE UP (ref 135–145)
WBC # BLD: 27 K/UL — HIGH (ref 3.8–10.5)
WBC # BLD: 42.6 K/UL — CRITICAL HIGH (ref 3.8–10.5)
WBC # FLD AUTO: 27 K/UL — HIGH (ref 3.8–10.5)
WBC # FLD AUTO: 42.6 K/UL — CRITICAL HIGH (ref 3.8–10.5)

## 2017-12-06 PROCEDURE — 74176 CT ABD & PELVIS W/O CONTRAST: CPT | Mod: 26

## 2017-12-06 RX ORDER — PIPERACILLIN AND TAZOBACTAM 4; .5 G/20ML; G/20ML
3.38 INJECTION, POWDER, LYOPHILIZED, FOR SOLUTION INTRAVENOUS EVERY 8 HOURS
Qty: 0 | Refills: 0 | Status: DISCONTINUED | OUTPATIENT
Start: 2017-12-06 | End: 2017-12-07

## 2017-12-06 RX ORDER — ACETAMINOPHEN 500 MG
1000 TABLET ORAL ONCE
Qty: 0 | Refills: 0 | Status: COMPLETED | OUTPATIENT
Start: 2017-12-06 | End: 2017-12-06

## 2017-12-06 RX ORDER — INFLUENZA VIRUS VACCINE 15; 15; 15; 15 UG/.5ML; UG/.5ML; UG/.5ML; UG/.5ML
0.5 SUSPENSION INTRAMUSCULAR ONCE
Qty: 0 | Refills: 0 | Status: DISCONTINUED | OUTPATIENT
Start: 2017-12-06 | End: 2017-12-08

## 2017-12-06 RX ADMIN — SODIUM CHLORIDE 125 MILLILITER(S): 9 INJECTION, SOLUTION INTRAVENOUS at 08:00

## 2017-12-06 RX ADMIN — Medication 240 MILLIGRAM(S): at 05:05

## 2017-12-06 RX ADMIN — OXYCODONE AND ACETAMINOPHEN 2 TABLET(S): 5; 325 TABLET ORAL at 20:30

## 2017-12-06 RX ADMIN — HEPARIN SODIUM 5000 UNIT(S): 5000 INJECTION INTRAVENOUS; SUBCUTANEOUS at 05:05

## 2017-12-06 RX ADMIN — Medication 1000 MILLIGRAM(S): at 08:00

## 2017-12-06 RX ADMIN — PIPERACILLIN AND TAZOBACTAM 25 GRAM(S): 4; .5 INJECTION, POWDER, LYOPHILIZED, FOR SOLUTION INTRAVENOUS at 13:40

## 2017-12-06 RX ADMIN — Medication 400 MILLIGRAM(S): at 07:45

## 2017-12-06 RX ADMIN — HYDROMORPHONE HYDROCHLORIDE 0.25 MILLIGRAM(S): 2 INJECTION INTRAMUSCULAR; INTRAVENOUS; SUBCUTANEOUS at 02:10

## 2017-12-06 RX ADMIN — Medication 100 MILLIGRAM(S): at 12:07

## 2017-12-06 RX ADMIN — HYDROMORPHONE HYDROCHLORIDE 0.25 MILLIGRAM(S): 2 INJECTION INTRAMUSCULAR; INTRAVENOUS; SUBCUTANEOUS at 02:19

## 2017-12-06 RX ADMIN — PIPERACILLIN AND TAZOBACTAM 25 GRAM(S): 4; .5 INJECTION, POWDER, LYOPHILIZED, FOR SOLUTION INTRAVENOUS at 06:45

## 2017-12-06 RX ADMIN — HEPARIN SODIUM 5000 UNIT(S): 5000 INJECTION INTRAVENOUS; SUBCUTANEOUS at 21:31

## 2017-12-06 RX ADMIN — HEPARIN SODIUM 5000 UNIT(S): 5000 INJECTION INTRAVENOUS; SUBCUTANEOUS at 13:39

## 2017-12-06 RX ADMIN — OXYCODONE AND ACETAMINOPHEN 2 TABLET(S): 5; 325 TABLET ORAL at 19:59

## 2017-12-06 RX ADMIN — PIPERACILLIN AND TAZOBACTAM 25 GRAM(S): 4; .5 INJECTION, POWDER, LYOPHILIZED, FOR SOLUTION INTRAVENOUS at 21:31

## 2017-12-06 NOTE — PROVIDER CONTACT NOTE (CRITICAL VALUE NOTIFICATION) - ACTION/TREATMENT ORDERED:
Shirley from core lab will do a manual test to verify WBC and let RN know accordingly. Zosyn ordered for pt by urology service.

## 2017-12-06 NOTE — PROGRESS NOTE ADULT - SUBJECTIVE AND OBJECTIVE BOX
UROLOGY PA PROGRESS NOTE:     Subjective:  no acute events overnight    Objective:  Vital signs  T(F): , Max: 99.9 (12-05-17 @ 21:40)  HR: 69 (12-06-17 @ 05:00)  BP: 119/60 (12-06-17 @ 05:00)  SpO2: 98% (12-06-17 @ 05:00)  Wt(kg): --    Output     12-05 @ 07:01  -  12-06 @ 07:00  --------------------------------------------------------  IN: 1250 mL / OUT: 995 mL / NET: 255 mL        Physical Exam:  Gen: NAD  Abd: soft, NT/ND, right NT draining punch, flank dressing CDI, no hematoma  : +foster draining translucent red; foster d/c'd for TOV: stent unintentionally removed as well as it was intertwined with foster lumen.     Labs:  12-06  42.6  / 38.6  /0.96   12-06  27.0  / 38.9  /0.89                           12.7   42.6  )-----------( 268      ( 06 Dec 2017 05:48 )             38.6     12-06    142  |  106  |  25<H>  ----------------------------<  141<H>  3.9   |  19<L>  |  0.96    Ca    9.1      06 Dec 2017 05:48

## 2017-12-06 NOTE — PROGRESS NOTE ADULT - SUBJECTIVE AND OBJECTIVE BOX
The patient was seen and examined at bedside.  Denies complaints of chest pain, shortness of breath, nausea, acute pain.    T(C): 36.7 (12-06-17 @ 01:00), Max: 37.7 (12-05-17 @ 21:40)  HR: 66 (12-06-17 @ 02:00) (55 - 77)  BP: 114/55 (12-06-17 @ 02:00) (105/52 - 165/70)  RR: 16 (12-06-17 @ 02:00) (12 - 20)  SpO2: 99% (12-06-17 @ 02:00) (93% - 100%)  Wt(kg): --    Physical Exam:    General: NAD, A+Ox3, appears tired  Abdomen: soft, non-tender, non-distended  Back: no hematoma, dressing saturated with blood, and was changed at bedside      12-05 @ 07:01  -  12-06 @ 02:32  --------------------------------------------------------  IN: 550 mL / OUT: 615 mL / NET: -65 mL    Mcmillan blood tinged  right NT - bloody output                        12.9   27.0  )-----------( 265      ( 06 Dec 2017 02:11 )             38.9       12-05    144  |  106  |  23  ----------------------------<  156<H>  3.7   |  21<L>  |  0.86    Ca    9.1      05 Dec 2017 22:39

## 2017-12-06 NOTE — PROGRESS NOTE ADULT - ASSESSMENT
78 yo female s/p right PCNL and right ureteral dilation, POD#1  - NPO for now, will discuss stent removal with Dr. Sorenson, may need to get replaced today  - CT scan this am  - NT to drainage, poss d/c tomorrow   - f/u am labs 78 yo female s/p right PCNL and right ureteral dilation, POD#1    - CT scan this am  - NT to drainage, poss d/c tomorrow   - antegrade nephrostogram tomorrow if stone free on CT prior to NT removal  - f/u am labs

## 2017-12-06 NOTE — PROGRESS NOTE ADULT - ASSESSMENT
77 year old female s/p right PCNL    -regular diet  -OOB, ambulate, DVT prophylaxis  -analgesia prn  -incentive spirometry  -monitor I's and O's  -AM labs

## 2017-12-06 NOTE — PROGRESS NOTE ADULT - SUBJECTIVE AND OBJECTIVE BOX
ANESTHESIA POSTOP CHECK    77y Female POSTOP DAY 1 S/P Ureteroscopy and stone extraction    Vital Signs Last 24 Hrs  T(C): 37.3 (06 Dec 2017 07:45), Max: 37.7 (05 Dec 2017 21:40)  T(F): 99.1 (06 Dec 2017 07:45), Max: 99.9 (05 Dec 2017 21:40)  HR: 61 (06 Dec 2017 07:45) (55 - 77)  BP: 117/58 (06 Dec 2017 07:45) (103/51 - 165/70)  BP(mean): 83 (06 Dec 2017 07:45) (75 - 95)  RR: 16 (06 Dec 2017 07:45) (12 - 20)  SpO2: 96% (06 Dec 2017 07:45) (93% - 100%)  I&O's Summary    05 Dec 2017 07:01  -  06 Dec 2017 07:00  --------------------------------------------------------  IN: 1475 mL / OUT: 1055 mL / NET: 420 mL    06 Dec 2017 07:01  -  06 Dec 2017 08:23  --------------------------------------------------------  IN: 125 mL / OUT: 0 mL / NET: 125 mL        [x ] NO APPARENT ANESTHESIA COMPLICATIONS      Comments:

## 2017-12-07 ENCOUNTER — TRANSCRIPTION ENCOUNTER (OUTPATIENT)
Age: 77
End: 2017-12-07

## 2017-12-07 LAB
BASOPHILS # BLD AUTO: 0.1 K/UL — SIGNIFICANT CHANGE UP (ref 0–0.2)
BASOPHILS NFR BLD AUTO: 0.2 % — SIGNIFICANT CHANGE UP (ref 0–2)
EOSINOPHIL # BLD AUTO: 0.1 K/UL — SIGNIFICANT CHANGE UP (ref 0–0.5)
EOSINOPHIL NFR BLD AUTO: 0.5 % — SIGNIFICANT CHANGE UP (ref 0–6)
HCT VFR BLD CALC: 33.9 % — LOW (ref 34.5–45)
HGB BLD-MCNC: 10.9 G/DL — LOW (ref 11.5–15.5)
LYMPHOCYTES # BLD AUTO: 13.4 % — SIGNIFICANT CHANGE UP (ref 13–44)
LYMPHOCYTES # BLD AUTO: 3.2 K/UL — SIGNIFICANT CHANGE UP (ref 1–3.3)
MCHC RBC-ENTMCNC: 29.1 PG — SIGNIFICANT CHANGE UP (ref 27–34)
MCHC RBC-ENTMCNC: 32.3 GM/DL — SIGNIFICANT CHANGE UP (ref 32–36)
MCV RBC AUTO: 90 FL — SIGNIFICANT CHANGE UP (ref 80–100)
MONOCYTES # BLD AUTO: 1.4 K/UL — HIGH (ref 0–0.9)
MONOCYTES NFR BLD AUTO: 5.8 % — SIGNIFICANT CHANGE UP (ref 2–14)
NEUTROPHILS # BLD AUTO: 18.8 K/UL — HIGH (ref 1.8–7.4)
NEUTROPHILS NFR BLD AUTO: 80 % — HIGH (ref 43–77)
PLATELET # BLD AUTO: 193 K/UL — SIGNIFICANT CHANGE UP (ref 150–400)
RBC # BLD: 3.76 M/UL — LOW (ref 3.8–5.2)
RBC # FLD: 15 % — HIGH (ref 10.3–14.5)
WBC # BLD: 23.5 K/UL — HIGH (ref 3.8–10.5)
WBC # FLD AUTO: 23.5 K/UL — HIGH (ref 3.8–10.5)

## 2017-12-07 PROCEDURE — 50389 REMOVE RENAL TUBE W/FLUORO: CPT | Mod: 78,RT

## 2017-12-07 PROCEDURE — 74420 UROGRAPHY RTRGR +-KUB: CPT | Mod: 26

## 2017-12-07 PROCEDURE — 50431 NJX PX NFROSGRM &/URTRGRM: CPT | Mod: RT

## 2017-12-07 PROCEDURE — 52332 CYSTOSCOPY AND TREATMENT: CPT | Mod: 78,RT

## 2017-12-07 RX ORDER — OXYCODONE AND ACETAMINOPHEN 5; 325 MG/1; MG/1
2 TABLET ORAL EVERY 6 HOURS
Qty: 0 | Refills: 0 | Status: DISCONTINUED | OUTPATIENT
Start: 2017-12-07 | End: 2017-12-07

## 2017-12-07 RX ORDER — MORPHINE SULFATE 50 MG/1
4 CAPSULE, EXTENDED RELEASE ORAL
Qty: 0 | Refills: 0 | Status: DISCONTINUED | OUTPATIENT
Start: 2017-12-07 | End: 2017-12-07

## 2017-12-07 RX ORDER — OXYCODONE AND ACETAMINOPHEN 5; 325 MG/1; MG/1
1 TABLET ORAL EVERY 4 HOURS
Qty: 0 | Refills: 0 | Status: DISCONTINUED | OUTPATIENT
Start: 2017-12-07 | End: 2017-12-08

## 2017-12-07 RX ORDER — SODIUM CHLORIDE 9 MG/ML
1000 INJECTION, SOLUTION INTRAVENOUS
Qty: 0 | Refills: 0 | Status: DISCONTINUED | OUTPATIENT
Start: 2017-12-07 | End: 2017-12-07

## 2017-12-07 RX ORDER — ONDANSETRON 8 MG/1
4 TABLET, FILM COATED ORAL EVERY 6 HOURS
Qty: 0 | Refills: 0 | Status: DISCONTINUED | OUTPATIENT
Start: 2017-12-07 | End: 2017-12-08

## 2017-12-07 RX ORDER — ONDANSETRON 8 MG/1
4 TABLET, FILM COATED ORAL EVERY 6 HOURS
Qty: 0 | Refills: 0 | Status: DISCONTINUED | OUTPATIENT
Start: 2017-12-07 | End: 2017-12-07

## 2017-12-07 RX ORDER — PIPERACILLIN AND TAZOBACTAM 4; .5 G/20ML; G/20ML
3.38 INJECTION, POWDER, LYOPHILIZED, FOR SOLUTION INTRAVENOUS EVERY 8 HOURS
Qty: 0 | Refills: 0 | Status: DISCONTINUED | OUTPATIENT
Start: 2017-12-07 | End: 2017-12-08

## 2017-12-07 RX ORDER — OXYCODONE AND ACETAMINOPHEN 5; 325 MG/1; MG/1
2 TABLET ORAL EVERY 6 HOURS
Qty: 0 | Refills: 0 | Status: DISCONTINUED | OUTPATIENT
Start: 2017-12-07 | End: 2017-12-08

## 2017-12-07 RX ORDER — HEPARIN SODIUM 5000 [USP'U]/ML
5000 INJECTION INTRAVENOUS; SUBCUTANEOUS EVERY 8 HOURS
Qty: 0 | Refills: 0 | Status: DISCONTINUED | OUTPATIENT
Start: 2017-12-07 | End: 2017-12-07

## 2017-12-07 RX ORDER — CEFAZOLIN SODIUM 1 G
2000 VIAL (EA) INJECTION ONCE
Qty: 0 | Refills: 0 | Status: DISCONTINUED | OUTPATIENT
Start: 2017-12-07 | End: 2017-12-07

## 2017-12-07 RX ORDER — SODIUM CHLORIDE 9 MG/ML
1000 INJECTION, SOLUTION INTRAVENOUS
Qty: 0 | Refills: 0 | Status: DISCONTINUED | OUTPATIENT
Start: 2017-12-07 | End: 2017-12-08

## 2017-12-07 RX ORDER — MORPHINE SULFATE 50 MG/1
4 CAPSULE, EXTENDED RELEASE ORAL
Qty: 0 | Refills: 0 | Status: DISCONTINUED | OUTPATIENT
Start: 2017-12-07 | End: 2017-12-08

## 2017-12-07 RX ORDER — ACETAMINOPHEN 500 MG
650 TABLET ORAL EVERY 6 HOURS
Qty: 0 | Refills: 0 | Status: DISCONTINUED | OUTPATIENT
Start: 2017-12-07 | End: 2017-12-08

## 2017-12-07 RX ORDER — ONDANSETRON 8 MG/1
4 TABLET, FILM COATED ORAL ONCE
Qty: 0 | Refills: 0 | Status: DISCONTINUED | OUTPATIENT
Start: 2017-12-07 | End: 2017-12-08

## 2017-12-07 RX ORDER — ACETAMINOPHEN 500 MG
650 TABLET ORAL EVERY 6 HOURS
Qty: 0 | Refills: 0 | Status: DISCONTINUED | OUTPATIENT
Start: 2017-12-07 | End: 2017-12-07

## 2017-12-07 RX ORDER — SENNA PLUS 8.6 MG/1
2 TABLET ORAL AT BEDTIME
Qty: 0 | Refills: 0 | Status: DISCONTINUED | OUTPATIENT
Start: 2017-12-07 | End: 2017-12-07

## 2017-12-07 RX ORDER — ALLOPURINOL 300 MG
100 TABLET ORAL DAILY
Qty: 0 | Refills: 0 | Status: DISCONTINUED | OUTPATIENT
Start: 2017-12-07 | End: 2017-12-08

## 2017-12-07 RX ORDER — SENNA PLUS 8.6 MG/1
2 TABLET ORAL AT BEDTIME
Qty: 0 | Refills: 0 | Status: DISCONTINUED | OUTPATIENT
Start: 2017-12-07 | End: 2017-12-08

## 2017-12-07 RX ORDER — VERAPAMIL HCL 240 MG
240 CAPSULE, EXTENDED RELEASE PELLETS 24 HR ORAL DAILY
Qty: 0 | Refills: 0 | Status: DISCONTINUED | OUTPATIENT
Start: 2017-12-07 | End: 2017-12-08

## 2017-12-07 RX ORDER — HYDROMORPHONE HYDROCHLORIDE 2 MG/ML
0.25 INJECTION INTRAMUSCULAR; INTRAVENOUS; SUBCUTANEOUS
Qty: 0 | Refills: 0 | Status: DISCONTINUED | OUTPATIENT
Start: 2017-12-07 | End: 2017-12-08

## 2017-12-07 RX ORDER — OXYCODONE AND ACETAMINOPHEN 5; 325 MG/1; MG/1
1 TABLET ORAL EVERY 4 HOURS
Qty: 0 | Refills: 0 | Status: DISCONTINUED | OUTPATIENT
Start: 2017-12-07 | End: 2017-12-07

## 2017-12-07 RX ADMIN — PIPERACILLIN AND TAZOBACTAM 25 GRAM(S): 4; .5 INJECTION, POWDER, LYOPHILIZED, FOR SOLUTION INTRAVENOUS at 13:42

## 2017-12-07 RX ADMIN — HEPARIN SODIUM 5000 UNIT(S): 5000 INJECTION INTRAVENOUS; SUBCUTANEOUS at 05:30

## 2017-12-07 RX ADMIN — PIPERACILLIN AND TAZOBACTAM 25 GRAM(S): 4; .5 INJECTION, POWDER, LYOPHILIZED, FOR SOLUTION INTRAVENOUS at 05:31

## 2017-12-07 RX ADMIN — HEPARIN SODIUM 5000 UNIT(S): 5000 INJECTION INTRAVENOUS; SUBCUTANEOUS at 11:28

## 2017-12-07 RX ADMIN — ONDANSETRON 4 MILLIGRAM(S): 8 TABLET, FILM COATED ORAL at 05:43

## 2017-12-07 RX ADMIN — Medication 240 MILLIGRAM(S): at 05:30

## 2017-12-07 RX ADMIN — PIPERACILLIN AND TAZOBACTAM 25 GRAM(S): 4; .5 INJECTION, POWDER, LYOPHILIZED, FOR SOLUTION INTRAVENOUS at 23:16

## 2017-12-07 RX ADMIN — ONDANSETRON 4 MILLIGRAM(S): 8 TABLET, FILM COATED ORAL at 16:48

## 2017-12-07 RX ADMIN — OXYCODONE AND ACETAMINOPHEN 2 TABLET(S): 5; 325 TABLET ORAL at 17:35

## 2017-12-07 RX ADMIN — Medication 100 MILLIGRAM(S): at 11:28

## 2017-12-07 RX ADMIN — OXYCODONE AND ACETAMINOPHEN 2 TABLET(S): 5; 325 TABLET ORAL at 06:00

## 2017-12-07 RX ADMIN — OXYCODONE AND ACETAMINOPHEN 2 TABLET(S): 5; 325 TABLET ORAL at 05:30

## 2017-12-07 RX ADMIN — OXYCODONE AND ACETAMINOPHEN 2 TABLET(S): 5; 325 TABLET ORAL at 18:05

## 2017-12-07 NOTE — PHYSICAL THERAPY INITIAL EVALUATION ADULT - PERTINENT HX OF CURRENT PROBLEM, REHAB EVAL
Pt is a 77 y.o. female s/p percutaneous right nephrolithotomy, cystourethroscopy with ureteroscopy with balloon dilation of stricture of ureter and ureteroscopy of right ureter with lithotripsy on 12/5/17. +CXR 12/5/17: Trace left pleural effusion.

## 2017-12-07 NOTE — BRIEF OPERATIVE NOTE - PROCEDURE
<<-----Click on this checkbox to enter Procedure Ureteral stent placement, intraoperatively (not at tx)  12/07/2017    Active  CTABIB

## 2017-12-07 NOTE — PROGRESS NOTE ADULT - SUBJECTIVE AND OBJECTIVE BOX
UROLOGY DAILY PROGRESS NOTE:     Subjective: Patient seen and examined at bedside. No overnight events.       Objective:  Vital signs  T(F): , Max: 99.1 (12-06-17 @ 07:45)  HR: 64 (12-07-17 @ 05:47)  BP: 152/64 (12-07-17 @ 05:47)  SpO2: 97% (12-07-17 @ 05:47)  Wt(kg): --    I&O's Summary    05 Dec 2017 07:01  -  06 Dec 2017 07:00  --------------------------------------------------------  IN: 1475 mL / OUT: 1055 mL / NET: 420 mL    06 Dec 2017 07:01  -  07 Dec 2017 06:02  --------------------------------------------------------  IN: 3550 mL / OUT: 2340 mL / NET: 1210 mL        Gen: NAD  Pulm: No respiratory distress, no subcostal retractions  CV: RRR, no JVD  Abd: Soft, NT, ND  : Right sided NT: clear urine     Labs:  12-06  42.6  / 38.6  /0.96   12-06  27.0  / 38.9  /0.89                           12.7   42.6  )-----------( 268      ( 06 Dec 2017 05:48 )             38.6     12-06    142  |  106  |  25<H>  ----------------------------<  141<H>  3.9   |  19<L>  |  0.96    Ca    9.1      06 Dec 2017 05:48

## 2017-12-07 NOTE — PROGRESS NOTE ADULT - SUBJECTIVE AND OBJECTIVE BOX
Post op Check    Pt seen and examined without complaints. Pain is controlled. Denies SOB/CP/N/V.     Vital Signs Last 24 Hrs  T(C): 36 (07 Dec 2017 23:00), Max: 37.3 (07 Dec 2017 14:21)  T(F): 96.8 (07 Dec 2017 23:00), Max: 99.1 (07 Dec 2017 14:21)  HR: 52 (07 Dec 2017 23:00) (52 - 73)  BP: 118/60 (07 Dec 2017 23:00) (99/48 - 152/64)  BP(mean): 86 (07 Dec 2017 23:00) (69 - 86)  RR: 16 (07 Dec 2017 23:00) (15 - 18)  SpO2: 93% (07 Dec 2017 23:00) (91% - 98%)    I&O's Summary    06 Dec 2017 07:01  -  07 Dec 2017 07:00  --------------------------------------------------------  IN: 3920 mL / OUT: 2340 mL / NET: 1580 mL    07 Dec 2017 07:01  -  07 Dec 2017 23:09  --------------------------------------------------------  IN: 2160 mL / OUT: 1250 mL / NET: 910 mL        Physical Exam  Gen: NAD  Abd: Soft, NT, ND  Back:  R PCN site with saturated dressing- changed. no naida bleeding from site                          10.9   23.5  )-----------( 193      ( 07 Dec 2017 07:28 )             33.9       12-06    142  |  106  |  25<H>  ----------------------------<  141<H>  3.9   |  19<L>  |  0.96    Ca    9.1      06 Dec 2017 05:48        A/P: 77y Female s/p cysto right stent placement, removal right percutaneous nephrostomy tube    -DVT prophylaxis/OOB  -Incentive spirometry  -Strict I&O's  -Analgesia and antiemetics as needed  -Diet  -AM labs

## 2017-12-07 NOTE — BRIEF OPERATIVE NOTE - POST-OP DX
Right nephrolithiasis  12/05/2017    Active  Interfaces, RTIF  Ureteral stricture  12/07/2017    Active  TabEarnest berger

## 2017-12-07 NOTE — PHYSICAL THERAPY INITIAL EVALUATION ADULT - DID THE PATIENT HAVE SURGERY?
yes/Percutaneous right nephrolithotomy. Cystourethroscopy with ureteroscopy with balloon dilation of stricture of ureter. Ureteroscopy of right ureter with lithotripsy.

## 2017-12-07 NOTE — PROGRESS NOTE ADULT - ASSESSMENT
76 YO s/p R PCNL, ureteral dilation.   -f/u am CBC  -color check   - f/u Nephrostogram results    -OR vs DC home

## 2017-12-07 NOTE — PHYSICAL THERAPY INITIAL EVALUATION ADULT - ADDITIONAL COMMENTS
+CT Abd and Pelvis 12/6/17: Right pyelonephritis status post percutaneous nephrolithotomy and removal of a right ureteral stent. There are foci of gas within the right renal cortex and proximal right ureter which is likely postprocedural. Mild right hydroureter and hydronephrosis. Multiple right renal calculi, the largest measuring 4 mm. Focus of air in the bladder.

## 2017-12-08 ENCOUNTER — TRANSCRIPTION ENCOUNTER (OUTPATIENT)
Age: 77
End: 2017-12-08

## 2017-12-08 VITALS
DIASTOLIC BLOOD PRESSURE: 75 MMHG | HEART RATE: 78 BPM | RESPIRATION RATE: 18 BRPM | OXYGEN SATURATION: 99 % | SYSTOLIC BLOOD PRESSURE: 124 MMHG | TEMPERATURE: 98 F

## 2017-12-08 LAB
-  AMIKACIN: SIGNIFICANT CHANGE UP
-  AZTREONAM: SIGNIFICANT CHANGE UP
-  CEFEPIME: SIGNIFICANT CHANGE UP
-  CEFTAZIDIME: SIGNIFICANT CHANGE UP
-  CIPROFLOXACIN: SIGNIFICANT CHANGE UP
-  GENTAMICIN: SIGNIFICANT CHANGE UP
-  IMIPENEM: SIGNIFICANT CHANGE UP
-  LEVOFLOXACIN: SIGNIFICANT CHANGE UP
-  MEROPENEM: SIGNIFICANT CHANGE UP
-  PIPERACILLIN/TAZOBACTAM: SIGNIFICANT CHANGE UP
-  TOBRAMYCIN: SIGNIFICANT CHANGE UP
ANION GAP SERPL CALC-SCNC: 9 MMOL/L — SIGNIFICANT CHANGE UP (ref 5–17)
BUN SERPL-MCNC: 14 MG/DL — SIGNIFICANT CHANGE UP (ref 7–23)
CALCIUM SERPL-MCNC: 8.4 MG/DL — SIGNIFICANT CHANGE UP (ref 8.4–10.5)
CHLORIDE SERPL-SCNC: 108 MMOL/L — SIGNIFICANT CHANGE UP (ref 96–108)
CO2 SERPL-SCNC: 25 MMOL/L — SIGNIFICANT CHANGE UP (ref 22–31)
CREAT SERPL-MCNC: 0.9 MG/DL — SIGNIFICANT CHANGE UP (ref 0.5–1.3)
CULTURE RESULTS: SIGNIFICANT CHANGE UP
GLUCOSE SERPL-MCNC: 95 MG/DL — SIGNIFICANT CHANGE UP (ref 70–99)
HCT VFR BLD CALC: 32.4 % — LOW (ref 34.5–45)
HGB BLD-MCNC: 10.7 G/DL — LOW (ref 11.5–15.5)
MCHC RBC-ENTMCNC: 30 PG — SIGNIFICANT CHANGE UP (ref 27–34)
MCHC RBC-ENTMCNC: 33 GM/DL — SIGNIFICANT CHANGE UP (ref 32–36)
MCV RBC AUTO: 90.9 FL — SIGNIFICANT CHANGE UP (ref 80–100)
METHOD TYPE: SIGNIFICANT CHANGE UP
ORGANISM # SPEC MICROSCOPIC CNT: SIGNIFICANT CHANGE UP
ORGANISM # SPEC MICROSCOPIC CNT: SIGNIFICANT CHANGE UP
PLATELET # BLD AUTO: 181 K/UL — SIGNIFICANT CHANGE UP (ref 150–400)
POTASSIUM SERPL-MCNC: 3.5 MMOL/L — SIGNIFICANT CHANGE UP (ref 3.5–5.3)
POTASSIUM SERPL-SCNC: 3.5 MMOL/L — SIGNIFICANT CHANGE UP (ref 3.5–5.3)
RBC # BLD: 3.57 M/UL — LOW (ref 3.8–5.2)
RBC # FLD: 15 % — HIGH (ref 10.3–14.5)
SODIUM SERPL-SCNC: 142 MMOL/L — SIGNIFICANT CHANGE UP (ref 135–145)
SPECIMEN SOURCE: SIGNIFICANT CHANGE UP
WBC # BLD: 12.3 K/UL — HIGH (ref 3.8–10.5)
WBC # FLD AUTO: 12.3 K/UL — HIGH (ref 3.8–10.5)

## 2017-12-08 RX ORDER — SENNA PLUS 8.6 MG/1
2 TABLET ORAL
Qty: 0 | Refills: 0 | COMMUNITY
Start: 2017-12-08

## 2017-12-08 RX ORDER — MOXIFLOXACIN HYDROCHLORIDE TABLETS, 400 MG 400 MG/1
1 TABLET, FILM COATED ORAL
Qty: 6 | Refills: 0 | OUTPATIENT
Start: 2017-12-08 | End: 2017-12-11

## 2017-12-08 RX ORDER — OXYBUTYNIN CHLORIDE 5 MG
5 TABLET ORAL ONCE
Qty: 0 | Refills: 0 | Status: COMPLETED | OUTPATIENT
Start: 2017-12-08 | End: 2017-12-08

## 2017-12-08 RX ORDER — OXYBUTYNIN CHLORIDE 5 MG
1 TABLET ORAL
Qty: 90 | Refills: 0 | OUTPATIENT
Start: 2017-12-08 | End: 2018-01-07

## 2017-12-08 RX ADMIN — PIPERACILLIN AND TAZOBACTAM 25 GRAM(S): 4; .5 INJECTION, POWDER, LYOPHILIZED, FOR SOLUTION INTRAVENOUS at 05:11

## 2017-12-08 RX ADMIN — OXYCODONE AND ACETAMINOPHEN 1 TABLET(S): 5; 325 TABLET ORAL at 13:57

## 2017-12-08 RX ADMIN — Medication 5 MILLIGRAM(S): at 11:37

## 2017-12-08 RX ADMIN — OXYCODONE AND ACETAMINOPHEN 2 TABLET(S): 5; 325 TABLET ORAL at 03:50

## 2017-12-08 RX ADMIN — OXYCODONE AND ACETAMINOPHEN 1 TABLET(S): 5; 325 TABLET ORAL at 14:27

## 2017-12-08 RX ADMIN — Medication 240 MILLIGRAM(S): at 05:11

## 2017-12-08 RX ADMIN — Medication 100 MILLIGRAM(S): at 11:37

## 2017-12-08 RX ADMIN — OXYCODONE AND ACETAMINOPHEN 2 TABLET(S): 5; 325 TABLET ORAL at 03:14

## 2017-12-08 NOTE — DISCHARGE NOTE ADULT - CARE PLAN
Principal Discharge DX:	Kidney stone  Goal:	you had your stone removed and a stent inserted  Instructions for follow-up, activity and diet:	Call the office if you experience fever, chills, uncontrolled pain, the inability to tolerate liquids, or the urine does not flow   to promote wound healing do not take a bath, continue to walk frequently, return to daily living activities slowly, no heavy lifting greater than 10lbs for 4-6 weeks, follow up Dr Sorenson in two weeks for stent removal

## 2017-12-08 NOTE — DISCHARGE NOTE ADULT - CARE PROVIDER_API CALL
Mark Sorenson), Urology  [address] [place] [address]  Phone: [phone]  Fax: [phone]

## 2017-12-08 NOTE — PROGRESS NOTE ADULT - SUBJECTIVE AND OBJECTIVE BOX
Subjective Pain controlled. Tolerating diet. +OOB/Ambulating. No issues overnight. Voiding without issues states urine is blood tinged.    Objective    Vital signs  T(F): , Max: 99.1 (12-07-17 @ 14:21)  HR: 55 (12-08-17 @ 05:10)  BP: 119/63 (12-08-17 @ 05:10)  SpO2: 92% (12-08-17 @ 05:10)  Wt(kg): --    Output     12-06 @ 07:01  -  12-07 @ 07:00  --------------------------------------------------------  IN: 3920 mL / OUT: 2340 mL / NET: 1580 mL    12-07 @ 07:01  -  12-08 @ 06:09  --------------------------------------------------------  IN: 2905 mL / OUT: 1550 mL / NET: 1355 mL    Void: 300    Gen NAD   Abd Soft. Non-tender    Labs      12-07 @ 07:28    WBC 23.5  / Hct 33.9  / SCr --         Urine Cx: pseudomonas      Imaging

## 2017-12-08 NOTE — DISCHARGE NOTE ADULT - HOSPITAL COURSE
pt is a 78 yo  f who arrived at [facility] on 12/5/17 for a right perc nephrolithotomy and dilation of ureter. post op she did well. her vitals were stable, she tolerated diet, her pain was controlled, she ambulated  and her labs were stable.  postop ct showed no stone burden. on pod 3 she underwent antegrade nephrostogram which showed the ureter was narrow and she needed a stent inserted. pod 4 she was stable for dc

## 2017-12-08 NOTE — PROGRESS NOTE ADULT - ASSESSMENT
78 YO s/p R PCNL, ureteral dilation.   -AM labs  -Cipro for pseudomonas (pt states she has tolerated cipro w/oc diff since fecal transplant)    -DC home

## 2017-12-08 NOTE — DISCHARGE NOTE ADULT - MEDICATION SUMMARY - MEDICATIONS TO TAKE
I will START or STAY ON the medications listed below when I get home from the hospital:    probiotic plus omega 3  -- once a day  -- Indication: For probiotic     oxyCODONE-acetaminophen 5 mg-325 mg oral tablet  -- 1 tab(s) by mouth every 4 hours, As needed, Moderate Pain (4 - 6) MDD:6  -- Indication: For pain medication     verapamil 240 mg/24 hours oral capsule, extended release  -- 1 cap(s) by mouth once a day  -- Indication: For home medication     allopurinol 100 mg oral tablet  -- 1 tab(s) by mouth once a day  -- Indication: For home medication     senna oral tablet  -- 2 tab(s) by mouth once a day (at bedtime), As needed, Constipation  -- Indication: For stool softener    Cipro 500 mg oral tablet  -- 1 tab(s) by mouth every 12 hours   -- Avoid prolonged or excessive exposure to direct and/or artificial sunlight while taking this medication.  Check with your doctor before becoming pregnant.  Do not take dairy products, antacids, or iron preparations within one hour of this medication.  Finish all this medication unless otherwise directed by prescriber.  Medication should be taken with plenty of water.    -- Indication: For antibiotic I will START or STAY ON the medications listed below when I get home from the hospital:    probiotic plus omega 3  -- once a day  -- Indication: For probiotic     oxyCODONE-acetaminophen 5 mg-325 mg oral tablet  -- 1 tab(s) by mouth every 4 hours, As needed, Moderate Pain (4 - 6) MDD:6  -- Indication: For pain medication     verapamil 240 mg/24 hours oral capsule, extended release  -- 1 cap(s) by mouth once a day  -- Indication: For home medication     allopurinol 100 mg oral tablet  -- 1 tab(s) by mouth once a day  -- Indication: For home medication     senna oral tablet  -- 2 tab(s) by mouth once a day (at bedtime), As needed, Constipation  -- Indication: For stool softener    Cipro 500 mg oral tablet  -- 1 tab(s) by mouth every 12 hours   -- Avoid prolonged or excessive exposure to direct and/or artificial sunlight while taking this medication.  Check with your doctor before becoming pregnant.  Do not take dairy products, antacids, or iron preparations within one hour of this medication.  Finish all this medication unless otherwise directed by prescriber.  Medication should be taken with plenty of water.    -- Indication: For antibiotic     oxybutynin 5 mg oral tablet  -- 1 tab(s) by mouth 3 times a day   -- May cause drowsiness.  Alcohol may intensify this effect.  Use care when operating dangerous machinery.    -- Indication: For to help with stent pains

## 2017-12-08 NOTE — DISCHARGE NOTE ADULT - INSTRUCTIONS
regular if any inability to urinate, fevers, uncontrollable pain, or increased drainage from surgical site notify MD.

## 2017-12-08 NOTE — DISCHARGE NOTE ADULT - MEDICATION SUMMARY - MEDICATIONS TO STOP TAKING
I will STOP taking the medications listed below when I get home from the hospital:    Keflex 500 mg oral capsule  -- 1 cap(s) by mouth every 6 hours   -- Finish all this medication unless otherwise directed by prescriber.

## 2017-12-08 NOTE — DISCHARGE NOTE ADULT - PROVIDER TOKENS
Bronchitis, Antibiotic Treatment (Adult)    Bronchitis is an infection of the air passages (bronchial tubes) in your lungs. It often occurs when you have a cold. This illness is contagious during the first few days and is spread through the air by coughing and sneezing, or by direct contact (touching the sick person and then touching your own eyes, nose, or mouth).  Symptoms of bronchitis include cough with mucus (phlegm) and low-grade fever. Bronchitis usually lasts 7 to 14 days. Mild cases can be treated with simple home remedies. More severe infection is treated with an antibiotic.  Home care  Follow these guidelines when caring for yourself at home:    If your symptoms are severe, rest at home for the first 2 to 3 days. When you go back to your usual activities, don't let yourself get too tired.    Do not smoke. Also avoid being exposed to secondhand smoke.    You may use over-the-counter medicines to control fever or pain, unless another medicine was prescribed. (Note: If you have chronic liver or kidney disease or have ever had a stomach ulcer or gastrointestinal bleeding, talk with your healthcare provider before using these medicines. Also talk to your provider if you are taking medicine to prevent blood clots.) Aspirin should never be given to anyone younger than 18 years of age who is ill with a viral infection or fever. It may cause severe liver or brain damage.    Your appetite may be poor, so a light diet is fine. Avoid dehydration by drinking 6 to 8 glasses of fluids per day (such as water, soft drinks, sports drinks, juices, tea, or soup). Extra fluids will help loosen secretions in the nose and lungs.    Over-the-counter cough, cold, and sore-throat medicines will not shorten the length of the illness, but they may be helpful to reduce symptoms. (Note: Do not use decongestants if you have high blood pressure.)    Finish all antibiotic medicine. Do this even if you are feeling better after only a  few days.  Follow-up care  Follow up with your healthcare provider, or as advised. If you had an X-ray or ECG (electrocardiogram), a specialist will review it. You will be notified of any new findings that may affect your care.  Note: If you are age 65 or older, or if you have a chronic lung disease or condition that affects your immune system, or you smoke, talk to your healthcare provider about having pneumococcal vaccinations and a yearly influenza vaccination (flu shot).  When to seek medical advice  Call your healthcare provider right away if any of these occur:    Fever of 100.4 F (38 C) or higher    Coughing up increased amounts of colored sputum    Weakness, drowsiness, headache, facial pain, ear pain, or a stiff neck  Call 911, or get immediate medical care  Contact emergency services right away if any of these occur.    Coughing up blood    Worsening weakness, drowsiness, headache, or stiff neck    Trouble breathing, wheezing, or pain with breathing  Date Last Reviewed: 9/13/2015 2000-2017 [employer]. [address] [place] [address]. All rights reserved. This information is not intended as a substitute for professional medical care. Always follow your healthcare professional's instructions.        Patient Education    Azithromycin Ophthalmic drops, solution    Azithromycin Oral suspension    Azithromycin Oral suspension, extended release    Azithromycin Oral tablet    Azithromycin Solution for injection  Azithromycin Oral tablet  What is this medicine?  AZITHROMYCIN (az ith genie MYE sin) is a macrolide antibiotic. It is used to treat or prevent certain kinds of bacterial infections. It will not work for colds, flu, or other viral infections.  This medicine may be used for other purposes; ask your health care provider or pharmacist if you have questions.  What should I tell my health care provider before I take this medicine?  They need to know if you have any of these  conditions:    kidney disease    liver disease    irregular heartbeat or heart disease    an unusual or allergic reaction to azithromycin, erythromycin, other macrolide antibiotics, foods, dyes, or preservatives    pregnant or trying to get pregnant    breast-feeding  How should I use this medicine?  Take this medicine by mouth with a full glass of water. Follow the directions on the prescription label. The tablets can be taken with food or on an empty stomach. If the medicine upsets your stomach, take it with food. Take your medicine at regular intervals. Do not take your medicine more often than directed. Take all of your medicine as directed even if you think your are better. Do not skip doses or stop your medicine early.  Talk to your pediatrician regarding the use of this medicine in children. Special care may be needed.  Overdosage: If you think you have taken too much of this medicine contact a poison control center or emergency room at once.  NOTE: This medicine is only for you. Do not share this medicine with others.  What if I miss a dose?  If you miss a dose, take it as soon as you can. If it is almost time for your next dose, take only that dose. Do not take double or extra doses.  What may interact with this medicine?  Do not take this medicine with any of the following medications:    lincomycin  This medicine may also interact with the following medications:    amiodarone    antacids    birth control pills    cyclosporine    digoxin    magnesium    nelfinavir    phenytoin    warfarin  This list may not describe all possible interactions. Give your health care provider a list of all the medicines, herbs, non-prescription drugs, or dietary supplements you use. Also tell them if you smoke, drink alcohol, or use illegal drugs. Some items may interact with your medicine.  What should I watch for while using this medicine?  Tell your doctor or health care professional if your symptoms do not improve.  Do  not treat diarrhea with over the counter products. Contact your doctor if you have diarrhea that lasts more than 2 days or if it is severe and watery.  This medicine can make you more sensitive to the sun. Keep out of the sun. If you cannot avoid being in the sun, wear protective clothing and use sunscreen. Do not use sun lamps or tanning beds/booths.  What side effects may I notice from receiving this medicine?  Side effects that you should report to your doctor or health care professional as soon as possible:    allergic reactions like skin rash, itching or hives, swelling of the face, lips, or tongue    confusion, nightmares or hallucinations    dark urine    difficulty breathing    hearing loss    irregular heartbeat or chest pain    pain or difficulty passing urine    redness, blistering, peeling or loosening of the skin, including inside the mouth    white patches or sores in the mouth    yellowing of the eyes or skin  Side effects that usually do not require medical attention (report to your doctor or health care professional if they continue or are bothersome):    diarrhea    dizziness, drowsiness    headache    stomach upset or vomiting    tooth discoloration    vaginal irritation  This list may not describe all possible side effects. Call your doctor for medical advice about side effects. You may report side effects to FDA at 8-620-FDA-0713.  Where should I keep my medicine?  Keep out of the reach of children.  Store at room temperature between 15 and 30 degrees C (59 and 86 degrees F). Throw away any unused medicine after the expiration date.  NOTE:This sheet is a summary. It may not cover all possible information. If you have questions about this medicine, talk to your doctor, pharmacist, or health care provider. Copyright  2016 [employer]         TOKEN:'[id number]'

## 2017-12-08 NOTE — DISCHARGE NOTE ADULT - PATIENT PORTAL LINK FT
“You can access the FollowHealth Patient Portal, offered by [facility], by registering with the following website: [email or web address]”

## 2017-12-08 NOTE — DISCHARGE NOTE ADULT - PLAN OF CARE
you had your stone removed and a stent inserted Call the office if you experience fever, chills, uncontrolled pain, the inability to tolerate liquids, or the urine does not flow   to promote wound healing do not take a bath, continue to walk frequently, return to daily living activities slowly, no heavy lifting greater than 10lbs for 4-6 weeks, follow up Dr Sorenson in two weeks for stent removal

## 2017-12-09 LAB — COMPN STONE: SIGNIFICANT CHANGE UP

## 2017-12-11 PROBLEM — A04.7 ENTEROCOLITIS DUE TO CLOSTRIDIUM DIFFICILE: Chronic | Status: ACTIVE | Noted: 2017-04-13

## 2017-12-12 LAB — COMPN STONE: SIGNIFICANT CHANGE UP

## 2017-12-19 PROCEDURE — 74176 CT ABD & PELVIS W/O CONTRAST: CPT

## 2017-12-19 PROCEDURE — 86901 BLOOD TYPING SEROLOGIC RH(D): CPT

## 2017-12-19 PROCEDURE — C1769: CPT

## 2017-12-19 PROCEDURE — 88300 SURGICAL PATH GROSS: CPT

## 2017-12-19 PROCEDURE — 87186 SC STD MICRODIL/AGAR DIL: CPT

## 2017-12-19 PROCEDURE — C1889: CPT

## 2017-12-19 PROCEDURE — 76000 FLUOROSCOPY <1 HR PHYS/QHP: CPT

## 2017-12-19 PROCEDURE — 80048 BASIC METABOLIC PNL TOTAL CA: CPT

## 2017-12-19 PROCEDURE — C1758: CPT

## 2017-12-19 PROCEDURE — 71045 X-RAY EXAM CHEST 1 VIEW: CPT

## 2017-12-19 PROCEDURE — 82365 CALCULUS SPECTROSCOPY: CPT

## 2017-12-19 PROCEDURE — 87102 FUNGUS ISOLATION CULTURE: CPT

## 2017-12-19 PROCEDURE — 85027 COMPLETE CBC AUTOMATED: CPT

## 2017-12-19 PROCEDURE — C1892: CPT

## 2017-12-19 PROCEDURE — 86900 BLOOD TYPING SEROLOGIC ABO: CPT

## 2017-12-19 PROCEDURE — 87070 CULTURE OTHR SPECIMN AEROBIC: CPT

## 2017-12-19 PROCEDURE — C2617: CPT

## 2017-12-19 PROCEDURE — 50431 NJX PX NFROSGRM &/URTRGRM: CPT

## 2017-12-19 PROCEDURE — C1726: CPT

## 2017-12-20 ENCOUNTER — APPOINTMENT (OUTPATIENT)
Dept: UROLOGY | Facility: CLINIC | Age: 77
End: 2017-12-20
Payer: MEDICARE

## 2017-12-20 ENCOUNTER — OUTPATIENT (OUTPATIENT)
Dept: OUTPATIENT SERVICES | Facility: HOSPITAL | Age: 77
LOS: 1 days | End: 2017-12-20
Payer: MEDICARE

## 2017-12-20 DIAGNOSIS — A41.9 SEPSIS, UNSPECIFIED ORGANISM: ICD-10-CM

## 2017-12-20 DIAGNOSIS — R35.0 FREQUENCY OF MICTURITION: ICD-10-CM

## 2017-12-20 DIAGNOSIS — Z98.890 OTHER SPECIFIED POSTPROCEDURAL STATES: Chronic | ICD-10-CM

## 2017-12-20 DIAGNOSIS — Z87.440 PERSONAL HISTORY OF URINARY (TRACT) INFECTIONS: Chronic | ICD-10-CM

## 2017-12-20 DIAGNOSIS — Z98.891 HISTORY OF UTERINE SCAR FROM PREVIOUS SURGERY: Chronic | ICD-10-CM

## 2017-12-20 DIAGNOSIS — Z87.448 PERSONAL HISTORY OF OTHER DISEASES OF URINARY SYSTEM: Chronic | ICD-10-CM

## 2017-12-20 DIAGNOSIS — N39.0 SEPSIS, UNSPECIFIED ORGANISM: ICD-10-CM

## 2017-12-20 DIAGNOSIS — D35.1 BENIGN NEOPLASM OF PARATHYROID GLAND: Chronic | ICD-10-CM

## 2017-12-20 DIAGNOSIS — Z87.440 PERSONAL HISTORY OF URINARY (TRACT) INFECTIONS: ICD-10-CM

## 2017-12-20 PROCEDURE — 52310 CYSTOSCOPY AND TREATMENT: CPT

## 2017-12-20 PROCEDURE — 52310 CYSTOSCOPY AND TREATMENT: CPT | Mod: 58

## 2017-12-20 PROCEDURE — 99024 POSTOP FOLLOW-UP VISIT: CPT

## 2017-12-24 PROBLEM — A41.9 SEPSIS DUE TO URINARY TRACT INFECTION: Status: ACTIVE | Noted: 2017-10-25

## 2017-12-26 DIAGNOSIS — N39.0 URINARY TRACT INFECTION, SITE NOT SPECIFIED: ICD-10-CM

## 2017-12-26 DIAGNOSIS — A04.72 ENTEROCOLITIS DUE TO CLOSTRIDIUM DIFFICILE, NOT SPECIFIED AS RECURRENT: ICD-10-CM

## 2017-12-26 DIAGNOSIS — R31.9 HEMATURIA, UNSPECIFIED: ICD-10-CM

## 2017-12-26 DIAGNOSIS — A41.9 SEPSIS, UNSPECIFIED ORGANISM: ICD-10-CM

## 2017-12-26 DIAGNOSIS — N20.0 CALCULUS OF KIDNEY: ICD-10-CM

## 2018-01-03 LAB
CULTURE RESULTS: SIGNIFICANT CHANGE UP
SPECIMEN SOURCE: SIGNIFICANT CHANGE UP

## 2018-01-29 ENCOUNTER — APPOINTMENT (OUTPATIENT)
Dept: UROLOGY | Facility: CLINIC | Age: 78
End: 2018-01-29

## 2018-02-28 ENCOUNTER — APPOINTMENT (OUTPATIENT)
Dept: UROLOGY | Facility: CLINIC | Age: 78
End: 2018-02-28
Payer: MEDICARE

## 2018-02-28 VITALS — HEART RATE: 59 BPM | SYSTOLIC BLOOD PRESSURE: 200 MMHG | DIASTOLIC BLOOD PRESSURE: 72 MMHG | RESPIRATION RATE: 14 BRPM

## 2018-02-28 PROCEDURE — 99214 OFFICE O/P EST MOD 30 MIN: CPT | Mod: 24

## 2018-03-28 NOTE — ED PROVIDER NOTE - HEME LYMPH, MLM
No adenopathy or splenomegaly. No cervical or inguinal lymphadenopathy. Posterior Auricular Interpolation Flap Text: A decision was made to reconstruct the defect utilizing an interpolation axial flap and a staged reconstruction.  A telfa template was made of the defect.  This telfa template was then used to outline the posterior auricular interpolation flap.  The donor area for the pedicle flap was then injected with anesthesia.  The flap was excised through the skin and subcutaneous tissue down to the layer of the underlying musculature.  The pedicle flap was carefully excised within this deep plane to maintain its blood supply.  The edges of the donor site were undermined.   The donor site was closed in a primary fashion.  The pedicle was then rotated into position and sutured.  Once the tube was sutured into place, adequate blood supply was confirmed with blanching and refill.  The pedicle was then wrapped with xeroform gauze and dressed appropriately with a telfa and gauze bandage to ensure continued blood supply and protect the attached pedicle.

## 2018-04-10 ENCOUNTER — APPOINTMENT (OUTPATIENT)
Dept: OBGYN | Facility: CLINIC | Age: 78
End: 2018-04-10
Payer: MEDICARE

## 2018-04-10 VITALS
WEIGHT: 129 LBS | DIASTOLIC BLOOD PRESSURE: 80 MMHG | HEIGHT: 59 IN | SYSTOLIC BLOOD PRESSURE: 128 MMHG | BODY MASS INDEX: 26 KG/M2

## 2018-04-10 DIAGNOSIS — N95.2 POSTMENOPAUSAL ATROPHIC VAGINITIS: ICD-10-CM

## 2018-04-10 DIAGNOSIS — Z87.39 PERSONAL HISTORY OF OTHER DISEASES OF THE MUSCULOSKELETAL SYSTEM AND CONNECTIVE TISSUE: ICD-10-CM

## 2018-04-10 PROCEDURE — 82270 OCCULT BLOOD FECES: CPT

## 2018-04-10 PROCEDURE — G0101: CPT

## 2018-04-11 ENCOUNTER — RX RENEWAL (OUTPATIENT)
Age: 78
End: 2018-04-11

## 2018-04-12 LAB — CYTOLOGY CVX/VAG DOC THIN PREP: NORMAL

## 2018-05-25 ENCOUNTER — EMERGENCY (EMERGENCY)
Facility: HOSPITAL | Age: 78
LOS: 0 days | Discharge: HOME | End: 2018-05-25
Attending: EMERGENCY MEDICINE | Admitting: EMERGENCY MEDICINE
Payer: MEDICARE

## 2018-05-25 VITALS
RESPIRATION RATE: 18 BRPM | HEIGHT: 59 IN | TEMPERATURE: 99 F | SYSTOLIC BLOOD PRESSURE: 132 MMHG | OXYGEN SATURATION: 100 % | WEIGHT: 125 LBS | HEART RATE: 79 BPM | DIASTOLIC BLOOD PRESSURE: 96 MMHG

## 2018-05-25 VITALS
OXYGEN SATURATION: 99 % | RESPIRATION RATE: 16 BRPM | DIASTOLIC BLOOD PRESSURE: 46 MMHG | HEART RATE: 58 BPM | SYSTOLIC BLOOD PRESSURE: 151 MMHG | TEMPERATURE: 100 F

## 2018-05-25 DIAGNOSIS — Z86.69 PERSONAL HISTORY OF OTHER DISEASES OF THE NERVOUS SYSTEM AND SENSE ORGANS: ICD-10-CM

## 2018-05-25 DIAGNOSIS — Z98.890 OTHER SPECIFIED POSTPROCEDURAL STATES: Chronic | ICD-10-CM

## 2018-05-25 DIAGNOSIS — Z79.899 OTHER LONG TERM (CURRENT) DRUG THERAPY: ICD-10-CM

## 2018-05-25 DIAGNOSIS — Z87.442 PERSONAL HISTORY OF URINARY CALCULI: ICD-10-CM

## 2018-05-25 DIAGNOSIS — Z86.19 PERSONAL HISTORY OF OTHER INFECTIOUS AND PARASITIC DISEASES: ICD-10-CM

## 2018-05-25 DIAGNOSIS — R50.9 FEVER, UNSPECIFIED: ICD-10-CM

## 2018-05-25 DIAGNOSIS — I10 ESSENTIAL (PRIMARY) HYPERTENSION: ICD-10-CM

## 2018-05-25 DIAGNOSIS — J45.909 UNSPECIFIED ASTHMA, UNCOMPLICATED: ICD-10-CM

## 2018-05-25 DIAGNOSIS — Z87.448 PERSONAL HISTORY OF OTHER DISEASES OF URINARY SYSTEM: Chronic | ICD-10-CM

## 2018-05-25 DIAGNOSIS — M10.9 GOUT, UNSPECIFIED: ICD-10-CM

## 2018-05-25 DIAGNOSIS — G43.909 MIGRAINE, UNSPECIFIED, NOT INTRACTABLE, WITHOUT STATUS MIGRAINOSUS: ICD-10-CM

## 2018-05-25 DIAGNOSIS — D35.1 BENIGN NEOPLASM OF PARATHYROID GLAND: Chronic | ICD-10-CM

## 2018-05-25 DIAGNOSIS — M48.061 SPINAL STENOSIS, LUMBAR REGION WITHOUT NEUROGENIC CLAUDICATION: ICD-10-CM

## 2018-05-25 DIAGNOSIS — Z98.891 HISTORY OF UTERINE SCAR FROM PREVIOUS SURGERY: Chronic | ICD-10-CM

## 2018-05-25 DIAGNOSIS — E89.2 POSTPROCEDURAL HYPOPARATHYROIDISM: ICD-10-CM

## 2018-05-25 DIAGNOSIS — Z98.890 OTHER SPECIFIED POSTPROCEDURAL STATES: ICD-10-CM

## 2018-05-25 DIAGNOSIS — Z87.440 PERSONAL HISTORY OF URINARY (TRACT) INFECTIONS: ICD-10-CM

## 2018-05-25 DIAGNOSIS — Z87.440 PERSONAL HISTORY OF URINARY (TRACT) INFECTIONS: Chronic | ICD-10-CM

## 2018-05-25 LAB
ALBUMIN SERPL ELPH-MCNC: 3.5 G/DL — SIGNIFICANT CHANGE UP (ref 3.3–5)
ALP SERPL-CCNC: 91 U/L — SIGNIFICANT CHANGE UP (ref 40–120)
ALT FLD-CCNC: 52 U/L — SIGNIFICANT CHANGE UP (ref 12–78)
ANION GAP SERPL CALC-SCNC: 7 MMOL/L — SIGNIFICANT CHANGE UP (ref 5–17)
APPEARANCE UR: CLEAR — SIGNIFICANT CHANGE UP
APTT BLD: 26 SEC — LOW (ref 27.5–37.4)
AST SERPL-CCNC: 52 U/L — HIGH (ref 15–37)
BACTERIA # UR AUTO: (no result)
BASOPHILS # BLD AUTO: 0.02 K/UL — SIGNIFICANT CHANGE UP (ref 0–0.2)
BASOPHILS NFR BLD AUTO: 0.4 % — SIGNIFICANT CHANGE UP (ref 0–2)
BILIRUB SERPL-MCNC: 1.2 MG/DL — SIGNIFICANT CHANGE UP (ref 0.2–1.2)
BILIRUB UR-MCNC: NEGATIVE — SIGNIFICANT CHANGE UP
BUN SERPL-MCNC: 20 MG/DL — SIGNIFICANT CHANGE UP (ref 7–23)
CALCIUM SERPL-MCNC: 8.9 MG/DL — SIGNIFICANT CHANGE UP (ref 8.5–10.1)
CHLORIDE SERPL-SCNC: 108 MMOL/L — SIGNIFICANT CHANGE UP (ref 96–108)
CO2 SERPL-SCNC: 23 MMOL/L — SIGNIFICANT CHANGE UP (ref 22–31)
COLOR SPEC: YELLOW — SIGNIFICANT CHANGE UP
COMMENT - URINE: SIGNIFICANT CHANGE UP
CREAT SERPL-MCNC: 0.86 MG/DL — SIGNIFICANT CHANGE UP (ref 0.5–1.3)
DIFF PNL FLD: (no result)
EOSINOPHIL # BLD AUTO: 0 K/UL — SIGNIFICANT CHANGE UP (ref 0–0.5)
EOSINOPHIL NFR BLD AUTO: 0 % — SIGNIFICANT CHANGE UP (ref 0–6)
EPI CELLS # UR: SIGNIFICANT CHANGE UP
GLUCOSE SERPL-MCNC: 94 MG/DL — SIGNIFICANT CHANGE UP (ref 70–99)
GLUCOSE UR QL: NEGATIVE MG/DL — SIGNIFICANT CHANGE UP
HCT VFR BLD CALC: 44.5 % — SIGNIFICANT CHANGE UP (ref 34.5–45)
HGB BLD-MCNC: 14.5 G/DL — SIGNIFICANT CHANGE UP (ref 11.5–15.5)
IMM GRANULOCYTES NFR BLD AUTO: 0.4 % — SIGNIFICANT CHANGE UP (ref 0–1.5)
INR BLD: 1.06 RATIO — SIGNIFICANT CHANGE UP (ref 0.88–1.16)
KETONES UR-MCNC: NEGATIVE — SIGNIFICANT CHANGE UP
LACTATE SERPL-SCNC: 1 MMOL/L — SIGNIFICANT CHANGE UP (ref 0.7–2)
LEUKOCYTE ESTERASE UR-ACNC: (no result)
LIDOCAIN IGE QN: 172 U/L — SIGNIFICANT CHANGE UP (ref 73–393)
LYMPHOCYTES # BLD AUTO: 1.43 K/UL — SIGNIFICANT CHANGE UP (ref 1–3.3)
LYMPHOCYTES # BLD AUTO: 26 % — SIGNIFICANT CHANGE UP (ref 13–44)
MCHC RBC-ENTMCNC: 28.6 PG — SIGNIFICANT CHANGE UP (ref 27–34)
MCHC RBC-ENTMCNC: 32.6 GM/DL — SIGNIFICANT CHANGE UP (ref 32–36)
MCV RBC AUTO: 87.8 FL — SIGNIFICANT CHANGE UP (ref 80–100)
MONOCYTES # BLD AUTO: 0.45 K/UL — SIGNIFICANT CHANGE UP (ref 0–0.9)
MONOCYTES NFR BLD AUTO: 8.2 % — SIGNIFICANT CHANGE UP (ref 2–14)
NEUTROPHILS # BLD AUTO: 3.59 K/UL — SIGNIFICANT CHANGE UP (ref 1.8–7.4)
NEUTROPHILS NFR BLD AUTO: 65 % — SIGNIFICANT CHANGE UP (ref 43–77)
NITRITE UR-MCNC: NEGATIVE — SIGNIFICANT CHANGE UP
NRBC # BLD: 0 /100 WBCS — SIGNIFICANT CHANGE UP (ref 0–0)
NT-PROBNP SERPL-SCNC: 437 PG/ML — SIGNIFICANT CHANGE UP (ref 0–450)
PH UR: 6 — SIGNIFICANT CHANGE UP (ref 5–8)
PLATELET # BLD AUTO: 224 K/UL — SIGNIFICANT CHANGE UP (ref 150–400)
POTASSIUM SERPL-MCNC: 4.1 MMOL/L — SIGNIFICANT CHANGE UP (ref 3.5–5.3)
POTASSIUM SERPL-SCNC: 4.1 MMOL/L — SIGNIFICANT CHANGE UP (ref 3.5–5.3)
PROT SERPL-MCNC: 7.6 GM/DL — SIGNIFICANT CHANGE UP (ref 6–8.3)
PROT UR-MCNC: 15 MG/DL
PROTHROM AB SERPL-ACNC: 11.5 SEC — SIGNIFICANT CHANGE UP (ref 9.8–12.7)
RBC # BLD: 5.07 M/UL — SIGNIFICANT CHANGE UP (ref 3.8–5.2)
RBC # FLD: 15.7 % — HIGH (ref 10.3–14.5)
RBC CASTS # UR COMP ASSIST: SIGNIFICANT CHANGE UP /HPF (ref 0–4)
SODIUM SERPL-SCNC: 138 MMOL/L — SIGNIFICANT CHANGE UP (ref 135–145)
SP GR SPEC: 1.01 — SIGNIFICANT CHANGE UP (ref 1.01–1.02)
TROPONIN I SERPL-MCNC: <0.015 NG/ML — SIGNIFICANT CHANGE UP (ref 0.01–0.04)
UROBILINOGEN FLD QL: NEGATIVE MG/DL — SIGNIFICANT CHANGE UP
WBC # BLD: 5.51 K/UL — SIGNIFICANT CHANGE UP (ref 3.8–10.5)
WBC # FLD AUTO: 5.51 K/UL — SIGNIFICANT CHANGE UP (ref 3.8–10.5)
WBC UR QL: (no result)

## 2018-05-25 PROCEDURE — 99285 EMERGENCY DEPT VISIT HI MDM: CPT

## 2018-05-25 PROCEDURE — 71045 X-RAY EXAM CHEST 1 VIEW: CPT | Mod: 26

## 2018-05-25 RX ORDER — CIPROFLOXACIN LACTATE 400MG/40ML
500 VIAL (ML) INTRAVENOUS ONCE
Qty: 0 | Refills: 0 | Status: COMPLETED | OUTPATIENT
Start: 2018-05-25 | End: 2018-05-25

## 2018-05-25 RX ORDER — MOXIFLOXACIN HYDROCHLORIDE TABLETS, 400 MG 400 MG/1
1 TABLET, FILM COATED ORAL
Qty: 28 | Refills: 0 | OUTPATIENT
Start: 2018-05-25 | End: 2018-06-07

## 2018-05-25 RX ADMIN — Medication 500 MILLIGRAM(S): at 13:34

## 2018-05-25 NOTE — ED PROVIDER NOTE - ATTENDING CONTRIBUTION TO CARE
76yo F with hx of fevers at home.  She is afebrile in the ED.  NAD, completely nontoxic, afebrile, NCAT, PERRL, CN grossly intact, S1S2, CTAB, abd soft nontender with no rebound/guarding/masses, no LE edema.  Labs, ua, cxr, reassess.

## 2018-05-25 NOTE — ED PROVIDER NOTE - PSH
Acinous adenoma of parathyroid gland  , parotidectomy  H/O  section  ,   H/O dilation and curettage  , ,   H/O lithotripsy  X2  H/O nephrostomy  and stent insertion - 2017  S/P R ureteral stent placed 17  History of UTI  "for 2.5 years"  S/P left knee arthroscopy

## 2018-05-25 NOTE — ED PROVIDER NOTE - OBJECTIVE STATEMENT
77F PMH renal stones requiring lithotripsy and percutaneous drainage p/w 77F PMH renal stones requiring lithotripsy and percutaneous drainage p/w 2 days fever (102F at home) and chills. Over past 2 days was taking Advil at home and fever would decrease to 101F but never broke. Woke up sweating last 2 nights, fatigued. Occasional sharp flank pains but states this is not atypical for her. No NVD. No abd pain. No urinary complaints. No cough/sob. Feels better this morning, did not take any advil today and afebrile in triage. 77F PMH renal stones requiring lithotripsy and percutaneous drainage in the past p/w 2 days fever (102F at home) and chills. Over past 2 days was taking Advil at home and fever would decrease to 101F but never broke. Woke up sweating last 2 nights, fatigued. Occasional sharp flank pains but states this is not atypical for her. No NVD. No abd pain. No urinary complaints. No cough/sob. Feels better this morning, did not take any advil today and afebrile in triage.

## 2018-05-25 NOTE — ED ADULT TRIAGE NOTE - CHIEF COMPLAINT QUOTE
pt states fever since yesterday w/ headache. pt notes hx of kidney stones and infection. denies urinary sx at this time. denies URI like sx or cough.

## 2018-05-25 NOTE — ED PROVIDER NOTE - PROGRESS NOTE DETAILS
Andrea Link: Dr. Oliver at bedside with patient. Pt currently c/o congestion (x2 months), fever, chills, sweating, and HA. Pt notes hx of UTIs and kidney stones.   PE as per Dr. Oliver- no rebound, guarding, TTP, or masses of abd. No CVA TTP. Pt appears well, no complaints at this time, wants to go home. Given normal labs, afebrile rectally, well appearing, will give rx for cipro based on prior urine culture sensitivities, dc home with very strict return precautions. Andrea Link: Dr. Oliver at bedside with patient. Pt currently c/o congestion (x2 months), fever at home but resolved in ED, chills, sweating. Pt notes hx of UTIs and kidney stones.  PE as per Dr. Oliver- no rebound, guarding, TTP, or masses of abd. No CVA TTP.

## 2018-05-25 NOTE — ED PROVIDER NOTE - MEDICAL DECISION MAKING DETAILS
77F with fever x 2 days, hx of severe sepsis 2/2 infected stones but currently well appearing, nontoxic, nonfocal exam and afebrile. Given hx will obtain bloodwork, cxr, ua/ucx.

## 2018-05-25 NOTE — ED PROVIDER NOTE - PMH
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

## 2018-05-25 NOTE — ED ADULT NURSE NOTE - NS ED NURSE DC INFO COMPLEXITY
Simple: Patient demonstrates quick and easy understanding/Patient asked questions/Returned Demonstration/Verbalized Understanding/Straightforward: Basic instructions, no meds, no home treatment

## 2018-05-27 LAB
-  AMIKACIN: SIGNIFICANT CHANGE UP
-  AZTREONAM: SIGNIFICANT CHANGE UP
-  CEFEPIME: SIGNIFICANT CHANGE UP
-  CEFTAZIDIME: SIGNIFICANT CHANGE UP
-  CIPROFLOXACIN: SIGNIFICANT CHANGE UP
-  GENTAMICIN: SIGNIFICANT CHANGE UP
-  IMIPENEM: SIGNIFICANT CHANGE UP
-  LEVOFLOXACIN: SIGNIFICANT CHANGE UP
-  MEROPENEM: SIGNIFICANT CHANGE UP
-  PIPERACILLIN/TAZOBACTAM: SIGNIFICANT CHANGE UP
-  TOBRAMYCIN: SIGNIFICANT CHANGE UP
CULTURE RESULTS: SIGNIFICANT CHANGE UP
METHOD TYPE: SIGNIFICANT CHANGE UP
ORGANISM # SPEC MICROSCOPIC CNT: SIGNIFICANT CHANGE UP
ORGANISM # SPEC MICROSCOPIC CNT: SIGNIFICANT CHANGE UP
SPECIMEN SOURCE: SIGNIFICANT CHANGE UP

## 2018-07-14 ENCOUNTER — TRANSCRIPTION ENCOUNTER (OUTPATIENT)
Age: 78
End: 2018-07-14

## 2018-07-16 PROBLEM — J45.909 UNSPECIFIED ASTHMA, UNCOMPLICATED: Chronic | Status: ACTIVE | Noted: 2017-11-28

## 2018-07-18 ENCOUNTER — APPOINTMENT (OUTPATIENT)
Dept: UROLOGY | Facility: CLINIC | Age: 78
End: 2018-07-18
Payer: MEDICARE

## 2018-07-18 VITALS
RESPIRATION RATE: 16 BRPM | TEMPERATURE: 97.7 F | HEIGHT: 59 IN | DIASTOLIC BLOOD PRESSURE: 71 MMHG | BODY MASS INDEX: 26.21 KG/M2 | HEART RATE: 55 BPM | WEIGHT: 130 LBS | SYSTOLIC BLOOD PRESSURE: 147 MMHG

## 2018-07-18 PROBLEM — M41.9 SCOLIOSIS, UNSPECIFIED: Chronic | Status: ACTIVE | Noted: 2017-07-31

## 2018-07-18 PROBLEM — I10 ESSENTIAL (PRIMARY) HYPERTENSION: Chronic | Status: ACTIVE | Noted: 2017-04-13

## 2018-07-18 PROBLEM — M48.061 SPINAL STENOSIS, LUMBAR REGION WITHOUT NEUROGENIC CLAUDICATION: Chronic | Status: ACTIVE | Noted: 2017-11-28

## 2018-07-18 PROBLEM — Z86.19 PERSONAL HISTORY OF OTHER INFECTIOUS AND PARASITIC DISEASES: Chronic | Status: ACTIVE | Noted: 2017-11-28

## 2018-07-18 PROBLEM — N20.0 CALCULUS OF KIDNEY: Chronic | Status: ACTIVE | Noted: 2017-07-11

## 2018-07-18 PROBLEM — M10.9 GOUT, UNSPECIFIED: Chronic | Status: ACTIVE | Noted: 2017-07-31

## 2018-07-18 PROBLEM — M48.00 SPINAL STENOSIS, SITE UNSPECIFIED: Chronic | Status: ACTIVE | Noted: 2017-07-31

## 2018-07-18 PROCEDURE — 99214 OFFICE O/P EST MOD 30 MIN: CPT

## 2018-10-15 ENCOUNTER — TRANSCRIPTION ENCOUNTER (OUTPATIENT)
Age: 78
End: 2018-10-15

## 2018-10-25 ENCOUNTER — INPATIENT (INPATIENT)
Facility: HOSPITAL | Age: 78
LOS: 4 days | Discharge: HOME | End: 2018-10-30
Attending: INTERNAL MEDICINE | Admitting: INTERNAL MEDICINE
Payer: MEDICARE

## 2018-10-25 VITALS — WEIGHT: 130.07 LBS | HEIGHT: 59 IN

## 2018-10-25 DIAGNOSIS — Z87.440 PERSONAL HISTORY OF URINARY (TRACT) INFECTIONS: Chronic | ICD-10-CM

## 2018-10-25 DIAGNOSIS — D35.1 BENIGN NEOPLASM OF PARATHYROID GLAND: Chronic | ICD-10-CM

## 2018-10-25 DIAGNOSIS — Z98.890 OTHER SPECIFIED POSTPROCEDURAL STATES: Chronic | ICD-10-CM

## 2018-10-25 DIAGNOSIS — Z98.891 HISTORY OF UTERINE SCAR FROM PREVIOUS SURGERY: Chronic | ICD-10-CM

## 2018-10-25 DIAGNOSIS — Z87.448 PERSONAL HISTORY OF OTHER DISEASES OF URINARY SYSTEM: Chronic | ICD-10-CM

## 2018-10-25 LAB
ALBUMIN SERPL ELPH-MCNC: 3.5 G/DL — SIGNIFICANT CHANGE UP (ref 3.3–5)
ALP SERPL-CCNC: 79 U/L — SIGNIFICANT CHANGE UP (ref 40–120)
ALT FLD-CCNC: 26 U/L — SIGNIFICANT CHANGE UP (ref 12–78)
ANION GAP SERPL CALC-SCNC: 11 MMOL/L — SIGNIFICANT CHANGE UP (ref 5–17)
APPEARANCE UR: CLEAR — SIGNIFICANT CHANGE UP
APTT BLD: 25.8 SEC — LOW (ref 27.5–37.4)
AST SERPL-CCNC: 14 U/L — LOW (ref 15–37)
BACTERIA # UR AUTO: ABNORMAL
BASOPHILS # BLD AUTO: 0.06 K/UL — SIGNIFICANT CHANGE UP (ref 0–0.2)
BASOPHILS NFR BLD AUTO: 0.3 % — SIGNIFICANT CHANGE UP (ref 0–2)
BILIRUB SERPL-MCNC: 1.2 MG/DL — SIGNIFICANT CHANGE UP (ref 0.2–1.2)
BILIRUB UR-MCNC: NEGATIVE — SIGNIFICANT CHANGE UP
BUN SERPL-MCNC: 16 MG/DL — SIGNIFICANT CHANGE UP (ref 7–23)
CALCIUM SERPL-MCNC: 9.6 MG/DL — SIGNIFICANT CHANGE UP (ref 8.5–10.1)
CHLORIDE SERPL-SCNC: 105 MMOL/L — SIGNIFICANT CHANGE UP (ref 96–108)
CO2 SERPL-SCNC: 23 MMOL/L — SIGNIFICANT CHANGE UP (ref 22–31)
COLOR SPEC: YELLOW — SIGNIFICANT CHANGE UP
CREAT SERPL-MCNC: 0.88 MG/DL — SIGNIFICANT CHANGE UP (ref 0.5–1.3)
DIFF PNL FLD: ABNORMAL
EOSINOPHIL # BLD AUTO: 0.07 K/UL — SIGNIFICANT CHANGE UP (ref 0–0.5)
EOSINOPHIL NFR BLD AUTO: 0.4 % — SIGNIFICANT CHANGE UP (ref 0–6)
EPI CELLS # UR: SIGNIFICANT CHANGE UP
GLUCOSE SERPL-MCNC: 102 MG/DL — HIGH (ref 70–99)
GLUCOSE UR QL: NEGATIVE MG/DL — SIGNIFICANT CHANGE UP
HCT VFR BLD CALC: 44.1 % — SIGNIFICANT CHANGE UP (ref 34.5–45)
HGB BLD-MCNC: 14.3 G/DL — SIGNIFICANT CHANGE UP (ref 11.5–15.5)
IMM GRANULOCYTES NFR BLD AUTO: 0.5 % — SIGNIFICANT CHANGE UP (ref 0–1.5)
INR BLD: 1.18 RATIO — HIGH (ref 0.88–1.16)
KETONES UR-MCNC: ABNORMAL
LACTATE SERPL-SCNC: 1.5 MMOL/L — SIGNIFICANT CHANGE UP (ref 0.7–2)
LEUKOCYTE ESTERASE UR-ACNC: ABNORMAL
LYMPHOCYTES # BLD AUTO: 12.3 % — LOW (ref 13–44)
LYMPHOCYTES # BLD AUTO: 2.45 K/UL — SIGNIFICANT CHANGE UP (ref 1–3.3)
MCHC RBC-ENTMCNC: 29.5 PG — SIGNIFICANT CHANGE UP (ref 27–34)
MCHC RBC-ENTMCNC: 32.4 GM/DL — SIGNIFICANT CHANGE UP (ref 32–36)
MCV RBC AUTO: 90.9 FL — SIGNIFICANT CHANGE UP (ref 80–100)
MONOCYTES # BLD AUTO: 1.27 K/UL — HIGH (ref 0–0.9)
MONOCYTES NFR BLD AUTO: 6.4 % — SIGNIFICANT CHANGE UP (ref 2–14)
NEUTROPHILS # BLD AUTO: 16.06 K/UL — HIGH (ref 1.8–7.4)
NEUTROPHILS NFR BLD AUTO: 80.1 % — HIGH (ref 43–77)
NITRITE UR-MCNC: NEGATIVE — SIGNIFICANT CHANGE UP
NRBC # BLD: 0 /100 WBCS — SIGNIFICANT CHANGE UP (ref 0–0)
PH UR: 5 — SIGNIFICANT CHANGE UP (ref 5–8)
PLATELET # BLD AUTO: 265 K/UL — SIGNIFICANT CHANGE UP (ref 150–400)
POTASSIUM SERPL-MCNC: 3.7 MMOL/L — SIGNIFICANT CHANGE UP (ref 3.5–5.3)
POTASSIUM SERPL-SCNC: 3.7 MMOL/L — SIGNIFICANT CHANGE UP (ref 3.5–5.3)
PROT SERPL-MCNC: 8 GM/DL — SIGNIFICANT CHANGE UP (ref 6–8.3)
PROT UR-MCNC: 30 MG/DL
PROTHROM AB SERPL-ACNC: 12.8 SEC — HIGH (ref 9.8–12.7)
RBC # BLD: 4.85 M/UL — SIGNIFICANT CHANGE UP (ref 3.8–5.2)
RBC # FLD: 15.3 % — HIGH (ref 10.3–14.5)
RBC CASTS # UR COMP ASSIST: ABNORMAL /HPF (ref 0–4)
SODIUM SERPL-SCNC: 139 MMOL/L — SIGNIFICANT CHANGE UP (ref 135–145)
SP GR SPEC: 1.02 — SIGNIFICANT CHANGE UP (ref 1.01–1.02)
UROBILINOGEN FLD QL: NEGATIVE MG/DL — SIGNIFICANT CHANGE UP
WBC # BLD: 20 K/UL — HIGH (ref 3.8–10.5)
WBC # FLD AUTO: 20 K/UL — HIGH (ref 3.8–10.5)
WBC UR QL: ABNORMAL

## 2018-10-25 PROCEDURE — 99285 EMERGENCY DEPT VISIT HI MDM: CPT

## 2018-10-25 PROCEDURE — 74177 CT ABD & PELVIS W/CONTRAST: CPT | Mod: 26

## 2018-10-25 PROCEDURE — 71045 X-RAY EXAM CHEST 1 VIEW: CPT | Mod: 26

## 2018-10-25 PROCEDURE — 93010 ELECTROCARDIOGRAM REPORT: CPT | Mod: 76

## 2018-10-25 RX ORDER — ACETAMINOPHEN 500 MG
650 TABLET ORAL ONCE
Qty: 0 | Refills: 0 | Status: COMPLETED | OUTPATIENT
Start: 2018-10-25 | End: 2018-10-25

## 2018-10-25 RX ORDER — VERAPAMIL HCL 240 MG
1 CAPSULE, EXTENDED RELEASE PELLETS 24 HR ORAL
Qty: 0 | Refills: 0 | COMMUNITY

## 2018-10-25 RX ORDER — SODIUM CHLORIDE 9 MG/ML
1000 INJECTION INTRAMUSCULAR; INTRAVENOUS; SUBCUTANEOUS ONCE
Qty: 0 | Refills: 0 | Status: COMPLETED | OUTPATIENT
Start: 2018-10-25 | End: 2018-10-25

## 2018-10-25 RX ORDER — SODIUM CHLORIDE 9 MG/ML
500 INJECTION INTRAMUSCULAR; INTRAVENOUS; SUBCUTANEOUS ONCE
Qty: 0 | Refills: 0 | Status: COMPLETED | OUTPATIENT
Start: 2018-10-25 | End: 2018-10-25

## 2018-10-25 RX ADMIN — SODIUM CHLORIDE 1000 MILLILITER(S): 9 INJECTION INTRAMUSCULAR; INTRAVENOUS; SUBCUTANEOUS at 20:40

## 2018-10-25 RX ADMIN — Medication 650 MILLIGRAM(S): at 18:00

## 2018-10-25 RX ADMIN — Medication 650 MILLIGRAM(S): at 17:07

## 2018-10-25 RX ADMIN — SODIUM CHLORIDE 500 MILLILITER(S): 9 INJECTION INTRAMUSCULAR; INTRAVENOUS; SUBCUTANEOUS at 17:07

## 2018-10-25 NOTE — H&P ADULT - ATTENDING COMMENTS
77yo F w/ PMH R nephrolithiasis s/p Nephrostomy tubes and Lithotripsy, Recurrent Cdiff colitis s/p Fecal transplant, HTN, HLD, Gout presents for 2 days of fever (Tmax 101.6 AM of presentation) and generalized abdominal pain. Patient developed fatigue, fevers, chills and "ripping" generalized abdominal pain rating 9/10 2 days prior to admission. Pain worsened on defecation and associated with nausea. Patient had frequent small, formed bowel movements (26 in 30hours) with "slimey, white globs." The morning of admission patient noted that the pain localized to the RLQ leading to the back. Patient denies any alleviating factors and did not take any pain medications.   ROS: as above.  on exam: as above.  A/P:    #Abdominal Pain 2/2 Colitis  - must rule out recurrent C-diff  - GI PCR,  Cdiff toxin ordered  - IV Zosyn q8 ordered  - Vanco 125mg q6 ordered  - continue home probiotic  - ID consult called  - contact isolation    #UTI  - f/u Urine Cx  - f/u Blood cultures  - IV zosyn  - follow cultures      #Afib  - possibly 2/2 dehydration as resolved after IV NS bolus  - give 1L NS @100cc/hr   - ASA  - CHADs-VASc 3  - admit to tele  - Cardio consult called- Dr. Carballo notified in ED of Afib     #HTN  - on Valsartan 160mg daily: not available due to shortage  - Losartan 50mg BID ordered- confirmed with pharmacy  - monitor BP    #Gout  - continue allopurinol    #Diet: Clear Liquid Diet    #Full Code    #DVT: Heparin subq

## 2018-10-25 NOTE — ED ADULT TRIAGE NOTE - CHIEF COMPLAINT QUOTE
Pt presents to ED c/o fever (TMax 101.6). Pt reports recent UTI with treatment of Cipro. Pt reports abd pain and "stool with slimey things in it, like white pieces of tissue". Pt reports hx of c-diff 1 year ago after abx.

## 2018-10-25 NOTE — H&P ADULT - NSHPLABSRESULTS_GEN_ALL_CORE
< from: CT Abdomen and Pelvis w/ IV Cont (10.25.18 @ 19:12) >    EXAM:  CT ABDOMEN AND PELVIS IC                            PROCEDURE DATE:  10/25/2018          INTERPRETATION:  CT ABDOMEN AND PELVIS IC    HISTORY:  Generalized Abdominal pain, rule out enteritis, hx of C-diff    Technique: CT of the abdomen and pelvis is performed without oral with   intravenous contrast. Axial images are supplemented with coronal and   sagittal reformations. This study was performed using automatic exposure   control (radiation dose reduction software) to obtain a diagnostic image   quality scan with patient dose as low as reasonably achievable.    Contrast: 90 cc Omnipaque 350    Comparison: CT abdomen and pelvis 12/6/2017    Findings:  LIVER: Normal.  SPLEEN: Normal.  PANCREAS: Normal.  GALLBLADDER/BILIARY TREE: Nondilated. Normal gallbladder.  ADRENALS: Normal.  KIDNEYS: No calcification, hydronephrosis, or soft tissue attenuating   renal mass.  LYMPHADENOPATHY/RETROPERITONEUM: No adenopathy.  VASCULATURE: Aortoiliac atherosclerosis without aneurysm.  BOWEL: Segmental colitis of the rectosigmoid  PELVIC VISCERA: Stable 2.1 cm right ovarian calcification. Stable 1.2 cm   left paraovarian cyst.  PELVIC LYMPH NODES: No pelvic adenopathy.  PERITONEUM/ABDOMINAL WALL: No free air or ascites.  SKELETAL: No acute bony abnormality.  LUNG BASES: Clear.    IMPRESSION:     Rectosigmoid colitis.                EARNESTINE JANSEN   This document has been electronically signed. Oct 25 2018  7:26PM                < end of copied text >

## 2018-10-25 NOTE — H&P ADULT - MUSCULOSKELETAL
negative No joint pain, swelling or deformity; no limitation of movement details… detailed exam no joint swelling/no joint erythema

## 2018-10-25 NOTE — ED ADULT NURSE NOTE - NSIMPLEMENTINTERV_GEN_ALL_ED
Implemented All Universal Safety Interventions:  [place] to call system. Call bell, personal items and telephone within reach. Instruct patient to call for assistance. Room bathroom lighting operational. Non-slip footwear when patient is off stretcher. Physically safe environment: no spills, clutter or unnecessary equipment. Stretcher in lowest position, wheels locked, appropriate side rails in place.

## 2018-10-25 NOTE — ED PROVIDER NOTE - OBJECTIVE STATEMENT
77 y/o female with PMHx of HTN, HLD, s/p fecal transplant ([place], Dr. Duron), Cdiff (5 episodes), pyelonephritis, kidney stones, s/p multiple kidney surgeries presents to the ED c/o constant loose stool with "slush." Pt states that sx are similar to that of early Cdiff. Denies diarrhea. +intermittent fever x2-3 days; Tmax of 101.6. +diffuse abd pain. Pt had UTI 12 days ago, was on three days of abx. no IV drug use. Nonsmoker. No EtOH. 77 y/o female with PMHx of HTN, HLD, s/p fecal transplant ([place], Dr. Duron), Cdiff (5 episodes), pyelonephritis, kidney stones, s/p multiple kidney surgeries presents to the ED c/o constant loose stool with "slush." Pt states that sx are similar to that of early C-diff. Denies diarrhea. +intermittent fever x2-3 days; Tmax of 101.6. +diffuse abd pain. Pt had UTI 12 days ago, was on three days of abx. no IV drug use. Nonsmoker. No EtOH.

## 2018-10-25 NOTE — H&P ADULT - NSHPPHYSICALEXAM_GEN_ALL_CORE
Vital Signs Last 24 Hrs  T(C): 36.7 (25 Oct 2018 20:15), Max: 38.6 (25 Oct 2018 16:00)  T(F): 98.1 (25 Oct 2018 20:15), Max: 101.4 (25 Oct 2018 16:00)  HR: 73 (25 Oct 2018 22:45) (65 - 130)  BP: 126/60 (25 Oct 2018 22:45) (125/51 - 155/64)  RR: 18 (25 Oct 2018 22:45) (15 - 18)  SpO2: 99% (25 Oct 2018 22:45) (97% - 100%)

## 2018-10-25 NOTE — H&P ADULT - HISTORY OF PRESENT ILLNESS
79yo F w/ PMH R nephrolithiasis s/p Nephrostomy tubes and Lithotripsy, Recurrent Cdiff colitis s/p Fecal transplant, HTN, HLD, Gout presents for 2 days of fever (Tmax 101.6 AM of presentation) and generalized abdominal pain. Patient developed fatigue, fevers, chills and "ripping" generalized abdominal pain rating 9/10 2 days prior to admission. Pain worsened on defecation and associated with nausea. Patient had frequent small, formed bowel movements (26 in 30hours) with "slimey, white globs." The morning of admission patient noted that the pain localized to the RLQ leading to the back. Patient denies any alleviating factors and did not take any pain medications.   Pateint was diagnosed with Klebsiella UTI on 10/15/18 and treated with 3 days of cipro.     In the ED patient had episode of afib confirmed on EKG- rate 138. Patient given 1.5L NS and converted to NSR- rate 78.     In ED patient recieved 1.5L NS, Tylenol 650mg. 77yo F w/ PMH R nephrolithiasis s/p Nephrostomy tubes and Lithotripsy, Recurrent Cdiff colitis s/p Fecal transplant, HTN, HLD, Gout presents for 2 days of fever (Tmax 101.6 AM of presentation) and generalized abdominal pain. Patient developed fatigue, fevers, chills and "ripping" generalized abdominal pain rating 9/10 2 days prior to admission. Pain worsened on defecation and associated with nausea. Patient had frequent small, formed bowel movements (26 in 30hours) with "slimey, white globs." The morning of admission patient noted that the pain localized to the RLQ leading to the back. Patient denies any alleviating factors and did not take any pain medications. On interview, patient is having aching sensation in abdomen- ripping pain has resolved.     Pateint was diagnosed with Klebsiella UTI on 10/15/18 and treated with 3 days of cipro.     In the ED patient had episode of afib confirmed on EKG- rate 138. Patient given 1.5L NS and converted to NSR- rate 78.     In ED patient recieved 1.5L NS, Tylenol 650mg.

## 2018-10-25 NOTE — ED PROVIDER NOTE - NS_ ATTENDINGSCRIBEDETAILS _ED_A_ED_FT
I Turner Paula MD saw and examined the patient. Scribe documented for me and under my supervision. I have modified the scribe's documentation where necessary to reflect my history, physical exam and other relevant documentations pertinent to the care of the patient.

## 2018-10-25 NOTE — H&P ADULT - ASSESSMENT
#DVT: Heparin subq -in setting of possible paroxysmal afib  IMPROVE VTE Individual Risk Assessment    RISK                                                                Points    [  ] Previous VTE                                                  3    [  ] Thrombophilia                                               2    [  ] Lower limb paralysis                                      2        (unable to hold up >15 seconds)      [  ] Current Cancer                                              2         (within 6 months)    [  ] Immobilization > 24 hrs                                1    [  ] ICU/CCU stay > 24 hours                              1    [ x ] Age > 60                                                      1    IMPROVE VTE Score _____1____ 79yo F w/ PMH R nephrolithiasis s/p Nephrostomy tubes and Lithotripsy, Recurrent Cdiff colitis s/p Fecal transplant, HTN, HLD, Gout presents with fever and abdominal pain found to have colitis and UTI.     #Abdominal Pain 2/2 Colitis  - must rule out recurrent C-diff  - GI PCR  - Cdiff toxin  -   - GI consult  - contact isolation until GI     #DVT: Heparin subq -in setting of possible paroxysmal afib  IMPROVE VTE Individual Risk Assessment    RISK                                                                Points    [  ] Previous VTE                                                  3    [  ] Thrombophilia                                               2    [  ] Lower limb paralysis                                      2        (unable to hold up >15 seconds)      [  ] Current Cancer                                              2         (within 6 months)    [  ] Immobilization > 24 hrs                                1    [  ] ICU/CCU stay > 24 hours                              1    [ x ] Age > 60                                                      1    IMPROVE VTE Score _____1____ 79yo F w/ PMH R nephrolithiasis s/p Nephrostomy tubes and Lithotripsy, Recurrent Cdiff colitis s/p Fecal transplant, HTN, HLD, Gout presents with fever and abdominal pain found to have colitis and UTI.     #Abdominal Pain 2/2 Colitis  - must rule out recurrent C-diff  - GI PCR  - Cdiff toxin  - IV Zosyn   - Vanco 125mg q6  - GI consult  - ID consult  - contact isolation    #UTI  - f/u Urine Cx  - f/u Blood cultures  - IV zosyn  - ID consult     #Afib  - possibly 2/2 dehydration as resolved after IVNS bolus  - 1L NS @100cc/hr   - ASA  - CHADs-VASc 3  - admit to tele  - Cardio consult called- Dr. Carballo notified in ED of Afib     #Diet: Clear Liquid Diet    #Full Code    #DVT: Heparin subq -in setting of possible paroxysmal afib  IMPROVE VTE Individual Risk Assessment    RISK                                                                Points    [  ] Previous VTE                                                  3    [  ] Thrombophilia                                               2    [  ] Lower limb paralysis                                      2        (unable to hold up >15 seconds)      [  ] Current Cancer                                              2         (within 6 months)    [  ] Immobilization > 24 hrs                                1    [  ] ICU/CCU stay > 24 hours                              1    [ x ] Age > 60                                                      1    IMPROVE VTE Score _____1____ 79yo F w/ PMH R nephrolithiasis s/p Nephrostomy tubes and Lithotripsy, Recurrent Cdiff colitis s/p Fecal transplant, HTN, HLD, Gout presents with fever and abdominal pain found to have colitis and UTI.     #Abdominal Pain 2/2 Colitis  - must rule out recurrent C-diff  - GI PCR,  Cdiff toxin ordered  - IV Zosyn q8 ordered  - Vanco 125mg q6 ordered  - ID consult called  - contact isolation    #UTI  - f/u Urine Cx  - f/u Blood cultures  - IV zosyn  - ID consult     #Afib  - possibly 2/2 dehydration as resolved after IVNS bolus  - 1L NS @100cc/hr   - ASA  - CHADs-VASc 3  - admit to tele  - Cardio consult called- Dr. Carballo notified in ED of Afib     #Diet: Clear Liquid Diet    #Full Code    #DVT: Heparin subq -in setting of possible paroxysmal afib  IMPROVE VTE Individual Risk Assessment    RISK                                                                Points    [  ] Previous VTE                                                  3    [  ] Thrombophilia                                               2    [  ] Lower limb paralysis                                      2        (unable to hold up >15 seconds)      [  ] Current Cancer                                              2         (within 6 months)    [  ] Immobilization > 24 hrs                                1    [  ] ICU/CCU stay > 24 hours                              1    [ x ] Age > 60                                                      1    IMPROVE VTE Score _____1____ 79yo F w/ PMH R nephrolithiasis s/p Nephrostomy tubes and Lithotripsy, Recurrent Cdiff colitis s/p Fecal transplant, HTN, HLD, Gout presents with fever and abdominal pain found to have colitis and UTI.     #Abdominal Pain 2/2 Colitis  - must rule out recurrent C-diff  - GI PCR,  Cdiff toxin ordered  - IV Zosyn q8 ordered  - Vanco 125mg q6 ordered  - continue home probiotic  - ID consult called  - contact isolation    #UTI  - f/u Urine Cx  - f/u Blood cultures  - IV zosyn  - ID consult     #Afib  - possibly 2/2 dehydration as resolved after IVNS bolus  - 1L NS @100cc/hr   - ASA  - CHADs-VASc 3  - admit to tele  - Cardio consult called- Dr. Carballo notified in ED of Afib     #HTN  - on Valsartan 160mg daily: not available due to shortage  - Losartan 50mg BID ordered- confirmed with pharmacy  - continue home veramipril  - monitor BP    #Gout  - continue allopurinol    #Diet: Clear Liquid Diet    #Full Code    #DVT: Heparin subq -in setting of possible paroxysmal afib  IMPROVE VTE Individual Risk Assessment    RISK                                                                Points    [  ] Previous VTE                                                  3    [  ] Thrombophilia                                               2    [  ] Lower limb paralysis                                      2        (unable to hold up >15 seconds)      [  ] Current Cancer                                              2         (within 6 months)    [  ] Immobilization > 24 hrs                                1    [  ] ICU/CCU stay > 24 hours                              1    [ x ] Age > 60                                                      1    IMPROVE VTE Score _____1____

## 2018-10-25 NOTE — ED PROVIDER NOTE - CARE PLAN
Principal Discharge DX:	Colitis  Secondary Diagnosis:	Urinary tract infection without hematuria, site unspecified  Secondary Diagnosis:	Fever, unspecified fever cause

## 2018-10-25 NOTE — ED ADULT NURSE REASSESSMENT NOTE - NS ED NURSE REASSESS COMMENT FT1
Patient , a-fib. Patient states she has had episodes of palpitation. Patient denies any cp, sob at the moment. EKG performed, 1L ns infusing.

## 2018-10-25 NOTE — H&P ADULT - FAMILY HISTORY
Family history of prostate cancer in father     Sibling  Still living? Unknown  Family history of hypertension in mother, Age at diagnosis: Age Unknown

## 2018-10-25 NOTE — H&P ADULT - NEUROLOGICAL DETAILS
alert and oriented x 3 alert and oriented x 3/responds to verbal commands/deep reflexes intact/normal strength/sensation intact/responds to pain

## 2018-10-26 LAB
ANION GAP SERPL CALC-SCNC: 7 MMOL/L — SIGNIFICANT CHANGE UP (ref 5–17)
BASOPHILS # BLD AUTO: 0.03 K/UL — SIGNIFICANT CHANGE UP (ref 0–0.2)
BASOPHILS NFR BLD AUTO: 0.2 % — SIGNIFICANT CHANGE UP (ref 0–2)
BUN SERPL-MCNC: 13 MG/DL — SIGNIFICANT CHANGE UP (ref 7–23)
CALCIUM SERPL-MCNC: 8.3 MG/DL — LOW (ref 8.5–10.1)
CHLORIDE SERPL-SCNC: 112 MMOL/L — HIGH (ref 96–108)
CO2 SERPL-SCNC: 25 MMOL/L — SIGNIFICANT CHANGE UP (ref 22–31)
CREAT SERPL-MCNC: 0.74 MG/DL — SIGNIFICANT CHANGE UP (ref 0.5–1.3)
CULTURE RESULTS: NO GROWTH — SIGNIFICANT CHANGE UP
CULTURE RESULTS: SIGNIFICANT CHANGE UP
EOSINOPHIL # BLD AUTO: 0.22 K/UL — SIGNIFICANT CHANGE UP (ref 0–0.5)
EOSINOPHIL NFR BLD AUTO: 1.8 % — SIGNIFICANT CHANGE UP (ref 0–6)
GLUCOSE SERPL-MCNC: 85 MG/DL — SIGNIFICANT CHANGE UP (ref 70–99)
HCT VFR BLD CALC: 39.3 % — SIGNIFICANT CHANGE UP (ref 34.5–45)
HGB BLD-MCNC: 12.8 G/DL — SIGNIFICANT CHANGE UP (ref 11.5–15.5)
IMM GRANULOCYTES NFR BLD AUTO: 0.4 % — SIGNIFICANT CHANGE UP (ref 0–1.5)
LYMPHOCYTES # BLD AUTO: 17.1 % — SIGNIFICANT CHANGE UP (ref 13–44)
LYMPHOCYTES # BLD AUTO: 2.12 K/UL — SIGNIFICANT CHANGE UP (ref 1–3.3)
MAGNESIUM SERPL-MCNC: 2 MG/DL — SIGNIFICANT CHANGE UP (ref 1.6–2.6)
MCHC RBC-ENTMCNC: 29.6 PG — SIGNIFICANT CHANGE UP (ref 27–34)
MCHC RBC-ENTMCNC: 32.6 GM/DL — SIGNIFICANT CHANGE UP (ref 32–36)
MCV RBC AUTO: 90.8 FL — SIGNIFICANT CHANGE UP (ref 80–100)
MONOCYTES # BLD AUTO: 1.09 K/UL — HIGH (ref 0–0.9)
MONOCYTES NFR BLD AUTO: 8.8 % — SIGNIFICANT CHANGE UP (ref 2–14)
NEUTROPHILS # BLD AUTO: 8.86 K/UL — HIGH (ref 1.8–7.4)
NEUTROPHILS NFR BLD AUTO: 71.7 % — SIGNIFICANT CHANGE UP (ref 43–77)
NRBC # BLD: 0 /100 WBCS — SIGNIFICANT CHANGE UP (ref 0–0)
PLATELET # BLD AUTO: 236 K/UL — SIGNIFICANT CHANGE UP (ref 150–400)
POTASSIUM SERPL-MCNC: 3.6 MMOL/L — SIGNIFICANT CHANGE UP (ref 3.5–5.3)
POTASSIUM SERPL-SCNC: 3.6 MMOL/L — SIGNIFICANT CHANGE UP (ref 3.5–5.3)
RBC # BLD: 4.33 M/UL — SIGNIFICANT CHANGE UP (ref 3.8–5.2)
RBC # FLD: 15.4 % — HIGH (ref 10.3–14.5)
SODIUM SERPL-SCNC: 144 MMOL/L — SIGNIFICANT CHANGE UP (ref 135–145)
SPECIMEN SOURCE: SIGNIFICANT CHANGE UP
SPECIMEN SOURCE: SIGNIFICANT CHANGE UP
WBC # BLD: 12.37 K/UL — HIGH (ref 3.8–10.5)
WBC # FLD AUTO: 12.37 K/UL — HIGH (ref 3.8–10.5)

## 2018-10-26 PROCEDURE — 93010 ELECTROCARDIOGRAM REPORT: CPT

## 2018-10-26 RX ORDER — ACETAMINOPHEN 500 MG
650 TABLET ORAL EVERY 6 HOURS
Qty: 0 | Refills: 0 | Status: DISCONTINUED | OUTPATIENT
Start: 2018-10-26 | End: 2018-10-30

## 2018-10-26 RX ORDER — SODIUM CHLORIDE 9 MG/ML
1000 INJECTION INTRAMUSCULAR; INTRAVENOUS; SUBCUTANEOUS
Qty: 0 | Refills: 0 | Status: DISCONTINUED | OUTPATIENT
Start: 2018-10-26 | End: 2018-10-30

## 2018-10-26 RX ORDER — HEPARIN SODIUM 5000 [USP'U]/ML
5000 INJECTION INTRAVENOUS; SUBCUTANEOUS EVERY 8 HOURS
Qty: 0 | Refills: 0 | Status: DISCONTINUED | OUTPATIENT
Start: 2018-10-26 | End: 2018-10-29

## 2018-10-26 RX ORDER — PIPERACILLIN AND TAZOBACTAM 4; .5 G/20ML; G/20ML
3.38 INJECTION, POWDER, LYOPHILIZED, FOR SOLUTION INTRAVENOUS EVERY 8 HOURS
Qty: 0 | Refills: 0 | Status: DISCONTINUED | OUTPATIENT
Start: 2018-10-26 | End: 2018-10-26

## 2018-10-26 RX ORDER — ASPIRIN/CALCIUM CARB/MAGNESIUM 324 MG
81 TABLET ORAL DAILY
Qty: 0 | Refills: 0 | Status: DISCONTINUED | OUTPATIENT
Start: 2018-10-26 | End: 2018-10-29

## 2018-10-26 RX ORDER — ALLOPURINOL 300 MG
100 TABLET ORAL DAILY
Qty: 0 | Refills: 0 | Status: DISCONTINUED | OUTPATIENT
Start: 2018-10-26 | End: 2018-10-30

## 2018-10-26 RX ORDER — VANCOMYCIN HCL 1 G
125 VIAL (EA) INTRAVENOUS EVERY 6 HOURS
Qty: 0 | Refills: 0 | Status: DISCONTINUED | OUTPATIENT
Start: 2018-10-26 | End: 2018-10-30

## 2018-10-26 RX ORDER — LACTOBACILLUS ACIDOPHILUS 100MM CELL
1 CAPSULE ORAL DAILY
Qty: 0 | Refills: 0 | Status: DISCONTINUED | OUTPATIENT
Start: 2018-10-26 | End: 2018-10-30

## 2018-10-26 RX ORDER — PIPERACILLIN AND TAZOBACTAM 4; .5 G/20ML; G/20ML
3.38 INJECTION, POWDER, LYOPHILIZED, FOR SOLUTION INTRAVENOUS ONCE
Qty: 0 | Refills: 0 | Status: COMPLETED | OUTPATIENT
Start: 2018-10-26 | End: 2018-10-26

## 2018-10-26 RX ORDER — LOSARTAN POTASSIUM 100 MG/1
50 TABLET, FILM COATED ORAL
Qty: 0 | Refills: 0 | Status: DISCONTINUED | OUTPATIENT
Start: 2018-10-26 | End: 2018-10-30

## 2018-10-26 RX ORDER — VERAPAMIL HCL 240 MG
120 CAPSULE, EXTENDED RELEASE PELLETS 24 HR ORAL DAILY
Qty: 0 | Refills: 0 | Status: DISCONTINUED | OUTPATIENT
Start: 2018-10-26 | End: 2018-10-30

## 2018-10-26 RX ORDER — POTASSIUM CHLORIDE 20 MEQ
40 PACKET (EA) ORAL EVERY 4 HOURS
Qty: 0 | Refills: 0 | Status: COMPLETED | OUTPATIENT
Start: 2018-10-26 | End: 2018-10-26

## 2018-10-26 RX ADMIN — HEPARIN SODIUM 5000 UNIT(S): 5000 INJECTION INTRAVENOUS; SUBCUTANEOUS at 13:48

## 2018-10-26 RX ADMIN — Medication 125 MILLIGRAM(S): at 06:11

## 2018-10-26 RX ADMIN — Medication 125 MILLIGRAM(S): at 03:16

## 2018-10-26 RX ADMIN — LOSARTAN POTASSIUM 50 MILLIGRAM(S): 100 TABLET, FILM COATED ORAL at 17:02

## 2018-10-26 RX ADMIN — Medication 40 MILLIEQUIVALENT(S): at 13:56

## 2018-10-26 RX ADMIN — Medication 125 MILLIGRAM(S): at 17:03

## 2018-10-26 RX ADMIN — PIPERACILLIN AND TAZOBACTAM 25 GRAM(S): 4; .5 INJECTION, POWDER, LYOPHILIZED, FOR SOLUTION INTRAVENOUS at 06:11

## 2018-10-26 RX ADMIN — LOSARTAN POTASSIUM 50 MILLIGRAM(S): 100 TABLET, FILM COATED ORAL at 06:11

## 2018-10-26 RX ADMIN — Medication 120 MILLIGRAM(S): at 06:10

## 2018-10-26 RX ADMIN — Medication 40 MILLIEQUIVALENT(S): at 18:36

## 2018-10-26 RX ADMIN — Medication 100 MILLIGRAM(S): at 11:20

## 2018-10-26 RX ADMIN — PIPERACILLIN AND TAZOBACTAM 200 GRAM(S): 4; .5 INJECTION, POWDER, LYOPHILIZED, FOR SOLUTION INTRAVENOUS at 01:40

## 2018-10-26 RX ADMIN — Medication 81 MILLIGRAM(S): at 11:20

## 2018-10-26 RX ADMIN — HEPARIN SODIUM 5000 UNIT(S): 5000 INJECTION INTRAVENOUS; SUBCUTANEOUS at 06:10

## 2018-10-26 RX ADMIN — Medication 1 TABLET(S): at 11:20

## 2018-10-26 RX ADMIN — Medication 125 MILLIGRAM(S): at 11:20

## 2018-10-26 RX ADMIN — HEPARIN SODIUM 5000 UNIT(S): 5000 INJECTION INTRAVENOUS; SUBCUTANEOUS at 21:33

## 2018-10-26 RX ADMIN — SODIUM CHLORIDE 100 MILLILITER(S): 9 INJECTION INTRAMUSCULAR; INTRAVENOUS; SUBCUTANEOUS at 03:48

## 2018-10-26 NOTE — CONSULT NOTE ADULT - ASSESSMENT
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 Patient developed fatigue, fevers, chills and "ripping" generalized abdominal pain rating 9/10 x 2 days prior to admission. Pain worsened on defecation and associated with nausea. Patient had frequent small, partially formed bowel movements (26 in 30hours) with "slimey, white globs." The morning of admission patient noted that the pain localized to the RLQ leading to the back. Patient was diagnosed with Klebsiella UTI on 10/15/18 and treated with 3 days of cipro. In ER< she received zosyn and vancomycin PO.    1, Abdominal pain. Diarrheal syndrome. 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  -leukocytosis  -no clinical signs of toxic megacolon  -obtain stool for CDT and PCR  -agree with vancomycin 125 mg PO q6h  -reason for abx use and side effects reviewed with patient; monitor BMP   -monitor abdomen  -old chart reviewed to assess prior cultures  -monitor temps  -f/u CBC  -supportive care  2. Other issues:   -care per medicine 77 y/o Female with h/o right nephrolithiasis s/p Nephrostomy tubes and lithotripsy, recurrent Cdiff colitis s/p prior fecal transplant, HTN, HLD, gout was admitted on 10/25 for fever to Tmax 101.6F at home and generalized abdominal pain. Patient developed fatigue, fevers, chills and "ripping" generalized abdominal pain rating 9/10 x 2 days prior to admission. Pain worsened on defecation and associated with nausea. Patient had frequent small, partially formed bowel movements (26 in 30hours) with "slimey, white globs." Patient was diagnosed with Klebsiella UTI on 10/15/18 and treated with 3 days of cipro. In ER< she received zosyn and vancomycin PO.    1, Febrile syndrome. Abdominal pain. Diarrheal syndrome. 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 Kidney stones.  -recent abx therapy with cipro  -leukocytosis  -no clinical signs of toxic megacolon  -obtain stool for CDT and PCR  -agree with vancomycin 125 mg PO q6h  -reason for abx use and side effects reviewed with patient; monitor BMP   -monitor abdomen  -old chart reviewed to assess prior cultures  -monitor temps  -f/u CBC  -supportive care  2. Other issues:   -care per medicine

## 2018-10-27 LAB
ANION GAP SERPL CALC-SCNC: 8 MMOL/L — SIGNIFICANT CHANGE UP (ref 5–17)
BUN SERPL-MCNC: 11 MG/DL — SIGNIFICANT CHANGE UP (ref 7–23)
C DIFF BY PCR RESULT: DETECTED
C DIFF TOX GENS STL QL NAA+PROBE: SIGNIFICANT CHANGE UP
CALCIUM SERPL-MCNC: 9.4 MG/DL — SIGNIFICANT CHANGE UP (ref 8.5–10.1)
CHLORIDE SERPL-SCNC: 114 MMOL/L — HIGH (ref 96–108)
CO2 SERPL-SCNC: 19 MMOL/L — LOW (ref 22–31)
CREAT SERPL-MCNC: 0.79 MG/DL — SIGNIFICANT CHANGE UP (ref 0.5–1.3)
GLUCOSE SERPL-MCNC: 110 MG/DL — HIGH (ref 70–99)
POTASSIUM SERPL-MCNC: 4.1 MMOL/L — SIGNIFICANT CHANGE UP (ref 3.5–5.3)
POTASSIUM SERPL-SCNC: 4.1 MMOL/L — SIGNIFICANT CHANGE UP (ref 3.5–5.3)
SODIUM SERPL-SCNC: 141 MMOL/L — SIGNIFICANT CHANGE UP (ref 135–145)

## 2018-10-27 PROCEDURE — 93306 TTE W/DOPPLER COMPLETE: CPT | Mod: 26

## 2018-10-27 RX ORDER — SODIUM CHLORIDE 9 MG/ML
1000 INJECTION INTRAMUSCULAR; INTRAVENOUS; SUBCUTANEOUS
Qty: 0 | Refills: 0 | Status: DISCONTINUED | OUTPATIENT
Start: 2018-10-27 | End: 2018-10-30

## 2018-10-27 RX ADMIN — HEPARIN SODIUM 5000 UNIT(S): 5000 INJECTION INTRAVENOUS; SUBCUTANEOUS at 05:47

## 2018-10-27 RX ADMIN — Medication 81 MILLIGRAM(S): at 11:01

## 2018-10-27 RX ADMIN — LOSARTAN POTASSIUM 50 MILLIGRAM(S): 100 TABLET, FILM COATED ORAL at 17:32

## 2018-10-27 RX ADMIN — LOSARTAN POTASSIUM 50 MILLIGRAM(S): 100 TABLET, FILM COATED ORAL at 05:48

## 2018-10-27 RX ADMIN — SODIUM CHLORIDE 83 MILLILITER(S): 9 INJECTION INTRAMUSCULAR; INTRAVENOUS; SUBCUTANEOUS at 14:06

## 2018-10-27 RX ADMIN — Medication 125 MILLIGRAM(S): at 05:48

## 2018-10-27 RX ADMIN — Medication 125 MILLIGRAM(S): at 23:05

## 2018-10-27 RX ADMIN — Medication 1 TABLET(S): at 11:01

## 2018-10-27 RX ADMIN — Medication 100 MILLIGRAM(S): at 11:01

## 2018-10-27 RX ADMIN — HEPARIN SODIUM 5000 UNIT(S): 5000 INJECTION INTRAVENOUS; SUBCUTANEOUS at 14:05

## 2018-10-27 RX ADMIN — Medication 125 MILLIGRAM(S): at 00:06

## 2018-10-27 RX ADMIN — Medication 125 MILLIGRAM(S): at 17:33

## 2018-10-27 RX ADMIN — Medication 120 MILLIGRAM(S): at 05:47

## 2018-10-27 RX ADMIN — HEPARIN SODIUM 5000 UNIT(S): 5000 INJECTION INTRAVENOUS; SUBCUTANEOUS at 21:43

## 2018-10-27 RX ADMIN — Medication 125 MILLIGRAM(S): at 11:01

## 2018-10-27 NOTE — CONSULT NOTE ADULT - ASSESSMENT
1. Colitis, rule out C Dif.  2. HTN  3. PAF CHADS VAsc 4  4. nephrolithiasis    Plan: cont cardiac monitoring. abx per id  will need Anticoagulation

## 2018-10-28 RX ADMIN — Medication 100 MILLIGRAM(S): at 10:51

## 2018-10-28 RX ADMIN — HEPARIN SODIUM 5000 UNIT(S): 5000 INJECTION INTRAVENOUS; SUBCUTANEOUS at 22:33

## 2018-10-28 RX ADMIN — LOSARTAN POTASSIUM 50 MILLIGRAM(S): 100 TABLET, FILM COATED ORAL at 05:14

## 2018-10-28 RX ADMIN — HEPARIN SODIUM 5000 UNIT(S): 5000 INJECTION INTRAVENOUS; SUBCUTANEOUS at 15:39

## 2018-10-28 RX ADMIN — Medication 125 MILLIGRAM(S): at 10:54

## 2018-10-28 RX ADMIN — Medication 1 TABLET(S): at 10:51

## 2018-10-28 RX ADMIN — Medication 125 MILLIGRAM(S): at 05:15

## 2018-10-28 RX ADMIN — Medication 125 MILLIGRAM(S): at 17:11

## 2018-10-28 RX ADMIN — Medication 81 MILLIGRAM(S): at 10:51

## 2018-10-28 RX ADMIN — LOSARTAN POTASSIUM 50 MILLIGRAM(S): 100 TABLET, FILM COATED ORAL at 17:11

## 2018-10-28 RX ADMIN — HEPARIN SODIUM 5000 UNIT(S): 5000 INJECTION INTRAVENOUS; SUBCUTANEOUS at 05:14

## 2018-10-29 RX ORDER — APIXABAN 2.5 MG/1
5 TABLET, FILM COATED ORAL EVERY 12 HOURS
Qty: 0 | Refills: 0 | Status: DISCONTINUED | OUTPATIENT
Start: 2018-10-29 | End: 2018-10-30

## 2018-10-29 RX ADMIN — Medication 100 MILLIGRAM(S): at 12:45

## 2018-10-29 RX ADMIN — Medication 125 MILLIGRAM(S): at 12:45

## 2018-10-29 RX ADMIN — LOSARTAN POTASSIUM 50 MILLIGRAM(S): 100 TABLET, FILM COATED ORAL at 06:15

## 2018-10-29 RX ADMIN — HEPARIN SODIUM 5000 UNIT(S): 5000 INJECTION INTRAVENOUS; SUBCUTANEOUS at 06:15

## 2018-10-29 RX ADMIN — APIXABAN 5 MILLIGRAM(S): 2.5 TABLET, FILM COATED ORAL at 18:48

## 2018-10-29 RX ADMIN — Medication 125 MILLIGRAM(S): at 00:56

## 2018-10-29 RX ADMIN — Medication 125 MILLIGRAM(S): at 18:49

## 2018-10-29 RX ADMIN — Medication 20 MILLIGRAM(S): at 18:48

## 2018-10-29 RX ADMIN — Medication 1 TABLET(S): at 12:45

## 2018-10-29 RX ADMIN — LOSARTAN POTASSIUM 50 MILLIGRAM(S): 100 TABLET, FILM COATED ORAL at 18:49

## 2018-10-29 RX ADMIN — Medication 125 MILLIGRAM(S): at 06:14

## 2018-10-29 RX ADMIN — Medication 120 MILLIGRAM(S): at 06:14

## 2018-10-29 NOTE — CONSULT NOTE ADULT - SUBJECTIVE AND OBJECTIVE BOX
Patient is a 78y old  Female who presents with a chief complaint of Fever, Abdominal Pain (26 Oct 2018 13:26)      HPI:  77yo F w/ PMH R nephrolithiasis s/p Nephrostomy tubes and Lithotripsy, Recurrent Cdiff colitis s/p Fecal transplant, HTN, HLD, Gout presents for 2 days of fever (Tmax 101.6 AM of presentation) and generalized abdominal pain. Patient developed fatigue, fevers, chills and "ripping" generalized abdominal pain rating 9/10 2 days prior to admission. Pain worsened on defecation and associated with nausea. Patient had frequent small, formed bowel movements (26 in 30hours) with "slimey, white globs." The morning of admission patient noted that the pain localized to the RLQ leading to the back. Patient denies any alleviating factors and did not take any pain medications. On interview, patient is having aching sensation in abdomen- ripping pain has resolved.     Pateint was diagnosed with Klebsiella UTI on 10/15/18 and treated with 3 days of cipro.     In the ED patient had episode of afib confirmed on EKG- rate 138. Patient given 1.5L NS and converted to NSR- rate 78.     In ED patient recieved 1.5L NS, Tylenol 650mg. (25 Oct 2018 23:24)    10/27 patient admitted with colitis, possibly c diff. in er had several hour episode of afib, did feel palps. brief narrow complex tach overnight.  PAST MEDICAL & SURGICAL HISTORY:  Spinal stenosis of lumbar region  Asthma: h/o last attack &quot; 2 years ago&quot;  H/o Lyme disease:   BELL&#x27;S PALSY  Gout  Spinal stenosis  Scoliosis  Ocular migraine  Kidney stone  Essential hypertension  Clostridium difficile colitis: S/P stool transplant 2017  &quot;with great relief&quot;  H/O lithotripsy: X2  History of UTI: &quot;for 2.5 years&quot;  H/O dilation and curettage: , ,   H/O nephrostomy: and stent insertion - 2017  S/P R ureteral stent placed 17  H/O  section: ,   S/P left knee arthroscopy:   Acinous adenoma of parathyroid gland: , parotidectomy      PREVIOUS DIAGNOSTIC TESTING:      ECHO  FINDINGS:    STRESS  FINDINGS:    CATHETERIZATION  FINDINGS:    MEDICATIONS  (STANDING):  allopurinol 100 milliGRAM(s) Oral daily  aspirin  chewable 81 milliGRAM(s) Oral daily  heparin  Injectable 5000 Unit(s) SubCutaneous every 8 hours  lactobacillus acidophilus 1 Tablet(s) Oral daily  losartan 50 milliGRAM(s) Oral two times a day  sodium chloride 0.9%. 1000 milliLiter(s) (100 mL/Hr) IV Continuous <Continuous>  vancomycin    Solution 125 milliGRAM(s) Oral every 6 hours  verapamil 120 milliGRAM(s) Oral daily    MEDICATIONS  (PRN):  acetaminophen   Tablet .. 650 milliGRAM(s) Oral every 6 hours PRN Temp greater or equal to 38C (100.4F), Mild Pain (1 - 3)      FAMILY HISTORY:  Family history of hypertension in mother (Sibling)  Family history of prostate cancer in father      SOCIAL HISTORY:  ***    REVIEW OF SYSTEM:  Pertinent items are noted in HPI.  Constitutional  Eyes:    Ears, nose, mouth,   throat, and face:    Neck:   Respiratory:   and wheezing  Cardiovascular:    Gastrointestinal:  Genitourinary:     Hematologic/lymphatic:   Musculoskeletal:   Neurological:   Behavioral/Psych:        Vital Signs Last 24 Hrs  T(C): 37.3 (27 Oct 2018 04:25), Max: 38.1 (26 Oct 2018 16:59)  T(F): 99.1 (27 Oct 2018 04:25), Max: 100.6 (26 Oct 2018 16:59)  HR: 75 (27 Oct 2018 04:25) (64 - 75)  BP: 116/54 (27 Oct 2018 04:25) (116/54 - 130/58)  BP(mean): --  RR: 18 (26 Oct 2018 10:18) (18 - 18)  SpO2: 99% (27 Oct 2018 04:25) (98% - 100%)    I&O's Summary    PHYSICAL EXAM  General Appearance: cooperative, no acute distress,   HEENT: PERRL, conjunctiva clear, EOM's intact,     Neck: Supple, , no adenopathy, thyroid: not enlarged, no carotid bruit or JVD  Back: Symmetric, no  tenderness,no soft tissue tenderness  Lungs: Clear to auscultation bilaterally,no adventitious breath sounds, normal   expiratory phase  Heart: Regular rate and rhythm, S1, S2 normal, no murmur,   Abdomen: Soft, non-tender, bowel sounds active , no hepatosplenomegaly  Extremities: no cyanosis or edema, no joint swelling  Skin: Skin color, texture normal, no rashes   Neurologic: Alert and oriented X3 ,         INTERPRETATION OF TELEMETRY:    ECG:        LABS:                          12.8   12.37 )-----------( 236      ( 26 Oct 2018 05:50 )             39.3     10-    141  |  114<H>  |  11  ----------------------------<  110<H>  4.1   |  19<L>  |  0.79    Ca    9.4      27 Oct 2018 05:42  Mg     2.0     10-    TPro  8.0  /  Alb  3.5  /  TBili  1.2  /  DBili  x   /  AST  14<L>  /  ALT  26  /  AlkPhos  79  10-            PT/INR - ( 25 Oct 2018 16:59 )   PT: 12.8 sec;   INR: 1.18 ratio         PTT - ( 25 Oct 2018 16:59 )  PTT:25.8 sec  Urinalysis Basic - ( 25 Oct 2018 16:59 )    Color: Yellow / Appearance: Clear / S.020 / pH: x  Gluc: x / Ketone: Small  / Bili: Negative / Urobili: Negative mg/dL   Blood: x / Protein: 30 mg/dL / Nitrite: Negative   Leuk Esterase: Trace / RBC: 3-5 /HPF / WBC 6-10   Sq Epi: x / Non Sq Epi: Few / Bacteria: Few            RADIOLOGY & ADDITIONAL STUDIES:    IMPRESSION:    PLAN:
Patient is a 78y old  Female who presents with a chief complaint of Fever, Abdominal Pain (29 Oct 2018 15:29)    HPI:  79yo F w/ PMH R nephrolithiasis s/p Nephrostomy tubes and Lithotripsy, Recurrent Cdiff colitis s/p Fecal transplant, HTN, HLD, Gout presents for 2 days of fever (Tmax 101.6 AM of presentation) and generalized abdominal pain. Patient developed fatigue, fevers, chills and "ripping" generalized abdominal pain rating 9/10 2 days prior to admission. Pain worsened on defecation and associated with nausea. Patient had frequent small, formed bowel movements (26 in 30hours) with "slimey, white globs." The morning of admission patient noted that the pain localized to the RLQ leading to the back. Patient denies any alleviating factors and did not take any pain medications. On interview, patient is having aching sensation in abdomen- ripping pain has resolved.     Pateint was diagnosed with Klebsiella UTI on 10/15/18 and treated with 3 days of cipro.     In the ED patient had episode of afib confirmed on EKG- rate 138. Patient given 1.5L NS and converted to NSR- rate 78.     In ED patient recieved 1.5L NS, Tylenol 650mg. (25 Oct 2018 23:24)    10/29/2018-  Pt with less diarrhea.   Reports x5 episodes today.   More formed stools.  Now with some mild abdominal cramping.   S/p fecal transplant in 2017 with Dr. Cullen.     PAST MEDICAL & SURGICAL HISTORY:  Spinal stenosis of lumbar region  Asthma: h/o last attack &quot; 2 years ago&quot;  H/o Lyme disease:   BELL&#x27;S PALSY  Gout  Spinal stenosis  Scoliosis  Ocular migraine  Kidney stone  Essential hypertension  Clostridium difficile colitis: S/P stool transplant 2017  &quot;with great relief&quot;  H/O lithotripsy: X2  History of UTI: &quot;for 2.5 years&quot;  H/O dilation and curettage: , ,   H/O nephrostomy: and stent insertion - 2017  S/P R ureteral stent placed 17  H/O  section: ,   S/P left knee arthroscopy:   Acinous adenoma of parathyroid gland: , parotidectomy    MEDICATIONS  (STANDING):  allopurinol 100 milliGRAM(s) Oral daily  apixaban 5 milliGRAM(s) Oral every 12 hours  lactobacillus acidophilus 1 Tablet(s) Oral daily  losartan 50 milliGRAM(s) Oral two times a day  sodium chloride 0.9%. 1000 milliLiter(s) (100 mL/Hr) IV Continuous <Continuous>  sodium chloride 0.9%. 1000 milliLiter(s) (83 mL/Hr) IV Continuous <Continuous>  vancomycin    Solution 125 milliGRAM(s) Oral every 6 hours  verapamil 120 milliGRAM(s) Oral daily    MEDICATIONS  (PRN):  acetaminophen   Tablet .. 650 milliGRAM(s) Oral every 6 hours PRN Temp greater or equal to 38C (100.4F), Mild Pain (1 - 3)    Allergies  No Known Allergies    FAMILY HISTORY:  Family history of hypertension in mother (Sibling)  Family history of prostate cancer in father    REVIEW OF SYSTEMS:  CONSTITUTIONAL: No weakness, fevers or chills  EYES/ENT: No visual changes;  No vertigo or throat pain   NECK: No pain or stiffness  RESPIRATORY: No cough, wheezing, hemoptysis; No shortness of breath  CARDIOVASCULAR: No chest pain or palpitations  GASTROINTESTINAL: Per HPI  GENITOURINARY: No dysuria, frequency or hematuria  NEUROLOGICAL: No numbness or weakness  SKIN: No itching, burning, rashes, or lesions   PSYCH: Normal mood and affect  All other review of systems is negative unless indicated above.  Vital Signs Last 24 Hrs  T(C): 36.9 (29 Oct 2018 12:20), Max: 36.9 (29 Oct 2018 12:20)  T(F): 98.5 (29 Oct 2018 12:20), Max: 98.5 (29 Oct 2018 12:20)  HR: 48 (29 Oct 2018 12:20) (48 - 54)  BP: 130/42 (29 Oct 2018 12:20) (129/53 - 140/47)  BP(mean): --  RR: 17 (29 Oct 2018 12:20) (17 - 20)  SpO2: 99% (29 Oct 2018 12:20) (98% - 99%)    PHYSICAL EXAM:  Constitutional: NAD, well-developed  HEENT: MMM  Neck: No LAD  Respiratory: CTAB  Cardiovascular: S1 and S2, RRR, no M/G/R  Gastrointestinal: BS+, soft, NT/ND  Extremities: No peripheral edema  Vascular: 2+ peripheral pulses  Neurological: A/O x 3, no focal deficits  Psychiatric: Normal mood, normal affect  Skin: No rashes    LABS:                RADIOLOGY & ADDITIONAL STUDIES:
Patient is a 78y old  Female who presents with a chief complaint of Fever, Abdominal Pain (25 Oct 2018 23:24)    HPI:  77 y/o Female with h/o right nephrolithiasis s/p Nephrostomy tubes and lithotripsy, recurrent Cdiff colitis s/p prior fecal transplant, HTN, HLD, gout was admitted on 10/25 for fever to Tmax 101.6F at home and generalized abdominal pain. Patient developed fatigue, fevers, chills and "ripping" generalized abdominal pain rating 9/10 x 2 days prior to admission. Pain worsened on defecation and associated with nausea. Patient had frequent small, partially formed bowel movements (26 in 30hours) with "slimey, white globs." The morning of admission patient noted that the pain localized to the RLQ leading to the back. Patient was diagnosed with Klebsiella UTI on 10/15/18 and treated with 3 days of cipro. In ER< she received zosyn and vancomycin PO.    PMH: as above  PSH: as above  Meds: per reconciliation sheet, noted below  MEDICATIONS  (STANDING):  allopurinol 100 milliGRAM(s) Oral daily  aspirin  chewable 81 milliGRAM(s) Oral daily  heparin  Injectable 5000 Unit(s) SubCutaneous every 8 hours  lactobacillus acidophilus 1 Tablet(s) Oral daily  losartan 50 milliGRAM(s) Oral two times a day  piperacillin/tazobactam IVPB. 3.375 Gram(s) IV Intermittent every 8 hours  sodium chloride 0.9%. 1000 milliLiter(s) (100 mL/Hr) IV Continuous <Continuous>  vancomycin    Solution 125 milliGRAM(s) Oral every 6 hours  verapamil 120 milliGRAM(s) Oral daily    MEDICATIONS  (PRN):  acetaminophen   Tablet .. 650 milliGRAM(s) Oral every 6 hours PRN Temp greater or equal to 38C (100.4F), Mild Pain (1 - 3)    Allergies    No Known Allergies    Intolerances      Social: no smoking, no alcohol, no illegal drugs; no recent travel, no exposure to TB  FAMILY HISTORY:  Family history of hypertension in mother (Sibling)  Family history of prostate cancer in father    no history of premature cardiovascular disease in first degree relatives    ROS: the patient denies fever, no chills, no HA, no seizures, no dizziness, no sore throat, no nasal congestion, no blurry vision, no CP, no palpitations, no SOB, no cough, has abdominal pain, has diarrhea, no N/V, no dysuria, no leg pain, no claudication, no rash, no joint aches, no rectal pain or bleeding, no night sweats  All other systems reviewed and are negative    Vital Signs Last 24 Hrs  T(C): 36.4 (26 Oct 2018 03:56), Max: 38.6 (25 Oct 2018 16:00)  T(F): 97.6 (26 Oct 2018 03:56), Max: 101.4 (25 Oct 2018 16:00)  HR: 58 (26 Oct 2018 03:56) (58 - 130)  BP: 133/45 (26 Oct 2018 03:56) (105/47 - 155/64)  BP(mean): --  RR: 18 (26 Oct 2018 03:56) (15 - 18)  SpO2: 98% (26 Oct 2018 03:56) (97% - 100%)  Daily Height in cm: 149.86 (25 Oct 2018 15:26)    Daily     PE:    Constitutional: frail looking  HEENT: NC/AT, EOMI, PERRLA, conjunctivae clear; ears and nose atraumatic; pharynx benign  Neck: supple; thyroid not palpable  Back: no tenderness  Respiratory: respiratory effort normal; clear to auscultation  Cardiovascular: S1S2 regular, no murmurs  Abdomen: soft, has periombilical tender, not distended, positive BS; no liver or spleen organomegaly  Genitourinary: no suprapubic tenderness  Lymphatic: no LN palpable  Musculoskeletal: no muscle tenderness, no joint swelling or tenderness  Extremities: no pedal edema  Neurological/ Psychiatric: AxOx3, judgement and insight normal; moving all extremities  Skin: no rashes; no palpable lesions    Labs: all available labs reviewed                        12.8   12.37 )-----------( 236      ( 26 Oct 2018 05:50 )             39.3     10-    144  |  112<H>  |  13  ----------------------------<  85  3.6   |  25  |  0.74    Ca    8.3<L>      26 Oct 2018 05:50  Mg     2.0     10-    TPro  8.0  /  Alb  3.5  /  TBili  1.2  /  DBili  x   /  AST  14<L>  /  ALT  26  /  AlkPhos  79  10-25     LIVER FUNCTIONS - ( 25 Oct 2018 16:59 )  Alb: 3.5 g/dL / Pro: 8.0 gm/dL / ALK PHOS: 79 U/L / ALT: 26 U/L / AST: 14 U/L / GGT: x           Urinalysis Basic - ( 25 Oct 2018 16:59 )    Color: Yellow / Appearance: Clear / S.020 / pH: x  Gluc: x / Ketone: Small  / Bili: Negative / Urobili: Negative mg/dL   Blood: x / Protein: 30 mg/dL / Nitrite: Negative   Leuk Esterase: Trace / RBC: 3-5 /HPF / WBC 6-10   Sq Epi: x / Non Sq Epi: Few / Bacteria: Few          Radiology: all available radiological tests reviewed    < from: CT Abdomen and Pelvis w/ IV Cont (10.25.18 @ 19:12) >  Rectosigmoid colitis.      < end of copied text >      Advanced directives addressed: full resuscitation

## 2018-10-29 NOTE — CONSULT NOTE ADULT - ASSESSMENT
77 y/o female with c. diff colitis.   Hx of c. diff and fecal stool transplant in 2017 done by Dr. Cullen.   Continue with PO vanco.   Start bentyl as needed for abdominal spasms.   May consider re-trial of fecal stool transplant with Dr. Cullen vs. Dr. Magaña.   Discussed with pt, Dr. Ann, nurse.

## 2018-10-29 NOTE — PROGRESS NOTE ADULT - ASSESSMENT
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 Patient developed fatigue, fevers, chills and "ripping" generalized abdominal pain rating 9/10 x 2 days prior to admission. Pain worsened on defecation and associated with nausea. Patient had frequent small, partially formed bowel movements (26 in 30hours) with "slimey, white globs." Patient was diagnosed with Klebsiella UTI on 10/15/18 and treated with 3 days of cipro. In ER< she received zosyn and vancomycin PO.    1, Febrile syndrome resolved. Abdominal pain resolving. Diarrheal syndrome improivng. Recurrent CDAD. Kidney stones.  -s/p recent abx therapy with cipro  -leukocytosis  -no clinical signs of toxic megacolon  -stool for CDT reviewed  -on vancomycin 125 mg PO q6h # 4  -tolerating abx well so far; no side effects noted  -monitor abdomen  -will need a longer course of vancomycin PO  -plan to d/c home in AM if continues to improve; would have her f/u with my office in 2-4 weeks  -monitor temps  -f/u CBC  -supportive care  2. Other issues:   -care per medicine

## 2018-10-29 NOTE — PROGRESS NOTE ADULT - ASSESSMENT
1. Colitis, rule out C Dif.  2. HTN  3. PAF CHADS VAsc 4  4. nephrolithiasis    Plan: cont verapamil, losartan  begin eliquis

## 2018-10-30 ENCOUNTER — TRANSCRIPTION ENCOUNTER (OUTPATIENT)
Age: 78
End: 2018-10-30

## 2018-10-30 VITALS
TEMPERATURE: 98 F | RESPIRATION RATE: 18 BRPM | DIASTOLIC BLOOD PRESSURE: 46 MMHG | OXYGEN SATURATION: 99 % | HEART RATE: 49 BPM | SYSTOLIC BLOOD PRESSURE: 149 MMHG

## 2018-10-30 RX ORDER — VANCOMYCIN HCL 1 G
125 VIAL (EA) INTRAVENOUS
Qty: 300 | Refills: 0 | OUTPATIENT
Start: 2018-10-30 | End: 2018-11-28

## 2018-10-30 RX ORDER — APIXABAN 2.5 MG/1
1 TABLET, FILM COATED ORAL
Qty: 0 | Refills: 0 | COMMUNITY
Start: 2018-10-30

## 2018-10-30 RX ORDER — CIPROFLOXACIN LACTATE 400MG/40ML
1 VIAL (ML) INTRAVENOUS
Qty: 0 | Refills: 0 | COMMUNITY

## 2018-10-30 RX ORDER — APIXABAN 2.5 MG/1
1 TABLET, FILM COATED ORAL
Qty: 60 | Refills: 0 | OUTPATIENT
Start: 2018-10-30

## 2018-10-30 RX ADMIN — Medication 100 MILLIGRAM(S): at 12:23

## 2018-10-30 RX ADMIN — Medication 125 MILLIGRAM(S): at 00:17

## 2018-10-30 RX ADMIN — Medication 20 MILLIGRAM(S): at 06:11

## 2018-10-30 RX ADMIN — LOSARTAN POTASSIUM 50 MILLIGRAM(S): 100 TABLET, FILM COATED ORAL at 06:02

## 2018-10-30 RX ADMIN — Medication 125 MILLIGRAM(S): at 06:04

## 2018-10-30 RX ADMIN — APIXABAN 5 MILLIGRAM(S): 2.5 TABLET, FILM COATED ORAL at 06:02

## 2018-10-30 RX ADMIN — Medication 125 MILLIGRAM(S): at 12:22

## 2018-10-30 RX ADMIN — Medication 120 MILLIGRAM(S): at 06:06

## 2018-10-30 RX ADMIN — Medication 1 TABLET(S): at 12:22

## 2018-10-30 NOTE — DISCHARGE NOTE ADULT - HOSPITAL COURSE
· Subjective and Objective: 	  79 yo F  PMH R nephrolithiasis s/p Nephrostomy tubes and Lithotripsy, Recurrent Cdiff colitis s/p Fecal transplant, HTN, HLD, Gout presents for 2 days of fever (Tmax 101.6 AM of presentation) and generalized abdominal pain. Patient developed fatigue, fevers, chills and "ripping" generalized abdominal pain rating 9/10 2 days prior to admission. Pain worsened on defecation and associated with nausea. Patient had frequent small, formed bowel movements (26 in 30hours) with "slimey, white globs." The morning of admission patient noted that the pain localized to the RLQ leading to the back. Patient denies any alleviating factors and did not take any pain medications. On interview, patient is having aching sensation in abdomen- ripping pain has resolved.     Patient  was diagnosed with Klebsiella UTI on 10/15/18 and treated with 3 days of cipro.     In the ED patient had episode of afib confirmed on EKG- rate 138. Patient given 1.5L NS and converted to NSR- rate 78.     10.26: no cp, no sob, +diarrhea   +abd pain  Tele: NSR  10.27: still has diarrhea, >20 BMs /day  10.28: less diarrhea, 2 BMs in am  10.29: 4 BM this am, c/o abd cramping  10.30: no BM, no cramps      REVIEW OF SYSTEMS:    CONSTITUTIONAL: No weakness, No fevers or chills  ENT: No ear ache, No sorethroat  NECK: No pain, No stiffness  RESPIRATORY: No cough, No wheezing, No hemoptysis; No dyspnea  CARDIOVASCULAR: No chest pain, No palpitations  GASTROINTESTINAL: No abd pain, No nausea, No vomiting, No hematemesis, No diarrhea or constipation. No melena, No hematochezia.  GENITOURINARY: No dysuria, No  hematuria  NEUROLOGICAL: No diplopia, No paresthesia, No motor dysfunction  MUSCULOSKELETAL: No arthralgia, No myalgia  SKIN: No rashes, or lesions   PSYCH: no anxiety, no suicidal ideation    All other review of systems is negative unless indicated above    Vital Signs Last 24 Hrs  T(C): 36.9 (29 Oct 2018 12:20), Max: 36.9 (29 Oct 2018 12:20)  T(F): 98.5 (29 Oct 2018 12:20), Max: 98.5 (29 Oct 2018 12:20)  HR: 48 (29 Oct 2018 12:20) (48 - 54)  BP: 130/42 (29 Oct 2018 12:20) (129/53 - 140/47)  RR: 17 (29 Oct 2018 12:20) (17 - 20)  SpO2: 99% (29 Oct 2018 12:20) (98% - 99%)    PHYSICAL EXAM:    GENERAL: NAD, Well nourished  HEENT:  NC/AT, EOMI, PERRLA, No scleral icterus, Moist mucous membranes  NECK: Supple, No JVD  CNS:  Alert & Oriented X3, Motor Strength 5/5 B/L upper and lower extremities; DTRs 2+ intact   LUNG: Normal Breath sounds, Clear to auscultation bilaterally, No rales, No rhonchi, No wheezing  HEART: RRR; No murmurs, No rubs  ABDOMEN: +BS, diffuse abd ttp  GENITOURINARY: Voiding, Bladder not distended  EXTREMITIES:  2+ Peripheral Pulses, No clubbing, No cyanosis, No tibial edema  MUSCULOSKELTAL: Joints normal ROM, No TTP, No effusion  SKIN: no rashes  RECTAL: deferred, not indicated  BREAST: deferred        Assessment and Plan:    1. Sepsis present on admission due to Cdiff colitis:   Vancomycin orally x 30days as outpt  + Cdiff toxin  GI eval outpt    2. Afib: controlled   now in NSR  cw CaChBlockers/Apixaban    3. Htn: controlled    d/c home, time 35min · Subjective and Objective: 	  77 yo F  PMH R nephrolithiasis s/p Nephrostomy tubes and Lithotripsy, Recurrent Cdiff colitis s/p Fecal transplant, HTN, HLD, Gout presents for 2 days of fever (Tmax 101.6 AM of presentation) and generalized abdominal pain. Patient developed fatigue, fevers, chills and "ripping" generalized abdominal pain rating 9/10 2 days prior to admission. Pain worsened on defecation and associated with nausea. Patient had frequent small, formed bowel movements (26 in 30hours) with "slimey, white globs." The morning of admission patient noted that the pain localized to the RLQ leading to the back. Patient denies any alleviating factors and did not take any pain medications. On interview, patient is having aching sensation in abdomen- ripping pain has resolved.     Patient  was diagnosed with Klebsiella UTI on 10/15/18 and treated with 3 days of cipro.     In the ED patient had episode of afib confirmed on EKG- rate 138. Patient given 1.5L NS and converted to NSR- rate 78.     10.26: no cp, no sob, +diarrhea   +abd pain  Tele: NSR  10.27: still has diarrhea, >20 BMs /day  10.28: less diarrhea, 2 BMs in am  10.29: 4 BM this am, c/o abd cramping  10.30: no BM, no cramps      REVIEW OF SYSTEMS:    CONSTITUTIONAL: No weakness, No fevers or chills  ENT: No ear ache, No sorethroat  NECK: No pain, No stiffness  RESPIRATORY: No cough, No wheezing, No hemoptysis; No dyspnea  CARDIOVASCULAR: No chest pain, No palpitations  GASTROINTESTINAL: No abd pain, No nausea, No vomiting, No hematemesis, No diarrhea or constipation. No melena, No hematochezia.  GENITOURINARY: No dysuria, No  hematuria  NEUROLOGICAL: No diplopia, No paresthesia, No motor dysfunction  MUSCULOSKELETAL: No arthralgia, No myalgia  SKIN: No rashes, or lesions   PSYCH: no anxiety, no suicidal ideation    All other review of systems is negative unless indicated above    Vital Signs Last 24 Hrs  T(C): 36.9 (29 Oct 2018 12:20), Max: 36.9 (29 Oct 2018 12:20)  T(F): 98.5 (29 Oct 2018 12:20), Max: 98.5 (29 Oct 2018 12:20)  HR: 48 (29 Oct 2018 12:20) (48 - 54)  BP: 130/42 (29 Oct 2018 12:20) (129/53 - 140/47)  RR: 17 (29 Oct 2018 12:20) (17 - 20)  SpO2: 99% (29 Oct 2018 12:20) (98% - 99%)    PHYSICAL EXAM:    GENERAL: NAD, Well nourished  HEENT:  NC/AT, EOMI, PERRLA, No scleral icterus, Moist mucous membranes  NECK: Supple, No JVD  CNS:  Alert & Oriented X3, Motor Strength 5/5 B/L upper and lower extremities; DTRs 2+ intact   LUNG: Normal Breath sounds, Clear to auscultation bilaterally, No rales, No rhonchi, No wheezing  HEART: RRR; No murmurs, No rubs  ABDOMEN: +BS, diffuse abd ttp  GENITOURINARY: Voiding, Bladder not distended  EXTREMITIES:  2+ Peripheral Pulses, No clubbing, No cyanosis, No tibial edema  MUSCULOSKELTAL: Joints normal ROM, No TTP, No effusion  SKIN: no rashes  RECTAL: deferred, not indicated  BREAST: deferred        Assessment and Plan:    1. Sepsis present on admission due to Cdiff colitis:   Vancomycin orally x 30days as outpt  + Cdiff toxin  GI eval outpt    2. Afib: controlled, new onset  now in NSR  cw CaChBlockers/Apixaban    3. Htn: controlled    d/c home, time 35min

## 2018-10-30 NOTE — PROGRESS NOTE ADULT - SUBJECTIVE AND OBJECTIVE BOX
79 yo F  PMH R nephrolithiasis s/p Nephrostomy tubes and Lithotripsy, Recurrent Cdiff colitis s/p Fecal transplant, HTN, HLD, Gout presents for 2 days of fever (Tmax 101.6 AM of presentation) and generalized abdominal pain. Patient developed fatigue, fevers, chills and "ripping" generalized abdominal pain rating 9/10 2 days prior to admission. Pain worsened on defecation and associated with nausea. Patient had frequent small, formed bowel movements (26 in 30hours) with "slimey, white globs." The morning of admission patient noted that the pain localized to the RLQ leading to the back. Patient denies any alleviating factors and did not take any pain medications. On interview, patient is having aching sensation in abdomen- ripping pain has resolved.     Patient  was diagnosed with Klebsiella UTI on 10/15/18 and treated with 3 days of cipro.     In the ED patient had episode of afib confirmed on EKG- rate 138. Patient given 1.5L NS and converted to NSR- rate 78.     10.26: no cp, no sob, +diarrhea   +abd pain  Tele: NSR  10.27: still has diarrhea, >20 BMs /day  10.28: less diarrhea, 2 BMs in am  10.29: 4 BM this am, c/o abd cramping      REVIEW OF SYSTEMS:    CONSTITUTIONAL: No weakness, No fevers or chills  ENT: No ear ache, No sorethroat  NECK: No pain, No stiffness  RESPIRATORY: No cough, No wheezing, No hemoptysis; No dyspnea  CARDIOVASCULAR: No chest pain, No palpitations  GASTROINTESTINAL: No abd pain, No nausea, No vomiting, No hematemesis, No diarrhea or constipation. No melena, No hematochezia.  GENITOURINARY: No dysuria, No  hematuria  NEUROLOGICAL: No diplopia, No paresthesia, No motor dysfunction  MUSCULOSKELETAL: No arthralgia, No myalgia  SKIN: No rashes, or lesions   PSYCH: no anxiety, no suicidal ideation    All other review of systems is negative unless indicated above    Vital Signs Last 24 Hrs  T(C): 36.9 (29 Oct 2018 12:20), Max: 36.9 (29 Oct 2018 12:20)  T(F): 98.5 (29 Oct 2018 12:20), Max: 98.5 (29 Oct 2018 12:20)  HR: 48 (29 Oct 2018 12:20) (48 - 54)  BP: 130/42 (29 Oct 2018 12:20) (129/53 - 140/47)  RR: 17 (29 Oct 2018 12:20) (17 - 20)  SpO2: 99% (29 Oct 2018 12:20) (98% - 99%)    PHYSICAL EXAM:    GENERAL: NAD, Well nourished  HEENT:  NC/AT, EOMI, PERRLA, No scleral icterus, Moist mucous membranes  NECK: Supple, No JVD  CNS:  Alert & Oriented X3, Motor Strength 5/5 B/L upper and lower extremities; DTRs 2+ intact   LUNG: Normal Breath sounds, Clear to auscultation bilaterally, No rales, No rhonchi, No wheezing  HEART: RRR; No murmurs, No rubs  ABDOMEN: +BS, diffuse abd ttp  GENITOURINARY: Voiding, Bladder not distended  EXTREMITIES:  2+ Peripheral Pulses, No clubbing, No cyanosis, No tibial edema  MUSCULOSKELTAL: Joints normal ROM, No TTP, No effusion  SKIN: no rashes  RECTAL: deferred, not indicated  BREAST: deferred               MEDICATIONS  (STANDING):  allopurinol 100 milliGRAM(s) Oral daily  aspirin  chewable 81 milliGRAM(s) Oral daily  heparin  Injectable 5000 Unit(s) SubCutaneous every 8 hours  lactobacillus acidophilus 1 Tablet(s) Oral daily  losartan 50 milliGRAM(s) Oral two times a day  sodium chloride 0.9%. 1000 milliLiter(s) (100 mL/Hr) IV Continuous <Continuous>  vancomycin    Solution 125 milliGRAM(s) Oral every 6 hours  verapamil 120 milliGRAM(s) Oral daily    MEDICATIONS  (PRN):  acetaminophen   Tablet .. 650 milliGRAM(s) Oral every 6 hours PRN Temp greater or equal to 38C (100.4F), Mild Pain (1 - 3)      all labs reviewed  all imaging reviewed    Assessment and Plan:    1. Sepsis present on admission due to Cdiff colitis:   Vancomycin orally   + Cdiff toxin  start IV hydration  patient requesting GI evaluation    2. Afib: controlled   now in NSR  Cardizem CD leiva  ASA 81mg/day    3. Htn: controlled  Losartan/Cardizem
77 yo F  PMH R nephrolithiasis s/p Nephrostomy tubes and Lithotripsy, Recurrent Cdiff colitis s/p Fecal transplant, HTN, HLD, Gout presents for 2 days of fever (Tmax 101.6 AM of presentation) and generalized abdominal pain. Patient developed fatigue, fevers, chills and "ripping" generalized abdominal pain rating 9/10 2 days prior to admission. Pain worsened on defecation and associated with nausea. Patient had frequent small, formed bowel movements (26 in 30hours) with "slimey, white globs." The morning of admission patient noted that the pain localized to the RLQ leading to the back. Patient denies any alleviating factors and did not take any pain medications. On interview, patient is having aching sensation in abdomen- ripping pain has resolved.     Patient  was diagnosed with Klebsiella UTI on 10/15/18 and treated with 3 days of cipro.     In the ED patient had episode of afib confirmed on EKG- rate 138. Patient given 1.5L NS and converted to NSR- rate 78.     10.26: no cp, no sob, +diarrhea   +abd pain  Tele: NSR  10.27: still has diarrhea, >20 BMs /day      REVIEW OF SYSTEMS:    CONSTITUTIONAL: No weakness, No fevers or chills  ENT: No ear ache, No sorethroat  NECK: No pain, No stiffness  RESPIRATORY: No cough, No wheezing, No hemoptysis; No dyspnea  CARDIOVASCULAR: No chest pain, No palpitations  GASTROINTESTINAL: No abd pain, No nausea, No vomiting, No hematemesis, No diarrhea or constipation. No melena, No hematochezia.  GENITOURINARY: No dysuria, No  hematuria  NEUROLOGICAL: No diplopia, No paresthesia, No motor dysfunction  MUSCULOSKELETAL: No arthralgia, No myalgia  SKIN: No rashes, or lesions   PSYCH: no anxiety, no suicidal ideation    All other review of systems is negative unless indicated above    Vital Signs Last 24 Hrs  T(C): 36.8 (27 Oct 2018 11:22), Max: 38.1 (26 Oct 2018 16:59)  T(F): 98.3 (27 Oct 2018 11:22), Max: 100.6 (26 Oct 2018 16:59)  HR: 59 (27 Oct 2018 11:22) (59 - 75)  BP: 114/45 (27 Oct 2018 11:22) (114/45 - 130/58)  RR: 18 (27 Oct 2018 11:22) (18 - 18)  SpO2: 97% (27 Oct 2018 11:22) (97% - 99%)  PHYSICAL EXAM:    GENERAL: NAD, Well nourished  HEENT:  NC/AT, EOMI, PERRLA, No scleral icterus, Moist mucous membranes  NECK: Supple, No JVD  CNS:  Alert & Oriented X3, Motor Strength 5/5 B/L upper and lower extremities; DTRs 2+ intact   LUNG: Normal Breath sounds, Clear to auscultation bilaterally, No rales, No rhonchi, No wheezing  HEART: RRR; No murmurs, No rubs  ABDOMEN: +BS, diffuse abd ttp  GENITOURINARY: Voiding, Bladder not distended  EXTREMITIES:  2+ Peripheral Pulses, No clubbing, No cyanosis, No tibial edema  MUSCULOSKELTAL: Joints normal ROM, No TTP, No effusion  SKIN: no rashes  RECTAL: deferred, not indicated  BREAST: deferred               Labs:                        12.8   12.37 )-----------( 236      ( 26 Oct 2018 05:50 )             39.3     10-27    141  |  114<H>  |  11  ----------------------------<  110<H>  4.1   |  19<L>  |  0.79    Ca    9.4      27 Oct 2018 05:42  Mg     2.0     10-26    TPro  8.0  /  Alb  3.5  /  TBili  1.2  /  DBili  x   /  AST  14<L>  /  ALT  26  /  AlkPhos  79  10-25           Cultures:     Vancomycin levels:   Cultures:     MEDICATIONS  (STANDING):  allopurinol 100 milliGRAM(s) Oral daily  aspirin  chewable 81 milliGRAM(s) Oral daily  heparin  Injectable 5000 Unit(s) SubCutaneous every 8 hours  lactobacillus acidophilus 1 Tablet(s) Oral daily  losartan 50 milliGRAM(s) Oral two times a day  sodium chloride 0.9%. 1000 milliLiter(s) (100 mL/Hr) IV Continuous <Continuous>  vancomycin    Solution 125 milliGRAM(s) Oral every 6 hours  verapamil 120 milliGRAM(s) Oral daily    MEDICATIONS  (PRN):  acetaminophen   Tablet .. 650 milliGRAM(s) Oral every 6 hours PRN Temp greater or equal to 38C (100.4F), Mild Pain (1 - 3)      all labs reviewed  all imaging reviewed    Assessment and Plan:    1. Sepsis present on admission due to Cdiff colitis:   Vancomycin orally   f/u Cdiff toxin: +  start IV hydration    2. Afib: controlled   now in NSR  Cardizem CD leiva  ASA 81mg/day    3. Htn: controlled  Losartan/Cardizem
79 yo F  PMH R nephrolithiasis s/p Nephrostomy tubes and Lithotripsy, Recurrent Cdiff colitis s/p Fecal transplant, HTN, HLD, Gout presents for 2 days of fever (Tmax 101.6 AM of presentation) and generalized abdominal pain. Patient developed fatigue, fevers, chills and "ripping" generalized abdominal pain rating 9/10 2 days prior to admission. Pain worsened on defecation and associated with nausea. Patient had frequent small, formed bowel movements (26 in 30hours) with "slimey, white globs." The morning of admission patient noted that the pain localized to the RLQ leading to the back. Patient denies any alleviating factors and did not take any pain medications. On interview, patient is having aching sensation in abdomen- ripping pain has resolved.     Patient  was diagnosed with Klebsiella UTI on 10/15/18 and treated with 3 days of cipro.     In the ED patient had episode of afib confirmed on EKG- rate 138. Patient given 1.5L NS and converted to NSR- rate 78.     10.26: no cp, no sob, +diarrhea   +abd pain  Tele: NSR      REVIEW OF SYSTEMS:    CONSTITUTIONAL: No weakness, No fevers or chills  ENT: No ear ache, No sorethroat  NECK: No pain, No stiffness  RESPIRATORY: No cough, No wheezing, No hemoptysis; No dyspnea  CARDIOVASCULAR: No chest pain, No palpitations  GASTROINTESTINAL: No abd pain, No nausea, No vomiting, No hematemesis, No diarrhea or constipation. No melena, No hematochezia.  GENITOURINARY: No dysuria, No  hematuria  NEUROLOGICAL: No diplopia, No paresthesia, No motor dysfunction  MUSCULOSKELETAL: No arthralgia, No myalgia  SKIN: No rashes, or lesions   PSYCH: no anxiety, no suicidal ideation    All other review of systems is negative unless indicated above    Vital Signs Last 24 Hrs  T(C): 37 (26 Oct 2018 10:18), Max: 38.6 (25 Oct 2018 16:00)  T(F): 98.6 (26 Oct 2018 10:18), Max: 101.4 (25 Oct 2018 16:00)  HR: 64 (26 Oct 2018 10:18) (58 - 130)  BP: 128/46 (26 Oct 2018 10:18) (105/47 - 155/64)  RR: 18 (26 Oct 2018 10:18) (15 - 18)  SpO2: 100% (26 Oct 2018 10:18) (97% - 100%)    PHYSICAL EXAM:    GENERAL: NAD, Well nourished  HEENT:  NC/AT, EOMI, PERRLA, No scleral icterus, Moist mucous membranes  NECK: Supple, No JVD  CNS:  Alert & Oriented X3, Motor Strength 5/5 B/L upper and lower extremities; DTRs 2+ intact   LUNG: Normal Breath sounds, Clear to auscultation bilaterally, No rales, No rhonchi, No wheezing  HEART: RRR; No murmurs, No rubs  ABDOMEN: +BS, diffuse abd ttp  GENITOURINARY: Voiding, Bladder not distended  EXTREMITIES:  2+ Peripheral Pulses, No clubbing, No cyanosis, No tibial edema  MUSCULOSKELTAL: Joints normal ROM, No TTP, No effusion  SKIN: no rashes  RECTAL: deferred, not indicated  BREAST: deferred                          12.8   12.37 )-----------( 236      ( 26 Oct 2018 05:50 )             39.3     10-26    144  |  112<H>  |  13  ----------------------------<  85  3.6   |  25  |  0.74    Ca    8.3<L>      26 Oct 2018 05:50  Mg     2.0     10-26    TPro  8.0  /  Alb  3.5  /  TBili  1.2  /  DBili  x   /  AST  14<L>  /  ALT  26  /  AlkPhos  79  10-25    Vancomycin levels:   Cultures:     MEDICATIONS  (STANDING):  allopurinol 100 milliGRAM(s) Oral daily  aspirin  chewable 81 milliGRAM(s) Oral daily  heparin  Injectable 5000 Unit(s) SubCutaneous every 8 hours  lactobacillus acidophilus 1 Tablet(s) Oral daily  losartan 50 milliGRAM(s) Oral two times a day  sodium chloride 0.9%. 1000 milliLiter(s) (100 mL/Hr) IV Continuous <Continuous>  vancomycin    Solution 125 milliGRAM(s) Oral every 6 hours  verapamil 120 milliGRAM(s) Oral daily    MEDICATIONS  (PRN):  acetaminophen   Tablet .. 650 milliGRAM(s) Oral every 6 hours PRN Temp greater or equal to 38C (100.4F), Mild Pain (1 - 3)      all labs reviewed  all imaging reviewed    Assessment and Plan:    1. Sepsis present on admission due to Cdiff colitis:   Vancomycin orally   f/u Cdiff toxin    2. Afib: controlled   now in NSR  Cardizem CD leiva  ASA 81mg/day    3. Htn: controlled  Losartan/Cardizem
79 yo F  PMH R nephrolithiasis s/p Nephrostomy tubes and Lithotripsy, Recurrent Cdiff colitis s/p Fecal transplant, HTN, HLD, Gout presents for 2 days of fever (Tmax 101.6 AM of presentation) and generalized abdominal pain. Patient developed fatigue, fevers, chills and "ripping" generalized abdominal pain rating 9/10 2 days prior to admission. Pain worsened on defecation and associated with nausea. Patient had frequent small, formed bowel movements (26 in 30hours) with "slimey, white globs." The morning of admission patient noted that the pain localized to the RLQ leading to the back. Patient denies any alleviating factors and did not take any pain medications. On interview, patient is having aching sensation in abdomen- ripping pain has resolved.     Patient  was diagnosed with Klebsiella UTI on 10/15/18 and treated with 3 days of cipro.     In the ED patient had episode of afib confirmed on EKG- rate 138. Patient given 1.5L NS and converted to NSR- rate 78.     10.26: no cp, no sob, +diarrhea   +abd pain  Tele: NSR  10.27: still has diarrhea, >20 BMs /day  10.28: less diarrhea, 2 BMs in am      REVIEW OF SYSTEMS:    CONSTITUTIONAL: No weakness, No fevers or chills  ENT: No ear ache, No sorethroat  NECK: No pain, No stiffness  RESPIRATORY: No cough, No wheezing, No hemoptysis; No dyspnea  CARDIOVASCULAR: No chest pain, No palpitations  GASTROINTESTINAL: No abd pain, No nausea, No vomiting, No hematemesis, No diarrhea or constipation. No melena, No hematochezia.  GENITOURINARY: No dysuria, No  hematuria  NEUROLOGICAL: No diplopia, No paresthesia, No motor dysfunction  MUSCULOSKELETAL: No arthralgia, No myalgia  SKIN: No rashes, or lesions   PSYCH: no anxiety, no suicidal ideation    All other review of systems is negative unless indicated above    Vital Signs Last 24 Hrs  T(C): 36.7 (28 Oct 2018 11:44), Max: 36.9 (27 Oct 2018 17:37)  T(F): 98 (28 Oct 2018 11:44), Max: 98.4 (27 Oct 2018 17:37)  HR: 52 (28 Oct 2018 11:44) (49 - 54)  BP: 132/40 (28 Oct 2018 11:44) (132/40 - 143/40)  RR: 18 (28 Oct 2018 11:44) (16 - 18)  SpO2: 98% (28 Oct 2018 11:44) (98% - 99%)    PHYSICAL EXAM:    GENERAL: NAD, Well nourished  HEENT:  NC/AT, EOMI, PERRLA, No scleral icterus, Moist mucous membranes  NECK: Supple, No JVD  CNS:  Alert & Oriented X3, Motor Strength 5/5 B/L upper and lower extremities; DTRs 2+ intact   LUNG: Normal Breath sounds, Clear to auscultation bilaterally, No rales, No rhonchi, No wheezing  HEART: RRR; No murmurs, No rubs  ABDOMEN: +BS, diffuse abd ttp  GENITOURINARY: Voiding, Bladder not distended  EXTREMITIES:  2+ Peripheral Pulses, No clubbing, No cyanosis, No tibial edema  MUSCULOSKELTAL: Joints normal ROM, No TTP, No effusion  SKIN: no rashes  RECTAL: deferred, not indicated  BREAST: deferred               Labs:    10-27    141  |  114<H>  |  11  ----------------------------<  110<H>  4.1   |  19<L>  |  0.79    Ca    9.4      27 Oct 2018 05:42             Cultures:     MEDICATIONS  (STANDING):  allopurinol 100 milliGRAM(s) Oral daily  aspirin  chewable 81 milliGRAM(s) Oral daily  heparin  Injectable 5000 Unit(s) SubCutaneous every 8 hours  lactobacillus acidophilus 1 Tablet(s) Oral daily  losartan 50 milliGRAM(s) Oral two times a day  sodium chloride 0.9%. 1000 milliLiter(s) (100 mL/Hr) IV Continuous <Continuous>  vancomycin    Solution 125 milliGRAM(s) Oral every 6 hours  verapamil 120 milliGRAM(s) Oral daily    MEDICATIONS  (PRN):  acetaminophen   Tablet .. 650 milliGRAM(s) Oral every 6 hours PRN Temp greater or equal to 38C (100.4F), Mild Pain (1 - 3)      all labs reviewed  all imaging reviewed    Assessment and Plan:    1. Sepsis present on admission due to Cdiff colitis:   Vancomycin orally   f/u Cdiff toxin: +  start IV hydration    2. Afib: controlled   now in NSR  Cardizem CD leiva  ASA 81mg/day    3. Htn: controlled  Losartan/Cardizem
Date of service: 10-29-18 @ 14:10    Lying in bed in NAD  Weak looking  Diarrhea is improving  Stool are more formed  Has abdominal cramps at times    ROS: no fever or chills; denies dizziness, no HA, no SOB or cough, no dysuria, no legs pain, no rashes    MEDICATIONS  (STANDING):  allopurinol 100 milliGRAM(s) Oral daily  apixaban 5 milliGRAM(s) Oral every 12 hours  lactobacillus acidophilus 1 Tablet(s) Oral daily  losartan 50 milliGRAM(s) Oral two times a day  sodium chloride 0.9%. 1000 milliLiter(s) (100 mL/Hr) IV Continuous <Continuous>  sodium chloride 0.9%. 1000 milliLiter(s) (83 mL/Hr) IV Continuous <Continuous>  vancomycin    Solution 125 milliGRAM(s) Oral every 6 hours  verapamil 120 milliGRAM(s) Oral daily      Vital Signs Last 24 Hrs  T(C): 36.9 (29 Oct 2018 12:20), Max: 36.9 (29 Oct 2018 12:20)  T(F): 98.5 (29 Oct 2018 12:20), Max: 98.5 (29 Oct 2018 12:20)  HR: 48 (29 Oct 2018 12:20) (48 - 54)  BP: 130/42 (29 Oct 2018 12:20) (129/53 - 140/47)  BP(mean): --  RR: 17 (29 Oct 2018 12:20) (17 - 20)  SpO2: 99% (29 Oct 2018 12:20) (98% - 99%)    Physical Exam:      Constitutional: frail looking  HEENT: NC/AT, EOMI, PERRLA, conjunctivae clear  Neck: supple; thyroid not palpable  Back: no tenderness  Respiratory: respiratory effort normal; clear to auscultation  Cardiovascular: S1S2 regular, no murmurs  Abdomen: soft, has periombilical tender, not distended, positive BS  Genitourinary: no suprapubic tenderness  Lymphatic: no LN palpable  Musculoskeletal: no muscle tenderness, no joint swelling or tenderness  Extremities: no pedal edema  Neurological/ Psychiatric: AxOx3, moving all extremities  Skin: no rashes; no palpable lesions    Labs: reviewed                        12.8   12.37 )-----------( 236      ( 26 Oct 2018 05:50 )             39.3     10-26    144  |  112<H>  |  13  ----------------------------<  85  3.6   |  25  |  0.74    Ca    8.3<L>      26 Oct 2018 05:50  Mg     2.0     10-    TPro  8.0  /  Alb  3.5  /  TBili  1.2  /  DBili  x   /  AST  14<L>  /  ALT  26  /  AlkPhos  79  10-     LIVER FUNCTIONS - ( 25 Oct 2018 16:59 )  Alb: 3.5 g/dL / Pro: 8.0 gm/dL / ALK PHOS: 79 U/L / ALT: 26 U/L / AST: 14 U/L / GGT: x           Urinalysis Basic - ( 25 Oct 2018 16:59 )    Color: Yellow / Appearance: Clear / S.020 / pH: x  Gluc: x / Ketone: Small  / Bili: Negative / Urobili: Negative mg/dL   Blood: x / Protein: 30 mg/dL / Nitrite: Negative   Leuk Esterase: Trace / RBC: 3-5 /HPF / WBC 6-10   Sq Epi: x / Non Sq Epi: Few / Bacteria: Few          Radiology: all available radiological tests reviewed    < from: CT Abdomen and Pelvis w/ IV Cont (10.25. @ 19:12) >  Rectosigmoid colitis.      < end of copied text >      Advanced directives addressed: full resuscitation
Date of service: 10-30-18 @ 11:43    No further abdominal pain  Diarrhea is resolving  No new complaints    ROS: no fever or chills; denies dizziness, no HA, no SOB or cough, no dysuria, no legs pain, no rashes    MEDICATIONS  (STANDING):  allopurinol 100 milliGRAM(s) Oral daily  apixaban 5 milliGRAM(s) Oral every 12 hours  lactobacillus acidophilus 1 Tablet(s) Oral daily  losartan 50 milliGRAM(s) Oral two times a day  sodium chloride 0.9%. 1000 milliLiter(s) (100 mL/Hr) IV Continuous <Continuous>  sodium chloride 0.9%. 1000 milliLiter(s) (83 mL/Hr) IV Continuous <Continuous>  vancomycin    Solution 125 milliGRAM(s) Oral every 6 hours  verapamil 120 milliGRAM(s) Oral daily      Vital Signs Last 24 Hrs  T(C): 36.6 (30 Oct 2018 05:07), Max: 36.9 (29 Oct 2018 12:20)  T(F): 97.9 (30 Oct 2018 05:07), Max: 98.5 (29 Oct 2018 12:20)  HR: 48 (30 Oct 2018 07:27) (46 - 51)  BP: 129/40 (30 Oct 2018 07:27) (121/45 - 156/54)  BP(mean): --  RR: 8 (30 Oct 2018 05:07) (8 - 18)  SpO2: 98% (30 Oct 2018 07:27) (98% - 99%)    Physical Exam:      Constitutional: frail looking  HEENT: NC/AT, EOMI, PERRLA, conjunctivae clear  Neck: supple; thyroid not palpable  Back: no tenderness  Respiratory: respiratory effort normal; clear to auscultation  Cardiovascular: S1S2 regular, no murmurs  Abdomen: soft, not tender, not distended, positive BS  Genitourinary: no suprapubic tenderness  Lymphatic: no LN palpable  Musculoskeletal: no muscle tenderness, no joint swelling or tenderness  Extremities: no pedal edema  Neurological/ Psychiatric: AxOx3, moving all extremities  Skin: no rashes; no palpable lesions    Labs: reviewed                        12.8   1237 )-----------( 236      ( 26 Oct 2018 05:50 )             39.3     10-26    144  |  112<H>  |  13  ----------------------------<  85  3.6   |  25  |  0.74    Ca    8.3<L>      26 Oct 2018 05:50  Mg     2.0     10-    TPro  8.0  /  Alb  3.5  /  TBili  1.2  /  DBili  x   /  AST  14<L>  /  ALT  26  /  AlkPhos  79  10-25     LIVER FUNCTIONS - ( 25 Oct 2018 16:59 )  Alb: 3.5 g/dL / Pro: 8.0 gm/dL / ALK PHOS: 79 U/L / ALT: 26 U/L / AST: 14 U/L / GGT: x           Urinalysis Basic - ( 25 Oct 2018 16:59 )    Color: Yellow / Appearance: Clear / S.020 / pH: x  Gluc: x / Ketone: Small  / Bili: Negative / Urobili: Negative mg/dL   Blood: x / Protein: 30 mg/dL / Nitrite: Negative   Leuk Esterase: Trace / RBC: 3-5 /HPF / WBC 6-10   Sq Epi: x / Non Sq Epi: Few / Bacteria: Few      GI PCR Panel, Stool (collected 26 Oct 2018 08:55)  Source: .Stool Feces  Final Report (26 Oct 2018 13:27):    GI PCR Results: NOT detected    *******Please Note:*******    GI panel PCR evaluates for:    Campylobacter, Plesiomonas shigelloides, Salmonella,    Vibrio, Yersinia enterocolitica, Enteroaggregative    Escherichia coli (EAEC), Enteropathogenic E.coli (EPEC),    Enterotoxigenic E. coli (ETEC) lt/st, Shiga-like    toxin-producing E. coli (STEC) stx1/stx2,    Shigella/ Enteroinvasive E. coli (EIEC), Cryptosporidium,    Cyclospora cayetanensis, Entamoeba histolytica,    Giardia lamblia, Adenovirus F 40/41, Astrovirus,    Clostridium difficile Toxin by PCR (10.26.18 @ 08:45)    Clostridium difficile Toxin by PCR:     C Diff by PCR Result: Detected      Culture - Urine (collected 25 Oct 2018 16:59)  Source: .Urine None  Final Report (26 Oct 2018 16:46):    No growth    Culture - Blood (collected 25 Oct 2018 16:59)  Source: .Blood None  Preliminary Report (26 Oct 2018 23:01):    No growth to date.    Culture - Blood (collected 25 Oct 2018 16:59)  Source: .Blood None  Preliminary Report (26 Oct 2018 23:01):    No growth to date.        Radiology: all available radiological tests reviewed    < from: CT Abdomen and Pelvis w/ IV Cont (10.25.18 @ 19:12) >  Rectosigmoid colitis.      < end of copied text >      Advanced directives addressed: full resuscitation
Patient is a 78y old  Female who presents with a chief complaint of Fever, Abdominal Pain (27 Oct 2018 13:43)      HPI:  77yo F w/ PMH R nephrolithiasis s/p Nephrostomy tubes and Lithotripsy, Recurrent Cdiff colitis s/p Fecal transplant, HTN, HLD, Gout presents for 2 days of fever (Tmax 101.6 AM of presentation) and generalized abdominal pain. Patient developed fatigue, fevers, chills and "ripping" generalized abdominal pain rating 9/10 2 days prior to admission. Pain worsened on defecation and associated with nausea. Patient had frequent small, formed bowel movements (26 in 30hours) with "slimey, white globs." The morning of admission patient noted that the pain localized to the RLQ leading to the back. Patient denies any alleviating factors and did not take any pain medications. On interview, patient is having aching sensation in abdomen- ripping pain has resolved.     Pateint was diagnosed with Klebsiella UTI on 10/15/18 and treated with 3 days of cipro.     In the ED patient had episode of afib confirmed on EKG- rate 138. Patient given 1.5L NS and converted to NSR- rate 78.     In ED patient recieved 1.5L NS, Tylenol 650mg. (25 Oct 2018 23:24)    10/29- diarrhea continues, minimla abd pain  PAST MEDICAL & SURGICAL HISTORY:  Spinal stenosis of lumbar region  Asthma: h/o last attack &quot; 2 years ago&quot;  H/o Lyme disease:   BELL&#x27;S PALSY  Gout  Spinal stenosis  Scoliosis  Ocular migraine  Kidney stone  Essential hypertension  Clostridium difficile colitis: S/P stool transplant 2017  &quot;with great relief&quot;  H/O lithotripsy: X2  History of UTI: &quot;for 2.5 years&quot;  H/O dilation and curettage: , ,   H/O nephrostomy: and stent insertion - 2017  S/P R ureteral stent placed 17  H/O  section: ,   S/P left knee arthroscopy:   Acinous adenoma of parathyroid gland: , parotidectomy        MEDICATIONS  (STANDING):  allopurinol 100 milliGRAM(s) Oral daily  aspirin  chewable 81 milliGRAM(s) Oral daily  heparin  Injectable 5000 Unit(s) SubCutaneous every 8 hours  lactobacillus acidophilus 1 Tablet(s) Oral daily  losartan 50 milliGRAM(s) Oral two times a day  sodium chloride 0.9%. 1000 milliLiter(s) (100 mL/Hr) IV Continuous <Continuous>  sodium chloride 0.9%. 1000 milliLiter(s) (83 mL/Hr) IV Continuous <Continuous>  vancomycin    Solution 125 milliGRAM(s) Oral every 6 hours  verapamil 120 milliGRAM(s) Oral daily    MEDICATIONS  (PRN):  acetaminophen   Tablet .. 650 milliGRAM(s) Oral every 6 hours PRN Temp greater or equal to 38C (100.4F), Mild Pain (1 - 3)          Vital Signs Last 24 Hrs  T(C): 36.6 (28 Oct 2018 04:18), Max: 36.9 (27 Oct 2018 17:37)  T(F): 97.8 (28 Oct 2018 04:18), Max: 98.4 (27 Oct 2018 17:37)  HR: 49 (28 Oct 2018 04:18) (49 - 59)  BP: 132/47 (28 Oct 2018 04:18) (114/45 - 143/40)  BP(mean): --  RR: 18 (28 Oct 2018 04:18) (16 - 18)  SpO2: 98% (28 Oct 2018 04:18) (97% - 99%)    I&O's Summary    27 Oct 2018 07:01  -  28 Oct 2018 07:00  --------------------------------------------------------  IN: 279.3 mL / OUT: 0 mL / NET: 279.3 mL      PHYSICAL EXAM  General Appearance: cooperative, no acute distress,   HEENT: PERRL, conjunctiva clear, EOM's intact,     Neck: Supple, , no adenopathy, thyroid: not enlarged, no carotid bruit or JVD  Back: Symmetric, no  tenderness,no soft tissue tenderness  Lungs: Clear to auscultation bilaterally,no adventitious breath sounds, normal   expiratory phase  Heart: Regular rate and rhythm, S1, S2 normal, no murmur,   Abdomen: Soft, non-tender, bowel sounds active , no hepatosplenomegaly  Extremities: no cyanosis or edema, no joint swelling  Skin: Skin color, texture normal, no rashes   Neurologic: Alert and oriented X3 ,     INTERPRETATION OF TELEMETRY:    ECG:        LABS:      10-27    141  |  114<H>  |  11  ----------------------------<  110<H>  4.1   |  19<L>  |  0.79    Ca    9.4      27 Oct 2018 05:42                      Assessment and Recommendation:   · Assessment		  1.  C difficile colitis  2. HTN  3. PAF CHADS VAsc 4  4. nephrolithiasis    Plan: cont cardiac monitoring. abx per id. Continue losartan, verapamil, vancomycin  will need Anticoagulation
Patient is a 78y old  Female who presents with a chief complaint of Fever, Abdominal Pain (28 Oct 2018 13:39)      HPI:  77yo F w/ PMH R nephrolithiasis s/p Nephrostomy tubes and Lithotripsy, Recurrent Cdiff colitis s/p Fecal transplant, HTN, HLD, Gout presents for 2 days of fever (Tmax 101.6 AM of presentation) and generalized abdominal pain. Patient developed fatigue, fevers, chills and "ripping" generalized abdominal pain rating 9/10 2 days prior to admission. Pain worsened on defecation and associated with nausea. Patient had frequent small, formed bowel movements (26 in 30hours) with "slimey, white globs." The morning of admission patient noted that the pain localized to the RLQ leading to the back. Patient denies any alleviating factors and did not take any pain medications. On interview, patient is having aching sensation in abdomen- ripping pain has resolved.     Pateint was diagnosed with Klebsiella UTI on 10/15/18 and treated with 3 days of cipro.     In the ED patient had episode of afib confirmed on EKG- rate 138. Patient given 1.5L NS and converted to NSR- rate 78.     In ED patient recieved 1.5L NS, Tylenol 650mg. (25 Oct 2018 23:24)    10/27 patient admitted with colitis, possibly c diff. in er had several hour episode of afib, did feel palps. brief narrow complex tach overnight.  10/29 feeling better, less diarrhea. no palps    PAST MEDICAL & SURGICAL HISTORY:  Spinal stenosis of lumbar region  Asthma: h/o last attack &quot; 2 years ago&quot;  H/o Lyme disease:   BELL&#x27;S PALSY  Gout  Spinal stenosis  Scoliosis  Ocular migraine  Kidney stone  Essential hypertension  Clostridium difficile colitis: S/P stool transplant 2017  &quot;with great relief&quot;  H/O lithotripsy: X2  History of UTI: &quot;for 2.5 years&quot;  H/O dilation and curettage: , ,   H/O nephrostomy: and stent insertion - 2017  S/P R ureteral stent placed 17  H/O  section: ,   S/P left knee arthroscopy:   Acinous adenoma of parathyroid gland: , parotidectomy        MEDICATIONS  (STANDING):  allopurinol 100 milliGRAM(s) Oral daily  aspirin  chewable 81 milliGRAM(s) Oral daily  heparin  Injectable 5000 Unit(s) SubCutaneous every 8 hours  lactobacillus acidophilus 1 Tablet(s) Oral daily  losartan 50 milliGRAM(s) Oral two times a day  sodium chloride 0.9%. 1000 milliLiter(s) (100 mL/Hr) IV Continuous <Continuous>  sodium chloride 0.9%. 1000 milliLiter(s) (83 mL/Hr) IV Continuous <Continuous>  vancomycin    Solution 125 milliGRAM(s) Oral every 6 hours  verapamil 120 milliGRAM(s) Oral daily    MEDICATIONS  (PRN):  acetaminophen   Tablet .. 650 milliGRAM(s) Oral every 6 hours PRN Temp greater or equal to 38C (100.4F), Mild Pain (1 - 3)          Vital Signs Last 24 Hrs  T(C): 36.7 (29 Oct 2018 04:56), Max: 36.8 (28 Oct 2018 16:53)  T(F): 98 (29 Oct 2018 04:56), Max: 98.2 (28 Oct 2018 16:53)  HR: 50 (29 Oct 2018 04:56) (50 - 54)  BP: 140/47 (29 Oct 2018 04:56) (129/53 - 140/47)  BP(mean): --  RR: 18 (29 Oct 2018 04:56) (18 - 20)  SpO2: 98% (29 Oct 2018 04:56) (98% - 99%)    I&O's Summary      PHYSICAL EXAM  General Appearance: NAD  HEENT: NCAT  Neck:   Back:   Lungs: clear  Heart: RR s1s2  Abdomen: soft nt  Extremities: no edema  Skin:   Neurologic:       INTERPRETATION OF TELEMETRY:    ECG:        LABS:                              RADIOLOGY & ADDITIONAL STUDIES:
Patient is a 78y old  Female who presents with a chief complaint of Fever, Abdominal Pain (29 Oct 2018 16:37)      HPI:  77yo F w/ PMH R nephrolithiasis s/p Nephrostomy tubes and Lithotripsy, Recurrent Cdiff colitis s/p Fecal transplant, HTN, HLD, Gout presents for 2 days of fever (Tmax 101.6 AM of presentation) and generalized abdominal pain. Patient developed fatigue, fevers, chills and "ripping" generalized abdominal pain rating 9/10 2 days prior to admission. Pain worsened on defecation and associated with nausea. Patient had frequent small, formed bowel movements (26 in 30hours) with "slimey, white globs." The morning of admission patient noted that the pain localized to the RLQ leading to the back. Patient denies any alleviating factors and did not take any pain medications. On interview, patient is having aching sensation in abdomen- ripping pain has resolved.     Pateint was diagnosed with Klebsiella UTI on 10/15/18 and treated with 3 days of cipro.     In the ED patient had episode of afib confirmed on EKG- rate 138. Patient given 1.5L NS and converted to NSR- rate 78.     In ED patient recieved 1.5L NS, Tylenol 650mg. (25 Oct 2018 23:24)  10/30- no abdominal pain. No stools on 10/29    PAST MEDICAL & SURGICAL HISTORY:  Spinal stenosis of lumbar region  Asthma: h/o last attack &quot; 2 years ago&quot;  H/o Lyme disease:   BELL&#x27;S PALSY  Gout  Spinal stenosis  Scoliosis  Ocular migraine  Kidney stone  Essential hypertension  Clostridium difficile colitis: S/P stool transplant 2017  &quot;with great relief&quot;  H/O lithotripsy: X2  History of UTI: &quot;for 2.5 years&quot;  H/O dilation and curettage: , ,   H/O nephrostomy: and stent insertion - 2017  S/P R ureteral stent placed 17  H/O  section: ,   S/P left knee arthroscopy:   Acinous adenoma of parathyroid gland: , parotidectomy        MEDICATIONS  (STANDING):  allopurinol 100 milliGRAM(s) Oral daily  apixaban 5 milliGRAM(s) Oral every 12 hours  lactobacillus acidophilus 1 Tablet(s) Oral daily  losartan 50 milliGRAM(s) Oral two times a day  sodium chloride 0.9%. 1000 milliLiter(s) (100 mL/Hr) IV Continuous <Continuous>  sodium chloride 0.9%. 1000 milliLiter(s) (83 mL/Hr) IV Continuous <Continuous>  vancomycin    Solution 125 milliGRAM(s) Oral every 6 hours  verapamil 120 milliGRAM(s) Oral daily    MEDICATIONS  (PRN):  acetaminophen   Tablet .. 650 milliGRAM(s) Oral every 6 hours PRN Temp greater or equal to 38C (100.4F), Mild Pain (1 - 3)  dicyclomine 20 milliGRAM(s) Oral three times a day before meals PRN abdominal spasms.          Vital Signs Last 24 Hrs  T(C): 36.6 (30 Oct 2018 05:07), Max: 36.9 (29 Oct 2018 12:20)  T(F): 97.9 (30 Oct 2018 05:07), Max: 98.5 (29 Oct 2018 12:20)  HR: 48 (30 Oct 2018 07:27) (46 - 51)  BP: 129/40 (30 Oct 2018 07:27) (121/45 - 156/54)  BP(mean): --  RR: 8 (30 Oct 2018 05:07) (8 - 18)  SpO2: 98% (30 Oct 2018 07:27) (98% - 99%)    I&O's Summary    PHYSICAL EXAM  General Appearance: NAD  HEENT: NCAT  Neck:   Back:   Lungs: clear  Heart: RR s1s2  Abdomen: soft nt  Extremities: no edema  INTERPRETATION OF TELEMETRY:    ECG:        LABS:              Assessment and Plan:   · Assessment		  1. Colitis, improved clinically  2. HTN  3. PAF CHADS VAsc 4  4. nephrolithiasis    Plan: cont verapamil, losartan   suggest begin eliquis in view of Chads Vasc 4 status. Hope to discharge today.
Patient is a 78y old  Female who presents with a chief complaint of Fever, Abdominal Pain (30 Oct 2018 07:37)    HPI:  79yo F w/ PMH R nephrolithiasis s/p Nephrostomy tubes and Lithotripsy, Recurrent Cdiff colitis s/p Fecal transplant, HTN, HLD, Gout presents for 2 days of fever (Tmax 101.6 AM of presentation) and generalized abdominal pain. Patient developed fatigue, fevers, chills and "ripping" generalized abdominal pain rating 9/10 2 days prior to admission. Pain worsened on defecation and associated with nausea. Patient had frequent small, formed bowel movements (26 in 30hours) with "slimey, white globs." The morning of admission patient noted that the pain localized to the RLQ leading to the back. Patient denies any alleviating factors and did not take any pain medications. On interview, patient is having aching sensation in abdomen- ripping pain has resolved.     Pateint was diagnosed with Klebsiella UTI on 10/15/18 and treated with 3 days of cipro.     In the ED patient had episode of afib confirmed on EKG- rate 138. Patient given 1.5L NS and converted to NSR- rate 78.     In ED patient recieved 1.5L NS, Tylenol 650mg. (25 Oct 2018 23:24)    10/30/2018-  Pt reports no BM since 2pm yesterday.  Tolerating diet without difficulty.  Denies any further abdominal pain, n/v, rectal bleeding, or melena.     PAST MEDICAL & SURGICAL HISTORY:  Spinal stenosis of lumbar region  Asthma: h/o last attack &quot; 2 years ago&quot;  H/o Lyme disease:   BELL&#x27;S PALSY  Gout  Spinal stenosis  Scoliosis  Ocular migraine  Kidney stone  Essential hypertension  Clostridium difficile colitis: S/P stool transplant 2017  &quot;with great relief&quot;  H/O lithotripsy: X2  History of UTI: &quot;for 2.5 years&quot;  H/O dilation and curettage: , ,   H/O nephrostomy: and stent insertion - 2017  S/P R ureteral stent placed 17  H/O  section: ,   S/P left knee arthroscopy:   Acinous adenoma of parathyroid gland: , parotidectomy    MEDICATIONS  (STANDING):  allopurinol 100 milliGRAM(s) Oral daily  apixaban 5 milliGRAM(s) Oral every 12 hours  lactobacillus acidophilus 1 Tablet(s) Oral daily  losartan 50 milliGRAM(s) Oral two times a day  sodium chloride 0.9%. 1000 milliLiter(s) (100 mL/Hr) IV Continuous <Continuous>  sodium chloride 0.9%. 1000 milliLiter(s) (83 mL/Hr) IV Continuous <Continuous>  vancomycin    Solution 125 milliGRAM(s) Oral every 6 hours  verapamil 120 milliGRAM(s) Oral daily    MEDICATIONS  (PRN):  acetaminophen   Tablet .. 650 milliGRAM(s) Oral every 6 hours PRN Temp greater or equal to 38C (100.4F), Mild Pain (1 - 3)  dicyclomine 20 milliGRAM(s) Oral three times a day before meals PRN abdominal spasms.    Allergies  No Known Allergies    FAMILY HISTORY:  Family history of hypertension in mother (Sibling)  Family history of prostate cancer in father    REVIEW OF SYSTEMS:  CONSTITUTIONAL: No weakness, fevers or chills  EYES/ENT: No visual changes;  No vertigo or throat pain   NECK: No pain or stiffness  RESPIRATORY: No cough, wheezing, hemoptysis; No shortness of breath  CARDIOVASCULAR: No chest pain or palpitations  GASTROINTESTINAL: Per HPI.  GENITOURINARY: No dysuria, frequency or hematuria  NEUROLOGICAL: No numbness or weakness  SKIN: No itching, burning, rashes, or lesions   PSYCH: Normal mood and affect  All other review of systems is negative unless indicated above.  Vital Signs Last 24 Hrs  T(C): 36.6 (30 Oct 2018 05:07), Max: 36.9 (29 Oct 2018 12:20)  T(F): 97.9 (30 Oct 2018 05:07), Max: 98.5 (29 Oct 2018 12:20)  HR: 48 (30 Oct 2018 07:27) (46 - 51)  BP: 129/40 (30 Oct 2018 07:27) (121/45 - 156/54)  BP(mean): --  RR: 8 (30 Oct 2018 05:07) (8 - 18)  SpO2: 98% (30 Oct 2018 07:27) (98% - 99%)    PHYSICAL EXAM:  Constitutional: NAD, well-developed  HEENT: MMM  Neck: No LAD  Respiratory: CTAB  Cardiovascular: S1 and S2, RRR, no M/G/R  Gastrointestinal: BS+, soft, NT/ND  Extremities: No peripheral edema  Vascular: 2+ peripheral pulses  Neurological: A/O x 3, no focal deficits  Psychiatric: Normal mood, normal affect  Skin: No rashes    LABS:                RADIOLOGY & ADDITIONAL STUDIES:

## 2018-10-30 NOTE — PROGRESS NOTE ADULT - PROVIDER SPECIALTY LIST ADULT
Cardiology
Gastroenterology
Hospitalist
Infectious Disease
Infectious Disease
Hospitalist

## 2018-10-30 NOTE — DISCHARGE NOTE ADULT - PATIENT PORTAL LINK FT
You can access the [facility], offered by [facility], by registering with the following website: [email or web address]

## 2018-10-30 NOTE — DISCHARGE NOTE ADULT - MEDICATION SUMMARY - MEDICATIONS TO TAKE
I will START or STAY ON the medications listed below when I get home from the hospital:    valsartan 160 mg oral tablet  -- 1 tab(s) by mouth once a day  -- Indication: For Htn    verapamil 120 mg oral tablet  -- 1 tab(s) by mouth once a day  -- Indication: For Htn    apixaban 5 mg oral tablet  -- 1 tab(s) by mouth every 12 hours  -- Indication: For Afib    allopurinol 100 mg oral tablet  -- 1 tab(s) by mouth once a day  -- Indication: For Gout    vancomycin 50 mg/mL oral liquid  -- 125 milligram(s) by mouth every 6 hours   -- Expires___________________  Refrigerate and shake well.  Expires_______________________    -- Indication: For Cdiff colitis    Probiotic Formula oral capsule  -- 1 cap(s) by mouth once a day  -- Indication: For supplement    potassium citrate 10 mEq oral tablet, extended release  -- 2 tab(s) by mouth 2 times a day  -- Indication: For supplement I will START or STAY ON the medications listed below when I get home from the hospital:    valsartan 160 mg oral tablet  -- 1 tab(s) by mouth once a day  -- Indication: For Htn    verapamil 120 mg oral tablet  -- 1 tab(s) by mouth once a day  -- Indication: For Htn    apixaban 5 mg oral tablet  -- 1 tab(s) by mouth every 12 hours  -- Indication: For afib    allopurinol 100 mg oral tablet  -- 1 tab(s) by mouth once a day  -- Indication: For Gout    vancomycin 50 mg/mL oral liquid  -- 125 milligram(s) by mouth every 6 hours   -- Expires___________________  Refrigerate and shake well.  Expires_______________________    -- Indication: For Cdiff colitis    Probiotic Formula oral capsule  -- 1 cap(s) by mouth once a day  -- Indication: For supplement    potassium citrate 10 mEq oral tablet, extended release  -- 2 tab(s) by mouth 2 times a day  -- Indication: For supplement

## 2018-10-30 NOTE — PROGRESS NOTE ADULT - ASSESSMENT
77 y/o Female with h/o right nephrolithiasis s/p Nephrostomy tubes and lithotripsy, recurrent Cdiff colitis s/p prior fecal transplant, HTN, HLD, gout was admitted on 10/25 for fever to Tmax 101.6F at home and generalized abdominal pain. Patient developed fatigue, fevers, chills and "ripping" generalized abdominal pain rating 9/10 x 2 days prior to admission. Pain worsened on defecation and associated with nausea. Patient had frequent small, partially formed bowel movements (26 in 30hours) with "slimey, white globs." Patient was diagnosed with Klebsiella UTI on 10/15/18 and treated with 3 days of cipro. In ER< she received zosyn and vancomycin PO.    1. Diarrheal syndrome resolving. Recurrent CDAD. Kidney stones.  -s/p recent abx therapy with cipro  -leukocytosis  -stool for CDT reviewed  -on vancomycin 125 mg PO q6h # 5  -tolerating abx well so far; no side effects noted  -monitor abdomen  -will need a longer course of vancomycin PO; would give her a 30 day course and decide as outpatient further duration at follow up  -plan to d/c home in AM if continues to improve; would have her f/u with my office in 2-4 weeks  -monitor temps  -f/u CBC  -supportive care  2. Other issues:   -care per medicine

## 2018-10-30 NOTE — DISCHARGE NOTE ADULT - CARE PLAN
Principal Discharge DX:	Clostridium difficile colitis  Goal:	feel well , no diarrhea  Assessment and plan of treatment:	take meds as instructed

## 2018-10-30 NOTE — DISCHARGE NOTE ADULT - NSTOBACCOHOTLINE_GEN_A_CS
[facility] Smokers Quitline ([id number]) [facility] Smokers Quitline ([phone])

## 2018-10-30 NOTE — DISCHARGE NOTE ADULT - CARE PROVIDER_API CALL
Minor Ann), Gastroenterology; Internal Medicine  [address] [place] [address]  Phone: [phone]  Fax: [phone]

## 2018-10-30 NOTE — PROGRESS NOTE ADULT - REASON FOR ADMISSION
Fever, Abdominal Pain

## 2018-10-30 NOTE — PROGRESS NOTE ADULT - ASSESSMENT
79 y/o female with c. diff colitis.   Hx of c. diff and fecal stool transplant in 2017 done by Dr. Cullen.   No BM since about 2pm yesterday.   Continue with PO vanco.   Bentyl as needed for abdominal spasms.   May consider re-trial of fecal stool transplant with Dr. Cullen vs. Dr. Magaña.   Ok to d/c from gi standpoint-pt to f/u next week in office.   Discussed with pt, Dr. Ann.

## 2018-11-03 DIAGNOSIS — J45.909 UNSPECIFIED ASTHMA, UNCOMPLICATED: ICD-10-CM

## 2018-11-03 DIAGNOSIS — M10.9 GOUT, UNSPECIFIED: ICD-10-CM

## 2018-11-03 DIAGNOSIS — A41.9 SEPSIS, UNSPECIFIED ORGANISM: ICD-10-CM

## 2018-11-03 DIAGNOSIS — M48.00 SPINAL STENOSIS, SITE UNSPECIFIED: ICD-10-CM

## 2018-11-03 DIAGNOSIS — E78.5 HYPERLIPIDEMIA, UNSPECIFIED: ICD-10-CM

## 2018-11-03 DIAGNOSIS — G51.0 BELL'S PALSY: ICD-10-CM

## 2018-11-03 DIAGNOSIS — I48.91 UNSPECIFIED ATRIAL FIBRILLATION: ICD-10-CM

## 2018-11-03 DIAGNOSIS — M41.9 SCOLIOSIS, UNSPECIFIED: ICD-10-CM

## 2018-11-03 DIAGNOSIS — I10 ESSENTIAL (PRIMARY) HYPERTENSION: ICD-10-CM

## 2018-11-03 DIAGNOSIS — A04.72 ENTEROCOLITIS DUE TO CLOSTRIDIUM DIFFICILE, NOT SPECIFIED AS RECURRENT: ICD-10-CM

## 2018-11-18 NOTE — ED ADULT NURSE NOTE - NS ED NURSE IV DC DT
Attending MD Ramirez: 79 yo female with HTN presents with five days of cough, runny nose, sore throat and SOB after coughing.  Denies fevers, nausea or vomiting.  Went to [facility] and treated for allergies.  No hx of smoking, asthma or COPD.  On exam she has bibasilar crackles, heart RRR no Murmur,  no LE edema.  DDX PNA, viral URI/bronchitis.  Will check chest xray RVP and labs.
28-Jun-2017 17:05

## 2018-12-20 ENCOUNTER — APPOINTMENT (OUTPATIENT)
Dept: INFECTIOUS DISEASE | Facility: CLINIC | Age: 78
End: 2018-12-20
Payer: MEDICARE

## 2018-12-20 VITALS
WEIGHT: 133 LBS | DIASTOLIC BLOOD PRESSURE: 64 MMHG | OXYGEN SATURATION: 97 % | RESPIRATION RATE: 16 BRPM | SYSTOLIC BLOOD PRESSURE: 149 MMHG | TEMPERATURE: 97.3 F | HEART RATE: 53 BPM | BODY MASS INDEX: 26.86 KG/M2

## 2018-12-20 PROCEDURE — 99215 OFFICE O/P EST HI 40 MIN: CPT

## 2019-01-04 RX ORDER — L.ACIDOPH/B.ANIMALIS/B.LONGUM 15B CELL
1 CAPSULE ORAL
Qty: 0 | Refills: 0 | COMMUNITY

## 2019-01-04 RX ORDER — VERAPAMIL HCL 240 MG
1 CAPSULE, EXTENDED RELEASE PELLETS 24 HR ORAL
Qty: 0 | Refills: 0 | COMMUNITY

## 2019-01-15 NOTE — H&P ADULT - NSHPPHYSICALEXAM_GEN_ALL_CORE
Vital Signs : BP        HR       RR                 Constitutional: well developed, well nourished, no deformities and no acute distress    Neurological: Alert & Oriented x 3, [place], no focal deficits    HEENT: NC/AT, PERRLA, EOMI,  Neck supple.    Respiratory: CTA B/L, No wheezing/crackles/rhonchi    Cardiovascular: (+) S1 & S2, RRR, No m/r/g    Gastrointestinal: soft, NT, nondistended, (+) BS    Genitourinary: non distended bladder, voiding freely    Extremities: No pedal edema, No clubbing, No cyanosis    Skin:  normal skin color and pigmentation, no skin lesions

## 2019-01-15 NOTE — H&P ADULT - ASSESSMENT
77 yo F with PMHx of HTN, Normal stress echo in April 2018, pt developed new severe chest discomfort followed by syncope at home 2 weeks ago. Pt referred for LHC with possible intervention. 79 yo F with PMHx of HTN, Normal stress echo in April 2018, pt developed new severe chest discomfort followed by syncope at home 2 weeks ago. Pt referred for LHC with possible intervention.  LHC risks, benefits and alternatives discussed with patient. Risk discussed included, but not limited to MI, stroke, mortality, major bleeding, arrythmia, or infection. Consent obtained and signed educational material provided. Pt. verbalizes and understands pre-procedural instructions.

## 2019-01-15 NOTE — H&P ADULT - PROBLEM SELECTOR PLAN 1
Pt is referred for Lt heart cath/possible PCI. Labs & medications are reviewed. Informed consent obtained after discussion of [facility] risks, benefits and alternatives  with patient. Risk discussed included, but not limited to MI, stroke, mortality, major bleeding, arrhythmia, or infection.  An educational material provided. Pt. verbalizes understandings of pre-procedural instructions.

## 2019-01-15 NOTE — H&P ADULT - HISTORY OF PRESENT ILLNESS
79 yo F with PMHx of HTN, Normal stress echo in April 2018, pt developed new severe chest discomfort followed by syncope at home 2 weeks ago. Pt referred for LHC with possible intervention. 79 yo F with PMHx of HTN, Normal stress echo in April 2018, pt developed new severe chest discomfort followed by syncopal episode at home 2 weeks ago. Pt referred for LHC with possible intervention.

## 2019-01-15 NOTE — H&P ADULT - NSHPREVIEWOFSYSTEMS_GEN_ALL_CORE
General: Pt denies recent weight loss/fever/chills    Neurological: denies numbness or  sensation loss    Cardiovascular: denies chest pain/palpitations/leg edema    Respiratory and Thorax: denies SOB/cough/wheezing    Gastrointestinal: denies abdominal pain/diarrhea/constipation/bloody stool    Genitourinary: denies urinary frequency/urgency/ dysuria    Musculoskeletal: denies joint pain or swelling, denies restricted motion    Hematologic: denies abnormal bleeding General: Pt denies recent weight loss/fever/chills    Neurological: +dizziness     Cardiovascular: +chest pain    Respiratory and Thorax: denies SOB/cough/wheezing    Gastrointestinal: denies abdominal pain/diarrhea/constipation/bloody stool    Genitourinary: denies urinary frequency/urgency/ dysuria    Musculoskeletal: denies joint pain or swelling, denies restricted motion    Hematologic: denies abnormal bleeding

## 2019-01-16 ENCOUNTER — OUTPATIENT (OUTPATIENT)
Dept: OUTPATIENT SERVICES | Facility: HOSPITAL | Age: 79
LOS: 1 days | Discharge: HOME | End: 2019-01-16
Payer: MEDICARE

## 2019-01-16 VITALS — WEIGHT: 132.06 LBS | HEIGHT: 58 IN

## 2019-01-16 DIAGNOSIS — Z98.890 OTHER SPECIFIED POSTPROCEDURAL STATES: Chronic | ICD-10-CM

## 2019-01-16 DIAGNOSIS — Z87.448 PERSONAL HISTORY OF OTHER DISEASES OF URINARY SYSTEM: Chronic | ICD-10-CM

## 2019-01-16 DIAGNOSIS — Z98.891 HISTORY OF UTERINE SCAR FROM PREVIOUS SURGERY: Chronic | ICD-10-CM

## 2019-01-16 DIAGNOSIS — Z87.440 PERSONAL HISTORY OF URINARY (TRACT) INFECTIONS: Chronic | ICD-10-CM

## 2019-01-16 DIAGNOSIS — R07.9 CHEST PAIN, UNSPECIFIED: ICD-10-CM

## 2019-01-16 DIAGNOSIS — D35.1 BENIGN NEOPLASM OF PARATHYROID GLAND: Chronic | ICD-10-CM

## 2019-01-16 LAB
BLD GP AB SCN SERPL QL: SIGNIFICANT CHANGE UP
TYPE + AB SCN PNL BLD: SIGNIFICANT CHANGE UP

## 2019-01-16 PROCEDURE — 99223 1ST HOSP IP/OBS HIGH 75: CPT

## 2019-01-16 PROCEDURE — 93010 ELECTROCARDIOGRAM REPORT: CPT

## 2019-01-16 RX ORDER — CLOPIDOGREL BISULFATE 75 MG/1
75 TABLET, FILM COATED ORAL DAILY
Qty: 0 | Refills: 0 | Status: DISCONTINUED | OUTPATIENT
Start: 2019-01-17 | End: 2019-01-17

## 2019-01-16 RX ORDER — VANCOMYCIN HCL 1 G
1 VIAL (EA) INTRAVENOUS
Qty: 0 | Refills: 0 | COMMUNITY

## 2019-01-16 RX ORDER — VERAPAMIL HCL 240 MG
240 CAPSULE, EXTENDED RELEASE PELLETS 24 HR ORAL DAILY
Qty: 0 | Refills: 0 | Status: DISCONTINUED | OUTPATIENT
Start: 2019-01-16 | End: 2019-01-17

## 2019-01-16 RX ORDER — ASPIRIN/CALCIUM CARB/MAGNESIUM 324 MG
81 TABLET ORAL DAILY
Qty: 0 | Refills: 0 | Status: DISCONTINUED | OUTPATIENT
Start: 2019-01-17 | End: 2019-01-17

## 2019-01-16 RX ORDER — VALSARTAN 80 MG/1
160 TABLET ORAL DAILY
Qty: 0 | Refills: 0 | Status: DISCONTINUED | OUTPATIENT
Start: 2019-01-16 | End: 2019-01-17

## 2019-01-16 RX ORDER — SODIUM CHLORIDE 9 MG/ML
1000 INJECTION INTRAMUSCULAR; INTRAVENOUS; SUBCUTANEOUS
Qty: 0 | Refills: 0 | Status: DISCONTINUED | OUTPATIENT
Start: 2019-01-16 | End: 2019-01-17

## 2019-01-16 RX ORDER — ACETAMINOPHEN 500 MG
650 TABLET ORAL EVERY 6 HOURS
Qty: 0 | Refills: 0 | Status: DISCONTINUED | OUTPATIENT
Start: 2019-01-16 | End: 2019-01-17

## 2019-01-16 RX ORDER — ATORVASTATIN CALCIUM 80 MG/1
40 TABLET, FILM COATED ORAL AT BEDTIME
Qty: 0 | Refills: 0 | Status: DISCONTINUED | OUTPATIENT
Start: 2019-01-16 | End: 2019-01-17

## 2019-01-16 RX ORDER — VANCOMYCIN HCL 1 G
125 VIAL (EA) INTRAVENOUS EVERY 6 HOURS
Qty: 0 | Refills: 0 | Status: DISCONTINUED | OUTPATIENT
Start: 2019-01-16 | End: 2019-01-17

## 2019-01-16 RX ORDER — ALLOPURINOL 300 MG
100 TABLET ORAL DAILY
Qty: 0 | Refills: 0 | Status: DISCONTINUED | OUTPATIENT
Start: 2019-01-16 | End: 2019-01-17

## 2019-01-16 RX ADMIN — Medication 125 MILLIGRAM(S): at 18:13

## 2019-01-16 RX ADMIN — Medication 125 MILLIGRAM(S): at 13:41

## 2019-01-16 RX ADMIN — ATORVASTATIN CALCIUM 40 MILLIGRAM(S): 80 TABLET, FILM COATED ORAL at 21:57

## 2019-01-16 RX ADMIN — SODIUM CHLORIDE 100 MILLILITER(S): 9 INJECTION INTRAMUSCULAR; INTRAVENOUS; SUBCUTANEOUS at 10:54

## 2019-01-16 RX ADMIN — Medication 100 MILLIGRAM(S): at 12:59

## 2019-01-16 NOTE — CONSULT NOTE ADULT - SUBJECTIVE AND OBJECTIVE BOX
78y Female who is S/P cardiac cath today, whom had syncope with abnormal stress test.    PAST MEDICAL & SURGICAL HISTORY:  Spinal stenosis of lumbar region  Asthma: h/o last attack &quot; 2 years ago&quot;  H/o Lyme disease:   BELL&#x27;S PALSY  Gout  Spinal stenosis  Scoliosis  Ocular migraine  Kidney stone  Essential hypertension  Clostridium difficile colitis: S/P stool transplant 2017  &quot;with great relief&quot;  H/O lithotripsy: X2  History of UTI: &quot;for 2.5 years&quot;  H/O dilation and curettage: , ,   H/O nephrostomy: and stent insertion - 2017  S/P R ureteral stent placed 17  H/O  section: ,   S/P left knee arthroscopy:   Acinous adenoma of parathyroid gland: , parotidectomy      MEDICATIONS  (STANDING):  allopurinol 100 milliGRAM(s) Oral daily  atorvastatin 40 milliGRAM(s) Oral at bedtime  sodium chloride 0.9%. 1000 milliLiter(s) (100 mL/Hr) IV Continuous <Continuous>  valsartan 160 milliGRAM(s) Oral daily  vancomycin    Solution 125 milliGRAM(s) Oral every 6 hours  verapamil  milliGRAM(s) Oral daily    MEDICATIONS  (PRN):  acetaminophen   Tablet .. 650 milliGRAM(s) Oral every 6 hours PRN Temp greater or equal to 38C (100.4F), Moderate Pain (4 - 6)      Allergies    No Known Allergies    Intolerances        SOCIAL HISTORY: Denies tobacco, etoh abuse or illicit drug use    FAMILY HISTORY:  Family history of hypertension in mother (Sibling)  Family history of prostate cancer in father      Vital Signs Last 24 Hrs  T(C): 36.2 (2019 11:51), Max: 36.2 (2019 07:19)  T(F): 97.1 (2019 11:51), Max: 97.1 (2019 07:19)  HR: 44 (2019 16:00) (44 - 67)  BP: 113/40 (2019 16:00) (105/39 - 142/50)  BP(mean): 59 (2019 16:00) (55 - 73)  RR: 16 (2019 14:00) (14 - 19)  SpO2: 99% (2019 14:00) (98% - 100%)    REVIEW OF SYSTEMS:    CONSTITUTIONAL:  As per HPI.  HEENT:  Eyes:  No diplopia or blurred vision. ENT:  No earache, sore throat or runny nose.  CARDIOVASCULAR:  No pressure, squeezing, strangling, tightness, heaviness or aching about the chest, neck, axilla or epigastrium.  RESPIRATORY:  No cough, shortness of breath, PND or orthopnea.  GASTROINTESTINAL:  No nausea, vomiting or diarrhea.  GENITOURINARY:  No dysuria, frequency or urgency.  MUSCULOSKELETAL:  As per HPI.  SKIN:  No change in skin, hair or nails.  NEUROLOGIC:  No paresthesias, fasciculations, seizures or weakness.  PSYCHIATRIC:  No disorder of thought or mood.  ENDOCRINE:  No heat or cold intolerance, polyuria or polydipsia.  HEMATOLOGICAL:  No easy bruising or bleedings:  .     PHYSICAL EXAMINATION:    GENERAL APPEARANCE:  Pt. is not currently dyspneic, in no distress. Pt. is alert, oriented, and pleasant.  HEENT:  Pupils are normal and react normally. No icterus. Mucous membranes well colored.  NECK:  Supple. No lymphadenopathy. Jugular venous pressure not elevated. Carotids equal.   HEART:   The cardiac impulse has a normal quality. There are no murmurs, rubs or gallops noted  CHEST:  Chest is clear to auscultation. Normal respiratory effort.  ABDOMEN:  Soft and nontender.   EXTREMITIES:  There is no edema.   SKIN:  No rash or significant lesions are noted.    I&O's Summary      LABS:           Impression from Cardiac Cath     Diagnostic Conclusions     Normal LV systolic function. Estimated LV ejection fraction is 65 %.   One Vessel coronary artery disease (LCX) .     Recommendations     Percutaneous coronary intervention (PCI) is recommended.    Estimated Blood Loss:6ml.     Signatures                EKG:  SB with normal intervals    TELEMETRY: SB    CARDIAC TESTS:    RADIOLOGY & ADDITIONAL STUDIES:    < from: Transthoracic Echocardiogram (10.27.18 @ 10:04) >   Summary     Fibrocalcific changes noted to the mitral valve leaflets with preserved   leaflet excursion.   Mild mitral annular calcification is present.   Mild (1+) mitral regurgitation is present.   Fibrocalcific changes noted to the Aortic valve leaflets with preserved   leaflet excursion.   Mild aortic regurgitation is present.   Normal appearing tricuspid valve structure and function.   Trace tricuspid valve regurgitation is present.   The left ventricle isnormal in size, wall thickness, wall motion and   contractility.   Estimated left ventricular ejection fraction is 65-70 %.   Normal appearing right ventricle structure and function.   No evidence of pericardial effusion.

## 2019-01-16 NOTE — PROGRESS NOTE ADULT - SUBJECTIVE AND OBJECTIVE BOX
HPI:  77 yo F with PMHx of HTN, paroxysmal Afib (on Eliquis), Normal stress echo in April 2018, pt developed new severe chest discomfort followed by syncopal episode at home 2 weeks ago. Pt referred for [facility] with possible intervention. Now, pt is s/p PCI with NATASHA x1 to OM1.     ROS: denies chest pain/ pressure, SOB or palpitation     Physical exam:   General: awake, no acute distress   HEENT: NCAT, neck supple   CV: RRR, normal S1S2, no murmur/ rub   Pulmonary: clear, no wheezing or rales   GI: +BS, soft, non-tender, non-distended   : voiding freely   Extremities: no edema, + pedal pulses   Skin: no rashes or lesion. Rt. femoral access site (s/p Mynx); no hematoma or bleeding     # s/p PCI with NATASHA x1 to OM1.   - CICU monitor   - IV hydration  - Labs and EKG in am   - post procedure, outcome and follow up care reviewed with patient/MD  - continue ASA 81 mg daily for one month  - start Plavix 75 mg daily   - continue ACEI/ CCB  - start Lipitor 40 mg daily   - hold Eliquis for LINQ tomorrow (NPO post MN)   - possible discharge home tomorrow if medically stable   - follow up with cardiologist in 4-7 days HPI:  79 yo F with PMHx of HTN, paroxysmal Afib (on Eliquis), Normal stress echo in April 2018, pt developed new severe chest discomfort followed by syncopal episode at home 2 weeks ago. Pt referred for [facility] with possible intervention. Now, pt is s/p PCI with NATASHA x1 to OM1.     ROS: denies chest pain/ pressure, SOB or palpitation     Physical exam:   General: awake, no acute distress   HEENT: NCAT, neck supple   CV: RRR, normal S1S2, no murmur/ rub   Pulmonary: clear, no wheezing or rales   GI: +BS, soft, non-tender, non-distended   : voiding freely   Extremities: no edema, + pedal pulses   Skin: no rashes or lesion. Rt. femoral access site (s/p Mynx); no hematoma or bleeding     # s/p PCI with NATASHA x1 to OM1.   - CICU monitor   - IV hydration  - Labs and EKG in am   - post procedure, outcome and follow up care reviewed with patient/MD  - continue ASA 81 mg daily for one month  - start Plavix 75 mg daily   - continue ACEI/ CCB  - start Lipitor 40 mg daily   - hold Xarelto for LINQ tomorrow (NPO post MN)   - possible discharge home tomorrow if medically stable   - follow up with cardiologist in 4-7 days

## 2019-01-16 NOTE — CONSULT NOTE ADULT - ASSESSMENT
ASSESSMENT & PLAN: 78 year old  female with syncope who has NLVEF and normal coronaries.  To have LINQ implant in the am for further assessment of syncope   NPO after midnight

## 2019-01-16 NOTE — PACU DISCHARGE NOTE - COMMENTS
Report given to Nahed, receiving RN in CICU; pt. transferred to inpt. room 336-1, CICU, on cardiac monitor via stretcher accompanied by RN and transport tech.

## 2019-01-17 ENCOUNTER — TRANSCRIPTION ENCOUNTER (OUTPATIENT)
Age: 79
End: 2019-01-17

## 2019-01-17 VITALS — TEMPERATURE: 97 F

## 2019-01-17 DIAGNOSIS — I10 ESSENTIAL (PRIMARY) HYPERTENSION: ICD-10-CM

## 2019-01-17 DIAGNOSIS — M41.9 SCOLIOSIS, UNSPECIFIED: ICD-10-CM

## 2019-01-17 DIAGNOSIS — R94.39 ABNORMAL RESULT OF OTHER CARDIOVASCULAR FUNCTION STUDY: ICD-10-CM

## 2019-01-17 DIAGNOSIS — I25.10 ATHEROSCLEROTIC HEART DISEASE OF NATIVE CORONARY ARTERY WITHOUT ANGINA PECTORIS: ICD-10-CM

## 2019-01-17 DIAGNOSIS — M48.061 SPINAL STENOSIS, LUMBAR REGION WITHOUT NEUROGENIC CLAUDICATION: ICD-10-CM

## 2019-01-17 DIAGNOSIS — J45.909 UNSPECIFIED ASTHMA, UNCOMPLICATED: ICD-10-CM

## 2019-01-17 DIAGNOSIS — M10.9 GOUT, UNSPECIFIED: ICD-10-CM

## 2019-01-17 LAB
ANION GAP SERPL CALC-SCNC: 5 MMOL/L — SIGNIFICANT CHANGE UP (ref 5–17)
BASOPHILS # BLD AUTO: 0.04 K/UL — SIGNIFICANT CHANGE UP (ref 0–0.2)
BASOPHILS NFR BLD AUTO: 0.4 % — SIGNIFICANT CHANGE UP (ref 0–2)
BUN SERPL-MCNC: 27 MG/DL — HIGH (ref 7–23)
CALCIUM SERPL-MCNC: 8.8 MG/DL — SIGNIFICANT CHANGE UP (ref 8.5–10.1)
CHLORIDE SERPL-SCNC: 111 MMOL/L — HIGH (ref 96–108)
CO2 SERPL-SCNC: 24 MMOL/L — SIGNIFICANT CHANGE UP (ref 22–31)
CREAT SERPL-MCNC: 0.94 MG/DL — SIGNIFICANT CHANGE UP (ref 0.5–1.3)
EOSINOPHIL # BLD AUTO: 0.22 K/UL — SIGNIFICANT CHANGE UP (ref 0–0.5)
EOSINOPHIL NFR BLD AUTO: 2.3 % — SIGNIFICANT CHANGE UP (ref 0–6)
GLUCOSE SERPL-MCNC: 92 MG/DL — SIGNIFICANT CHANGE UP (ref 70–99)
HCT VFR BLD CALC: 36.2 % — SIGNIFICANT CHANGE UP (ref 34.5–45)
HGB BLD-MCNC: 11.7 G/DL — SIGNIFICANT CHANGE UP (ref 11.5–15.5)
IMM GRANULOCYTES NFR BLD AUTO: 0.2 % — SIGNIFICANT CHANGE UP (ref 0–1.5)
LYMPHOCYTES # BLD AUTO: 3.23 K/UL — SIGNIFICANT CHANGE UP (ref 1–3.3)
LYMPHOCYTES # BLD AUTO: 34.4 % — SIGNIFICANT CHANGE UP (ref 13–44)
MCHC RBC-ENTMCNC: 28.6 PG — SIGNIFICANT CHANGE UP (ref 27–34)
MCHC RBC-ENTMCNC: 32.3 GM/DL — SIGNIFICANT CHANGE UP (ref 32–36)
MCV RBC AUTO: 88.5 FL — SIGNIFICANT CHANGE UP (ref 80–100)
MONOCYTES # BLD AUTO: 0.78 K/UL — SIGNIFICANT CHANGE UP (ref 0–0.9)
MONOCYTES NFR BLD AUTO: 8.3 % — SIGNIFICANT CHANGE UP (ref 2–14)
NEUTROPHILS # BLD AUTO: 5.1 K/UL — SIGNIFICANT CHANGE UP (ref 1.8–7.4)
NEUTROPHILS NFR BLD AUTO: 54.4 % — SIGNIFICANT CHANGE UP (ref 43–77)
NRBC # BLD: 0 /100 WBCS — SIGNIFICANT CHANGE UP (ref 0–0)
PLATELET # BLD AUTO: 274 K/UL — SIGNIFICANT CHANGE UP (ref 150–400)
POTASSIUM SERPL-MCNC: 4.5 MMOL/L — SIGNIFICANT CHANGE UP (ref 3.5–5.3)
POTASSIUM SERPL-SCNC: 4.5 MMOL/L — SIGNIFICANT CHANGE UP (ref 3.5–5.3)
RBC # BLD: 4.09 M/UL — SIGNIFICANT CHANGE UP (ref 3.8–5.2)
RBC # FLD: 15.6 % — HIGH (ref 10.3–14.5)
SODIUM SERPL-SCNC: 140 MMOL/L — SIGNIFICANT CHANGE UP (ref 135–145)
WBC # BLD: 9.39 K/UL — SIGNIFICANT CHANGE UP (ref 3.8–10.5)
WBC # FLD AUTO: 9.39 K/UL — SIGNIFICANT CHANGE UP (ref 3.8–10.5)

## 2019-01-17 PROCEDURE — 93010 ELECTROCARDIOGRAM REPORT: CPT

## 2019-01-17 PROCEDURE — 33285 INSJ SUBQ CAR RHYTHM MNTR: CPT

## 2019-01-17 RX ORDER — CLOPIDOGREL BISULFATE 75 MG/1
1 TABLET, FILM COATED ORAL
Qty: 30 | Refills: 11
Start: 2019-01-17 | End: 2020-01-11

## 2019-01-17 RX ORDER — ASPIRIN/CALCIUM CARB/MAGNESIUM 324 MG
1 TABLET ORAL
Qty: 0 | Refills: 0 | DISCHARGE
Start: 2019-01-17

## 2019-01-17 RX ORDER — RIVAROXABAN 15 MG-20MG
1 KIT ORAL
Qty: 0 | Refills: 0 | COMMUNITY

## 2019-01-17 RX ORDER — ATORVASTATIN CALCIUM 80 MG/1
1 TABLET, FILM COATED ORAL
Qty: 30 | Refills: 3
Start: 2019-01-17 | End: 2019-05-16

## 2019-01-17 RX ADMIN — Medication 81 MILLIGRAM(S): at 12:56

## 2019-01-17 RX ADMIN — CLOPIDOGREL BISULFATE 75 MILLIGRAM(S): 75 TABLET, FILM COATED ORAL at 12:56

## 2019-01-17 RX ADMIN — Medication 125 MILLIGRAM(S): at 00:03

## 2019-01-17 RX ADMIN — Medication 100 MILLIGRAM(S): at 12:56

## 2019-01-17 RX ADMIN — Medication 125 MILLIGRAM(S): at 12:56

## 2019-01-17 RX ADMIN — Medication 125 MILLIGRAM(S): at 05:59

## 2019-01-17 RX ADMIN — VALSARTAN 160 MILLIGRAM(S): 80 TABLET ORAL at 05:58

## 2019-01-17 RX ADMIN — Medication 240 MILLIGRAM(S): at 05:58

## 2019-01-17 NOTE — DISCHARGE NOTE ADULT - HOSPITAL COURSE
77 yo F with PMHx of HTN, paroxysmal Afib (on Eliquis), Normal stress echo in April 2018, pt developed new severe chest discomfort followed by syncopal episode at home 2 weeks ago. Pt referred for [facility] with possible intervention. S/P PCI with NATASHA x1 to OM1 on 1/17/19  S/P Loop recorder today 79 yo F with PMHx of HTN, paroxysmal Afib (on Eliquis), Normal stress echo in April 2018, pt developed new severe chest discomfort followed by syncopal episode at home 2 weeks ago. Pt referred for [facility] with possible intervention. S/P PCI with NATASHA x1 to OM1 on 1/17/19  S/P Loop recorder today. Restart Xarelto 20 mg PO daily  tomorrow.   ASA 81 mg PO daily x1 month  Plavix 75 mg PO daily

## 2019-01-17 NOTE — DISCHARGE NOTE ADULT - CARE PROVIDER_API CALL
Minor Carballo), Cardiovascular Disease; Internal Medicine  [address] [place] [address]  Phone: [phone]  Fax: [phone]

## 2019-01-17 NOTE — DISCHARGE NOTE ADULT - CARE PLAN
Principal Discharge DX:	CAD (coronary artery disease)  Goal:	symptom free  Assessment and plan of treatment:	Plavix/statin

## 2019-01-17 NOTE — PROCEDURAL SAFETY CHECKLIST WITH OR WITHOUT SEDATION - NSPOSTCOMMENTFT_GEN_ALL_CORE
VSS, awake and alert, no complaints of pain. Tolerated procedure well and without complications. will continue to monitor

## 2019-01-17 NOTE — DISCHARGE NOTE ADULT - PATIENT PORTAL LINK FT
You can access the [facility] Patient Portal, offered by [facility], by registering with the following website: [email or web address]

## 2019-01-17 NOTE — PACU DISCHARGE NOTE - COMMENTS
vss, awake alert and oriented. patient tolerated procedure well. post-procedure education given . and safely escorted to cicu on monitor.

## 2019-01-17 NOTE — DISCHARGE NOTE ADULT - MEDICATION SUMMARY - MEDICATIONS TO TAKE
I will START or STAY ON the medications listed below when I get home from the hospital:    aspirin 81 mg oral tablet, chewable  -- 1 tab(s) by mouth once a day  -- Indication: For CAD (coronary artery disease)    valsartan 160 mg oral tablet  -- 1 tab(s) by mouth once a day  -- Indication: For Hypertension    verapamil 240 mg/24 hours oral capsule, extended release  -- 1 cap(s) by mouth once a day  -- Indication: For Afib    Xarelto 20 mg oral tablet  -- 1 tab(s) by mouth once a day (in the evening)  -- Indication: For Afib    allopurinol 100 mg oral tablet  -- 1 tab(s) by mouth once a day  -- Indication: For Gout    atorvastatin 40 mg oral tablet  -- 1 tab(s) by mouth once a day (at bedtime)  -- Indication: For CAD (coronary artery disease)    clopidogrel 75 mg oral tablet  -- 1 tab(s) by mouth once a day  -- Indication: For CORONARY ARTERY DISEASE    vancomycin 250 mg/5 mL oral liquid  -- 125 milligram(s) by mouth every 6 hours  -- Indication: For infection    potassium citrate 10 mEq oral tablet, extended release  -- 2 tab(s) by mouth 2 times a day  -- Indication: For Supplement

## 2019-01-17 NOTE — PROGRESS NOTE ADULT - SUBJECTIVE AND OBJECTIVE BOX
Cardiology NP note  S/P PCI on 1/16/19. Denies CP, SOB, dizziness, palpitations    77 yo F with PMHx of HTN, paroxysmal Afib (on Eliquis), Normal stress echo in April 2018, pt developed new severe chest discomfort followed by syncopal episode at home 2 weeks ago. Pt referred for [facility] with possible intervention. S/P PCI with NATASHA x1 to OM1 on 1/17/19    Physical exam:   General: awake, no acute distress   HEENT: NCAT, neck supple   CV: RRR, normal S1S2, no murmur  Pulmonary: clear, no wheezing or rales   GI: +BS, soft, non-tender, non-distended   : voiding freely   Extremities: no edema, + pedal pulses   Skin: no rashes or lesion. Rt. femoral access site (s/p Mynx); no hematoma or bleeding     ICU Vital Signs Last 24 Hrs  T(C): 36.1 (17 Jan 2019 05:28), Max: 36.8 (16 Jan 2019 18:07)  T(F): 97 (17 Jan 2019 05:28), Max: 98.3 (16 Jan 2019 18:07)  HR: 62 (17 Jan 2019 08:00) (44 - 67)  BP: 110/49 (17 Jan 2019 08:00) (105/39 - 134/45)  BP(mean): 63 (17 Jan 2019 08:00) (51 - 72)  RR: 16 (16 Jan 2019 14:00) (14 - 19)  SpO2: 98% (17 Jan 2019 04:00) (95% - 100%)    Tele: SB no arrythmia  EKG: NSR      # s/p PCI with NATASHA x1 to OM1.   - post procedure, outcome and follow up care reviewed with patient/MD  -- Plavix 75 mg daily -Rx  - continue ACEI/ CCB  - start Lipitor 40 mg daily   - hold Xarelto for LINQ today- Resume after  - possible discharge home today if medically stable   - follow up with cardiologist in 4-7 days Cardiology NP note  S/P PCI on 1/16/19. Denies CP, SOB, dizziness, palpitations    79 yo F with PMHx of HTN, paroxysmal Afib (on Xeralto), Normal stress echo in April 2018, pt developed new severe chest discomfort followed by syncopal episode at home 2 weeks ago. Pt referred for [facility] with possible intervention. S/P PCI with NATASHA x1 to OM1 on 1/17/19    Physical exam:   General: awake, no acute distress   HEENT: NCAT, neck supple   CV: RRR, normal S1S2, no murmur  Pulmonary: clear, no wheezing or rales   GI: +BS, soft, non-tender, non-distended   : voiding freely   Extremities: no edema, + pedal pulses   Skin: no rashes or lesion. Rt. femoral access site (s/p Mynx); no hematoma or bleeding     ICU Vital Signs Last 24 Hrs  T(C): 36.1 (17 Jan 2019 05:28), Max: 36.8 (16 Jan 2019 18:07)  T(F): 97 (17 Jan 2019 05:28), Max: 98.3 (16 Jan 2019 18:07)  HR: 62 (17 Jan 2019 08:00) (44 - 67)  BP: 110/49 (17 Jan 2019 08:00) (105/39 - 134/45)  BP(mean): 63 (17 Jan 2019 08:00) (51 - 72)  RR: 16 (16 Jan 2019 14:00) (14 - 19)  SpO2: 98% (17 Jan 2019 04:00) (95% - 100%)    Tele: SB no arrythmia  EKG: NSR      # s/p PCI with NATASHA x1 to OM1.   - post procedure, outcome and follow up care reviewed with patient/MD  -ASA 81 mg PO daily for 1 month-D/W Dr Azul  -- Plavix 75 mg daily -Rx  - continue ACEI/ CCB  - start Lipitor 40 mg daily   - hold Xarelto for LINQ today- Resume tomorrow  - possible discharge home today if medically stable   - follow up with cardiologist in 4-7 days

## 2019-01-23 ENCOUNTER — APPOINTMENT (OUTPATIENT)
Dept: ELECTROPHYSIOLOGY | Facility: CLINIC | Age: 79
End: 2019-01-23

## 2019-02-05 ENCOUNTER — APPOINTMENT (OUTPATIENT)
Dept: ELECTROPHYSIOLOGY | Facility: CLINIC | Age: 79
End: 2019-02-05
Payer: MEDICARE

## 2019-02-05 VITALS
DIASTOLIC BLOOD PRESSURE: 68 MMHG | HEART RATE: 53 BPM | WEIGHT: 133 LBS | HEIGHT: 59 IN | BODY MASS INDEX: 26.81 KG/M2 | SYSTOLIC BLOOD PRESSURE: 158 MMHG

## 2019-02-05 DIAGNOSIS — R10.9 UNSPECIFIED ABDOMINAL PAIN: ICD-10-CM

## 2019-02-05 PROCEDURE — 99212 OFFICE O/P EST SF 10 MIN: CPT

## 2019-02-05 PROCEDURE — 93285 PRGRMG DEV EVAL SCRMS IP: CPT

## 2019-02-05 RX ORDER — ESTRADIOL 0.1 MG/G
0.1 CREAM VAGINAL
Qty: 1 | Refills: 1 | Status: DISCONTINUED | COMMUNITY
Start: 2018-04-11 | End: 2019-02-05

## 2019-02-05 RX ORDER — CONJUGATED ESTROGENS 0.62 MG/G
0.62 CREAM VAGINAL
Qty: 45 | Refills: 2 | Status: DISCONTINUED | COMMUNITY
Start: 2018-04-10 | End: 2019-02-05

## 2019-02-05 NOTE — HISTORY OF PRESENT ILLNESS
[de-identified] : 77 yo female presentd for postop visit s/p LINQ implant for syncope, LINQ interrogation shows sinus rhythm, no tachy or alejandro arrhythmias recorded, no pt activated episodes for symptoms noted. Patient c/o 2 day history of right sided lower back/flank pain, on and off stabbing/shooting in nature, she is s/p PCI on 1/1/6/19 with dr Azul on plavix and xarelto for PAF. She reports prior extensive h/o kidney stones and kidney infection on the right side with multiple lithotripsies and surgery in december 2017 and has been pain free since surgery, and had follow up scan in december 2019 and reports no stones.  \par her right groin access site is soft, no swelling no hematoma noted, denies any groin pain today, but reports 3 days ago felt throbbing pain in right groin.

## 2019-02-05 NOTE — ASSESSMENT
[FreeTextEntry1] : 77 yo female presented for postop LINQ check, also 3 weeks s/p PCI, with  c/o right lower back/flank pain. \par will obtain CTAP to r/o retroperitoneal bleed. Discussed with Dr Azul. She will follow up with Dr Azul after ctscan..

## 2019-02-07 ENCOUNTER — APPOINTMENT (OUTPATIENT)
Dept: INFECTIOUS DISEASE | Facility: CLINIC | Age: 79
End: 2019-02-07
Payer: MEDICARE

## 2019-02-07 VITALS
BODY MASS INDEX: 27.21 KG/M2 | HEART RATE: 60 BPM | DIASTOLIC BLOOD PRESSURE: 65 MMHG | OXYGEN SATURATION: 100 % | HEIGHT: 59 IN | WEIGHT: 135 LBS | TEMPERATURE: 97.7 F | SYSTOLIC BLOOD PRESSURE: 155 MMHG

## 2019-02-07 PROCEDURE — 99213 OFFICE O/P EST LOW 20 MIN: CPT | Mod: 25

## 2019-02-07 PROCEDURE — G0455: CPT

## 2019-02-07 RX ORDER — IBUPROFEN 200 MG/1
TABLET, COATED ORAL
Refills: 0 | Status: DISCONTINUED | COMMUNITY
End: 2019-02-07

## 2019-02-07 NOTE — CONSULT LETTER
[Dear  ___] : Dear  [unfilled], [Consult Letter:] : I had the pleasure of evaluating your patient, [unfilled]. [Please see my note below.] : Please see my note below. [Consult Closing:] : Thank you very much for allowing me to participate in the care of this patient.  If you have any questions, please do not hesitate to contact me. [Sincerely,] : Sincerely, [FreeTextEntry2] : Dr Minor Carballo\par [address]\par [place] [phone]  [FreeTextEntry3] : Alton Ramirez MD\par Infectious Diseases\par [facility]

## 2019-02-07 NOTE — PHYSICAL EXAM
[General Appearance - Alert] : alert [General Appearance - In No Acute Distress] : in no acute distress [General Appearance - Well Nourished] : well nourished

## 2019-02-07 NOTE — ASSESSMENT
[FreeTextEntry1] : After signing informed consent, easily swallowed 12 capsules --- each containing concentrated cyropreserved fecally derived bacteria.\par \par PREP:  [id number]\par \par There were no immediate adverse consequences.  She was instructed to not eat and remain upright for 1 hour. She will return in 1 week for the second of theses 2 rounds of FMT. Dietary and probiotic recommendations were reinforced.\par

## 2019-02-11 ENCOUNTER — APPOINTMENT (OUTPATIENT)
Dept: UROLOGY | Facility: CLINIC | Age: 79
End: 2019-02-11
Payer: MEDICARE

## 2019-02-11 PROCEDURE — 99214 OFFICE O/P EST MOD 30 MIN: CPT

## 2019-02-11 NOTE — PHYSICAL EXAM
[General Appearance - Well Developed] : well developed [General Appearance - Well Nourished] : well nourished [Normal Appearance] : normal appearance [Well Groomed] : well groomed [General Appearance - In No Acute Distress] : no acute distress [Abdomen Soft] : soft [Abdomen Tenderness] : non-tender [Costovertebral Angle Tenderness] : no ~M costovertebral angle tenderness [Urinary Bladder Findings] : the bladder was normal on palpation [Skin Color & Pigmentation] : normal skin color and pigmentation [Edema] : no peripheral edema [] : no respiratory distress [Respiration, Rhythm And Depth] : normal respiratory rhythm and effort [Exaggerated Use Of Accessory Muscles For Inspiration] : no accessory muscle use [Oriented To Time, Place, And Person] : oriented to person, place, and time [Affect] : the affect was normal [Mood] : the mood was normal [Not Anxious] : not anxious [Normal Station and Gait] : the gait and station were normal for the patient's age

## 2019-02-13 ENCOUNTER — APPOINTMENT (OUTPATIENT)
Dept: INFECTIOUS DISEASE | Facility: CLINIC | Age: 79
End: 2019-02-13
Payer: MEDICARE

## 2019-02-13 VITALS
DIASTOLIC BLOOD PRESSURE: 67 MMHG | OXYGEN SATURATION: 98 % | BODY MASS INDEX: 26.66 KG/M2 | WEIGHT: 132 LBS | HEART RATE: 58 BPM | SYSTOLIC BLOOD PRESSURE: 166 MMHG | TEMPERATURE: 97 F

## 2019-02-13 PROCEDURE — 99213 OFFICE O/P EST LOW 20 MIN: CPT | Mod: 25

## 2019-02-13 PROCEDURE — G0455: CPT

## 2019-02-13 NOTE — ASSESSMENT
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

## 2019-02-13 NOTE — HISTORY OF PRESENT ILLNESS
[None] : None [FreeTextEntry1] : Patient here for f/u.\par Had flank pain/pressure a few days ago, but now resolved.\par CT ([facility]) and renal sono: no stones, bilateral simple cysts, no hydronephrosis.\par \par \par RELEVANT PAST HISTORY\par History of RIGHT PCNL and treatment of RIGHT proximal ureteral stricture on 12/5/17; stent dislodged and had to be replaced on 12/7/17.\par Stones = 90% Calcium oxalate monohydrate,10% Calcium oxalate dihydrate; and 100% Calcium oxalate monohydrate.\par Known  5mm residual Right lower pole stone.\par \par

## 2019-02-14 ENCOUNTER — APPOINTMENT (OUTPATIENT)
Dept: ELECTROPHYSIOLOGY | Facility: CLINIC | Age: 79
End: 2019-02-14
Payer: MEDICARE

## 2019-02-14 VITALS
BODY MASS INDEX: 26.61 KG/M2 | SYSTOLIC BLOOD PRESSURE: 137 MMHG | WEIGHT: 132 LBS | HEIGHT: 59 IN | HEART RATE: 55 BPM | DIASTOLIC BLOOD PRESSURE: 55 MMHG

## 2019-02-14 PROCEDURE — 93285 PRGRMG DEV EVAL SCRMS IP: CPT

## 2019-02-14 PROCEDURE — 99211 OFF/OP EST MAY X REQ PHY/QHP: CPT

## 2019-02-14 NOTE — PHYSICAL EXAM
[General Appearance - Alert] : alert [General Appearance - In No Acute Distress] : in no acute distress [General Appearance - Well Nourished] : well nourished [Sclera] : the sclera and conjunctiva were normal [PERRL With Normal Accommodation] : pupils were equal in size, round, reactive to light [Extraocular Movements] : extraocular movements were intact [Outer Ear] : the ears and nose were normal in appearance [Oropharynx] : the oropharynx was normal with no thrush [Neck Appearance] : the appearance of the neck was normal [Neck Cervical Mass (___cm)] : no neck mass was observed [Jugular Venous Distention Increased] : there was no jugular-venous distention [Thyroid Diffuse Enlargement] : the thyroid was not enlarged [Auscultation Breath Sounds / Voice Sounds] : lungs were clear to auscultation bilaterally [Edema] : there was no peripheral edema [Bowel Sounds] : normal bowel sounds [Abdomen Soft] : soft [Abdomen Tenderness] : non-tender [Abdomen Mass (___ Cm)] : no abdominal mass palpated [Costovertebral Angle Tenderness] : no CVA tenderness [Skin Color & Pigmentation] : normal skin color and pigmentation [] : no rash [Oriented To Time, Place, And Person] : oriented to person, place, and time [Affect] : the affect was normal

## 2019-02-14 NOTE — CONSULT LETTER
[Dear  ___] : Dear  [unfilled], [FreeTextEntry2] : Dr Minor Carballo\par [address]\par [place] [id number]

## 2019-02-14 NOTE — HISTORY OF PRESENT ILLNESS
[FreeTextEntry1] : Ms Reese underwent FMT on 6/14/17 and 6/21/17 and was free from Cdiff until 10/18 despite regular and high grade antibiotic exposure related to multiple UTIs and surgeries. She developed Cidff on 10/18 after 3 days of Cipro for a UTI. She had been on po Vanco but required and additional course on 1/7/19 for recurrent diarrhea which she completed 1/30/19. In retrospect, she had cut down on recommended probiotic (Kefir 1 cup 3 times daily)>\par \par On 1/20/19 she undrwent coronary artery stenting. Plavix was added. When I reviewed instructions for FMT last week, I suggested she omit the PPI to avoid any drug interaction. \par \par She underwent FMT on 2/7/19. There were no adverse effects. She has had intermittent explosive bowel movements - mostly formed or soft. She had abdominal discomfort. On the morning of the procedure, she had 2 formed stools. Though she does not have progressive continuous diarrhea, bad smell and has had formed stools, her daughter believes she has active Cdiff.  The patient declined the offer to defer FMT and promptly go back on po Vanco.  She wishes to proceed with FMT.

## 2019-02-14 NOTE — ASSESSMENT
[Medical Care Issues] : medical care issues [FreeTextEntry1] : Ms Reese previously signed informed consent. She easily swallowed 12 capsules --- each containing concentrated cyropreserved fecally derived bacteria.\par \par PREP: [id number]\par \par There were no immediate adverse consequences. She was instructed to not eat and remain upright for 1 hour. Dietary and probiotic recommendations were reinforced. She will call me as needed and will give phone update on her condition in about 2 weeks.

## 2019-02-21 ENCOUNTER — RX RENEWAL (OUTPATIENT)
Age: 79
End: 2019-02-21

## 2019-03-20 ENCOUNTER — APPOINTMENT (OUTPATIENT)
Age: 79
End: 2019-03-20
Payer: MEDICARE

## 2019-03-20 PROCEDURE — 93299: CPT

## 2019-03-20 PROCEDURE — 93298 REM INTERROG DEV EVAL SCRMS: CPT

## 2019-04-20 ENCOUNTER — APPOINTMENT (OUTPATIENT)
Dept: ELECTROPHYSIOLOGY | Facility: CLINIC | Age: 79
End: 2019-04-20
Payer: MEDICARE

## 2019-04-20 PROCEDURE — 93298 REM INTERROG DEV EVAL SCRMS: CPT

## 2019-04-20 PROCEDURE — 93299: CPT

## 2019-04-30 NOTE — H&P ADULT - HEMATOLOGY/LYMPHATICS
Pt is a 32M, MSM, hx MRSA abscess of the buttock who uses anabolic steroids x 3 years who presented to the ED with c/o CP and SOB after vigorous sex and masturbation. Pain reached 8/10. Reported associated lightheadedness and SOB with dry cough. No F/C, sore throat, congestion, recent travel, hx DVT/PE, calf pain or edema, abd pain, N/V/D/C, dysuria. In the ED, tachycardic to 106 and hypertensive to SBP 160s, otherwise VSS. Labs notable for WBC 12, albumin 3.3, trop I 0.72, . He was loaded with , Plavix 300 and was started on a heparin ggt. Pt had echo performed which demonstrated global hypokinesis of LV with EF 30-35%. CT coronarys showed moderate to severe prox LAD stenosis but calcium score of 0. Pt had cardiac cath next day with RICHY to proxLAD. Throughout his hospital course, pt wanted to leave AMA and had to have providers continuously explain to him the risk against leaving against medical advice. Pt HD stable for discharge to home. Pt is a 32M, MSM, hx MRSA abscess of the buttock who uses anabolic steroids x 3 years who presented to the ED with c/o CP and SOB after vigorous sex and masturbation. Pain reached 8/10. Reported associated lightheadedness and SOB with dry cough. No F/C, sore throat, congestion, recent travel, hx DVT/PE, calf pain or edema, abd pain, N/V/D/C, dysuria. In the ED, tachycardic to 106 and hypertensive to SBP 160s, otherwise VSS. Labs notable for WBC 12, albumin 3.3, trop I 0.72, . He was loaded with , Plavix 300 and was started on a heparin ggt. Pt had echo performed which demonstrated global hypokinesis of LV with EF 30-35%. CT coronarys showed moderate to severe prox LAD stenosis but calcium score of 0. Pt had cardiac cath next day with RICHY to proxLAD. Throughout his hospital course, pt wanted to leave AMA and had to have providers continuously explain to him the risk against leaving against medical advice. __________ Pt is a 32M, MSM, hx MRSA abscess of the buttock who uses anabolic steroids x 3 years who presented to the ED with c/o CP and SOB after vigorous sex and masturbation. Pain reached 8/10. Reported associated lightheadedness and SOB with dry cough. No F/C, sore throat, congestion, recent travel, hx DVT/PE, calf pain or edema, abd pain, N/V/D/C, dysuria. In the ED, tachycardic to 106 and hypertensive to SBP 160s, otherwise VSS. Labs notable for WBC 12, albumin 3.3, trop I 0.72, . He was loaded with , Plavix 300 and was started on a heparin ggt. Pt had echo performed which demonstrated global hypokinesis of LV with EF 30-35%. CT coronarys showed moderate to severe prox LAD stenosis but calcium score of 0. Pt had cardiac cath next day with RICHY to proxLAD. Throughout his hospital course, pt wanted to leave AMA and had to have providers continuously explain to him the risk against leaving against medical advice.  Pt was started on aspirin, plavix, Lipitor, toprol, and lisinopril. He will follow up with Dr. Ag. negative details…

## 2019-05-08 NOTE — ED ADULT TRIAGE NOTE - CHIEF COMPLAINT QUOTE
pt developed headache at 7pm. complained of trouble speaking during incident. pt states hx of ocular migraines in past, with same symptoms. pt states headache resolving and only mild head pain at this time. pt recently discharge for kidney stone this week.
21-Nov-2018

## 2019-05-21 ENCOUNTER — APPOINTMENT (OUTPATIENT)
Dept: ELECTROPHYSIOLOGY | Facility: CLINIC | Age: 79
End: 2019-05-21
Payer: MEDICARE

## 2019-05-21 PROCEDURE — 93298 REM INTERROG DEV EVAL SCRMS: CPT

## 2019-05-21 PROCEDURE — 93299: CPT

## 2019-07-22 ENCOUNTER — APPOINTMENT (OUTPATIENT)
Dept: ELECTROPHYSIOLOGY | Facility: CLINIC | Age: 79
End: 2019-07-22
Payer: MEDICARE

## 2019-07-22 ENCOUNTER — APPOINTMENT (OUTPATIENT)
Dept: DERMATOLOGY | Facility: CLINIC | Age: 79
End: 2019-07-22
Payer: MEDICARE

## 2019-07-22 DIAGNOSIS — Z00.00 ENCOUNTER FOR GENERAL ADULT MEDICAL EXAMINATION W/OUT ABNORMAL FINDINGS: ICD-10-CM

## 2019-07-22 DIAGNOSIS — Z41.1 ENCOUNTER FOR COSMETIC SURGERY: ICD-10-CM

## 2019-07-22 DIAGNOSIS — D22.9 MELANOCYTIC NEVI, UNSPECIFIED: ICD-10-CM

## 2019-07-22 DIAGNOSIS — D18.01 HEMANGIOMA OF SKIN AND SUBCUTANEOUS TISSUE: ICD-10-CM

## 2019-07-22 DIAGNOSIS — L82.1 OTHER SEBORRHEIC KERATOSIS: ICD-10-CM

## 2019-07-22 PROCEDURE — D0051: CPT

## 2019-07-22 PROCEDURE — 93298 REM INTERROG DEV EVAL SCRMS: CPT

## 2019-07-22 PROCEDURE — 99203 OFFICE O/P NEW LOW 30 MIN: CPT

## 2019-07-22 PROCEDURE — 93299: CPT

## 2019-07-22 NOTE — HISTORY OF PRESENT ILLNESS
[FreeTextEntry1] : Patient presents for skin examination. [de-identified] : Notes lesion of the left lower eyelid.  Present for years, despite prior tx on one occasion.  No bleeding or tenderness.

## 2019-07-22 NOTE — PHYSICAL EXAM
[Alert] : alert [Well Nourished] : well nourished [Oriented x 3] : ~L oriented x 3 [Full Body Skin Exam Performed] : performed [FreeTextEntry3] : A full skin exam was performed including the scalp, face, neck, chest, abdomen, back, buttocks, upper extremities and lower extremities.  The patient declined examination of the breasts and genitalia.  \par The exam did not reveal any evidence of skin cancer, showing only the following benign growths:\par [place] pigmented nevi - 3 mm of the left upper arm.  IDN's of the left cheek and right shoulder.\par Seborrheic keratoses - includes the left lower eyelid.\par Cherry angioma.\par \par

## 2019-08-15 ENCOUNTER — APPOINTMENT (OUTPATIENT)
Dept: ELECTROPHYSIOLOGY | Facility: CLINIC | Age: 79
End: 2019-08-15
Payer: MEDICARE

## 2019-08-15 VITALS
DIASTOLIC BLOOD PRESSURE: 60 MMHG | HEART RATE: 61 BPM | HEIGHT: 59 IN | WEIGHT: 132 LBS | SYSTOLIC BLOOD PRESSURE: 138 MMHG | BODY MASS INDEX: 26.61 KG/M2

## 2019-08-15 PROCEDURE — 93285 PRGRMG DEV EVAL SCRMS IP: CPT

## 2019-08-22 ENCOUNTER — APPOINTMENT (OUTPATIENT)
Dept: GASTROENTEROLOGY | Facility: CLINIC | Age: 79
End: 2019-08-22
Payer: MEDICARE

## 2019-08-22 VITALS
HEIGHT: 58 IN | WEIGHT: 134 LBS | HEART RATE: 60 BPM | DIASTOLIC BLOOD PRESSURE: 67 MMHG | BODY MASS INDEX: 28.13 KG/M2 | SYSTOLIC BLOOD PRESSURE: 157 MMHG

## 2019-08-22 DIAGNOSIS — R94.5 ABNORMAL RESULTS OF LIVER FUNCTION STUDIES: ICD-10-CM

## 2019-08-22 PROCEDURE — 99214 OFFICE O/P EST MOD 30 MIN: CPT

## 2019-08-22 PROCEDURE — 99204 OFFICE O/P NEW MOD 45 MIN: CPT

## 2019-08-22 NOTE — ASSESSMENT
[FreeTextEntry1] : #1 elevated transaminases will do blood work and get a sonogram to evaluate for Barrios #2 recurrent C. difficile presently off of antibiotics filling well

## 2019-08-22 NOTE — HISTORY OF PRESENT ILLNESS
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 She has been put on Lipitor for hypercholesterolemia and recently was noted to have mild elevation of her transaminases she denies any abdominal pain at this time she denies any jaundice or known history of gallstones she denies any diarrhea or no medications

## 2019-08-22 NOTE — PHYSICAL EXAM
[General Appearance - Alert] : alert [General Appearance - In No Acute Distress] : in no acute distress [Sclera] : the sclera and conjunctiva were normal [PERRL With Normal Accommodation] : pupils were equal in size, round, and reactive to light [Extraocular Movements] : extraocular movements were intact [Oropharynx] : the oropharynx was normal [Outer Ear] : the ears and nose were normal in appearance [Neck Cervical Mass (___cm)] : no neck mass was observed [Neck Appearance] : the appearance of the neck was normal [Jugular Venous Distention Increased] : there was no jugular-venous distention [Thyroid Diffuse Enlargement] : the thyroid was not enlarged [Thyroid Nodule] : there were no palpable thyroid nodules [Auscultation Breath Sounds / Voice Sounds] : lungs were clear to auscultation bilaterally [Heart Sounds] : normal S1 and S2 [Heart Rate And Rhythm] : heart rate was normal and rhythm regular [Murmurs] : no murmurs [Heart Sounds Gallop] : no gallops [Heart Sounds Pericardial Friction Rub] : no pericardial rub [Abdomen Soft] : soft [Bowel Sounds] : normal bowel sounds [Abdomen Tenderness] : non-tender [Cervical Lymph Nodes Enlarged Posterior Bilaterally] : posterior cervical [Abdomen Mass (___ Cm)] : no abdominal mass palpated [Cervical Lymph Nodes Enlarged Anterior Bilaterally] : anterior cervical [Supraclavicular Lymph Nodes Enlarged Bilaterally] : supraclavicular [Axillary Lymph Nodes Enlarged Bilaterally] : axillary [Femoral Lymph Nodes Enlarged Bilaterally] : femoral [Inguinal Lymph Nodes Enlarged Bilaterally] : inguinal [No CVA Tenderness] : no ~M costovertebral angle tenderness [Abnormal Walk] : normal gait [No Spinal Tenderness] : no spinal tenderness [Nail Clubbing] : no clubbing  or cyanosis of the fingernails [Musculoskeletal - Swelling] : no joint swelling seen [Motor Tone] : muscle strength and tone were normal [Skin Turgor] : normal skin turgor [Skin Color & Pigmentation] : normal skin color and pigmentation [] : no rash

## 2019-08-23 ENCOUNTER — APPOINTMENT (OUTPATIENT)
Dept: ELECTROPHYSIOLOGY | Facility: CLINIC | Age: 79
End: 2019-08-23
Payer: MEDICARE

## 2019-08-23 PROCEDURE — 93298 REM INTERROG DEV EVAL SCRMS: CPT

## 2019-08-23 PROCEDURE — 93299: CPT

## 2019-08-27 ENCOUNTER — OTHER (OUTPATIENT)
Age: 79
End: 2019-08-27

## 2019-08-27 ENCOUNTER — APPOINTMENT (OUTPATIENT)
Dept: OBGYN | Facility: CLINIC | Age: 79
End: 2019-08-27
Payer: MEDICARE

## 2019-08-27 VITALS
WEIGHT: 132 LBS | SYSTOLIC BLOOD PRESSURE: 140 MMHG | BODY MASS INDEX: 27.71 KG/M2 | HEIGHT: 58 IN | DIASTOLIC BLOOD PRESSURE: 70 MMHG | TEMPERATURE: 98.3 F

## 2019-08-27 DIAGNOSIS — Z87.440 PERSONAL HISTORY OF URINARY (TRACT) INFECTIONS: ICD-10-CM

## 2019-08-27 DIAGNOSIS — Z87.39 PERSONAL HISTORY OF OTHER DISEASES OF THE MUSCULOSKELETAL SYSTEM AND CONNECTIVE TISSUE: ICD-10-CM

## 2019-08-27 DIAGNOSIS — N95.2 POSTMENOPAUSAL ATROPHIC VAGINITIS: ICD-10-CM

## 2019-08-27 DIAGNOSIS — M85.80 OTHER SPECIFIED DISORDERS OF BONE DENSITY AND STRUCTURE, UNSPECIFIED SITE: ICD-10-CM

## 2019-08-27 LAB
DATE COLLECTED: NORMAL
HEMOCCULT SP1 STL QL: NEGATIVE
QUALITY CONTROL: YES

## 2019-08-27 PROCEDURE — 82270 OCCULT BLOOD FECES: CPT

## 2019-08-27 PROCEDURE — 99214 OFFICE O/P EST MOD 30 MIN: CPT

## 2019-08-27 NOTE — PHYSICAL EXAM
[Awake] : awake [Alert] : alert [Acute Distress] : no acute distress [Mass] : no breast mass [Nipple Discharge] : no nipple discharge [Axillary LAD] : no axillary lymphadenopathy [Soft] : soft [Tender] : non tender [Oriented x3] : oriented to person, place, and time [Vulvar Atrophy] : vulvar atrophy [Normal] : cervix [Atrophy] : atrophy [No Bleeding] : there was no active vaginal bleeding [Pap Obtained] : a Pap smear was performed [No Tenderness] : no rectal tenderness [Uterine Adnexae] : were not tender and not enlarged [Occult Blood] : occult blood test from digital rectal exam was negative [Nl Sphincter Tone] : normal sphincter tone

## 2019-08-27 NOTE — CHIEF COMPLAINT
[Annual Visit] : annual visit [FreeTextEntry1] : Patient is a 78-year-old female presents for a gynecologic evaluation. Patient complains of intermittent right sided vulvar itching but is otherwise within normal limits. Patient's last mammogram was over one year ago last bone density was in 2016

## 2019-08-27 NOTE — COUNSELING
[Nutrition] : nutrition [Breast Self Exam] : breast self exam [Exercise] : exercise [Medication Management] : medication management [Vitamins/Supplements] : vitamins/supplements [Vulvar Hygiene] : vulvar hygiene

## 2019-08-28 ENCOUNTER — LABORATORY RESULT (OUTPATIENT)
Age: 79
End: 2019-08-28

## 2019-08-28 LAB
APPEARANCE: CLEAR
BACTERIA: NEGATIVE
BILIRUBIN URINE: NEGATIVE
BLOOD URINE: NORMAL
COLOR: NORMAL
GLUCOSE QUALITATIVE U: NEGATIVE
HYALINE CASTS: 1 /LPF
KETONES URINE: NEGATIVE
LEUKOCYTE ESTERASE URINE: ABNORMAL
MICROSCOPIC-UA: NORMAL
NITRITE URINE: NEGATIVE
PH URINE: 6.5
PROTEIN URINE: NEGATIVE
RED BLOOD CELLS URINE: 4 /HPF
SPECIFIC GRAVITY URINE: 1.01
SQUAMOUS EPITHELIAL CELLS: 6 /HPF
UROBILINOGEN URINE: NORMAL
WHITE BLOOD CELLS URINE: 16 /HPF

## 2019-09-02 LAB — BACTERIA UR CULT: ABNORMAL

## 2019-09-02 RX ORDER — NITROFURANTOIN (MONOHYDRATE/MACROCRYSTALS) 25; 75 MG/1; MG/1
100 CAPSULE ORAL TWICE DAILY
Qty: 14 | Refills: 0 | Status: DISCONTINUED | COMMUNITY
Start: 2019-09-02 | End: 2019-09-02

## 2019-09-19 ENCOUNTER — APPOINTMENT (OUTPATIENT)
Dept: OBGYN | Facility: CLINIC | Age: 79
End: 2019-09-19
Payer: MEDICARE

## 2019-09-19 DIAGNOSIS — R31.9 HEMATURIA, UNSPECIFIED: ICD-10-CM

## 2019-09-19 LAB
BILIRUB UR QL STRIP: NORMAL
CLARITY UR: CLEAR
COLLECTION METHOD: NORMAL
GLUCOSE UR-MCNC: NORMAL
HCG UR QL: 0.2 EU/DL
HGB UR QL STRIP.AUTO: NORMAL
KETONES UR-MCNC: NORMAL
LEUKOCYTE ESTERASE UR QL STRIP: NORMAL
NITRITE UR QL STRIP: NORMAL
PH UR STRIP: 5.5
PROT UR STRIP-MCNC: NORMAL
SP GR UR STRIP: 1.03

## 2019-09-19 PROCEDURE — 99214 OFFICE O/P EST MOD 30 MIN: CPT

## 2019-09-23 ENCOUNTER — APPOINTMENT (OUTPATIENT)
Dept: ELECTROPHYSIOLOGY | Facility: CLINIC | Age: 79
End: 2019-09-23
Payer: MEDICARE

## 2019-09-23 ENCOUNTER — MEDICATION RENEWAL (OUTPATIENT)
Age: 79
End: 2019-09-23

## 2019-09-23 DIAGNOSIS — N95.2 POSTMENOPAUSAL ATROPHIC VAGINITIS: ICD-10-CM

## 2019-09-23 LAB — BACTERIA UR CULT: ABNORMAL

## 2019-09-23 PROCEDURE — 93298 REM INTERROG DEV EVAL SCRMS: CPT

## 2019-09-23 PROCEDURE — 93299: CPT

## 2019-10-19 ENCOUNTER — EMERGENCY (EMERGENCY)
Facility: HOSPITAL | Age: 79
LOS: 0 days | Discharge: HOME | End: 2019-10-19
Attending: EMERGENCY MEDICINE
Payer: MEDICARE

## 2019-10-19 VITALS
HEART RATE: 57 BPM | WEIGHT: 134.92 LBS | DIASTOLIC BLOOD PRESSURE: 51 MMHG | TEMPERATURE: 98 F | HEIGHT: 59 IN | RESPIRATION RATE: 18 BRPM | SYSTOLIC BLOOD PRESSURE: 141 MMHG | OXYGEN SATURATION: 98 %

## 2019-10-19 VITALS
RESPIRATION RATE: 18 BRPM | OXYGEN SATURATION: 98 % | DIASTOLIC BLOOD PRESSURE: 59 MMHG | HEART RATE: 58 BPM | SYSTOLIC BLOOD PRESSURE: 135 MMHG

## 2019-10-19 DIAGNOSIS — J45.909 UNSPECIFIED ASTHMA, UNCOMPLICATED: ICD-10-CM

## 2019-10-19 DIAGNOSIS — M10.9 GOUT, UNSPECIFIED: ICD-10-CM

## 2019-10-19 DIAGNOSIS — Z98.890 OTHER SPECIFIED POSTPROCEDURAL STATES: Chronic | ICD-10-CM

## 2019-10-19 DIAGNOSIS — Z87.440 PERSONAL HISTORY OF URINARY (TRACT) INFECTIONS: Chronic | ICD-10-CM

## 2019-10-19 DIAGNOSIS — R42 DIZZINESS AND GIDDINESS: ICD-10-CM

## 2019-10-19 DIAGNOSIS — M41.9 SCOLIOSIS, UNSPECIFIED: ICD-10-CM

## 2019-10-19 DIAGNOSIS — Z87.448 PERSONAL HISTORY OF OTHER DISEASES OF URINARY SYSTEM: Chronic | ICD-10-CM

## 2019-10-19 DIAGNOSIS — D35.1 BENIGN NEOPLASM OF PARATHYROID GLAND: Chronic | ICD-10-CM

## 2019-10-19 DIAGNOSIS — Z98.891 HISTORY OF UTERINE SCAR FROM PREVIOUS SURGERY: Chronic | ICD-10-CM

## 2019-10-19 DIAGNOSIS — I25.10 ATHEROSCLEROTIC HEART DISEASE OF NATIVE CORONARY ARTERY WITHOUT ANGINA PECTORIS: ICD-10-CM

## 2019-10-19 DIAGNOSIS — Z95.1 PRESENCE OF AORTOCORONARY BYPASS GRAFT: ICD-10-CM

## 2019-10-19 DIAGNOSIS — I10 ESSENTIAL (PRIMARY) HYPERTENSION: ICD-10-CM

## 2019-10-19 LAB
ALBUMIN SERPL ELPH-MCNC: 3.7 G/DL — SIGNIFICANT CHANGE UP (ref 3.3–5)
ALP SERPL-CCNC: 92 U/L — SIGNIFICANT CHANGE UP (ref 40–120)
ALT FLD-CCNC: 35 U/L — SIGNIFICANT CHANGE UP (ref 12–78)
ANION GAP SERPL CALC-SCNC: 8 MMOL/L — SIGNIFICANT CHANGE UP (ref 5–17)
APPEARANCE UR: CLEAR — SIGNIFICANT CHANGE UP
APTT BLD: 32.2 SEC — SIGNIFICANT CHANGE UP (ref 27.5–36.3)
AST SERPL-CCNC: 20 U/L — SIGNIFICANT CHANGE UP (ref 15–37)
BILIRUB SERPL-MCNC: 0.8 MG/DL — SIGNIFICANT CHANGE UP (ref 0.2–1.2)
BILIRUB UR-MCNC: NEGATIVE — SIGNIFICANT CHANGE UP
BUN SERPL-MCNC: 26 MG/DL — HIGH (ref 7–23)
CALCIUM SERPL-MCNC: 9.6 MG/DL — SIGNIFICANT CHANGE UP (ref 8.5–10.1)
CHLORIDE SERPL-SCNC: 107 MMOL/L — SIGNIFICANT CHANGE UP (ref 96–108)
CO2 SERPL-SCNC: 26 MMOL/L — SIGNIFICANT CHANGE UP (ref 22–31)
COLOR SPEC: YELLOW — SIGNIFICANT CHANGE UP
CREAT SERPL-MCNC: 1.06 MG/DL — SIGNIFICANT CHANGE UP (ref 0.5–1.3)
DIFF PNL FLD: ABNORMAL
GLUCOSE SERPL-MCNC: 156 MG/DL — HIGH (ref 70–99)
GLUCOSE UR QL: NEGATIVE MG/DL — SIGNIFICANT CHANGE UP
HCT VFR BLD CALC: 43.7 % — SIGNIFICANT CHANGE UP (ref 34.5–45)
HGB BLD-MCNC: 14.3 G/DL — SIGNIFICANT CHANGE UP (ref 11.5–15.5)
INR BLD: 1.57 RATIO — HIGH (ref 0.88–1.16)
KETONES UR-MCNC: NEGATIVE — SIGNIFICANT CHANGE UP
LEUKOCYTE ESTERASE UR-ACNC: ABNORMAL
MCHC RBC-ENTMCNC: 29.5 PG — SIGNIFICANT CHANGE UP (ref 27–34)
MCHC RBC-ENTMCNC: 32.7 GM/DL — SIGNIFICANT CHANGE UP (ref 32–36)
MCV RBC AUTO: 90.1 FL — SIGNIFICANT CHANGE UP (ref 80–100)
NITRITE UR-MCNC: NEGATIVE — SIGNIFICANT CHANGE UP
PH UR: 6 — SIGNIFICANT CHANGE UP (ref 5–8)
PLATELET # BLD AUTO: 263 K/UL — SIGNIFICANT CHANGE UP (ref 150–400)
POTASSIUM SERPL-MCNC: 4.8 MMOL/L — SIGNIFICANT CHANGE UP (ref 3.5–5.3)
POTASSIUM SERPL-SCNC: 4.8 MMOL/L — SIGNIFICANT CHANGE UP (ref 3.5–5.3)
PROT SERPL-MCNC: 7.6 GM/DL — SIGNIFICANT CHANGE UP (ref 6–8.3)
PROT UR-MCNC: 100 MG/DL
PROTHROM AB SERPL-ACNC: 17.7 SEC — HIGH (ref 10–12.9)
RBC # BLD: 4.85 M/UL — SIGNIFICANT CHANGE UP (ref 3.8–5.2)
RBC # FLD: 14.8 % — HIGH (ref 10.3–14.5)
SODIUM SERPL-SCNC: 141 MMOL/L — SIGNIFICANT CHANGE UP (ref 135–145)
SP GR SPEC: 1.02 — SIGNIFICANT CHANGE UP (ref 1.01–1.02)
TROPONIN I SERPL-MCNC: <0.015 NG/ML — SIGNIFICANT CHANGE UP (ref 0.01–0.04)
TROPONIN I SERPL-MCNC: <0.015 NG/ML — SIGNIFICANT CHANGE UP (ref 0.01–0.04)
UROBILINOGEN FLD QL: NEGATIVE MG/DL — SIGNIFICANT CHANGE UP
WBC # BLD: 14.59 K/UL — HIGH (ref 3.8–10.5)
WBC # FLD AUTO: 14.59 K/UL — HIGH (ref 3.8–10.5)

## 2019-10-19 PROCEDURE — 96374 THER/PROPH/DIAG INJ IV PUSH: CPT

## 2019-10-19 PROCEDURE — 93010 ELECTROCARDIOGRAM REPORT: CPT

## 2019-10-19 PROCEDURE — 84484 ASSAY OF TROPONIN QUANT: CPT

## 2019-10-19 PROCEDURE — 85730 THROMBOPLASTIN TIME PARTIAL: CPT

## 2019-10-19 PROCEDURE — 85610 PROTHROMBIN TIME: CPT

## 2019-10-19 PROCEDURE — 85027 COMPLETE CBC AUTOMATED: CPT

## 2019-10-19 PROCEDURE — 93005 ELECTROCARDIOGRAM TRACING: CPT

## 2019-10-19 PROCEDURE — 99284 EMERGENCY DEPT VISIT MOD MDM: CPT

## 2019-10-19 PROCEDURE — 80053 COMPREHEN METABOLIC PANEL: CPT

## 2019-10-19 PROCEDURE — 71045 X-RAY EXAM CHEST 1 VIEW: CPT | Mod: 26

## 2019-10-19 PROCEDURE — 81001 URINALYSIS AUTO W/SCOPE: CPT

## 2019-10-19 PROCEDURE — 71045 X-RAY EXAM CHEST 1 VIEW: CPT

## 2019-10-19 PROCEDURE — 96361 HYDRATE IV INFUSION ADD-ON: CPT

## 2019-10-19 PROCEDURE — 99284 EMERGENCY DEPT VISIT MOD MDM: CPT | Mod: 25

## 2019-10-19 PROCEDURE — 36415 COLL VENOUS BLD VENIPUNCTURE: CPT

## 2019-10-19 RX ORDER — CEFTRIAXONE 500 MG/1
1000 INJECTION, POWDER, FOR SOLUTION INTRAMUSCULAR; INTRAVENOUS ONCE
Refills: 0 | Status: COMPLETED | OUTPATIENT
Start: 2019-10-19 | End: 2019-10-19

## 2019-10-19 RX ORDER — SODIUM CHLORIDE 9 MG/ML
1000 INJECTION INTRAMUSCULAR; INTRAVENOUS; SUBCUTANEOUS ONCE
Refills: 0 | Status: COMPLETED | OUTPATIENT
Start: 2019-10-19 | End: 2019-10-19

## 2019-10-19 RX ADMIN — SODIUM CHLORIDE 1000 MILLILITER(S): 9 INJECTION INTRAMUSCULAR; INTRAVENOUS; SUBCUTANEOUS at 14:54

## 2019-10-19 RX ADMIN — SODIUM CHLORIDE 1000 MILLILITER(S): 9 INJECTION INTRAMUSCULAR; INTRAVENOUS; SUBCUTANEOUS at 13:54

## 2019-10-19 RX ADMIN — CEFTRIAXONE 1000 MILLIGRAM(S): 500 INJECTION, POWDER, FOR SOLUTION INTRAMUSCULAR; INTRAVENOUS at 15:19

## 2019-10-19 NOTE — ED PROVIDER NOTE - OBJECTIVE STATEMENT
78 y/o female with PMHx of asthma, loop recorder, CAD s/p coronary stent, HTN, C Diff, renal calculi s/p lithotripsy x2, gout, Bell's palsy, scoliosis, spinal stenosis, s/p , s/p D&C presents to ED c/o acute onset of lightheadedness, nausea and near-syncope after eating breakfast with her son this AM. Pt states she was walking in a parking lot when experiencing this. Currently symptom free. Pt states she gets these symptoms every now and then.

## 2019-10-19 NOTE — ED ADULT NURSE NOTE - NSIMPLEMENTINTERV_GEN_ALL_ED
Implemented All Fall with Harm Risk Interventions:  [place] to call system. Call bell, personal items and telephone within reach. Instruct patient to call for assistance. Room bathroom lighting operational. Non-slip footwear when patient is off stretcher. Physically safe environment: no spills, clutter or unnecessary equipment. Stretcher in lowest position, wheels locked, appropriate side rails in place. Provide visual cue, wrist band, yellow gown, etc. Monitor gait and stability. Monitor for mental status changes and reorient to person, place, and time. Review medications for side effects contributing to fall risk. Reinforce activity limits and safety measures with patient and family. Provide visual clues: red socks.

## 2019-10-19 NOTE — ED PROVIDER NOTE - PATIENT PORTAL LINK FT
You can access the FollowMyHealth Patient Portal offered by [facility] by registering at the following website: [email or web address]. By joining [facility] FollowMyHealth portal, you will also be able to view your health information using other applications (apps) compatible with our system.

## 2019-10-19 NOTE — ED ADULT NURSE NOTE - OBJECTIVE STATEMENT
c/owitnessed near syncope episode after eating, pt was helped to ground by son, denies fall or injury, denies CP, dizziness, blurred vision. Hx Afib, HTN, loop recorder, kidney stones, cardiac stents

## 2019-10-19 NOTE — ED ADULT NURSE NOTE - CHIEF COMPLAINT QUOTE
Pt just finished eating when she became dizzy and nauseous, had near syncopal episode . hx of stent and loop recorder

## 2019-10-23 ENCOUNTER — APPOINTMENT (OUTPATIENT)
Dept: UROLOGY | Facility: CLINIC | Age: 79
End: 2019-10-23
Payer: MEDICARE

## 2019-10-23 VITALS
HEART RATE: 56 BPM | BODY MASS INDEX: 27.71 KG/M2 | RESPIRATION RATE: 17 BRPM | HEIGHT: 58 IN | WEIGHT: 132 LBS | DIASTOLIC BLOOD PRESSURE: 68 MMHG | SYSTOLIC BLOOD PRESSURE: 169 MMHG | TEMPERATURE: 97.8 F

## 2019-10-23 DIAGNOSIS — Z87.440 PERSONAL HISTORY OF URINARY (TRACT) INFECTIONS: ICD-10-CM

## 2019-10-23 PROCEDURE — 99215 OFFICE O/P EST HI 40 MIN: CPT

## 2019-10-23 NOTE — HISTORY OF PRESENT ILLNESS
[None] : None [FreeTextEntry1] : Patient here for f/u.\par She has suffered from several bouts of E Coli cystitis since last visit. She has taken limited courses of antibiotics due to her history with several antibiotics associated bouts of C. diff infections requiring fecal transplant. Most recent bouts of cystitis were in August, September and just this past weekend after she syncopized and was taken to [facility] and workup revealed only dirty UA and culture sent. She was given a dose of Rocephin IV and no oral antibiotics because of concern for C. diff flaring.\par \par Labs and records from [facility] reviewed: urine culture grew pansensitive E. Coli. WBC was elevated. CXR was negative.\par \par Abdominal sono: no stones, bilateral renal cysts.\par 24 hr urine: low vol, pH 6.5, citrate ok, sodium and calcium ok.\par \par \par RELEVANT PAST HISTORY\par History of RIGHT PCNL and treatment of RIGHT proximal ureteral stricture on 12/5/17; stent dislodged and had to be replaced on 12/7/17.\par Stones = 90% Calcium oxalate monohydrate,10% Calcium oxalate dihydrate; and 100% Calcium oxalate monohydrate.\par Known  5mm residual Right lower pole stone.\par \par

## 2019-10-23 NOTE — ASSESSMENT
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 Increased citrate with continued k-citrate and increased fluids intake should hopefully control stones recurrence. We will reassess this approach based on next renal ultrasound and 24 hr urine as well as whether she has any UTI recurrences before next visit.

## 2019-10-23 NOTE — PHYSICAL EXAM
[General Appearance - Well Developed] : well developed [General Appearance - Well Nourished] : well nourished [Normal Appearance] : normal appearance [Well Groomed] : well groomed [General Appearance - In No Acute Distress] : no acute distress [Abdomen Soft] : soft [Abdomen Tenderness] : non-tender [Costovertebral Angle Tenderness] : no ~M costovertebral angle tenderness [Urinary Bladder Findings] : the bladder was normal on palpation [Edema] : no peripheral edema [Skin Color & Pigmentation] : normal skin color and pigmentation [] : no respiratory distress [Respiration, Rhythm And Depth] : normal respiratory rhythm and effort [Exaggerated Use Of Accessory Muscles For Inspiration] : no accessory muscle use [Oriented To Time, Place, And Person] : oriented to person, place, and time [Affect] : the affect was normal [Mood] : the mood was normal [Not Anxious] : not anxious [Normal Station and Gait] : the gait and station were normal for the patient's age

## 2019-11-06 ENCOUNTER — APPOINTMENT (OUTPATIENT)
Dept: ELECTROPHYSIOLOGY | Facility: CLINIC | Age: 79
End: 2019-11-06
Payer: MEDICARE

## 2019-11-06 VITALS — HEART RATE: 56 BPM | BODY MASS INDEX: 27.01 KG/M2 | HEIGHT: 59 IN | WEIGHT: 134 LBS

## 2019-11-06 PROCEDURE — 93291 INTERROG DEV EVAL SCRMS IP: CPT

## 2019-11-06 RX ORDER — CONJUGATED ESTROGENS 0.62 MG/G
0.62 CREAM VAGINAL
Qty: 1 | Refills: 2 | Status: DISCONTINUED | COMMUNITY
Start: 2019-09-23 | End: 2019-11-06

## 2019-11-06 RX ORDER — CEFUROXIME AXETIL 250 MG/1
250 TABLET ORAL
Qty: 21 | Refills: 0 | Status: DISCONTINUED | COMMUNITY
Start: 2019-09-02 | End: 2019-11-06

## 2019-11-06 RX ORDER — BISMUTH SUBSALICYLATE 525 MG/1
TABLET ORAL
Refills: 0 | Status: ACTIVE | COMMUNITY

## 2019-11-06 RX ORDER — NITROFURANTOIN MACROCRYSTALS 100 MG/1
100 CAPSULE ORAL
Qty: 30 | Refills: 1 | Status: DISCONTINUED | COMMUNITY
Start: 2019-10-23 | End: 2019-11-06

## 2019-11-06 RX ORDER — CLOPIDOGREL 75 MG/1
75 TABLET, FILM COATED ORAL DAILY
Qty: 90 | Refills: 3 | Status: ACTIVE | COMMUNITY
Start: 2019-02-07

## 2019-11-06 RX ORDER — SULFAMETHOXAZOLE AND TRIMETHOPRIM 800; 160 MG/1; MG/1
800-160 TABLET ORAL
Qty: 14 | Refills: 0 | Status: DISCONTINUED | COMMUNITY
Start: 2019-09-23 | End: 2019-11-06

## 2019-11-06 RX ORDER — ESTRADIOL 0.1 MG/G
0.1 CREAM VAGINAL
Qty: 1 | Refills: 2 | Status: DISCONTINUED | COMMUNITY
Start: 2019-09-23 | End: 2019-11-06

## 2019-11-06 RX ORDER — ALLOPURINOL 100 MG/1
100 TABLET ORAL DAILY
Refills: 0 | Status: ACTIVE | COMMUNITY

## 2019-11-13 LAB
APPEARANCE: CLEAR
BACTERIA UR CULT: NORMAL
BACTERIA: NEGATIVE
BILIRUBIN URINE: NEGATIVE
BLOOD URINE: NEGATIVE
COLOR: NORMAL
GLUCOSE QUALITATIVE U: NEGATIVE
HYALINE CASTS: 0 /LPF
KETONES URINE: NEGATIVE
LEUKOCYTE ESTERASE URINE: NEGATIVE
MICROSCOPIC-UA: NORMAL
NITRITE URINE: NEGATIVE
PH URINE: 6.5
PROTEIN URINE: NEGATIVE
RED BLOOD CELLS URINE: 2 /HPF
SPECIFIC GRAVITY URINE: 1.01
SQUAMOUS EPITHELIAL CELLS: 1 /HPF
UROBILINOGEN URINE: NORMAL
WHITE BLOOD CELLS URINE: 0 /HPF

## 2019-12-04 ENCOUNTER — OUTPATIENT (OUTPATIENT)
Dept: OUTPATIENT SERVICES | Facility: HOSPITAL | Age: 79
LOS: 1 days | End: 2019-12-04
Payer: MEDICARE

## 2019-12-04 DIAGNOSIS — Z98.890 OTHER SPECIFIED POSTPROCEDURAL STATES: Chronic | ICD-10-CM

## 2019-12-04 DIAGNOSIS — R55 SYNCOPE AND COLLAPSE: ICD-10-CM

## 2019-12-04 DIAGNOSIS — Z87.448 PERSONAL HISTORY OF OTHER DISEASES OF URINARY SYSTEM: Chronic | ICD-10-CM

## 2019-12-04 DIAGNOSIS — D35.1 BENIGN NEOPLASM OF PARATHYROID GLAND: Chronic | ICD-10-CM

## 2019-12-04 DIAGNOSIS — Z98.891 HISTORY OF UTERINE SCAR FROM PREVIOUS SURGERY: Chronic | ICD-10-CM

## 2019-12-04 DIAGNOSIS — Z87.440 PERSONAL HISTORY OF URINARY (TRACT) INFECTIONS: Chronic | ICD-10-CM

## 2019-12-04 PROCEDURE — 93660 TILT TABLE EVALUATION: CPT | Mod: 26

## 2019-12-04 PROCEDURE — 93660 TILT TABLE EVALUATION: CPT

## 2019-12-05 DIAGNOSIS — R55 SYNCOPE AND COLLAPSE: ICD-10-CM

## 2019-12-27 ENCOUNTER — APPOINTMENT (OUTPATIENT)
Dept: ELECTROPHYSIOLOGY | Facility: CLINIC | Age: 79
End: 2019-12-27
Payer: MEDICARE

## 2019-12-27 PROCEDURE — 93299: CPT

## 2019-12-27 PROCEDURE — 93298 REM INTERROG DEV EVAL SCRMS: CPT

## 2020-01-01 NOTE — DISCHARGE NOTE ADULT - ADMISSION DATE +STARTOFVISITDATE
"Assessments completed as charted.  Pulse 128   Temp 98.4  F (36.9  C) (Axillary)   Resp 40   Ht 0.47 m (1' 6.5\")   Wt 2.88 kg (6 lb 5.6 oz)   HC 33.7 cm (13.25\")   SpO2 96%   BMI 13.04 kg/m  , Infant with easy respirations, lungs clear to auscultation bilaterally. Skin pink, warm, no rashes, no ecchymosis, well perfused.Breast feeding well. Infant remains in parent room. Education completed as charted. Will continue to monitor. Continued planning for discharge.  " Statement Selected

## 2020-01-16 ENCOUNTER — APPOINTMENT (OUTPATIENT)
Dept: GASTROENTEROLOGY | Facility: CLINIC | Age: 80
End: 2020-01-16
Payer: MEDICARE

## 2020-01-16 VITALS
WEIGHT: 135 LBS | SYSTOLIC BLOOD PRESSURE: 175 MMHG | HEART RATE: 70 BPM | BODY MASS INDEX: 27.21 KG/M2 | HEIGHT: 59 IN | DIASTOLIC BLOOD PRESSURE: 95 MMHG

## 2020-01-16 PROCEDURE — 99213 OFFICE O/P EST LOW 20 MIN: CPT

## 2020-01-16 NOTE — HISTORY OF PRESENT ILLNESS
[FreeTextEntry1] : Patient is noted to pellet-sized stools over the past few months. Some flecks of white material were present throughout but has since disappeared. She feels well. No abdominal cramps. No fevers no diarrhea, antibiotics. Several months ago, for urinary tract infection. Did not result in any change in bowel habits, and she continues to use probiotics

## 2020-01-16 NOTE — PHYSICAL EXAM
[General Appearance - Alert] : alert [General Appearance - In No Acute Distress] : in no acute distress [Sclera] : the sclera and conjunctiva were normal [PERRL With Normal Accommodation] : pupils were equal in size, round, and reactive to light [Extraocular Movements] : extraocular movements were intact [Outer Ear] : the ears and nose were normal in appearance [Oropharynx] : the oropharynx was normal [Neck Appearance] : the appearance of the neck was normal [Jugular Venous Distention Increased] : there was no jugular-venous distention [Neck Cervical Mass (___cm)] : no neck mass was observed [Thyroid Nodule] : there were no palpable thyroid nodules [Thyroid Diffuse Enlargement] : the thyroid was not enlarged [Auscultation Breath Sounds / Voice Sounds] : lungs were clear to auscultation bilaterally [Heart Rate And Rhythm] : heart rate was normal and rhythm regular [Heart Sounds] : normal S1 and S2 [Heart Sounds Gallop] : no gallops [Murmurs] : no murmurs [Heart Sounds Pericardial Friction Rub] : no pericardial rub [Abdomen Soft] : soft [Bowel Sounds] : normal bowel sounds [Abdomen Tenderness] : non-tender [Abdomen Mass (___ Cm)] : no abdominal mass palpated [Cervical Lymph Nodes Enlarged Posterior Bilaterally] : posterior cervical [Supraclavicular Lymph Nodes Enlarged Bilaterally] : supraclavicular [Axillary Lymph Nodes Enlarged Bilaterally] : axillary [Cervical Lymph Nodes Enlarged Anterior Bilaterally] : anterior cervical [Femoral Lymph Nodes Enlarged Bilaterally] : femoral [Inguinal Lymph Nodes Enlarged Bilaterally] : inguinal [No CVA Tenderness] : no ~M costovertebral angle tenderness [No Spinal Tenderness] : no spinal tenderness [Abnormal Walk] : normal gait [Nail Clubbing] : no clubbing  or cyanosis of the fingernails [Musculoskeletal - Swelling] : no joint swelling seen [Motor Tone] : muscle strength and tone were normal [Skin Color & Pigmentation] : normal skin color and pigmentation [] : no rash [Skin Turgor] : normal skin turgor

## 2020-01-16 NOTE — ASSESSMENT
[FreeTextEntry1] : Pellet stools, likely secondary to constipation and dehydration rotavirus on Metamucil, and increasing fluid intake

## 2020-01-27 ENCOUNTER — APPOINTMENT (OUTPATIENT)
Dept: ELECTROPHYSIOLOGY | Facility: CLINIC | Age: 80
End: 2020-01-27
Payer: MEDICARE

## 2020-01-27 PROCEDURE — 93298 REM INTERROG DEV EVAL SCRMS: CPT

## 2020-01-27 PROCEDURE — G2066: CPT

## 2020-02-10 NOTE — PROCEDURE NOTE - NSCOMPLICATION_GEN_A_CORE
Patient Name: Trena Newman  MRN: [id number]  : 1966    Referring Provider   Maggie Edmonds MD    HPI:   This 53-year-old female was referred because of a relatively low heart rate.  The patient recently purchased an Apple watch, and she became concerned that her heart rate was sometimes in the 40s during sleep.  She exercises 3-4 times per week without difficulty, although when using a heart rate monitor on exercise equipment, she has difficulty entering the \"red zone\" of heart rate calculated for her age bracket.  She denies dyspnea, palpitations, fainting or angina.  Her weight is stable.  She does not have a history of thyroid disease or any systemic illnesses.  An EKG dated 2020 revealed sinus bradycardia at 47 bpm.  Until 3 years ago, the patient was running in marathons.  Her  notes that she snores at night but he has not observed apnea.  She denies daytime headaches or fatigue.  The patient also mentioned the possibility of needing a coronary calcium score.    Coronary risk factors: None known    Electrophysiology family history: Negative    Social History: .  Children.  .  Non-smoker.    PMHx:  Past Medical History:   Diagnosis Date   • Bradycardia        PSHx:  Past Surgical History:   Procedure Laterality Date   • Achilles tendon surgery     • Cervix surgery      cone biopsy >20 yrs ago   • Colonoscopy  2016    re:5yrs Dr. Karimi   • Colonoscopy     • Tonsillectomy     • Tubal ligation     • Vaginal delivery      x 3       Family Hx:  Family History   Problem Relation Age of Onset   • Hypertension Mother    • Cancer, Colon Father        Allergies:  ALLERGIES:  No Known Allergies    Medications:  (Not in a hospital admission)      Review of Systems:   Review of Systems   Constitutional: Negative.    HENT: Negative.    Eyes: Negative.    Respiratory: Negative.    Cardiovascular:        As per HPI   Gastrointestinal: Negative.    Endocrine:  Negative.    Genitourinary: Negative.    Musculoskeletal: Negative.    Skin: Negative.    Allergic/Immunologic: Negative.    Neurological: Negative.    Hematological: Negative.    Psychiatric/Behavioral: Negative.        Physical Examination:   Vitals:    02/10/20 1047 02/10/20 1048   BP: 118/78 124/80   Pulse: (!) 52    SpO2: 99%    Weight: 66.2 kg (146 lb)    Height: 5' 6\" (1.676 m)       Physical Exam   Constitutional: She is oriented to person, place, and time. She appears well-developed and well-nourished.   HENT:   Head: Normocephalic and atraumatic.   Right Ear: External ear normal.   Left Ear: External ear normal.   Nose: Nose normal.   Mouth/Throat: Oropharynx is clear and moist.   Eyes: Pupils are equal, round, and reactive to light. Conjunctivae and EOM are normal.   Neck: Normal range of motion. Neck supple.   Cardiovascular: Normal rate, regular rhythm, normal heart sounds and intact distal pulses.   Pulmonary/Chest: Effort normal and breath sounds normal.   Abdominal: Soft. Bowel sounds are normal. Musculoskeletal: Normal range of motion.     Neurological: She is alert and oriented to person, place, and time. She has normal reflexes.   Skin: Skin is warm.   Psychiatric: She has a normal mood and affect. Her behavior is normal. Judgment and thought content normal.     Results for orders placed or performed in visit on 02/10/20   ELECTROCARDIOGRAM 12-LEAD    Impression    Sinus bradycardia at 49 bpm        Assessment/Plan:   Diagnoses and all orders for this visit:  Bradycardia  -     ELECTROCARDIOGRAM 12-LEAD  -     STRESS TEST; Future  Sinus bradycardia  At low risk for coronary artery disease       Sinus bradycardia  The patient has asymptomatic sinus bradycardia at rest.  Most likely, this reflects relative hypervagotonia.  I doubt that this represents clinically significant intrinsic sinus node dysfunction or reflects another medical problem.    Plan:    1.  Treadmill test   2.  TSH if not recently  assessed    At low risk for coronary artery disease  Although the patient mentioned the possibility of obtaining a coronary calcium score, she has no  known risk factors.  If she has a \"borderline\" lipid profile and statin therapy is being considered, a calcium score would be useful in decision making.            Rudolph Garcia MD   no complications

## 2020-02-13 ENCOUNTER — APPOINTMENT (OUTPATIENT)
Dept: ELECTROPHYSIOLOGY | Facility: CLINIC | Age: 80
End: 2020-02-13
Payer: MEDICARE

## 2020-02-13 VITALS
WEIGHT: 136 LBS | SYSTOLIC BLOOD PRESSURE: 153 MMHG | HEART RATE: 55 BPM | DIASTOLIC BLOOD PRESSURE: 59 MMHG | OXYGEN SATURATION: 96 % | HEIGHT: 59 IN | BODY MASS INDEX: 27.42 KG/M2

## 2020-02-13 DIAGNOSIS — E78.5 HYPERLIPIDEMIA, UNSPECIFIED: ICD-10-CM

## 2020-02-13 DIAGNOSIS — M10.9 GOUT, UNSPECIFIED: ICD-10-CM

## 2020-02-13 PROCEDURE — 93285 PRGRMG DEV EVAL SCRMS IP: CPT

## 2020-02-13 RX ORDER — NYSTATIN AND TRIAMCINOLONE ACETONIDE 100000; 1 [USP'U]/G; MG/G
100000-0.1 OINTMENT TOPICAL 3 TIMES DAILY
Qty: 1 | Refills: 1 | Status: DISCONTINUED | COMMUNITY
Start: 2019-08-27 | End: 2020-02-13

## 2020-02-13 RX ORDER — BETAMETHASONE DIPROPIONATE 0.5 MG/G
0.05 OINTMENT TOPICAL TWICE DAILY
Qty: 1 | Refills: 0 | Status: DISCONTINUED | COMMUNITY
Start: 2019-08-27 | End: 2020-02-13

## 2020-02-13 NOTE — PHYSICAL EXAM
[General Appearance - Well Developed] : well developed [Normal Appearance] : normal appearance [General Appearance - Well Nourished] : well nourished [Well Groomed] : well groomed [General Appearance - In No Acute Distress] : no acute distress [Heart Rate And Rhythm] : heart rate and rhythm were normal [No Deformities] : no deformities [Murmurs] : no murmurs present [Heart Sounds] : normal S1 and S2 [Auscultation Breath Sounds / Voice Sounds] : lungs were clear to auscultation bilaterally [Exaggerated Use Of Accessory Muscles For Inspiration] : no accessory muscle use [Respiration, Rhythm And Depth] : normal respiratory rhythm and effort [Dry] : dry [Clean] : clean [Well-Healed] : well-healed [Abdomen Soft] : soft [Abdomen Tenderness] : non-tender [Abdomen Mass (___ Cm)] : no abdominal mass palpated [Cyanosis, Localized] : no localized cyanosis [Nail Clubbing] : no clubbing of the fingernails [Petechial Hemorrhages (___cm)] : no petechial hemorrhages [] : no ischemic changes

## 2020-02-27 ENCOUNTER — APPOINTMENT (OUTPATIENT)
Dept: ELECTROPHYSIOLOGY | Facility: CLINIC | Age: 80
End: 2020-02-27
Payer: MEDICARE

## 2020-02-27 PROCEDURE — 93298 REM INTERROG DEV EVAL SCRMS: CPT

## 2020-02-27 PROCEDURE — G2066: CPT

## 2020-03-30 ENCOUNTER — APPOINTMENT (OUTPATIENT)
Dept: ELECTROPHYSIOLOGY | Facility: CLINIC | Age: 80
End: 2020-03-30
Payer: MEDICARE

## 2020-03-30 PROCEDURE — G2066: CPT

## 2020-03-30 PROCEDURE — 93298 REM INTERROG DEV EVAL SCRMS: CPT
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
## 2020-04-13 ENCOUNTER — APPOINTMENT (OUTPATIENT)
Dept: UROLOGY | Facility: CLINIC | Age: 80
End: 2020-04-13

## 2020-04-15 DIAGNOSIS — Z82.49 FAMILY HISTORY OF ISCHEMIC HEART DISEASE AND OTHER DISEASES OF THE CIRCULATORY SYSTEM: ICD-10-CM

## 2020-04-15 DIAGNOSIS — Z84.1 FAMILY HISTORY OF DISORDERS OF KIDNEY AND URETER: ICD-10-CM

## 2020-04-15 DIAGNOSIS — Z80.42 FAMILY HISTORY OF MALIGNANT NEOPLASM OF PROSTATE: ICD-10-CM

## 2020-04-15 NOTE — REVIEW OF SYSTEMS
[Eyesight Problems] : eyesight problems [Heart Rate Is Fast] : fast heart rate [Constipation] : constipation [Urine Infection (bladder/kidney)] : bladder/kidney infection [Pain during urination] : pain during urination [Told you have blood in urine on a urine test] : told blood was present in a urine test [History of kidney stones] : history of kidney stones [Wake up at night to urinate  How many times?  ___] : wakes up to urinate [unfilled] times during the night [Leakage of urine with urgency] : leakage of urine with urgency [Joint Pain] : joint pain [Negative] : Heme/Lymph

## 2020-04-17 ENCOUNTER — APPOINTMENT (OUTPATIENT)
Dept: UROLOGY | Facility: CLINIC | Age: 80
End: 2020-04-17
Payer: MEDICARE

## 2020-04-17 PROCEDURE — 99214 OFFICE O/P EST MOD 30 MIN: CPT | Mod: 95

## 2020-04-17 NOTE — HISTORY OF PRESENT ILLNESS
[Home] : at home, [unfilled] , at the time of the visit. [Other Location: e.g. Home (Enter Location, City,State)___] : at [unfilled] [Patient] : the patient [Self] : self [FreeTextEntry1] : See notes from last visit\par She remains on Hiprex, K-citrate and Allopurinol (for gout from PCP).\par No UTI's since last visit.\par No pain, fevers, chills, nausea and or vomiting and no unexplained weight loss. \par  \par All pertinent parts of the patient PFSH (past medical, family and social histories), laboratory, radiological studies and physician notes were reviewed prior to starting the face to face portion of the telemedicine visit.  Questionnaire results were discussed with patient. \par \par 24 hr urine: good vol, other parameters within acceptable ranges.\par Renal sono: pending (not able to due to COVID19 crisis).\par [facility] no stones, stable bilateral renal cysts \par \par Plan:\par continue k-citrate\par continue hiprex for now until next visit, then we will consider stopping\par continue allopurinol\par do renal sono when able and call me so i can review results\par 24 hr urine and renal sono before next visit\par f/u 6 months\par \par \par \par

## 2020-04-21 ENCOUNTER — APPOINTMENT (OUTPATIENT)
Dept: GASTROENTEROLOGY | Facility: CLINIC | Age: 80
End: 2020-04-21

## 2020-05-01 ENCOUNTER — APPOINTMENT (OUTPATIENT)
Dept: ELECTROPHYSIOLOGY | Facility: CLINIC | Age: 80
End: 2020-05-01
Payer: MEDICARE

## 2020-05-01 PROCEDURE — G2066: CPT

## 2020-05-01 PROCEDURE — 93298 REM INTERROG DEV EVAL SCRMS: CPT

## 2020-06-02 ENCOUNTER — APPOINTMENT (OUTPATIENT)
Dept: ELECTROPHYSIOLOGY | Facility: CLINIC | Age: 80
End: 2020-06-02
Payer: MEDICARE

## 2020-06-02 PROCEDURE — 93298 REM INTERROG DEV EVAL SCRMS: CPT

## 2020-06-02 PROCEDURE — G2066: CPT

## 2020-07-02 ENCOUNTER — APPOINTMENT (OUTPATIENT)
Dept: ELECTROPHYSIOLOGY | Facility: CLINIC | Age: 80
End: 2020-07-02
Payer: MEDICARE

## 2020-07-02 PROCEDURE — G2066: CPT

## 2020-07-02 PROCEDURE — 93298 REM INTERROG DEV EVAL SCRMS: CPT

## 2020-07-22 ENCOUNTER — APPOINTMENT (OUTPATIENT)
Dept: DERMATOLOGY | Facility: CLINIC | Age: 80
End: 2020-07-22

## 2020-07-30 ENCOUNTER — APPOINTMENT (OUTPATIENT)
Dept: OTOLARYNGOLOGY | Facility: CLINIC | Age: 80
End: 2020-07-30

## 2020-08-06 ENCOUNTER — APPOINTMENT (OUTPATIENT)
Dept: ELECTROPHYSIOLOGY | Facility: CLINIC | Age: 80
End: 2020-08-06
Payer: MEDICARE

## 2020-08-06 PROCEDURE — 93298 REM INTERROG DEV EVAL SCRMS: CPT

## 2020-08-06 PROCEDURE — G2066: CPT

## 2020-08-13 ENCOUNTER — APPOINTMENT (OUTPATIENT)
Dept: ELECTROPHYSIOLOGY | Facility: CLINIC | Age: 80
End: 2020-08-13
Payer: MEDICARE

## 2020-08-13 VITALS
BODY MASS INDEX: 27.21 KG/M2 | HEIGHT: 59 IN | SYSTOLIC BLOOD PRESSURE: 148 MMHG | HEART RATE: 69 BPM | OXYGEN SATURATION: 97 % | WEIGHT: 135 LBS | DIASTOLIC BLOOD PRESSURE: 62 MMHG

## 2020-08-13 DIAGNOSIS — Z45.09 ENCOUNTER FOR ADJUSTMENT AND MANAGEMENT OF OTHER CARDIAC DEVICE: ICD-10-CM

## 2020-08-13 PROCEDURE — 99213 OFFICE O/P EST LOW 20 MIN: CPT

## 2020-08-13 PROCEDURE — 93285 PRGRMG DEV EVAL SCRMS IP: CPT

## 2020-08-13 RX ORDER — CLOPIDOGREL 75 MG/1
TABLET, FILM COATED ORAL
Refills: 0 | Status: DISCONTINUED | COMMUNITY
End: 2020-08-13

## 2020-08-13 NOTE — PHYSICAL EXAM
[Normal Appearance] : normal appearance [General Appearance - Well Developed] : well developed [Well Groomed] : well groomed [General Appearance - Well Nourished] : well nourished [General Appearance - In No Acute Distress] : no acute distress [No Deformities] : no deformities [] : no respiratory distress [Exaggerated Use Of Accessory Muscles For Inspiration] : no accessory muscle use [Auscultation Breath Sounds / Voice Sounds] : lungs were clear to auscultation bilaterally [Respiration, Rhythm And Depth] : normal respiratory rhythm and effort [Heart Sounds] : normal S1 and S2 [Heart Rate And Rhythm] : heart rate and rhythm were normal [Murmurs] : no murmurs present

## 2020-08-13 NOTE — ASSESSMENT
[FreeTextEntry1] : ILR reveals episode of Afib on 7/25/20 lasting >11 hrs.\par Dr. Carballo was notified, pt is maintained on Eliquis.
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
## 2020-09-10 ENCOUNTER — APPOINTMENT (OUTPATIENT)
Dept: ELECTROPHYSIOLOGY | Facility: CLINIC | Age: 80
End: 2020-09-10
Payer: MEDICARE

## 2020-09-11 PROCEDURE — 93298 REM INTERROG DEV EVAL SCRMS: CPT

## 2020-09-11 PROCEDURE — G2066: CPT

## 2020-09-23 ENCOUNTER — APPOINTMENT (OUTPATIENT)
Dept: OTOLARYNGOLOGY | Facility: CLINIC | Age: 80
End: 2020-09-23
Payer: MEDICARE

## 2020-09-23 VITALS
TEMPERATURE: 98.2 F | HEART RATE: 63 BPM | WEIGHT: 136 LBS | SYSTOLIC BLOOD PRESSURE: 151 MMHG | HEIGHT: 59 IN | BODY MASS INDEX: 27.42 KG/M2 | DIASTOLIC BLOOD PRESSURE: 66 MMHG

## 2020-09-23 DIAGNOSIS — Z86.79 PERSONAL HISTORY OF OTHER DISEASES OF THE CIRCULATORY SYSTEM: ICD-10-CM

## 2020-09-23 DIAGNOSIS — J34.3 HYPERTROPHY OF NASAL TURBINATES: ICD-10-CM

## 2020-09-23 DIAGNOSIS — Z87.09 PERSONAL HISTORY OF OTHER DISEASES OF THE RESPIRATORY SYSTEM: ICD-10-CM

## 2020-09-23 DIAGNOSIS — G50.1 ATYPICAL FACIAL PAIN: ICD-10-CM

## 2020-09-23 DIAGNOSIS — J32.2 CHRONIC ETHMOIDAL SINUSITIS: ICD-10-CM

## 2020-09-23 DIAGNOSIS — J34.2 DEVIATED NASAL SEPTUM: ICD-10-CM

## 2020-09-23 DIAGNOSIS — Z86.69 PERSONAL HISTORY OF OTHER DISEASES OF THE NERVOUS SYSTEM AND SENSE ORGANS: ICD-10-CM

## 2020-09-23 DIAGNOSIS — H92.01 OTALGIA, RIGHT EAR: ICD-10-CM

## 2020-09-23 PROCEDURE — 31231 NASAL ENDOSCOPY DX: CPT

## 2020-09-23 PROCEDURE — 69210 REMOVE IMPACTED EAR WAX UNI: CPT | Mod: RT

## 2020-09-23 PROCEDURE — 99204 OFFICE O/P NEW MOD 45 MIN: CPT | Mod: 25

## 2020-09-23 RX ORDER — FLUTICASONE PROPIONATE 50 UG/1
50 SPRAY, METERED NASAL DAILY
Qty: 1 | Refills: 5 | Status: ACTIVE | COMMUNITY
Start: 2020-09-23 | End: 1900-01-01

## 2020-09-23 NOTE — REASON FOR VISIT
[Initial Consultation] : an initial consultation for [FreeTextEntry2] : headaches  tooth aches  facial and jaw discomfort

## 2020-09-23 NOTE — ASSESSMENT
[FreeTextEntry1] : cerumen cleared ad\par septal deviation w nasal obstruction\par recurrent facial pain\par sinusitis vs sinus pressure\par resume fluticasone spray\par ct sinus

## 2020-09-23 NOTE — HISTORY OF PRESENT ILLNESS
[de-identified] : co upper teeth pain\par dental exams and dental xrays reported neg\par co facial swelling, fluticasone for pnd, co nasal obstruction\par to primary 8 weeks ago for increased facial pain and weekness, periorbital pain rx\par amox clav x 10 days-improved better but than relapse\par hx recurrent c difficile in past

## 2020-09-23 NOTE — REVIEW OF SYSTEMS
[Seasonal Allergies] : seasonal allergies [Post Nasal Drip] : post nasal drip [Dizziness] : dizziness [Vertigo] : vertigo [Ear Noises] : ear noises [Nasal Congestion] : nasal congestion [Recurrent Sinus Infections] : recurrent sinus infections [Sinus Pain] : sinus pain [Sinus Pressure] : sinus pressure [Swelling Neck] : swelling neck [Swelling Face] : face swelling [Chest Pain] : chest pain [Palpitations] : palpitations [Shortness Of Breath] : shortness of breath [Wheezing] : wheezing [Swollen Glands] : swollen glands [Negative] : Endocrine [Patient Intake Form Reviewed] : Patient intake form was reviewed [Sneezing] : no sneezing [FreeTextEntry1] : headache  fatigue   joint aches  watery eyes  passing out

## 2020-10-07 ENCOUNTER — APPOINTMENT (OUTPATIENT)
Dept: UROLOGY | Facility: CLINIC | Age: 80
End: 2020-10-07
Payer: MEDICARE

## 2020-10-07 DIAGNOSIS — Z87.440 PERSONAL HISTORY OF URINARY (TRACT) INFECTIONS: ICD-10-CM

## 2020-10-07 PROCEDURE — 99214 OFFICE O/P EST MOD 30 MIN: CPT | Mod: 95

## 2020-10-07 NOTE — HISTORY OF PRESENT ILLNESS
[Home] : at home, [unfilled] , at the time of the visit. [Medical Office: ([facility])___] : at the medical office located in  [Patient] : the patient [Self] : self [FreeTextEntry1] : See notes from last visit.\par She remains on Hiprex, K-citrate and Allopurinol (for gout from PCP).\par No UTI's since last appointment.\par No pain, fevers, chills, nausea and or vomiting and no unexplained weight loss. \par  \par All pertinent parts of the patient PFSH (past medical, family and social histories), laboratory, radiological studies and physician notes were reviewed prior to starting the face to face portion of the telemedicine visit.  Questionnaire results were discussed with patient. \par \par 24 hr urine: good vol, other parameters within acceptable ranges except \par Old renal sono ([facility] radiology [place] sept 2020): no stones, stable bilateral renal cysts \par \par Plan:\par continue k-citrate\par stop hiprex  and vit C (has been on both for a year)\par continue allopurinol\par 24 hr urine and renal sono before next visit\par f/u 9 months\par \par \par \par

## 2020-10-16 ENCOUNTER — APPOINTMENT (OUTPATIENT)
Dept: ELECTROPHYSIOLOGY | Facility: CLINIC | Age: 80
End: 2020-10-16
Payer: MEDICARE

## 2020-10-16 PROCEDURE — G2066: CPT

## 2020-10-16 PROCEDURE — 93298 REM INTERROG DEV EVAL SCRMS: CPT

## 2020-10-21 ENCOUNTER — APPOINTMENT (OUTPATIENT)
Dept: OBGYN | Facility: CLINIC | Age: 80
End: 2020-10-21
Payer: MEDICARE

## 2020-10-21 ENCOUNTER — LABORATORY RESULT (OUTPATIENT)
Age: 80
End: 2020-10-21

## 2020-10-21 VITALS
WEIGHT: 135 LBS | BODY MASS INDEX: 27.21 KG/M2 | SYSTOLIC BLOOD PRESSURE: 122 MMHG | HEIGHT: 59 IN | DIASTOLIC BLOOD PRESSURE: 86 MMHG

## 2020-10-21 PROCEDURE — 99213 OFFICE O/P EST LOW 20 MIN: CPT

## 2020-10-21 PROCEDURE — 81003 URINALYSIS AUTO W/O SCOPE: CPT | Mod: QW

## 2020-10-21 NOTE — PHYSICAL EXAM
[Vulvar Atrophy] : vulvar atrophy [Labia Majora] : normal [Labia Minora] : normal [Atrophy] : atrophy [Normal] : normal [Uterine Adnexae] : normal

## 2020-10-21 NOTE — DISCUSSION/SUMMARY
[FreeTextEntry1] : 79 YO PATIENT PRESENTS TO OFFICE TODAY WITH COMPLAINTS OF UTI SYMPTOMS \par PATIENT STATES FEELINGS OF URGENCY/ FREQUENCY STARTED 15 DAYS AGO\par PATIENT DENIES/ STATES HEMATURIA \par \par SURESWAB COLLECTED \par URINE CULTURE SENT \par URINE DIP STICK IN OFFICE TODAY \par PELVIC EXAM WNL\par \par RX MACROBID AND DIFLUCAN SENT TO PHARMACY WITH INSTRUCTIONS ON USE \par PT STATES OVERDUE FOR MAMMO- SCRIPT GENERATED \par PATIENT EDUCATED ON BLADDER HYGIENE \par \par ALL QUESTIONS ANSWERED TO PATIENT SATISFACTION \par \par RTO IN FOR ANNUAL OR PRN\par

## 2020-10-23 ENCOUNTER — TRANSCRIPTION ENCOUNTER (OUTPATIENT)
Age: 80
End: 2020-10-23

## 2020-10-23 LAB
APPEARANCE: CLEAR
BILIRUBIN URINE: NEGATIVE
BLOOD URINE: NEGATIVE
COLOR: YELLOW
GLUCOSE QUALITATIVE U: NEGATIVE
KETONES URINE: NEGATIVE
LEUKOCYTE ESTERASE URINE: ABNORMAL
NITRITE URINE: NEGATIVE
PH URINE: 6.5
PROTEIN URINE: NORMAL
SPECIFIC GRAVITY URINE: 1.02
UROBILINOGEN URINE: NORMAL

## 2020-10-27 LAB
A VAGINAE DNA VAG QL NAA+PROBE: NORMAL
BVAB2 DNA VAG QL NAA+PROBE: NORMAL
C KRUSEI DNA VAG QL NAA+PROBE: NEGATIVE
MEGA1 DNA VAG QL NAA+PROBE: NORMAL
T VAGINALIS RRNA SPEC QL NAA+PROBE: NEGATIVE

## 2020-11-18 ENCOUNTER — APPOINTMENT (OUTPATIENT)
Dept: ELECTROPHYSIOLOGY | Facility: CLINIC | Age: 80
End: 2020-11-18
Payer: MEDICARE

## 2020-11-19 PROCEDURE — 93298 REM INTERROG DEV EVAL SCRMS: CPT

## 2020-11-19 PROCEDURE — G2066: CPT

## 2020-12-15 PROBLEM — Z87.440 HISTORY OF URINARY TRACT INFECTION: Status: RESOLVED | Noted: 2017-10-31 | Resolved: 2020-12-15

## 2020-12-23 ENCOUNTER — APPOINTMENT (OUTPATIENT)
Dept: ELECTROPHYSIOLOGY | Facility: CLINIC | Age: 80
End: 2020-12-23
Payer: MEDICARE

## 2020-12-23 PROBLEM — Z87.440 HISTORY OF URINARY TRACT INFECTION: Status: RESOLVED | Noted: 2019-08-27 | Resolved: 2020-12-23

## 2020-12-24 PROCEDURE — 93298 REM INTERROG DEV EVAL SCRMS: CPT

## 2020-12-24 PROCEDURE — G2066: CPT

## 2021-01-27 ENCOUNTER — APPOINTMENT (OUTPATIENT)
Dept: ELECTROPHYSIOLOGY | Facility: CLINIC | Age: 81
End: 2021-01-27
Payer: MEDICARE

## 2021-01-28 PROCEDURE — G2066: CPT

## 2021-01-28 PROCEDURE — 93298 REM INTERROG DEV EVAL SCRMS: CPT

## 2021-02-11 ENCOUNTER — APPOINTMENT (OUTPATIENT)
Dept: ELECTROPHYSIOLOGY | Facility: CLINIC | Age: 81
End: 2021-02-11
Payer: MEDICARE

## 2021-02-11 VITALS
HEIGHT: 59 IN | HEART RATE: 65 BPM | RESPIRATION RATE: 16 BRPM | BODY MASS INDEX: 28.02 KG/M2 | WEIGHT: 139 LBS | OXYGEN SATURATION: 98 % | SYSTOLIC BLOOD PRESSURE: 138 MMHG | DIASTOLIC BLOOD PRESSURE: 57 MMHG

## 2021-02-11 PROCEDURE — 93285 PRGRMG DEV EVAL SCRMS IP: CPT

## 2021-02-11 RX ORDER — CEFDINIR 300 MG/1
300 CAPSULE ORAL TWICE DAILY
Qty: 40 | Refills: 1 | Status: DISCONTINUED | COMMUNITY
Start: 2020-09-29 | End: 2021-02-11

## 2021-02-11 RX ORDER — FLUCONAZOLE 150 MG/1
150 TABLET ORAL
Qty: 1 | Refills: 0 | Status: DISCONTINUED | COMMUNITY
Start: 2020-10-21 | End: 2021-02-11

## 2021-02-11 RX ORDER — NITROFURANTOIN (MONOHYDRATE/MACROCRYSTALS) 25; 75 MG/1; MG/1
100 CAPSULE ORAL TWICE DAILY
Qty: 14 | Refills: 0 | Status: DISCONTINUED | COMMUNITY
Start: 2020-10-21 | End: 2021-02-11

## 2021-02-12 ENCOUNTER — NON-APPOINTMENT (OUTPATIENT)
Age: 81
End: 2021-02-12

## 2021-02-12 NOTE — DIETITIAN INITIAL EVALUATION ADULT. - ETIOLOGY
patient picked up 16 month old son twice and hurt low back 2 days ago. Motrin 600 mg taken OTC without relief. Patient using Ice/ heat without relief  Patient reports pain radiates to buttocks.   NO loss of bowel or bladder control
recurrent c. difficile colitis

## 2021-02-26 NOTE — ED ADULT NURSE NOTE - PHONE #
Progress Note    Location: [facility]  POS: 32 ([facility])    [employer] [facility] Rachel Tripp  2/26/2021 [phone]

## 2021-03-04 ENCOUNTER — APPOINTMENT (OUTPATIENT)
Dept: ELECTROPHYSIOLOGY | Facility: CLINIC | Age: 81
End: 2021-03-04
Payer: MEDICARE

## 2021-03-04 PROCEDURE — 93298 REM INTERROG DEV EVAL SCRMS: CPT

## 2021-03-04 PROCEDURE — G2066: CPT

## 2021-03-05 ENCOUNTER — NON-APPOINTMENT (OUTPATIENT)
Age: 81
End: 2021-03-05

## 2021-03-23 NOTE — ASU PATIENT PROFILE, ADULT - NS PRO AD ANY ON CHART
History  Chief Complaint   Patient presents with    Possible UTI     Patient states she has had chronic R kidney pain for a month and was seen by her specialist and treated  Patient states she states she has a UTI and is not normally incontinent      This is a 58-year-old female complaining of urinary incontinence over the last 2 weeks with generalized weakness  She is concerned that she has a urinary tract infection  She has chronic UTIs and takes nitrofurantoin  She is poor historian  Prior to Admission Medications   Prescriptions Last Dose Informant Patient Reported? Taking? CRANBERRY PO   Yes No   Sig: Take 4,200 mg by mouth daily  DiphenhydrAMINE HCl (ALLERGY MED PO)   Yes No   Sig: Take 25 mg by mouth as needed  [place] [facility]   LORazepam (ATIVAN) 1 mg tablet   Yes No   Sig: Take 1 mg by mouth 4 (four) times a day  Magnesium Cl-Calcium Carbonate (SLOW-MAG PO)   Yes No   Sig: Take 64 mg by mouth 3 (three) times a day  SUMAtriptan (IMITREX) 100 mg tablet   Yes No   Sig: Take 100 mg by mouth once as needed for migraine  TiZANidine (ZANAFLEX) 4 MG capsule   Yes No   Sig: Take 4 mg by mouth 3 (three) times a day  baclofen 10 mg tablet   Yes No   Sig: Take 10 mg by mouth 3 (three) times a day  bisacodyl (DULCOLAX) 5 mg EC tablet   Yes No   Sig: Take 5 mg by mouth 2 (two) times a day  2 in the AM; 2 in the PM   buPROPion [facility]) 150 mg 12 hr tablet   Yes No   Sig: Take 150 mg by mouth 2 (two) times a day  2 tabs in AM; one tab in afternoon   citalopram (CeleXA) 40 mg tablet   Yes No   Sig: Take 40 mg by mouth daily  fluconazole (DIFLUCAN) 100 mg tablet   Yes No   Sig: Take 100 mg by mouth 2 (two) times a day  levETIRAcetam (KEPPRA) 500 mg tablet   Yes No   Sig: Take 500 mg by mouth 2 (two) times a day  3 tabs two times a day   levothyroxine 100 mcg tablet   Yes No   Sig: Take 100 mcg by mouth daily     lubiprostone (AMITIZA) 24 mcg capsule   Yes No   Sig: Take 24 mcg by mouth March 23, 2021       Soto Sampson DO  [address] [address]  Via Fax: [phone]      Patient: Merrill Dobbs   YOB: 1968   Date of Visit: 3/23/2021       Dear Dr. Sampson:    Thank you for referring Merrill Dobbs to me for evaluation. Below are my notes for this visit with him.    If you have questions, please do not hesitate to call me. I look forward to following your patient along with you.      Sincerely,      VALERIA Lui  [facility]        CC: No Recipients  KYLEE Lui  3/23/2021  1:41 PM  Signed         SLEEP EVALUATION NOTE     Merrill Dobbs is a 52 year old male presenting for evaluation of: Sleep Problem and Office Visit   .    Referring provider: Pcp, No  PCP: Soto Sampson DO       HPI     New patient reporting to clinic for establishment of care.  Patient was in with his PCP, he can fall asleep throughout the day.  He reports being tired throughout the day.  Patient states he is having trouble with driving too. Sleeps about 2 hours at a time.    Bedtime: [id number]  Awake time: 0600  Sleep position: supine, lateral  Sleep onset latency: 10-30 minutes  Sleeping aid medications: No  Awakenings # and episodes of nocturia: 3    [x]Snoring  [x]Witnessed apneas  [x]Dyspneic arousal  [x]Morning dry mouth  []Morning headache    [x]Non-restorative sleep  [x]Excessive daytime sleepiness or tired during the day  [x]Naps    [x]Urge to move legs and/or uncomfortable tingling or burning in legs  []Cramping or jerking of the legs during sleep    []Cataplexy  []Sleep paralysis  []Hypnogognic or hypnopompnic hallucinations  []Sleep attacks    []Parasomnias:    STOP BANG SCREENING  STOP  S: Do you snore loudly (Louder than talking or loud enough to be heard through closed door)?: Yes  T: Do you oftern feel tired, fatigue or sleepy during daytime?: Yes  O: Has anyone observed you stop breathing during your sleep?: Yes  P: Do you have or are you being treated  2 (two) times a day with meals  metolazone (ZAROXOLYN) 2 5 mg tablet   Yes No   Sig: Take 2 5 mg by mouth daily  nitrofurantoin (MACROBID) 100 mg capsule   Yes No   Sig: Take 100 mg by mouth 2 (two) times a day  omeprazole (PriLOSEC) 40 MG capsule   Yes No   Sig: Take 40 mg by mouth daily  oxybutynin (DITROPAN) 5 mg tablet   Yes No   Sig: Take 5 mg by mouth 3 (three) times a day  potassium chloride (K-DUR,KLOR-CON) 20 mEq tablet   Yes No   Sig: Take 40 mEq by mouth 2 (two) times a day  temazepam (RESTORIL) 22 5 MG capsule   Yes No   Sig: Take 22 5 mg by mouth daily at bedtime  traMADol (ULTRAM) 50 mg tablet   Yes No   Sig: Take 50 mg by mouth 4 (four) times a day  Facility-Administered Medications: None       Past Medical History:   Diagnosis Date    Abnormal gait     Abscess of abdominal cavity (HCC)     Aneurysm (HCC)     Brain with clips x 3 per pt    Arm injury     Right arm- pt present with short FA/wrist  immoblizer present on ED arrival    Cataract     Cellulitis     B/L LE    Chronic back pain     Chronic pain     Disease of thyroid gland     hypothyroid    Edema     B/L LE    Falls frequently     last fall 1 hour ago per pt    Foot drop, left foot     Fracture     Facial bones per pt- left orbit/cheek? pt unsure    Fracture closed, nasal bone     Gastric bypass status for obesity     Hypoglycemia     Memory loss, short term     Migraine     MRSA (methicillin resistant Staphylococcus aureus)     Stomach ulcer        Past Surgical History:   Procedure Laterality Date    BACK SURGERY      lumbar/sacral fusion per pt    CEREBRAL ANEURYSM REPAIR      with clips x3 per pt     SECTION      x 4 per pt    GASTRIC BYPASS      MRSA CULTURE (HISTORICAL)      stomach abscess per pt       History reviewed  No pertinent family history  I have reviewed and agree with the history as documented      Social History   Substance Use Topics    Smoking status: Former Smoker    for high blood pressure?: No  BANG  B: BMI more than 35 kg/m2?: Yes  A: Is age over 50 years old?: Yes  N: Neck circumfrence >16 inches (40 cm)?: Yes  G: Is gender Male?: Yes  Total Score  Total Score (out of 8): 7              CURRENT CO-MORBIDITIES:    [] HTN     [] AF/ARRHYTHMIA    [] HYPOTHYROIDISM   []OTHER:  [] CAD     [x] OBESITY                  [] INSOMNIA  [] CHF     [] DM                         [] STROKE/TIA          [place] Sleepiness Scale:     0 = would never doze  1 = slight chance of dozing  2 = moderate chance of dozing  3 = high chance of dozing    Situation     Chance of Dozing       1. Sitting and reading   3  2. Watching TV    3  3. Sitting, inactive in a public place       (e.g. a theatre or a meeting)  2  4. As a passenger in a car for an hour       without a break    3  5. Lying down to rest in the afternoon      when circumstances permit  2  6. Sitting and talking to someone  2  7. Sitting quietly after lunch without      Alcohol     2  8. In a car, while stopped for a few      Minutes in traffic    2    TOTAL 19      Past Medical History:     Past Medical History:   Diagnosis Date   • Clotting disorder (CMS/HCC)    • Fracture    • Pulmonary embolism (CMS/HCC)    • Sleep apnea      Past Surgical History:   Procedure Laterality Date   • Ankle surgery  2017   • Fracture surgery     • Knee surgery  2018     History reviewed. No pertinent family history.  Social History     Tobacco Use   • Smoking status: Never Smoker   • Smokeless tobacco: Never Used   Substance Use Topics   • Alcohol use: Yes   • Drug use: Never       ALLERGIES:   Allergen Reactions   • Hydrocodone-Acetaminophen PRURITUS     Current Outpatient Medications   Medication Sig   • diazePAM (VALIUM) 2 MG tablet    • naproxen (NAPROSYN) 500 MG tablet TAKE 1 TABLET BY MOUTH TWICE DAILY AS NEEDED FOR PAIN. TAKE WITH FOOD   • polyethylene glycol (MIRALAX) 17 GM/SCOOP powder    • GABAPENTIN PO    • docusate sodium (Colace) 100 MG  Smokeless tobacco: Never Used    Alcohol use 1 2 oz/week     2 Glasses of wine per week        Review of Systems   All other systems reviewed and are negative  Physical Exam  ED Triage Vitals [12/24/17 1422]   Temperature Pulse Respirations Blood Pressure SpO2   (!) 96 9 °F (36 1 °C) (!) 114 20 119/72 96 %      Temp src Heart Rate Source Patient Position - Orthostatic VS BP Location FiO2 (%)   -- -- -- -- --      Pain Score       Worst Possible Pain           Orthostatic Vital Signs  Vitals:    12/24/17 1515 12/24/17 1600 12/24/17 1700 12/24/17 1730   BP:  159/91 154/85 124/72   Pulse: 92 93 94 92       Physical Exam   Constitutional: She appears distressed  HENT:   Mouth/Throat: Mucous membranes are dry  Eyes: Conjunctivae and EOM are normal  Pupils are equal, round, and reactive to light  Neck: Normal range of motion  Neck supple  No spinous process tenderness present  Cardiovascular: Regular rhythm, normal heart sounds and intact distal pulses  Tachycardia present  Pulmonary/Chest: Effort normal and breath sounds normal  No respiratory distress  She has no wheezes  Abdominal: Soft  Bowel sounds are normal  She exhibits no distension  There is no tenderness  Musculoskeletal:   Generalized weakness/ kyphosis   Neurological: She has normal strength  No sensory deficit  GCS eye subscore is 4  GCS verbal subscore is 3  GCS motor subscore is 6    mumbling   Skin: Skin is warm and dry  No rash noted  Psychiatric: She has a normal mood and affect  Nursing note and vitals reviewed        ED Medications  Medications   sodium chloride (PF) 0 9 % injection 3 mL (not administered)   sodium chloride 0 9 % bolus 1,000 mL (0 mL Intravenous Stopped 12/24/17 1702)   magnesium sulfate 2 g/50 mL IVPB (premix) 2 g (0 g Intravenous Stopped 12/24/17 1744)   cephalexin (KEFLEX) capsule 500 mg (500 mg Oral Given 12/24/17 1740)       Diagnostic Studies  Results Reviewed     Procedure Component Value Units capsule Take 100 mg by mouth.   • traMADol (ULTRAM) 50 MG tablet Take 50 mg by mouth.     No current facility-administered medications for this visit.         Review of Systems:     Review of Systems   Constitution: Negative for chills, fever and weight loss.   HENT: Negative for congestion, ear discharge and sore throat.    Eyes: Negative for blurred vision and photophobia.   Cardiovascular: Negative for chest pain and palpitations.   Respiratory: Positive for sleep disturbances due to breathing and snoring.    Skin: Negative for rash.   Musculoskeletal: Negative for back pain, joint swelling, muscle weakness and neck pain.   Gastrointestinal: Negative for constipation, diarrhea, nausea and vomiting.   Genitourinary: Positive for nocturia.   Neurological: Positive for excessive daytime sleepiness. Negative for numbness and paresthesias.       Physical Examination:     Vitals:    03/23/21 1321   Pulse: 89   Temp: 97.6 °F (36.4 °C)   SpO2: 97%   Weight: 120.2 kg (265 lb)   Height: 6' 1\" (1.854 m)     Body mass index is 34.96 kg/m².         Nasal Passages: [x]Clear   [] Congested  Palate: Jacob [] I  [] II   [] III   [x] IV              Eyrtherna/edema [x]none []+1  []+2  []+3  []+4  Neck size: 22.5 inches No enlarged lymph nodes  Gen: No acute distress. Pleasant and interactive.  Head:  Normocephalic and atraumatic.  Eyes: Conjunctiva and sclera normal.   Heart:  Normal rate and regular rhythm, no murmur.  Lungs:  Normal respiratory rate and effort, breath sounds equal.  Extremities:  No edema in lower extremities.   Skin: Warm and dry, no rash.  Musculoskeletal:  No joint swelling or tenderness  Psych:  Affect was appropriate to situation and mood was normal.  Neuro:  Alert and oriented, attention and recall preserved, cranial nerves intact, motor strength preserved, coordination intact, sensation preserved, reflexes symmetric, gait normal.     Assessment and Plan:     PROBLEMS ADDRESSED THIS VISIT:  Problem List  Date/Time    Urine culture [[id number]] Collected:  12/24/17 1719    Lab Status: In process Specimen:  Urine from Urine, Other Updated:  12/24/17 1741    Urine Microscopic [[id number]]  (Abnormal) Collected:  12/24/17 1719    Lab Status:  Edited Result - FINAL Specimen:  Urine from Urine, Straight Cath Updated:  12/24/17 1739     RBC, UA 0-1 (A) /hpf      WBC, UA 4-10 (A) /hpf      Epithelial Cells None Seen /hpf      Bacteria, UA Moderate (A) /hpf      WBC Clumps Present    UA w Reflex to Microscopic w Reflex to Culture [[id number]]  (Abnormal) Collected:  12/24/17 1719    Lab Status:  Final result Specimen:  Urine from Urine, Straight Cath Updated:  12/24/17 1727     Color, UA Yellow     Clarity, UA Slightly Cloudy     Specific Gravity, UA >=1 030     pH, UA 5 5     Leukocytes, UA Moderate (A)     Nitrite, UA Positive (A)     Protein, UA Negative mg/dl      Glucose, UA Negative mg/dl      Ketones, UA Negative mg/dl      Urobilinogen, UA 0 2 E U /dl      Bilirubin, UA Negative     Blood, UA Negative    Comprehensive metabolic panel [[id number]]  (Abnormal) Collected:  12/24/17 1454    Lab Status:  Final result Specimen:  Blood from Arm, Right Updated:  12/24/17 1530     Sodium 144 mmol/L      Potassium 3 4 (L) mmol/L      Chloride 108 mmol/L      CO2 27 mmol/L      Anion Gap 9 mmol/L      BUN 32 (H) mg/dL      Creatinine 0 86 mg/dL      Glucose 146 (H) mg/dL      Calcium 7 6 (L) mg/dL      AST 29 U/L      ALT 36 U/L      Alkaline Phosphatase 133 (H) U/L      Total Protein 5 5 (L) g/dL      Albumin 2 4 (L) g/dL      Total Bilirubin 0 20 mg/dL      eGFR 77 ml/min/1 73sq m     Narrative:         National Kidney Disease Education Program recommendations are as follows:  GFR calculation is accurate only with a steady state creatinine  Chronic Kidney disease less than 60 ml/min/1 73 sq  meters  Kidney failure less than 15 ml/min/1 73 sq  meters      Magnesium [[id number]]  (Abnormal) Collected:  12/24/17 1454    Lab Status:  Final Items Addressed This Visit     None      Visit Diagnoses     Snoring    -  Primary    Relevant Orders    POLYSOMNOGRAPHY 4 OR MORE PARAMETERS    Hypersomnia, unspecified        Relevant Orders    POLYSOMNOGRAPHY 4 OR MORE PARAMETERS    Dysfunctions of sleep stages or arousal from sleep        Relevant Orders    POLYSOMNOGRAPHY 4 OR MORE PARAMETERS    Witnessed episode of apnea        Relevant Orders    POLYSOMNOGRAPHY 4 OR MORE PARAMETERS    Obesity (BMI 30.0-34.9)        Relevant Orders    POLYSOMNOGRAPHY 4 OR MORE PARAMETERS           Comments: In lab PSG Split night to be ordered to assess for SILVESTRE and treatment of SILVESTRE.  Discussed the nature of sleep apnea and the risk factors of not treating it.   All questions and concerns were addressed and answered.  This note is not being shared electronically with the patient because, the patient requested that this note not be displayed in [employer].  Soto Sampson DO received this note via [employer].    Patient Counseling:     • We reviewed the nature of sleep apnea, the elevated cardiovascular risks and other health consequences associated with this condition and reviewed treatment options in detail.  • Pt. to undergo polysomnogram with CPAP titration for an apnea-hypopnea index greater than 15 events per hour.  • The patient was cautioned not to drive while sleepy.    KYLEE Lui                  result Specimen:  Blood from Arm, Right Updated:  12/24/17 1530     Magnesium 1 4 (L) mg/dL     Lactic Acid x2 [[id number]]  (Normal) Collected:  12/24/17 1454    Lab Status:  Final result Specimen:  Blood from Arm, Right Updated:  12/24/17 1525     LACTIC ACID 1 0 mmol/L     Narrative:         Result may be elevated if tourniquet was used during collection  Donna Screen [[id number]]  (Normal) Collected:  12/24/17 1454    Lab Status:  Final result Specimen:  Blood from Arm, Right Updated:  12/24/17 1518     Protime 14 2 seconds      INR 1 12    APTT [[id number]]  (Normal) Collected:  12/24/17 1454    Lab Status:  Final result Specimen:  Blood from Arm, Right Updated:  12/24/17 1518     PTT 33 seconds     Narrative: Therapeutic Heparin Range = 60-90 seconds    CBC and differential [[id number]]  (Abnormal) Collected:  12/24/17 1454    Lab Status:  Final result Specimen:  Blood from Arm, Right Updated:  12/24/17 1506     WBC 9 52 Thousand/uL      RBC 3 08 (L) Million/uL      Hemoglobin 8 0 (L) g/dL      Hematocrit 26 6 (L) %      MCV 86 fL      MCH 26 0 (L) pg      MCHC 30 1 (L) g/dL      RDW 21 0 (H) %      MPV 10 0 fL      Platelets 559 Thousands/uL      Neutrophils Relative 76 (H) %      Lymphocytes Relative 15 %      Monocytes Relative 8 %      Eosinophils Relative 1 %      Basophils Relative 0 %      Neutrophils Absolute 7 25 Thousands/µL      Lymphocytes Absolute 1 38 Thousands/µL      Monocytes Absolute 0 73 Thousand/µL      Eosinophils Absolute 0 13 Thousand/µL      Basophils Absolute 0 03 Thousands/µL     Blood culture #2 [[id number]] Collected:  12/24/17 1454    Lab Status: In process Specimen:  Blood from Arm, Left Updated:  12/24/17 1503    Blood culture #1 [[id number]] Collected:  12/24/17 1454    Lab Status:   In process Specimen:  Blood from Arm, Right Updated:  12/24/17 1503    Lactic Acid x2 [[id number]]     Lab Status:  No result Specimen:  Blood                  XR chest 1 view portable   ED Interpretation by Addi Woodson DO (12/24 1525)   No acute abnl      CT head without contrast   Final Result by Aan Lemos MD (12/24 1509)      No acute intracranial abnormality  Workstation performed: [id number]                    Procedures  Procedures       Phone Contacts  ED Phone Contact    ED Course  ED Course as of Dec 24 1830   Naz Kern Dec 24, 2017   1520 12/24/2017  3:12 PM - Interface, Ris Results In     Component Results     Component Value Ref Range & Units Status  Ventricular Rate 93  BPM Preliminary  Atrial Rate 93  BPM Preliminary  UT Interval 144  ms Preliminary  QRSD Interval 90  ms Preliminary  QT Interval 418  ms Preliminary  QTC Interval 519  ms Preliminary  P Axis 54  degrees Preliminary  QRS Axis 67  degrees Preliminary  T Wave Axis 55  degrees Preliminary  Narrative     Normal sinus rhythm  Prolonged QT  Abnormal ECG  When compared with ECG of 25-FEB-2014 16:29,  T wave inversion now evident in Inferior leads  Nonspecific T wave abnormality, worse in Lateral leads  QT has lengthened        1603 Patient states that she has no blood in bowel movements  She states that she has chronic blood in urine and that is causing her anemia  Patient declines straight cath  She is trying to urinate for sample  More alert after fluids  Also, she states that she hasn't been taking her magnesium supplements because she can't afford them                          MDM  Number of Diagnoses or Management Options  Hypomagnesemia: new and requires workup  UTI (urinary tract infection): new and requires workup     Amount and/or Complexity of Data Reviewed  Clinical lab tests: ordered and reviewed  Tests in the radiology section of CPT®: ordered and reviewed  Obtain history from someone other than the patient: yes    Patient Progress  Patient progress: improved    CritCare Time    Disposition  Final diagnoses:   UTI (urinary tract infection) - acute on chronic   Hypomagnesemia - acute     Time reflects when diagnosis was documented in both MDM as applicable and the Disposition within this note     Time User Action Codes Description Comment    12/24/2017  5:34 PM Venson Peace Add [N39 0] UTI (urinary tract infection)     12/24/2017  5:34 PM Venson Peace Modify [N39 0] UTI (urinary tract infection) acute on chronic    12/24/2017  5:34 PM Venson Peace Add [E83 42] Hypomagnesemia     12/24/2017  5:34 PM Venson Peace Modify [E83 42] Hypomagnesemia acute      ED Disposition     ED Disposition Condition Comment    AMA          Follow-up Information     Follow up With Specialties Details Why Contact Info    Charbel Louis  In 2 days  [address]  P  O  [address]          Discharge Medication List as of 12/24/2017  5:38 PM      START taking these medications    Details   cephalexin (KEFLEX) 500 mg capsule Take 1 capsule by mouth every 12 (twelve) hours, Starting Mon 12/25/2017, Print         CONTINUE these medications which have NOT CHANGED    Details   baclofen 10 mg tablet Take 10 mg by mouth 3 (three) times a day , Until Discontinued, Historical Med      bisacodyl (DULCOLAX) 5 mg EC tablet Take 5 mg by mouth 2 (two) times a day  2 in the AM; 2 in the PM, Until Discontinued, Historical Med      buPROPion (WELLBUTRIN SR) 150 mg 12 hr tablet Take 150 mg by mouth 2 (two) times a day  2 tabs in AM; one tab in afternoon, Until Discontinued, Historical Med      citalopram (CeleXA) 40 mg tablet Take 40 mg by mouth daily  , Until Discontinued, Historical Med      CRANBERRY PO Take 4,200 mg by mouth daily  , Until Discontinued, Historical Med      DiphenhydrAMINE HCl (ALLERGY MED PO) Take 25 mg by mouth as needed  [place] [facility], Until Discontinued, Historical Med      fluconazole (DIFLUCAN) 100 mg tablet Take 100 mg by mouth 2 (two) times a day , Until Discontinued, Historical Med      levETIRAcetam (KEPPRA) 500 mg tablet Take 500 mg by mouth 2 (two) times a day   3 tabs two times a day, Until Discontinued, Historical Med      levothyroxine 100 mcg tablet Take 100 mcg by mouth daily  , Until Discontinued, Historical Med      LORazepam (ATIVAN) 1 mg tablet Take 1 mg by mouth 4 (four) times a day , Until Discontinued, Historical Med      lubiprostone (AMITIZA) 24 mcg capsule Take 24 mcg by mouth 2 (two) times a day with meals  , Until Discontinued, Historical Med      Magnesium Cl-Calcium Carbonate (SLOW-MAG PO) Take 64 mg by mouth 3 (three) times a day , Until Discontinued, Historical Med      metolazone (ZAROXOLYN) 2 5 mg tablet Take 2 5 mg by mouth daily  , Until Discontinued, Historical Med      nitrofurantoin (MACROBID) 100 mg capsule Take 100 mg by mouth 2 (two) times a day , Until Discontinued, Historical Med      omeprazole (PriLOSEC) 40 MG capsule Take 40 mg by mouth daily  , Until Discontinued, Historical Med      oxybutynin (DITROPAN) 5 mg tablet Take 5 mg by mouth 3 (three) times a day , Until Discontinued, Historical Med      potassium chloride (K-DUR,KLOR-CON) 20 mEq tablet Take 40 mEq by mouth 2 (two) times a day , Until Discontinued, Historical Med      SUMAtriptan (IMITREX) 100 mg tablet Take 100 mg by mouth once as needed for migraine , Until Discontinued, Historical Med      temazepam (RESTORIL) 22 5 MG capsule Take 22 5 mg by mouth daily at bedtime  , Until Discontinued, Historical Med      TiZANidine (ZANAFLEX) 4 MG capsule Take 4 mg by mouth 3 (three) times a day , Until Discontinued, Historical Med      traMADol (ULTRAM) 50 mg tablet Take 50 mg by mouth 4 (four) times a day , Until Discontinued, Historical Med           No discharge procedures on file      ED Provider  Electronically Signed by           Marquis Vizcarra DO  12/24/17 2350 Yes

## 2021-03-29 NOTE — PATIENT PROFILE ADULT. - NS PRO OT REFERRAL QUES 2 YN
Provider paged ([phone]) FYI pt still febrile, Temp 101.0. BP 96/56, , RR 22. BCx drawn on day shift and pt received tylenol.     [phone]   no

## 2021-04-05 ENCOUNTER — NON-APPOINTMENT (OUTPATIENT)
Age: 81
End: 2021-04-05

## 2021-04-06 ENCOUNTER — NON-APPOINTMENT (OUTPATIENT)
Age: 81
End: 2021-04-06

## 2021-04-06 ENCOUNTER — APPOINTMENT (OUTPATIENT)
Dept: ELECTROPHYSIOLOGY | Facility: CLINIC | Age: 81
End: 2021-04-06
Payer: MEDICARE

## 2021-04-07 PROCEDURE — G2066: CPT

## 2021-04-07 PROCEDURE — 93298 REM INTERROG DEV EVAL SCRMS: CPT

## 2021-05-12 ENCOUNTER — APPOINTMENT (OUTPATIENT)
Dept: ELECTROPHYSIOLOGY | Facility: CLINIC | Age: 81
End: 2021-05-12
Payer: MEDICARE

## 2021-05-12 PROCEDURE — 93298 REM INTERROG DEV EVAL SCRMS: CPT

## 2021-05-12 PROCEDURE — G2066: CPT

## 2021-05-13 ENCOUNTER — NON-APPOINTMENT (OUTPATIENT)
Age: 81
End: 2021-05-13

## 2021-06-02 NOTE — ED STATDOCS - NS ED ATTENDING STATEMENT MOD
Unknown if ever smoked
I have personally performed a face to face diagnostic evaluation on this patient. I have reviewed the ACP note and agree with the history, exam and plan of care, except as noted.

## 2021-06-15 ENCOUNTER — APPOINTMENT (OUTPATIENT)
Dept: ELECTROPHYSIOLOGY | Facility: CLINIC | Age: 81
End: 2021-06-15
Payer: MEDICARE

## 2021-06-16 ENCOUNTER — NON-APPOINTMENT (OUTPATIENT)
Age: 81
End: 2021-06-16

## 2021-06-16 PROCEDURE — 93298 REM INTERROG DEV EVAL SCRMS: CPT

## 2021-06-16 PROCEDURE — G2066: CPT

## 2021-06-26 ENCOUNTER — TRANSCRIPTION ENCOUNTER (OUTPATIENT)
Age: 81
End: 2021-06-26

## 2021-07-19 ENCOUNTER — APPOINTMENT (OUTPATIENT)
Dept: UROLOGY | Facility: CLINIC | Age: 81
End: 2021-07-19

## 2021-07-21 ENCOUNTER — NON-APPOINTMENT (OUTPATIENT)
Age: 81
End: 2021-07-21

## 2021-07-21 ENCOUNTER — APPOINTMENT (OUTPATIENT)
Dept: UROLOGY | Facility: CLINIC | Age: 81
End: 2021-07-21
Payer: MEDICARE

## 2021-07-21 ENCOUNTER — APPOINTMENT (OUTPATIENT)
Dept: ELECTROPHYSIOLOGY | Facility: CLINIC | Age: 81
End: 2021-07-21
Payer: MEDICARE

## 2021-07-21 VITALS
HEIGHT: 59 IN | SYSTOLIC BLOOD PRESSURE: 144 MMHG | DIASTOLIC BLOOD PRESSURE: 75 MMHG | HEART RATE: 56 BPM | BODY MASS INDEX: 27.01 KG/M2 | WEIGHT: 134 LBS | RESPIRATION RATE: 17 BRPM | TEMPERATURE: 98 F

## 2021-07-21 PROCEDURE — G2066: CPT

## 2021-07-21 PROCEDURE — 93298 REM INTERROG DEV EVAL SCRMS: CPT

## 2021-07-21 PROCEDURE — 99214 OFFICE O/P EST MOD 30 MIN: CPT

## 2021-07-22 ENCOUNTER — INPATIENT (INPATIENT)
Facility: HOSPITAL | Age: 81
LOS: 3 days | Discharge: HOME | DRG: 872 | End: 2021-07-26
Attending: INTERNAL MEDICINE | Admitting: INTERNAL MEDICINE
Payer: MEDICARE

## 2021-07-22 VITALS — HEIGHT: 59 IN | WEIGHT: 132.94 LBS

## 2021-07-22 DIAGNOSIS — Z87.440 PERSONAL HISTORY OF URINARY (TRACT) INFECTIONS: Chronic | ICD-10-CM

## 2021-07-22 DIAGNOSIS — N39.0 URINARY TRACT INFECTION, SITE NOT SPECIFIED: ICD-10-CM

## 2021-07-22 DIAGNOSIS — Z87.448 PERSONAL HISTORY OF OTHER DISEASES OF URINARY SYSTEM: Chronic | ICD-10-CM

## 2021-07-22 DIAGNOSIS — Z98.890 OTHER SPECIFIED POSTPROCEDURAL STATES: Chronic | ICD-10-CM

## 2021-07-22 DIAGNOSIS — Z98.891 HISTORY OF UTERINE SCAR FROM PREVIOUS SURGERY: Chronic | ICD-10-CM

## 2021-07-22 DIAGNOSIS — D35.1 BENIGN NEOPLASM OF PARATHYROID GLAND: Chronic | ICD-10-CM

## 2021-07-22 LAB
ALBUMIN SERPL ELPH-MCNC: 3.5 G/DL — SIGNIFICANT CHANGE UP (ref 3.3–5)
ALP SERPL-CCNC: 89 U/L — SIGNIFICANT CHANGE UP (ref 40–120)
ALT FLD-CCNC: 46 U/L — SIGNIFICANT CHANGE UP (ref 12–78)
ANION GAP SERPL CALC-SCNC: 8 MMOL/L — SIGNIFICANT CHANGE UP (ref 5–17)
APPEARANCE UR: ABNORMAL
APTT BLD: 31.1 SEC — SIGNIFICANT CHANGE UP (ref 27.5–35.5)
AST SERPL-CCNC: 35 U/L — SIGNIFICANT CHANGE UP (ref 15–37)
BASOPHILS # BLD AUTO: 0 K/UL — SIGNIFICANT CHANGE UP (ref 0–0.2)
BASOPHILS NFR BLD AUTO: 0 % — SIGNIFICANT CHANGE UP (ref 0–2)
BILIRUB SERPL-MCNC: 2 MG/DL — HIGH (ref 0.2–1.2)
BILIRUB UR-MCNC: NEGATIVE — SIGNIFICANT CHANGE UP
BUN SERPL-MCNC: 23 MG/DL — SIGNIFICANT CHANGE UP (ref 7–23)
CALCIUM SERPL-MCNC: 9.3 MG/DL — SIGNIFICANT CHANGE UP (ref 8.5–10.1)
CHLORIDE SERPL-SCNC: 109 MMOL/L — HIGH (ref 96–108)
CO2 SERPL-SCNC: 23 MMOL/L — SIGNIFICANT CHANGE UP (ref 22–31)
COLOR SPEC: ABNORMAL
CREAT SERPL-MCNC: 0.79 MG/DL — SIGNIFICANT CHANGE UP (ref 0.5–1.3)
DIFF PNL FLD: ABNORMAL
EOSINOPHIL # BLD AUTO: 0 K/UL — SIGNIFICANT CHANGE UP (ref 0–0.5)
EOSINOPHIL NFR BLD AUTO: 0 % — SIGNIFICANT CHANGE UP (ref 0–6)
GLUCOSE SERPL-MCNC: 134 MG/DL — HIGH (ref 70–99)
GLUCOSE UR QL: NEGATIVE MG/DL — SIGNIFICANT CHANGE UP
HCT VFR BLD CALC: 43.5 % — SIGNIFICANT CHANGE UP (ref 34.5–45)
HGB BLD-MCNC: 14.3 G/DL — SIGNIFICANT CHANGE UP (ref 11.5–15.5)
INR BLD: 1.55 RATIO — HIGH (ref 0.88–1.16)
KETONES UR-MCNC: ABNORMAL
LACTATE SERPL-SCNC: 1.6 MMOL/L — SIGNIFICANT CHANGE UP (ref 0.7–2)
LEUKOCYTE ESTERASE UR-ACNC: ABNORMAL
LYMPHOCYTES # BLD AUTO: 1 K/UL — SIGNIFICANT CHANGE UP (ref 1–3.3)
LYMPHOCYTES # BLD AUTO: 3 % — LOW (ref 13–44)
MCHC RBC-ENTMCNC: 29.1 PG — SIGNIFICANT CHANGE UP (ref 27–34)
MCHC RBC-ENTMCNC: 32.9 GM/DL — SIGNIFICANT CHANGE UP (ref 32–36)
MCV RBC AUTO: 88.6 FL — SIGNIFICANT CHANGE UP (ref 80–100)
MONOCYTES # BLD AUTO: 0.67 K/UL — SIGNIFICANT CHANGE UP (ref 0–0.9)
MONOCYTES NFR BLD AUTO: 2 % — SIGNIFICANT CHANGE UP (ref 2–14)
NEUTROPHILS # BLD AUTO: 31.77 K/UL — HIGH (ref 1.8–7.4)
NEUTROPHILS NFR BLD AUTO: 90 % — HIGH (ref 43–77)
NITRITE UR-MCNC: NEGATIVE — SIGNIFICANT CHANGE UP
NRBC # BLD: SIGNIFICANT CHANGE UP /100 WBCS (ref 0–0)
PH UR: 6 — SIGNIFICANT CHANGE UP (ref 5–8)
PLATELET # BLD AUTO: 261 K/UL — SIGNIFICANT CHANGE UP (ref 150–400)
POTASSIUM SERPL-MCNC: 3.2 MMOL/L — LOW (ref 3.5–5.3)
POTASSIUM SERPL-SCNC: 3.2 MMOL/L — LOW (ref 3.5–5.3)
PROT SERPL-MCNC: 7.6 GM/DL — SIGNIFICANT CHANGE UP (ref 6–8.3)
PROT UR-MCNC: 100 MG/DL
PROTHROM AB SERPL-ACNC: 17.6 SEC — HIGH (ref 10.6–13.6)
RAPID RVP RESULT: SIGNIFICANT CHANGE UP
RBC # BLD: 4.91 M/UL — SIGNIFICANT CHANGE UP (ref 3.8–5.2)
RBC # FLD: 15.4 % — HIGH (ref 10.3–14.5)
SARS-COV-2 RNA SPEC QL NAA+PROBE: SIGNIFICANT CHANGE UP
SODIUM SERPL-SCNC: 140 MMOL/L — SIGNIFICANT CHANGE UP (ref 135–145)
SP GR SPEC: 1.01 — SIGNIFICANT CHANGE UP (ref 1.01–1.02)
UROBILINOGEN FLD QL: NEGATIVE MG/DL — SIGNIFICANT CHANGE UP
WBC # BLD: 33.44 K/UL — HIGH (ref 3.8–10.5)
WBC # FLD AUTO: 33.44 K/UL — HIGH (ref 3.8–10.5)

## 2021-07-22 PROCEDURE — 36415 COLL VENOUS BLD VENIPUNCTURE: CPT

## 2021-07-22 PROCEDURE — 71045 X-RAY EXAM CHEST 1 VIEW: CPT | Mod: 26

## 2021-07-22 PROCEDURE — 99285 EMERGENCY DEPT VISIT HI MDM: CPT

## 2021-07-22 PROCEDURE — 85027 COMPLETE CBC AUTOMATED: CPT

## 2021-07-22 PROCEDURE — 93010 ELECTROCARDIOGRAM REPORT: CPT

## 2021-07-22 PROCEDURE — 80048 BASIC METABOLIC PNL TOTAL CA: CPT

## 2021-07-22 PROCEDURE — 80053 COMPREHEN METABOLIC PANEL: CPT

## 2021-07-22 PROCEDURE — 76770 US EXAM ABDO BACK WALL COMP: CPT | Mod: 26

## 2021-07-22 PROCEDURE — 86769 SARS-COV-2 COVID-19 ANTIBODY: CPT

## 2021-07-22 RX ORDER — ONDANSETRON 8 MG/1
4 TABLET, FILM COATED ORAL EVERY 6 HOURS
Refills: 0 | Status: DISCONTINUED | OUTPATIENT
Start: 2021-07-22 | End: 2021-07-26

## 2021-07-22 RX ORDER — SODIUM CHLORIDE 9 MG/ML
1850 INJECTION INTRAMUSCULAR; INTRAVENOUS; SUBCUTANEOUS ONCE
Refills: 0 | Status: COMPLETED | OUTPATIENT
Start: 2021-07-22 | End: 2021-07-22

## 2021-07-22 RX ORDER — ACETAMINOPHEN 500 MG
650 TABLET ORAL EVERY 6 HOURS
Refills: 0 | Status: DISCONTINUED | OUTPATIENT
Start: 2021-07-22 | End: 2021-07-26

## 2021-07-22 RX ORDER — CEFTRIAXONE 500 MG/1
1000 INJECTION, POWDER, FOR SOLUTION INTRAMUSCULAR; INTRAVENOUS ONCE
Refills: 0 | Status: COMPLETED | OUTPATIENT
Start: 2021-07-22 | End: 2021-07-22

## 2021-07-22 RX ADMIN — Medication 650 MILLIGRAM(S): at 21:52

## 2021-07-22 RX ADMIN — Medication 650 MILLIGRAM(S): at 21:49

## 2021-07-22 RX ADMIN — CEFTRIAXONE 1000 MILLIGRAM(S): 500 INJECTION, POWDER, FOR SOLUTION INTRAMUSCULAR; INTRAVENOUS at 19:31

## 2021-07-22 RX ADMIN — SODIUM CHLORIDE 1850 MILLILITER(S): 9 INJECTION INTRAMUSCULAR; INTRAVENOUS; SUBCUTANEOUS at 19:15

## 2021-07-22 RX ADMIN — SODIUM CHLORIDE 1850 MILLILITER(S): 9 INJECTION INTRAMUSCULAR; INTRAVENOUS; SUBCUTANEOUS at 21:47

## 2021-07-22 NOTE — ED PROVIDER NOTE - OBJECTIVE STATEMENT
79 y/o female with a PMHx of asthma, C. diff, essential HTN, gout, lyme disease, kidney stones, ocular migraines, scoliosis, spinal stenosis, UTI presents to the ED for intermittent fever and chills x1 week, Tmax 102.6. Saw urologist yesterday and was prescribed abx. Took 2 Tylenol at 8 am today. Also reports HA, nausea yesterday, intermittent dysuria, chest congestion, back ache. Notes feeling chest congestion throughout the night with productive cough this morning. Denies vomiting. Had sonogram 1 week ago with no kidney stones.

## 2021-07-22 NOTE — ED ADULT NURSE NOTE - CHIEF COMPLAINT QUOTE
Pt c/o fever x since yesterday. Tmax 102.6F.  took tylenol at 8a. Seen by urologist yesterday for uti.  Started on Augmentin today

## 2021-07-22 NOTE — ED STATDOCS - PROGRESS NOTE DETAILS
Andrea Armstrong for attending Dr. Swain: 79 y/o female with a PMHx of asthma Cdiff, essential HTN, gout, lyme disease, kidney stones, ocular migraines, scoliosis, spinal stenosis, UTI presents to the ED c/o fever and chills. Pt seen yesterday at urology office for annual visit. Pt complained of mild dysuria and urinary frequency. Outpatient UA suggestive of UTI and prescribed Augmentin. Pt took first dose this morning. Urine culture sent, results pending. Fever started last night, Tmax 102.6F. Pt meets sepsis criteria. Sepsis orders placed. Will send pt to main ED for further evaluation.

## 2021-07-22 NOTE — ED ADULT NURSE REASSESSMENT NOTE - NS ED NURSE REASSESS COMMENT FT1
Pt resting comfortably-vitals taken and stable. Pt c/o of "headache", given PO tylenol. Pt requesting food/tea. Given both and plan of care explained. Pt approved to move on bridge orders, pt pending bed assignment.

## 2021-07-23 LAB
COVID-19 SPIKE DOMAIN AB INTERP: POSITIVE
COVID-19 SPIKE DOMAIN ANTIBODY RESULT: >250 U/ML — HIGH
HCT VFR BLD CALC: 36.8 % — SIGNIFICANT CHANGE UP (ref 34.5–45)
HGB BLD-MCNC: 12.1 G/DL — SIGNIFICANT CHANGE UP (ref 11.5–15.5)
MCHC RBC-ENTMCNC: 29.9 PG — SIGNIFICANT CHANGE UP (ref 27–34)
MCHC RBC-ENTMCNC: 32.9 GM/DL — SIGNIFICANT CHANGE UP (ref 32–36)
MCV RBC AUTO: 90.9 FL — SIGNIFICANT CHANGE UP (ref 80–100)
PLATELET # BLD AUTO: 199 K/UL — SIGNIFICANT CHANGE UP (ref 150–400)
RBC # BLD: 4.05 M/UL — SIGNIFICANT CHANGE UP (ref 3.8–5.2)
RBC # FLD: 15.8 % — HIGH (ref 10.3–14.5)
SARS-COV-2 IGG+IGM SERPL QL IA: >250 U/ML — HIGH
SARS-COV-2 IGG+IGM SERPL QL IA: POSITIVE
WBC # BLD: 22.23 K/UL — HIGH (ref 3.8–10.5)
WBC # FLD AUTO: 22.23 K/UL — HIGH (ref 3.8–10.5)

## 2021-07-23 PROCEDURE — 99232 SBSQ HOSP IP/OBS MODERATE 35: CPT

## 2021-07-23 PROCEDURE — 99223 1ST HOSP IP/OBS HIGH 75: CPT

## 2021-07-23 RX ORDER — VALSARTAN 80 MG/1
1 TABLET ORAL
Qty: 0 | Refills: 0 | DISCHARGE

## 2021-07-23 RX ORDER — LANOLIN ALCOHOL/MO/W.PET/CERES
3 CREAM (GRAM) TOPICAL AT BEDTIME
Refills: 0 | Status: DISCONTINUED | OUTPATIENT
Start: 2021-07-23 | End: 2021-07-26

## 2021-07-23 RX ORDER — RIVAROXABAN 15 MG-20MG
1 KIT ORAL
Qty: 0 | Refills: 0 | DISCHARGE

## 2021-07-23 RX ORDER — VERAPAMIL HCL 240 MG
120 CAPSULE, EXTENDED RELEASE PELLETS 24 HR ORAL DAILY
Refills: 0 | Status: DISCONTINUED | OUTPATIENT
Start: 2021-07-23 | End: 2021-07-23

## 2021-07-23 RX ORDER — VERAPAMIL HCL 240 MG
180 CAPSULE, EXTENDED RELEASE PELLETS 24 HR ORAL DAILY
Refills: 0 | Status: DISCONTINUED | OUTPATIENT
Start: 2021-07-23 | End: 2021-07-26

## 2021-07-23 RX ORDER — ALLOPURINOL 300 MG
100 TABLET ORAL DAILY
Refills: 0 | Status: DISCONTINUED | OUTPATIENT
Start: 2021-07-23 | End: 2021-07-26

## 2021-07-23 RX ORDER — ATORVASTATIN CALCIUM 80 MG/1
40 TABLET, FILM COATED ORAL AT BEDTIME
Refills: 0 | Status: DISCONTINUED | OUTPATIENT
Start: 2021-07-23 | End: 2021-07-23

## 2021-07-23 RX ORDER — VANCOMYCIN HCL 1 G
125 VIAL (EA) INTRAVENOUS EVERY 6 HOURS
Refills: 0 | Status: DISCONTINUED | OUTPATIENT
Start: 2021-07-23 | End: 2021-07-26

## 2021-07-23 RX ORDER — CLOPIDOGREL BISULFATE 75 MG/1
75 TABLET, FILM COATED ORAL DAILY
Refills: 0 | Status: DISCONTINUED | OUTPATIENT
Start: 2021-07-23 | End: 2021-07-26

## 2021-07-23 RX ORDER — VERAPAMIL HCL 240 MG
240 CAPSULE, EXTENDED RELEASE PELLETS 24 HR ORAL DAILY
Refills: 0 | Status: DISCONTINUED | OUTPATIENT
Start: 2021-07-23 | End: 2021-07-23

## 2021-07-23 RX ORDER — POTASSIUM CITRATE MONOHYDRATE 100 %
20 POWDER (GRAM) MISCELLANEOUS
Refills: 0 | Status: DISCONTINUED | OUTPATIENT
Start: 2021-07-23 | End: 2021-07-26

## 2021-07-23 RX ORDER — ATORVASTATIN CALCIUM 80 MG/1
20 TABLET, FILM COATED ORAL AT BEDTIME
Refills: 0 | Status: DISCONTINUED | OUTPATIENT
Start: 2021-07-23 | End: 2021-07-26

## 2021-07-23 RX ORDER — RIVAROXABAN 15 MG-20MG
15 KIT ORAL
Refills: 0 | Status: DISCONTINUED | OUTPATIENT
Start: 2021-07-23 | End: 2021-07-26

## 2021-07-23 RX ORDER — HEPARIN SODIUM 5000 [USP'U]/ML
5000 INJECTION INTRAVENOUS; SUBCUTANEOUS EVERY 8 HOURS
Refills: 0 | Status: DISCONTINUED | OUTPATIENT
Start: 2021-07-23 | End: 2021-07-23

## 2021-07-23 RX ORDER — VANCOMYCIN HCL 1 G
125 VIAL (EA) INTRAVENOUS
Qty: 0 | Refills: 0 | DISCHARGE

## 2021-07-23 RX ORDER — CEFTRIAXONE 500 MG/1
1000 INJECTION, POWDER, FOR SOLUTION INTRAMUSCULAR; INTRAVENOUS EVERY 24 HOURS
Refills: 0 | Status: DISCONTINUED | OUTPATIENT
Start: 2021-07-23 | End: 2021-07-26

## 2021-07-23 RX ORDER — VERAPAMIL HCL 240 MG
1 CAPSULE, EXTENDED RELEASE PELLETS 24 HR ORAL
Qty: 0 | Refills: 0 | DISCHARGE

## 2021-07-23 RX ADMIN — ATORVASTATIN CALCIUM 20 MILLIGRAM(S): 80 TABLET, FILM COATED ORAL at 22:03

## 2021-07-23 RX ADMIN — Medication 180 MILLIGRAM(S): at 15:14

## 2021-07-23 RX ADMIN — Medication 100 MILLIGRAM(S): at 09:35

## 2021-07-23 RX ADMIN — HEPARIN SODIUM 5000 UNIT(S): 5000 INJECTION INTRAVENOUS; SUBCUTANEOUS at 15:15

## 2021-07-23 RX ADMIN — CEFTRIAXONE 1000 MILLIGRAM(S): 500 INJECTION, POWDER, FOR SOLUTION INTRAMUSCULAR; INTRAVENOUS at 17:50

## 2021-07-23 RX ADMIN — Medication 125 MILLIGRAM(S): at 17:05

## 2021-07-23 RX ADMIN — Medication 20 MILLIEQUIVALENT(S): at 22:03

## 2021-07-23 RX ADMIN — RIVAROXABAN 15 MILLIGRAM(S): KIT at 22:03

## 2021-07-23 RX ADMIN — CLOPIDOGREL BISULFATE 75 MILLIGRAM(S): 75 TABLET, FILM COATED ORAL at 09:23

## 2021-07-23 NOTE — PROGRESS NOTE ADULT - SUBJECTIVE AND OBJECTIVE BOX
RN called to report that RN spoke to Dr. Pa Urologist and patient is not going for any acute  intervention so Pt can start taking  Xarelto home meds. Hepatin SC discontinued. Xarelto 15mg PO ordered.     D/w RN.     Hospitalist to f/u in AM.

## 2021-07-23 NOTE — CONSULT NOTE ADULT - SUBJECTIVE AND OBJECTIVE BOX
80 year old woman with PMx of Right nephrolithiasis s/p Nephrostomy tubes and Lithotripsy, Recurrent Cdiff colitis s/p Fecal transplant, HTN, HLD, Gout presents with fever, chills.   Pt was prescribed antibiotics (bactrim) as an outpatient for UTI with no improvement yesterday.  Symptoms have been going on for a week.  Out patient sono negative for renal stones a week ago.  In ED given IVFs, antibiotics for UTI and admitted for sepsis management.  At bedside pt feeling better, denies any pain.  Currently no fevers, or urinary complaints.    Urology consulted for urolithiasis. Pt is known to Dr Sorenson for renal stones and frequent UTIs.  s/p PCNL and RIGHT ureteral stricture treatment 2017. Outpatient Renal US showed no tones or     Review of Systems:  Review of Systems: REVIEW OF SYSTEMS: All other review of systems is negative unless indicated above.      Allergies and Intolerances:        Allergies:  	No Known Allergies:     Home Medications:   * Incomplete Medication History as of 2021 10:46 documented in Structured Notes  · 	clopidogrel 75 mg oral tablet: Last Dose Taken:  , 1 tab(s) orally once a day  · 	allopurinol 100 mg oral tablet: Last Dose Taken:  , 1 tab(s) orally once a day  · 	potassium citrate 10 mEq oral tablet, extended release: Last Dose Taken:  , 2 tab(s) orally 2 times a day  · 	verapamil 240 mg/24 hours oral capsule, extended release: Last Dose Taken:  , 1 cap(s) orally once a day  · 	atorvastatin 20 mg oral tablet: Last Dose Taken:  , 1 tab(s) orally once a day (at bedtime)  · 	Xarelto 15 mg oral tablet: Last Dose Taken:  , 1 tab(s) orally once a day (in the evening)    .    Patient History:   Past Medical, Past Surgical, and Family History:  PAST MEDICAL HISTORY:  Asthma h/o last attack " 2 years ago"    Clostridium difficile colitis S/P stool transplant 2017 "with great relief"  Essential hypertension   Gout   H/o Lyme disease   BELL'S PALSY  Kidney stone   Ocular migraine   Scoliosis   Spinal stenosis   Spinal stenosis of lumbar region.     PAST SURGICAL HISTORY:  Acinous adenoma of parathyroid gland , parotidectomy  H/O  section ,   H/O dilation and curettage , ,   H/O lithotripsy X2  H/O nephrostomy and stent insertion - 2017  S/P R ureteral stent placed 17  History of UTI "for 2.5 years"  S/P left knee arthroscopy .     FAMILY HISTORY:  Family history of prostate cancer in father    Sibling  Still living? Unknown  Family history of hypertension in mother, Age at diagnosis: Age Unknown.    Social History:  Social History (marital status, living situation, occupation, tobacco use, alcohol and drug use, and sexual history): retired  non-smoker     80 year old woman with PMx of Right nephrolithiasis s/p Nephrostomy tubes and Lithotripsy, Recurrent Cdiff colitis s/p Fecal transplant, HTN, HLD, Gout presents with fever, chills.   Pt was prescribed antibiotics (bactrim) as an outpatient for UTI with no improvement yesterday.  Symptoms have been going on for a week.  Out patient sono negative for renal stones a week ago.  In ED given IVFs, antibiotics for UTI and admitted for sepsis management.  At bedside pt feeling better, denies any pain.  Currently no fevers, or urinary complaints.    Urology consulted for urolithiasis. Pt is known to Dr Sorenson for renal stones and frequent UTIs.  s/p PCNL and RIGHT ureteral stricture treatment 2017. Outpatient Renal US showed no stones or hydronephrosis. RBUS at  showed 8 mm LEFT intrarenal stone but no hydronephrosis.     Review of Systems:  Review of Systems: REVIEW OF SYSTEMS: All other review of systems is negative unless indicated above.      Allergies and Intolerances:        Allergies:  	No Known Allergies:     Home Medications:   * Incomplete Medication History as of 2021 10:46 documented in Structured Notes  · 	clopidogrel 75 mg oral tablet: Last Dose Taken:  , 1 tab(s) orally once a day  · 	allopurinol 100 mg oral tablet: Last Dose Taken:  , 1 tab(s) orally once a day  · 	potassium citrate 10 mEq oral tablet, extended release: Last Dose Taken:  , 2 tab(s) orally 2 times a day  · 	verapamil 240 mg/24 hours oral capsule, extended release: Last Dose Taken:  , 1 cap(s) orally once a day  · 	atorvastatin 20 mg oral tablet: Last Dose Taken:  , 1 tab(s) orally once a day (at bedtime)  · 	Xarelto 15 mg oral tablet: Last Dose Taken:  , 1 tab(s) orally once a day (in the evening)    .    Patient History:   Past Medical, Past Surgical, and Family History:  PAST MEDICAL HISTORY:  Asthma h/o last attack " 2 years ago"    Clostridium difficile colitis S/P stool transplant 2017 "with great relief"  Essential hypertension   Gout   H/o Lyme disease   BELL'S PALSY  Kidney stone   Ocular migraine   Scoliosis   Spinal stenosis   Spinal stenosis of lumbar region.     PAST SURGICAL HISTORY:  Acinous adenoma of parathyroid gland , parotidectomy  H/O  section ,   H/O dilation and curettage , ,   H/O lithotripsy X2  H/O nephrostomy and stent insertion - 2017  S/P R ureteral stent placed 17  History of UTI "for 2.5 years"  S/P left knee arthroscopy .     FAMILY HISTORY:  Family history of prostate cancer in father    Sibling  Still living? Unknown  Family history of hypertension in mother, Age at diagnosis: Age Unknown.    Social History:  Social History (marital status, living situation, occupation, tobacco use, alcohol and drug use, and sexual history): retired  non-smoker    Vital Signs Last 24 Hrs  T(C): 37.1 (2021 17:32), Max: 37.3 (2021 21:37)  T(F): 98.7 (2021 17:32), Max: 99.1 (2021 21:37)  HR: 60 (2021 17:32) (56 - 75)  BP: 140/40 (2021 17:32) (94/73 - 140/57)  BP(mean): --  RR: 17 (2021 17:32) (17 - 18)  SpO2: 99% (2021 17:32) (97% - 99%)    NAD, A+Ox3  RRR  no increased WOB  ABD S NT ND, no SP tenderness  no CVA tenderness                          12.1   22.23 )-----------( 199      ( 2021 09:20 )             36.8     07-23    145  |  113<H>  |  20  ----------------------------<  105<H>  3.3<L>   |  26  |  0.78    Ca    8.9      2021 10:23    TPro  6.5  /  Alb  2.9<L>  /  TBili  0.9  /  DBili  x   /  AST  26  /  ALT  38  /  AlkPhos  72  07-23    < from: US Kidney and Bladder (21 @ 20:34) >  Right kidney: 10.5 cm. No renal mass, hydronephrosis or calculi.    Left kidney: 10.8 cm. Septated cyst measuring 3.7 x 1.9 x 2.7 cm. No renal mass or hydronephrosis. 8 mm calculus.    Urinary bladder: Within normal limits.    IMPRESSION:    No hydronephrosis.    < end of copied text >

## 2021-07-23 NOTE — CONSULT NOTE ADULT - ASSESSMENT
79 yo F with flank pain, fevers. RBUS was negative for hydronephrosis. Suspect pyelonephritis not renal colic.   - ABx  - f/u cultures  - no acute  intervention at this time

## 2021-07-23 NOTE — PROVIDER CONTACT NOTE (OTHER) - SITUATION
Spoke w/BETINA rodriguez after speaking w/Dr. Pa confirming that pt. will not be going for any procedure and should resume Xarelto.
Spoke w/Dr. Pa regarding if pt. should resume xarelto if not going to any type of procedure tomorrow, OK to resume xarelto per Dr. Pa.
notified Dr Carballo's office of admission
notified Dr Pa's office of consult spoke to cande
Spoke w/hospitalist regarding pt. new admit from ED bridge orders, will need home meds ordered.

## 2021-07-23 NOTE — PROVIDER CONTACT NOTE (OTHER) - REASON
Need xarelto order
Regarding xarelto
garth hernandez
notified pcp
Bridge order patient needs orders.

## 2021-07-23 NOTE — H&P ADULT - HISTORY OF PRESENT ILLNESS
80 year old woman with PMx of Right nephrolithiasis s/p Nephrostomy tubes and Lithotripsy, Recurrent Cdiff colitis s/p Fecal transplant, HTN, HLD, Gout presents with fever, chills.   Pt was prescribed antibiotics (bactrim) as an outpatient for UTI with no improvement yesterday.  Symptoms have been going on for a week.  Out patient sono negative for renal stones a week ago.  In ED given IVFs, antibiotics for UTI and admitted for sepsis management.  At bedside pt feeling better, denies any pain.  Currently no fevers, or urinary complaints.
Yes

## 2021-07-23 NOTE — H&P ADULT - ASSESSMENT
80 year old woman with PMx of Right nephrolithiasis s/p Nephrostomy tubes and Lithotripsy, Recurrent Cdiff colitis s/p Fecal transplant, HTN, HLD, Gout presents with fever, chills.      Sepsis secondary to UTI complicated by nephrolithiasis:  -Ultrasound noted for renal calculus - consult uro for further evaluation  -c/w with ceftriaxone  -f/u cultures  -c/w potassium citrate  -hold off on further IVFs for now, encourage oral intake  -oral cdiff for ppx given extensive history     Chronic Afib / HTN / HLD / gout:  -c/w plavix   -c/w allopurinol   -c/w statin   -c/w verapamil  -hold anticoagulation until uro evaluation    DVT ppx:  -heparin subc

## 2021-07-23 NOTE — H&P ADULT - NSHPLABSRESULTS_GEN_ALL_CORE
12.1    )-----------( 199      ( 2021 09:20 )             36.8         145  |  113<H>  |  20  ----------------------------<  105<H>  3.3<L>   |  26  |  0.78    Ca    8.9      2021 10:23    TPro  6.5  /  Alb  2.9<L>  /  TBili  0.9  /  DBili  x   /  AST  26  /  ALT  38  /  AlkPhos  72      CAPILLARY BLOOD GLUCOSE        LIVER FUNCTIONS - ( 2021 10:23 )  Alb: 2.9 g/dL / Pro: 6.5 gm/dL / ALK PHOS: 72 U/L / ALT: 38 U/L / AST: 26 U/L / GGT: x           PT/INR - ( 2021 19:29 )   PT: 17.6 sec;   INR: 1.55 ratio         PTT - ( 2021 19:29 )  PTT:31.1 sec  Urinalysis Basic - ( 2021 19:29 )    Color: Celina / Appearance: Slightly Turbid / S.015 / pH: x  Gluc: x / Ketone: Moderate  / Bili: Negative / Urobili: Negative mg/dL   Blood: x / Protein: 100 mg/dL / Nitrite: Negative   Leuk Esterase: Small / RBC: 6-10 /HPF / WBC 26-50   Sq Epi: x / Non Sq Epi: Few / Bacteria: Occasional        < from: US Kidney and Bladder (21 @ 20:34) >      FINDINGS:    Right kidney: 10.5 cm. No renal mass, hydronephrosis or calculi.    Left kidney: 10.8 cm. Septated cyst measuring 3.7 x 1.9 x 2.7 cm. No renal mass or hydronephrosis. 8 mm calculus.    Urinary bladder: Within normal limits.    IMPRESSION:    No hydronephrosis.    < end of copied text >

## 2021-07-23 NOTE — H&P ADULT - NSHPPHYSICALEXAM_GEN_ALL_CORE
Vital Signs Last 24 Hrs  T(C): 36.6 (23 Jul 2021 09:29), Max: 38.3 (22 Jul 2021 18:39)  T(F): 97.9 (23 Jul 2021 09:29), Max: 100.9 (22 Jul 2021 18:39)  HR: 58 (23 Jul 2021 09:29) (58 - 95)  BP: 132/59 (23 Jul 2021 09:29) (94/73 - 152/54)  BP(mean): 90 (22 Jul 2021 20:00) (71 - 90)  RR: 18 (23 Jul 2021 09:29) (18 - 25)  SpO2: 98% (23 Jul 2021 09:29) (94% - 98%)    PHYSICAL EXAM:    Constitutional: NAD, awake and alert, well-developed  HEENT: PERR, EOMI, Normal Hearing, MMM  Neck: Soft and supple  Respiratory: Breath sounds are clear bilaterally, No wheezing, rales or rhonchi  Cardiovascular: S1 and S2, regular rate and rhythm, no Murmurs, gallops or rubs  Gastrointestinal: Bowel Sounds present, soft, nontender, nondistended, no guarding, no rebound  Extremities: No peripheral edema  Neurological: A/O x 3, no focal deficits in my limited exam

## 2021-07-23 NOTE — H&P ADULT - NSICDXFAMILYHX_GEN_ALL_CORE_FT
FAMILY HISTORY:  Family history of prostate cancer in father    Sibling  Still living? Unknown  Family history of hypertension in mother, Age at diagnosis: Age Unknown

## 2021-07-23 NOTE — H&P ADULT - NSICDXPASTSURGICALHX_GEN_ALL_CORE_FT
PAST SURGICAL HISTORY:  Acinous adenoma of parathyroid gland , parotidectomy    H/O  section ,     H/O dilation and curettage , ,     H/O lithotripsy X2    H/O nephrostomy and stent insertion - 2017  S/P R ureteral stent placed 17    History of UTI "for 2.5 years"    S/P left knee arthroscopy

## 2021-07-24 LAB
ANION GAP SERPL CALC-SCNC: 7 MMOL/L — SIGNIFICANT CHANGE UP (ref 5–17)
BUN SERPL-MCNC: 19 MG/DL — SIGNIFICANT CHANGE UP (ref 7–23)
CALCIUM SERPL-MCNC: 8.7 MG/DL — SIGNIFICANT CHANGE UP (ref 8.5–10.1)
CHLORIDE SERPL-SCNC: 110 MMOL/L — HIGH (ref 96–108)
CO2 SERPL-SCNC: 23 MMOL/L — SIGNIFICANT CHANGE UP (ref 22–31)
CREAT SERPL-MCNC: 0.68 MG/DL — SIGNIFICANT CHANGE UP (ref 0.5–1.3)
GLUCOSE SERPL-MCNC: 99 MG/DL — SIGNIFICANT CHANGE UP (ref 70–99)
HCT VFR BLD CALC: 38.3 % — SIGNIFICANT CHANGE UP (ref 34.5–45)
HGB BLD-MCNC: 12.6 G/DL — SIGNIFICANT CHANGE UP (ref 11.5–15.5)
MCHC RBC-ENTMCNC: 29.3 PG — SIGNIFICANT CHANGE UP (ref 27–34)
MCHC RBC-ENTMCNC: 32.9 GM/DL — SIGNIFICANT CHANGE UP (ref 32–36)
MCV RBC AUTO: 89.1 FL — SIGNIFICANT CHANGE UP (ref 80–100)
PLATELET # BLD AUTO: 207 K/UL — SIGNIFICANT CHANGE UP (ref 150–400)
POTASSIUM SERPL-MCNC: 3.5 MMOL/L — SIGNIFICANT CHANGE UP (ref 3.5–5.3)
POTASSIUM SERPL-SCNC: 3.5 MMOL/L — SIGNIFICANT CHANGE UP (ref 3.5–5.3)
RBC # BLD: 4.3 M/UL — SIGNIFICANT CHANGE UP (ref 3.8–5.2)
RBC # FLD: 15.5 % — HIGH (ref 10.3–14.5)
SODIUM SERPL-SCNC: 140 MMOL/L — SIGNIFICANT CHANGE UP (ref 135–145)
WBC # BLD: 8.1 K/UL — SIGNIFICANT CHANGE UP (ref 3.8–10.5)
WBC # FLD AUTO: 8.1 K/UL — SIGNIFICANT CHANGE UP (ref 3.8–10.5)

## 2021-07-24 PROCEDURE — 99233 SBSQ HOSP IP/OBS HIGH 50: CPT

## 2021-07-24 RX ORDER — LACTOBACILLUS ACIDOPHILUS 100MM CELL
1 CAPSULE ORAL
Refills: 0 | Status: DISCONTINUED | OUTPATIENT
Start: 2021-07-24 | End: 2021-07-26

## 2021-07-24 RX ADMIN — Medication 125 MILLIGRAM(S): at 11:48

## 2021-07-24 RX ADMIN — Medication 125 MILLIGRAM(S): at 23:06

## 2021-07-24 RX ADMIN — Medication 20 MILLIEQUIVALENT(S): at 09:09

## 2021-07-24 RX ADMIN — Medication 125 MILLIGRAM(S): at 00:01

## 2021-07-24 RX ADMIN — Medication 20 MILLIEQUIVALENT(S): at 23:05

## 2021-07-24 RX ADMIN — RIVAROXABAN 15 MILLIGRAM(S): KIT at 17:12

## 2021-07-24 RX ADMIN — Medication 125 MILLIGRAM(S): at 17:12

## 2021-07-24 RX ADMIN — CLOPIDOGREL BISULFATE 75 MILLIGRAM(S): 75 TABLET, FILM COATED ORAL at 09:09

## 2021-07-24 RX ADMIN — Medication 100 MILLIGRAM(S): at 09:09

## 2021-07-24 RX ADMIN — CEFTRIAXONE 1000 MILLIGRAM(S): 500 INJECTION, POWDER, FOR SOLUTION INTRAMUSCULAR; INTRAVENOUS at 17:12

## 2021-07-24 RX ADMIN — Medication 180 MILLIGRAM(S): at 09:09

## 2021-07-24 RX ADMIN — Medication 1 TABLET(S): at 22:28

## 2021-07-24 RX ADMIN — Medication 125 MILLIGRAM(S): at 05:29

## 2021-07-24 RX ADMIN — ATORVASTATIN CALCIUM 20 MILLIGRAM(S): 80 TABLET, FILM COATED ORAL at 22:28

## 2021-07-24 NOTE — PROGRESS NOTE ADULT - SUBJECTIVE AND OBJECTIVE BOX
80 year old woman with PMx of Right nephrolithiasis s/p Nephrostomy tubes and Lithotripsy, Recurrent Cdiff colitis s/p Fecal transplant, HTN, HLD, Gout presents with fever, chills.   Pt was prescribed antibiotics (bactrim) as an outpatient for UTI with no improvement yesterday.  Symptoms have been going on for a week.  Out patient sono negative for renal stones a week ago.  In ED given IVFs, antibiotics for UTI and admitted for sepsis management.  At bedside pt feeling better, denies any pain.  Currently no fevers, or urinary complaints.      21: Patient seen and examined. Feels better today.         Vital Signs Last 24 Hrs  T(C): 36.9 (2021 07:15), Max: 37.1 (2021 17:32)  T(F): 98.5 (2021 07:15), Max: 98.7 (2021 17:32)  HR: 62 (2021 07:15) (56 - 62)  BP: 149/44 (2021 07:15) (140/40 - 149/44)  BP(mean): --  RR: 18 (2021 07:15) (17 - 18)  SpO2: 97% (2021 07:15) (97% - 99%)      PHYSICAL EXAM:    Constitutional: NAD, awake and alert, well-developed  HEENT: PERR, EOMI, Normal Hearing, MMM  Neck: Soft and supple  Respiratory: Breath sounds are clear bilaterally, No wheezing, rales or rhonchi  Cardiovascular: S1 and S2, regular rate and rhythm, no Murmurs, gallops or rubs  Gastrointestinal: Bowel Sounds present, soft, nontender, nondistended, no guarding, no rebound  Extremities: No peripheral edema  Neurological: A/O x 3, no focal deficits in my limited exam                              12.6   8.10  )-----------( 207      ( 2021 07:35 )             38.3     2021 07:35    140    |  110    |  19     ----------------------------<  99     3.5     |  23     |  0.68     Ca    8.7        2021 07:35    TPro  6.5    /  Alb  2.9    /  TBili  0.9    /  DBili  x      /  AST  26     /  ALT  38     /  AlkPhos  72     2021 10:23    LIVER FUNCTIONS - ( 2021 10:23 )  Alb: 2.9 g/dL / Pro: 6.5 gm/dL / ALK PHOS: 72 U/L / ALT: 38 U/L / AST: 26 U/L / GGT: x           PT/INR - ( 2021 19:29 )   PT: 17.6 sec;   INR: 1.55 ratio         PTT - ( 2021 19:29 )  PTT:31.1 sec  CAPILLARY BLOOD GLUCOSE            Urinalysis Basic - ( 2021 19:29 )    Color: Celina / Appearance: Slightly Turbid / S.015 / pH: x  Gluc: x / Ketone: Moderate  / Bili: Negative / Urobili: Negative mg/dL   Blood: x / Protein: 100 mg/dL / Nitrite: Negative   Leuk Esterase: Small / RBC: 6-10 /HPF / WBC 26-50   Sq Epi: x / Non Sq Epi: Few / Bacteria: Occasional        MEDICATIONS  (STANDING):  allopurinol 100 milliGRAM(s) Oral daily  atorvastatin 20 milliGRAM(s) Oral at bedtime  cefTRIAXone Injectable. 1000 milliGRAM(s) IV Push every 24 hours  clopidogrel Tablet 75 milliGRAM(s) Oral daily  lactobacillus acidophilus 1 Tablet(s) Oral two times a day  potassium citrate 20 milliEquivalent(s) Oral two times a day  rivaroxaban 15 milliGRAM(s) Oral with dinner  vancomycin    Solution 125 milliGRAM(s) Oral every 6 hours  verapamil  milliGRAM(s) Oral daily    MEDICATIONS  (PRN):  acetaminophen   Tablet .. 650 milliGRAM(s) Oral every 6 hours PRN Mild Pain (1 - 3)  aluminum hydroxide/magnesium hydroxide/simethicone Suspension 30 milliLiter(s) Oral every 4 hours PRN Dyspepsia  melatonin 3 milliGRAM(s) Oral at bedtime PRN Insomnia  ondansetron Injectable 4 milliGRAM(s) IV Push every 6 hours PRN Nausea and/or Vomiting              Assessment and Plan:   Assessment:  · Assessment	  80 year old woman with PMx of Right nephrolithiasis s/p Nephrostomy tubes and Lithotripsy, Recurrent Cdiff colitis s/p Fecal transplant, HTN, HLD, Gout presents with fever, chills.      Sepsis secondary to UTI/pyelonephritis  -Ultrasound noted for renal calculus - eval appreciated. As per Dr Pa suspect pyelo not stone  -c/w with ceftriaxone  -f/u cultures, blood cultures: no growth  -c/w potassium citrate  -hold off on further IVFs for now, encourage oral intake  -oral cdiff for ppx given extensive history     Chronic Afib / HTN / HLD / gout:  -c/w plavix   -c/w allopurinol   -c/w statin   -c/w verapamil    DVT ppx:  -On xarelto

## 2021-07-25 LAB
-  AMIKACIN: SIGNIFICANT CHANGE UP
-  AMOXICILLIN/CLAVULANIC ACID: SIGNIFICANT CHANGE UP
-  AMPICILLIN/SULBACTAM: SIGNIFICANT CHANGE UP
-  AMPICILLIN: SIGNIFICANT CHANGE UP
-  AZTREONAM: SIGNIFICANT CHANGE UP
-  CEFAZOLIN: SIGNIFICANT CHANGE UP
-  CEFEPIME: SIGNIFICANT CHANGE UP
-  CEFOXITIN: SIGNIFICANT CHANGE UP
-  CEFTRIAXONE: SIGNIFICANT CHANGE UP
-  CIPROFLOXACIN: SIGNIFICANT CHANGE UP
-  ERTAPENEM: SIGNIFICANT CHANGE UP
-  GENTAMICIN: SIGNIFICANT CHANGE UP
-  LEVOFLOXACIN: SIGNIFICANT CHANGE UP
-  MEROPENEM: SIGNIFICANT CHANGE UP
-  NITROFURANTOIN: SIGNIFICANT CHANGE UP
-  PIPERACILLIN/TAZOBACTAM: SIGNIFICANT CHANGE UP
-  TOBRAMYCIN: SIGNIFICANT CHANGE UP
-  TRIMETHOPRIM/SULFAMETHOXAZOLE: SIGNIFICANT CHANGE UP
ANION GAP SERPL CALC-SCNC: 5 MMOL/L — SIGNIFICANT CHANGE UP (ref 5–17)
BUN SERPL-MCNC: 16 MG/DL — SIGNIFICANT CHANGE UP (ref 7–23)
CALCIUM SERPL-MCNC: 9 MG/DL — SIGNIFICANT CHANGE UP (ref 8.5–10.1)
CHLORIDE SERPL-SCNC: 110 MMOL/L — HIGH (ref 96–108)
CO2 SERPL-SCNC: 27 MMOL/L — SIGNIFICANT CHANGE UP (ref 22–31)
CREAT SERPL-MCNC: 0.66 MG/DL — SIGNIFICANT CHANGE UP (ref 0.5–1.3)
CULTURE RESULTS: SIGNIFICANT CHANGE UP
GLUCOSE SERPL-MCNC: 97 MG/DL — SIGNIFICANT CHANGE UP (ref 70–99)
HCT VFR BLD CALC: 38.7 % — SIGNIFICANT CHANGE UP (ref 34.5–45)
HGB BLD-MCNC: 12.7 G/DL — SIGNIFICANT CHANGE UP (ref 11.5–15.5)
MCHC RBC-ENTMCNC: 29.2 PG — SIGNIFICANT CHANGE UP (ref 27–34)
MCHC RBC-ENTMCNC: 32.8 GM/DL — SIGNIFICANT CHANGE UP (ref 32–36)
MCV RBC AUTO: 89 FL — SIGNIFICANT CHANGE UP (ref 80–100)
METHOD TYPE: SIGNIFICANT CHANGE UP
ORGANISM # SPEC MICROSCOPIC CNT: SIGNIFICANT CHANGE UP
ORGANISM # SPEC MICROSCOPIC CNT: SIGNIFICANT CHANGE UP
PLATELET # BLD AUTO: 224 K/UL — SIGNIFICANT CHANGE UP (ref 150–400)
POTASSIUM SERPL-MCNC: 4 MMOL/L — SIGNIFICANT CHANGE UP (ref 3.5–5.3)
POTASSIUM SERPL-SCNC: 4 MMOL/L — SIGNIFICANT CHANGE UP (ref 3.5–5.3)
RBC # BLD: 4.35 M/UL — SIGNIFICANT CHANGE UP (ref 3.8–5.2)
RBC # FLD: 15.6 % — HIGH (ref 10.3–14.5)
SODIUM SERPL-SCNC: 142 MMOL/L — SIGNIFICANT CHANGE UP (ref 135–145)
SPECIMEN SOURCE: SIGNIFICANT CHANGE UP
WBC # BLD: 6.4 K/UL — SIGNIFICANT CHANGE UP (ref 3.8–10.5)
WBC # FLD AUTO: 6.4 K/UL — SIGNIFICANT CHANGE UP (ref 3.8–10.5)

## 2021-07-25 PROCEDURE — 99232 SBSQ HOSP IP/OBS MODERATE 35: CPT

## 2021-07-25 RX ADMIN — Medication 125 MILLIGRAM(S): at 17:08

## 2021-07-25 RX ADMIN — Medication 1 TABLET(S): at 08:56

## 2021-07-25 RX ADMIN — ATORVASTATIN CALCIUM 20 MILLIGRAM(S): 80 TABLET, FILM COATED ORAL at 21:16

## 2021-07-25 RX ADMIN — Medication 20 MILLIEQUIVALENT(S): at 08:56

## 2021-07-25 RX ADMIN — Medication 125 MILLIGRAM(S): at 11:03

## 2021-07-25 RX ADMIN — Medication 125 MILLIGRAM(S): at 23:06

## 2021-07-25 RX ADMIN — RIVAROXABAN 15 MILLIGRAM(S): KIT at 17:08

## 2021-07-25 RX ADMIN — Medication 20 MILLIEQUIVALENT(S): at 21:16

## 2021-07-25 RX ADMIN — Medication 125 MILLIGRAM(S): at 05:26

## 2021-07-25 RX ADMIN — CLOPIDOGREL BISULFATE 75 MILLIGRAM(S): 75 TABLET, FILM COATED ORAL at 08:56

## 2021-07-25 RX ADMIN — CEFTRIAXONE 1000 MILLIGRAM(S): 500 INJECTION, POWDER, FOR SOLUTION INTRAMUSCULAR; INTRAVENOUS at 17:08

## 2021-07-25 RX ADMIN — Medication 180 MILLIGRAM(S): at 09:49

## 2021-07-25 RX ADMIN — Medication 100 MILLIGRAM(S): at 08:55

## 2021-07-25 RX ADMIN — Medication 1 TABLET(S): at 21:16

## 2021-07-25 NOTE — PROGRESS NOTE ADULT - SUBJECTIVE AND OBJECTIVE BOX
80 year old woman with PMx of Right nephrolithiasis s/p Nephrostomy tubes and Lithotripsy, Recurrent Cdiff colitis s/p Fecal transplant, HTN, HLD, Gout presents with fever, chills.   Pt was prescribed antibiotics (bactrim) as an outpatient for UTI with no improvement yesterday.  Symptoms have been going on for a week.  Out patient sono negative for renal stones a week ago.  In ED given IVFs, antibiotics for UTI and admitted for sepsis management.  At bedside pt feeling better, denies any pain.  Currently no fevers, or urinary complaints.      21: Patient seen and examined. Feels better today.   21: Patient seen and examined. No new issues. Tolerating IV rocephin. Discussed with patient regarding management and d/c plan.       Vital Signs Last 24 Hrs  T(C): 36.6 (2021 08:14), Max: 37.4 (2021 17:25)  T(F): 97.9 (2021 08:14), Max: 99.3 (2021 17:25)  HR: 57 (2021 08:57) (57 - 65)  BP: 139/46 (2021 08:57) (103/61 - 154/55)  BP(mean): --  RR: 18 (2021 08:14) (17 - 18)  SpO2: 97% (2021 08:14) (97% - 99%)      PHYSICAL EXAM:    Constitutional: NAD, awake and alert, well-developed  HEENT: PERR, EOMI, Normal Hearing, MMM  Neck: Soft and supple  Respiratory: Breath sounds are clear bilaterally, No wheezing, rales or rhonchi  Cardiovascular: S1 and S2, regular rate and rhythm, no Murmurs, gallops or rubs  Gastrointestinal: Bowel Sounds present, soft, nontender, nondistended, no guarding, no rebound  Extremities: No peripheral edema  Neurological: A/O x 3, no focal deficits in my limited exam                                           12.7   6.40  )-----------( 224      ( 2021 07:35 )             38.7     2021 07:35    142    |  110    |  16     ----------------------------<  97     4.0     |  27     |  0.66     Ca    9.0        2021 07:35          Urinalysis Basic - ( 2021 19:29 )    Color: Celina / Appearance: Slightly Turbid / S.015 / pH: x  Gluc: x / Ketone: Moderate  / Bili: Negative / Urobili: Negative mg/dL   Blood: x / Protein: 100 mg/dL / Nitrite: Negative   Leuk Esterase: Small / RBC: 6-10 /HPF / WBC 26-50   Sq Epi: x / Non Sq Epi: Few / Bacteria: Occasional        MEDICATIONS  (STANDING):  allopurinol 100 milliGRAM(s) Oral daily  atorvastatin 20 milliGRAM(s) Oral at bedtime  cefTRIAXone Injectable. 1000 milliGRAM(s) IV Push every 24 hours  clopidogrel Tablet 75 milliGRAM(s) Oral daily  lactobacillus acidophilus 1 Tablet(s) Oral two times a day  potassium citrate 20 milliEquivalent(s) Oral two times a day  rivaroxaban 15 milliGRAM(s) Oral with dinner  vancomycin    Solution 125 milliGRAM(s) Oral every 6 hours  verapamil  milliGRAM(s) Oral daily    MEDICATIONS  (PRN):  acetaminophen   Tablet .. 650 milliGRAM(s) Oral every 6 hours PRN Mild Pain (1 - 3)  aluminum hydroxide/magnesium hydroxide/simethicone Suspension 30 milliLiter(s) Oral every 4 hours PRN Dyspepsia  melatonin 3 milliGRAM(s) Oral at bedtime PRN Insomnia  ondansetron Injectable 4 milliGRAM(s) IV Push every 6 hours PRN Nausea and/or Vomiting              Assessment and Plan:   Assessment:  · Assessment	  80 year old woman with PMx of Right nephrolithiasis s/p Nephrostomy tubes and Lithotripsy, Recurrent Cdiff colitis s/p Fecal transplant, HTN, HLD, Gout presents with fever, chills.      Sepsis secondary to UTI/pyelonephritis  -Ultrasound noted for renal calculus - eval appreciated. As per Dr Pa suspect pyelo not stone  -c/w with ceftriaxone  -f/u cultures, blood cultures: no growth  -Urine cultures: Proteus mirabilis, follow sensitivity  -c/w potassium citrate  -oral cdiff for ppx given extensive history     Chronic Afib / HTN / HLD / gout:  -c/w plavix   -c/w allopurinol   -c/w statin   -c/w verapamil    DVT ppx:  -On xarelto    Possible d/c in 1 or 2 days time

## 2021-07-26 ENCOUNTER — TRANSCRIPTION ENCOUNTER (OUTPATIENT)
Age: 81
End: 2021-07-26

## 2021-07-26 VITALS
SYSTOLIC BLOOD PRESSURE: 149 MMHG | RESPIRATION RATE: 18 BRPM | TEMPERATURE: 98 F | HEART RATE: 67 BPM | OXYGEN SATURATION: 98 % | DIASTOLIC BLOOD PRESSURE: 56 MMHG

## 2021-07-26 PROCEDURE — 99239 HOSP IP/OBS DSCHRG MGMT >30: CPT

## 2021-07-26 RX ORDER — VANCOMYCIN HCL 1 G
5 VIAL (EA) INTRAVENOUS
Qty: 280 | Refills: 0
Start: 2021-07-26 | End: 2021-08-08

## 2021-07-26 RX ORDER — LACTOBACILLUS ACIDOPHILUS 100MM CELL
1 CAPSULE ORAL
Qty: 0 | Refills: 0 | DISCHARGE
Start: 2021-07-26

## 2021-07-26 RX ORDER — CEFUROXIME AXETIL 250 MG
1 TABLET ORAL
Qty: 14 | Refills: 0
Start: 2021-07-26 | End: 2021-08-01

## 2021-07-26 RX ORDER — VANCOMYCIN HCL 1 G
12.5 VIAL (EA) INTRAVENOUS
Qty: 700 | Refills: 0
Start: 2021-07-26 | End: 2021-08-08

## 2021-07-26 RX ADMIN — CLOPIDOGREL BISULFATE 75 MILLIGRAM(S): 75 TABLET, FILM COATED ORAL at 09:43

## 2021-07-26 RX ADMIN — Medication 100 MILLIGRAM(S): at 09:42

## 2021-07-26 RX ADMIN — Medication 20 MILLIEQUIVALENT(S): at 09:43

## 2021-07-26 RX ADMIN — Medication 125 MILLIGRAM(S): at 12:26

## 2021-07-26 RX ADMIN — Medication 1 TABLET(S): at 09:43

## 2021-07-26 RX ADMIN — Medication 180 MILLIGRAM(S): at 09:43

## 2021-07-26 RX ADMIN — Medication 125 MILLIGRAM(S): at 05:39

## 2021-07-26 NOTE — DISCHARGE NOTE PROVIDER - NSDCMRMEDTOKEN_GEN_ALL_CORE_FT
allopurinol 100 mg oral tablet: 1 tab(s) orally once a day  atorvastatin 20 mg oral tablet: 1 tab(s) orally once a day (at bedtime)  cefuroxime 500 mg oral tablet: 1 tab(s) orally 2 times a day   clopidogrel 75 mg oral tablet: 1 tab(s) orally once a day  lactobacillus acidophilus oral capsule: 1 tab(s) orally 2 times a day  potassium citrate 10 mEq oral tablet, extended release: 2 tab(s) orally 2 times a day  vancomycin 50 mg/mL oral liquid: 12.5 milliliter(s) orally every 6 hours   verapamil 180 mg/24 hours oral tablet, extended release: 1 tab(s) orally once a day  Xarelto 15 mg oral tablet: 1 tab(s) orally once a day (in the evening)

## 2021-07-26 NOTE — DISCHARGE NOTE NURSING/CASE MANAGEMENT/SOCIAL WORK - PATIENT PORTAL LINK FT
You can access the FollowMyHealth Patient Portal offered by [facility] by registering at the following website: [email or web address]. By joining [employer]’s FollowMyHealth portal, you will also be able to view your health information using other applications (apps) compatible with our system.

## 2021-07-26 NOTE — DISCHARGE NOTE PROVIDER - CARE PROVIDER_API CALL
Minor Carballo)  Cardiovascular Disease; Internal Medicine  [address] [place] [address]  Phone: [phone]  Fax: [phone]  Follow Up Time:

## 2021-07-26 NOTE — DISCHARGE NOTE PROVIDER - NSDCCPCAREPLAN_GEN_ALL_CORE_FT
PRINCIPAL DISCHARGE DIAGNOSIS  Diagnosis: UTI (urinary tract infection)  Assessment and Plan of Treatment: Continue ceftin as ordered      SECONDARY DISCHARGE DIAGNOSES  Diagnosis: Sepsis  Assessment and Plan of Treatment:      Implemented All Universal Safety Interventions:  [place] to call system. Call bell, personal items and telephone within reach. Instruct patient to call for assistance. Room bathroom lighting operational. Non-slip footwear when patient is off stretcher. Physically safe environment: no spills, clutter or unnecessary equipment. Stretcher in lowest position, wheels locked, appropriate side rails in place.

## 2021-08-01 DIAGNOSIS — Z79.82 LONG TERM (CURRENT) USE OF ASPIRIN: ICD-10-CM

## 2021-08-01 DIAGNOSIS — Z79.02 LONG TERM (CURRENT) USE OF ANTITHROMBOTICS/ANTIPLATELETS: ICD-10-CM

## 2021-08-01 DIAGNOSIS — B96.4 PROTEUS (MIRABILIS) (MORGANII) AS THE CAUSE OF DISEASES CLASSIFIED ELSEWHERE: ICD-10-CM

## 2021-08-01 DIAGNOSIS — Z79.01 LONG TERM (CURRENT) USE OF ANTICOAGULANTS: ICD-10-CM

## 2021-08-01 DIAGNOSIS — A41.9 SEPSIS, UNSPECIFIED ORGANISM: ICD-10-CM

## 2021-08-01 DIAGNOSIS — I10 ESSENTIAL (PRIMARY) HYPERTENSION: ICD-10-CM

## 2021-08-01 DIAGNOSIS — N39.0 URINARY TRACT INFECTION, SITE NOT SPECIFIED: ICD-10-CM

## 2021-08-01 DIAGNOSIS — I48.20 CHRONIC ATRIAL FIBRILLATION, UNSPECIFIED: ICD-10-CM

## 2021-08-01 DIAGNOSIS — M48.061 SPINAL STENOSIS, LUMBAR REGION WITHOUT NEUROGENIC CLAUDICATION: ICD-10-CM

## 2021-08-01 DIAGNOSIS — M10.9 GOUT, UNSPECIFIED: ICD-10-CM

## 2021-08-04 NOTE — ASSESSMENT
[FreeTextEntry1] : Plan:\par continue k-citrate\par continue allopurinol\par 24 hr urine and renal sono before next visit\par \par \par suspect cystitis\par check urine culture\par start nitrofurantoin given hx of complicated c. diff infection\par may need to resume hiprex and vit C

## 2021-08-04 NOTE — HISTORY OF PRESENT ILLNESS
[FreeTextEntry1] : See notes from last visit\par Pt here for f/u\par No new c/o\par on K-citrate and Allopurinol (for gout from PCP)\par \par new c/o dysuria and worsened urinary frequency\par \par No pain, fevers, chills, nausea and or vomiting\par  \par 24 hr urine: good vol, other parameters within acceptable ranges except \par Renal sono ([facility] radiology): no stones, stable bilateral renal cysts \par \par \par

## 2021-08-10 NOTE — ED ADULT NURSE NOTE - NS ED NURSE DISCH DISPOSITION
The skin of the bilateral groins and right arm was clipped, prepped and draped in the usual sterile manner. (If not otherwise specified, skin prep was bilateral.)  Discharged

## 2021-08-16 ENCOUNTER — APPOINTMENT (OUTPATIENT)
Dept: ELECTROPHYSIOLOGY | Facility: CLINIC | Age: 81
End: 2021-08-16
Payer: MEDICARE

## 2021-08-16 VITALS
SYSTOLIC BLOOD PRESSURE: 144 MMHG | WEIGHT: 133 LBS | HEART RATE: 60 BPM | OXYGEN SATURATION: 97 % | BODY MASS INDEX: 26.81 KG/M2 | DIASTOLIC BLOOD PRESSURE: 65 MMHG | HEIGHT: 59 IN

## 2021-08-16 DIAGNOSIS — Z87.898 PERSONAL HISTORY OF OTHER SPECIFIED CONDITIONS: ICD-10-CM

## 2021-08-16 PROCEDURE — 99211 OFF/OP EST MAY X REQ PHY/QHP: CPT

## 2021-08-16 PROCEDURE — 93285 PRGRMG DEV EVAL SCRMS IP: CPT

## 2021-08-16 RX ORDER — AMOXICILLIN AND CLAVULANATE POTASSIUM 875; 125 MG/1; MG/1
875-125 TABLET, COATED ORAL
Qty: 14 | Refills: 0 | Status: COMPLETED | COMMUNITY
Start: 2021-08-04 | End: 2021-08-16

## 2021-08-16 RX ORDER — DRONEDARONE 400 MG/1
400 TABLET, FILM COATED ORAL TWICE DAILY
Qty: 180 | Refills: 3 | Status: DISCONTINUED | COMMUNITY
End: 2021-08-16

## 2021-08-16 RX ORDER — NITROFURANTOIN MACROCRYSTALS 100 MG/1
100 CAPSULE ORAL
Qty: 30 | Refills: 0 | Status: COMPLETED | COMMUNITY
Start: 2021-07-21 | End: 2021-08-16

## 2021-08-16 RX ORDER — AMOXICILLIN AND CLAVULANATE POTASSIUM 875; 125 MG/1; MG/1
875-125 TABLET, COATED ORAL
Qty: 14 | Refills: 0 | Status: COMPLETED | COMMUNITY
Start: 2017-10-31 | End: 2021-08-16

## 2021-08-16 RX ORDER — ATORVASTATIN CALCIUM 20 MG/1
20 TABLET, FILM COATED ORAL
Refills: 0 | Status: ACTIVE | COMMUNITY

## 2021-08-16 NOTE — HISTORY OF PRESENT ILLNESS
[FreeTextEntry1] :   80 year old female with history of syncope, PAF on Xarelto, recently hospitalized for urosepsis who returns for routine ILR follow up.  Denies any CP, palpitations, SOB, dizziness, lightheadedness.

## 2021-08-16 NOTE — ASSESSMENT
[FreeTextEntry1] : 80 year old female with history of PAF on Xarelto and syncope with ILR in place.  ILR interrogation reveals:\par Good battery status, no episodes of tachycardia, bradycardia, pauses or AT/AF since last interrogation.  AF burden 1%.

## 2021-08-16 NOTE — DISCUSSION/SUMMARY
[FreeTextEntry1] : Continue monthly remote monitoring\par In office follow up in six months\par ChadsVasc 3 Continue Xarelto\par

## 2021-09-28 ENCOUNTER — APPOINTMENT (OUTPATIENT)
Dept: ELECTROPHYSIOLOGY | Facility: CLINIC | Age: 81
End: 2021-09-28
Payer: MEDICARE

## 2021-09-29 ENCOUNTER — NON-APPOINTMENT (OUTPATIENT)
Age: 81
End: 2021-09-29

## 2021-09-29 PROCEDURE — 93298 REM INTERROG DEV EVAL SCRMS: CPT

## 2021-09-29 PROCEDURE — G2066: CPT

## 2021-11-02 ENCOUNTER — APPOINTMENT (OUTPATIENT)
Dept: ELECTROPHYSIOLOGY | Facility: CLINIC | Age: 81
End: 2021-11-02
Payer: MEDICARE

## 2021-11-03 ENCOUNTER — NON-APPOINTMENT (OUTPATIENT)
Age: 81
End: 2021-11-03

## 2021-11-03 PROCEDURE — 93298 REM INTERROG DEV EVAL SCRMS: CPT | Mod: NC

## 2021-11-03 PROCEDURE — G2066: CPT | Mod: NC

## 2021-11-16 NOTE — ED ADULT TRIAGE NOTE - ACCOMPANIED BY
[facility]  [facility] ENCOUNTER  PHYSICAL MEDICINE AND REHABILITATION  DAILY PROGRESS NOTE    ADMISSION DATE:  11/10/2021  DATE:  11/16/2021  CURRENT HOSPITAL DAY:  Hospital Day: 7  ATTENDING PHYSICIAN:  Anthony Dumont MD  CODE STATUS:  Full Resuscitation    CHIEF COMPLAINT:  Toxic encephalopathy    INTERVAL HISTORY:    Amy Howell is a 33 year old female patient admitted with Encephalopathy acute [G93.40]     11/14/2021 -Patient was seen and examined.  She was alert and awake.  Patient continues to have colostomy with output and urostomy.  Patient was playing on her phone and my visit.  She denies HA, dizziness, nausea.  Patient attending swallow group for Supervision with meals.  SBP .  Patient with intermittent tachycardia.  Patient continued to participate in therapies.  She is reportedly making fair progress.  We will continue therapies and monitor.    11/15/2021 -the patient had no complaints of shortness of breath or chest pain.  I discussed the case with the patient's nurse.  We will examine the patient's wounds when PT wound care sees the patient next.    11/16/2021 -the patient had no complaints this morning.  She participates in the swallow group.  Her diet was advanced to general and thins.  I spoke to PT wound care team.  They will call me tomorrow with a dressing change.    MEDICATIONS:    The medication list was reviewed today.       HISTORIES:     \"I have reviewed the past medical history, family history, social history, medications and allergies listed in the medical record as obtained by my nursing staff and support staff and agree with their documentation.      REVIEW OF SYSTEMS:  Review of Systems   Constitutional: Negative for chills and fever.   HENT: Negative for congestion.    Respiratory: Negative for cough and shortness of breath.    Cardiovascular: Negative for chest pain.   Neurological: Positive for tremors, sensory change, focal weakness and weakness.    Psychiatric/Behavioral: Positive for memory loss.         OBJECTIVE:    VITAL SIGNS:     Vital Last Value 24 Hour Range   Temperature 97.9 °F (36.6 °C) (11/16/21 0626) Temp  Min: 97.9 °F (36.6 °C)  Max: 98.2 °F (36.8 °C)   Pulse 100 (11/16/21 0626) Pulse  Min: 64  Max: 100   Respiratory 16 (11/15/21 1552) Resp  Min: 16  Max: 16   Non-Invasive  Blood Pressure 103/68 (11/16/21 0626) BP  Min: 100/58  Max: 122/82   Pulse Oximetry 95 % (11/16/21 0626) SpO2  Min: 95 %  Max: 99 %     Vital Today Admitted   Weight 40.6 kg (89 lb 8.1 oz) (11/15/21 1800) Weight: 40.3 kg (88 lb 13.5 oz) (11/10/21 1152)       INTAKE/OUTPUT:      Intake/Output Summary (Last 24 hours) at 11/16/2021 0840  Last data filed at 11/16/2021 0516  Gross per 24 hour   Intake 480 ml   Output 500 ml   Net -20 ml       Bowel:       PVR:      PHYSICAL EXAMINATION:  Physical Exam  Vitals reviewed.   Constitutional:       General: She is not in acute distress.     Appearance: She is not toxic-appearing or diaphoretic.      Comments:  Body habitus with shortened lower extremity limbs    HENT:      Head: Normocephalic.      Right Ear: External ear normal.      Left Ear: External ear normal.      Nose: Nose normal.      Mouth/Throat:      Mouth: Mucous membranes are moist.   Eyes:      Extraocular Movements: Extraocular movements intact.   Cardiovascular:      Rate and Rhythm: Normal rate.   Pulmonary:      Effort: Pulmonary effort is normal. No respiratory distress.      Breath sounds: No wheezing or rales.   Abdominal:      Palpations: Abdomen is soft.      Tenderness: There is no abdominal tenderness. There is no guarding or rebound.      Comments:  colostomy and urostomy bag intact   Musculoskeletal:         General: Deformity present.      Cervical back: Normal range of motion.      Right lower leg: Edema present.      Left lower leg: Edema present.   Skin:     General: Skin is warm and dry.      Findings: No bruising or erythema.      Comments: Patient  with documented numerous pressure wounds.  Full-thickness wounds noted to the coccyx, left abdomen, left leg the left and right thighs   Neurological:      Mental Status: She is alert and oriented to person, place, and time.      Sensory: Sensory deficit (Sensory level approximately T11) present.      Motor: Weakness present.      Gait: Gait abnormal.      Comments: Patient with sensation and motor level at approximately T11     11/15/2021 -there is no movement in lower extremities.  The patient has no sensation over the lower extremities.   Psychiatric:         Mood and Affect: Mood normal.         Behavior: Behavior normal.           LABORATORY DATA:    Recent Labs   Lab 11/16/21  0601 11/11/21  0608 11/10/21  0541 11/09/21  1751   WBC 7.5 8.2 9.0  --    RBC 2.87* 2.87*  --   --    HGB 9.1* 9.1* 9.0* 9.4*   HCT 29.9* 29.1*  --  30.3*   .2* 101.4*  --   --    MCH 31.7 31.7  --   --    MCHC 30.4* 31.3*  --   --    RDWCV 18.6* 21.3*  --   --    * 530* 504*  --    TLYMPH 31 29  --   --        Recent Labs   Lab 11/16/21  0601 11/11/21  0608 11/10/21  0541   SODIUM 139 143 138   CHLORIDE 109* 115* 113*   BUN 15 14 14   BCRAT 28* 25 23   POTASSIUM 4.3 4.2 4.2   GLUCOSE 78 79 82   CREATININE 0.54 0.57 0.62   CALCIUM 8.3* 8.3* 8.4       No results found    No results found    IMAGING STUDIES:   No orders to display         ASSESSMENT/PLAN:     * Toxic encephalopathy  Assessment & Plan  2/2 sepsis  Cognition now improving.  Speech to eval and treat      11/11/2001 patient continues to voice memory insight processing deficits  Neuropsychology to evaluate and treat along with speech-language pathology    • nystatin   Topical 2 times per day   • famotidine  20 mg Oral Daily   • calcium carbonate-vitamin D  1 tablet Oral BID WC   • chlorhexidine gluconate  15 mL Swish & Spit 2 times per day   • docusate sodium-sennosides  1 tablet Oral BID   • ipratropium-albuterol  3 mL Nebulization Q4H Resp   • polyethylene  glycol  17 g Oral Daily   • potassium citrate ER  1,080 mg Oral Daily with breakfast   • enoxaparin  30 mg Subcutaneous Q24H     Daily W Dinner; Ensure Enlive/standard Oral Supplement, Chocolate (please Send With Chocolate Ice Cream) Oral Nutrition Supplement  One Time Diet Regular; Trial General/thins: Add Hot Dog With Bun, Ketchup X 4, Potato Wedges, Ketchup X4; Send Original Order Tray Too. Aspiration Cart. Swallow Group  Pediatric Diet  Vitals:    11/15/21 0549 11/15/21 0839 11/15/21 1124 11/15/21 1552   Temp: 98.1 °F (36.7 °C)  98.2 °F (36.8 °C) 98.1 °F (36.7 °C)   Pulse: (!) 102 (!) 102 95 64   Resp: 16  16 16   SpO2: 93% 94% 99% 95%   BP: 104/69 115/62 122/82 100/58    11/16/21 0626   Temp: 97.9 °F (36.6 °C)   Pulse: 100   Resp:    SpO2: 95%   BP: 103/68     I/O last 3 completed shifts:  In: 480 [P.O.:480]  Out: 700 [Urine:500; Stool:200]  No intake/output data recorded.  Wt Readings from Last 4 Encounters:   11/15/21 40.6 kg (89 lb 8.1 oz)   11/10/21 39.5 kg (87 lb 1.3 oz)     Recent Labs   Lab 11/16/21  0601 11/11/21  0608 11/10/21  0541   SODIUM 139 143 138   CHLORIDE 109* 115* 113*   BUN 15 14 14   BCRAT 28* 25 23   POTASSIUM 4.3 4.2 4.2   GLUCOSE 78 79 82   CREATININE 0.54 0.57 0.62   CALCIUM 8.3* 8.3* 8.4     Recent Labs   Lab 11/16/21  0601 11/11/21  0608 11/10/21  0541 11/09/21  1751   WBC 7.5 8.2 9.0  --    RBC 2.87* 2.87*  --   --    HGB 9.1* 9.1* 9.0* 9.4*   HCT 29.9* 29.1*  --  30.3*   .2* 101.4*  --   --    MCH 31.7 31.7  --   --    MCHC 30.4* 31.3*  --   --    RDWCV 18.6* 21.3*  --   --    * 530* 504*  --    TLYMPH 31 29  --   --                   Skin breakdown  Assessment & Plan  Patient documented with numerous pressure wounds.  Full-thickness wounds noted to the coccyx, left abdomen, left labia and left and right thigh.    Continue with nursing care  We will have PT wound care evaluate and treat  Continue with treatment protocol every 2 hours.    11/12/2024 I have been informed  by by RN patient may have ischial involvement.  When patient returns room, I will will evaluate with rehab nursing  11/14 nystatin applied to groin area.  11/15 -I discussed the case with the nurse.  We will evaluate patient's wounds the next time PT wound care is here.  11/16 - I discussed the case with PT wound care team.  They will call me at the next dressing change on Thursday.        Physical debility  Assessment & Plan  2/2 prolonged hospitalization  Superimposed upon underlying history of spina bifida    Dysphagia  Assessment & Plan  11/16/2021 -patient's diet was upgraded to general and thins yesterday.    Impaired mobility and ADLs  Assessment & Plan  11 11,021 history of decreased ADL and mobility capability secondary to history of spina bifida.  Recently deteriorated level secondary to acute hospitalization for her acute medical decompensation    Neurogenic bowel  Assessment & Plan  Patient with history colostomy formation in the past.  Patient also with brown stool noted in bag.  Today patient denies nausea emesis GERD.  11/14 Ostomy with output, CPM and monitor  11/15 -the patient was seen by gastroenterology service at [facility].  She will need to follow-up with them as an outpatient.  The patient had blood in colostomy bag which had resolved.      Neurogenic bladder  Assessment & Plan  11/11/2001 patient with urostomy intact    Acute blood loss anemia  Assessment & Plan  Transfusion on 11/9  Monitor H&H  Today's examination with active signs of bruising or bleeding    11/11/2021 today follow-up CBC shows hemoglobin 9.1 and hematocrit 29.1.  -Platelet count 530,000.    11/12/2021 no signs of acute bleeding or bruising    Spina bifida (CMS/HCC)  Assessment & Plan  Previously modified independent.   Currently requiring assistance with all aspects of her care.  Sensory level T11, paraplegic bilateral lower extremities    Paraplegia (CMS/HCC)  Assessment & Plan  Chronic, longstanding  Secondary to  spina bifida      Fibromyalgia  Assessment & Plan  11/12/2021 fairly stable myalgias at present    Asthma  Assessment & Plan  11/11/2001 respiratory status stable.  Saturating well on room air  Has not required nebulizer breathing treatment.  11/14 no shortness of breath reported.  Patient tolerating room air.  We will continue to monitor    NEIL (acute kidney injury) (CMS/HCC)-resolved as of 11/10/2021  Assessment & Plan  Evaluated by nephrology service at acute med floor.  Recommendations to avoid hypotension, nephrotoxins, monitor strict I's and O's.  CT of the abdomen performed.  Most recent renal indices today show BUN 14 and creatinine 0.62.    11/11/2021 today's follow-up BMP shows BUN 14 and creatinine 0.57    UTI (urinary tract infection)-resolved as of 11/10/2021  Assessment & Plan  Completed course of IV antibiotics  Monitor for recurrence  Urostomy formation in the past    Sepsis (CMS/HCC)-resolved as of 11/10/2021  Assessment & Plan  Presented with altered mental status.  Patient also hypothermic  Patient also with leukocytosis.  Felt to be due to underlying wound issues along with pneumonia.  Patient status post treatment with antibiotics    Acute respiratory failure with hypoxia (CMS/HCC)-resolved as of 11/10/2021  Assessment & Plan  Required intubation  Now saturating well on room air oxygen.  Continue to monitor lung field auscultation examination and need for oxygen  As needed nebulizer breathing treatments ongoing.  Encourage incentive spirometer protocol    11/11/2021 continues to saturate well on room air  Encourage incentive spirometer protocol    11/12/2021 continues to saturate well on room air    Hypokalemia-resolved as of 11/10/2021  Assessment & Plan  Most recent follow-up potassium level today 4.2.  Initial losses felt to be due to poor solute intake, gastrointestinal losses etc.  Continue to follow closely.      Principal Problem:    Toxic encephalopathy  Active Problems:    Physical  debility    Skin breakdown    Spina bifida (CMS/HCC)    Acute blood loss anemia    Neurogenic bladder    Neurogenic bowel    Impaired mobility and ADLs    Dysphagia    Hydrocephalus (CMS/HCC)    Arnold-Chiari malformation (CMS/HCC)    Asthma    Fibromyalgia    Paraplegia (CMS/HCC)    Wheelchair bound    RTA (renal tubular acidosis)       EMT/paramedic

## 2021-12-14 ENCOUNTER — NON-APPOINTMENT (OUTPATIENT)
Age: 81
End: 2021-12-14

## 2021-12-14 ENCOUNTER — APPOINTMENT (OUTPATIENT)
Dept: ELECTROPHYSIOLOGY | Facility: CLINIC | Age: 81
End: 2021-12-14
Payer: MEDICARE

## 2021-12-14 PROCEDURE — 93298 REM INTERROG DEV EVAL SCRMS: CPT

## 2021-12-14 PROCEDURE — G2066: CPT

## 2021-12-24 NOTE — H&P ADULT - NS ABD PE RECTAL EXAM
Pt arrives to ED via EMS.  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  Pt showed up to his cousins house today for the first time in 40 years.  Pt was outside the home for 4 hours waiting for police because the cousin "did not want anything to do with him."  Pt pointing to his mouth indicating he is hungry.  There is no missing persons report as per [employer].  [employer] attempted to contact any family but was unsuccessful.  No outward signs of injury or trauma.  Pt indicates that he understands sign language.  Recommend using . patient refused

## 2022-01-17 ENCOUNTER — APPOINTMENT (OUTPATIENT)
Dept: ELECTROPHYSIOLOGY | Facility: CLINIC | Age: 82
End: 2022-01-17
Payer: MEDICARE

## 2022-01-18 ENCOUNTER — NON-APPOINTMENT (OUTPATIENT)
Age: 82
End: 2022-01-18

## 2022-01-18 PROCEDURE — G2066: CPT

## 2022-01-18 PROCEDURE — 93298 REM INTERROG DEV EVAL SCRMS: CPT

## 2022-01-19 ENCOUNTER — APPOINTMENT (OUTPATIENT)
Dept: GASTROENTEROLOGY | Facility: CLINIC | Age: 82
End: 2022-01-19
Payer: MEDICARE

## 2022-01-19 VITALS
HEIGHT: 59 IN | DIASTOLIC BLOOD PRESSURE: 83 MMHG | SYSTOLIC BLOOD PRESSURE: 148 MMHG | HEART RATE: 96 BPM | WEIGHT: 136 LBS | BODY MASS INDEX: 27.42 KG/M2

## 2022-01-19 DIAGNOSIS — R10.32 LEFT LOWER QUADRANT PAIN: ICD-10-CM

## 2022-01-19 PROCEDURE — 99214 OFFICE O/P EST MOD 30 MIN: CPT

## 2022-01-19 NOTE — HISTORY OF PRESENT ILLNESS
[de-identified] : Ms. CHAVEZ BORNFRIEND is a 81 year old female with history of atrial fibrillation and history of C. difficile in the past. Patient has had two weeks of intermittent crampy left lower quadrant discomfort associated with passage of mucus and diarrhea. There has been no rectal bleeding or fevers. There has been no recent antibiotic use. Symptoms have been intermittent but have been increasing recently.\par \par

## 2022-01-19 NOTE — ASSESSMENT
[FreeTextEntry1] : 82 yo female with history of mucus and history of diarrhea. Differential diagnosis includes C diff and ischemic colitis. Will obtain stool specimens and CT scan of abdomen. Patient advised to increase hydration and call immediately with increased pain or bleeding.

## 2022-01-25 LAB — GI PCR PANEL, STOOL: NORMAL

## 2022-01-26 LAB
C DIFF TOXIN B QL PCR REFLEX: NORMAL
CALPROTECTIN FECAL: 367 UG/G
GDH ANTIGEN: DETECTED
TOXIN A AND B: DETECTED

## 2022-02-08 NOTE — H&P ADULT - CRANIAL NERVE
What is your communicable disease history:  · None known     Do you know how to avoid getting:    · STD's: Yes:    · Hepatitis A, B, C: Yes    · COVID: Yes:    · TB: Yes:    · HIV: Yes:    · Flu: Yes:    Nursing supplied education to patient on the following topics:   · COVID  · Flu     Education included:   · Hand washing  · Respiratory hygiene    Patient accepted education: Yes     CN II- XII intact

## 2022-02-15 ENCOUNTER — APPOINTMENT (OUTPATIENT)
Dept: GASTROENTEROLOGY | Facility: CLINIC | Age: 82
End: 2022-02-15
Payer: MEDICARE

## 2022-02-15 VITALS
HEIGHT: 59 IN | HEART RATE: 90 BPM | WEIGHT: 134 LBS | SYSTOLIC BLOOD PRESSURE: 141 MMHG | DIASTOLIC BLOOD PRESSURE: 80 MMHG | BODY MASS INDEX: 27.01 KG/M2

## 2022-02-15 PROCEDURE — 99213 OFFICE O/P EST LOW 20 MIN: CPT

## 2022-02-15 NOTE — ASSESSMENT
[FreeTextEntry1] : 82yo female with hx of c. diff, requiring x2 fecal stool transplants.  Here today for follow up of c. diff after being treated with po abx.  Patient reports overall doing better.  Continue probiotics and intake po intake.  Call back if symptoms do not continue to improve.  \par \par Discussed with Dr. Andrew.

## 2022-02-15 NOTE — HISTORY OF PRESENT ILLNESS
[de-identified] : 82yo female with hx of c. diff requiring x2 stool transplants for c. diff.  Here today for follow up of c. diff after being treated with po abx.  Patient reports overall doing better.  Stools are more formed and only moving her bowels up to 1-2 times daily.  She does have occasional abdominal discomfort but overall improving.  No rectal bleeding or melena.  No fevers, n/v.

## 2022-02-17 ENCOUNTER — APPOINTMENT (OUTPATIENT)
Dept: ELECTROPHYSIOLOGY | Facility: CLINIC | Age: 82
End: 2022-02-17

## 2022-02-28 NOTE — DISCHARGE NOTE ADULT - MEDICATION SUMMARY - MEDICATIONS TO TAKE
room air I will START or STAY ON the medications listed below when I get home from the hospital:    verapamil 240 mg/24 hours oral capsule, extended release  -- 1 cap(s) by mouth once a day  -- Indication: For hypertension    Cipro 250 mg oral tablet  -- 1 tab(s) by mouth 2 times a day  -- Avoid prolonged or excessive exposure to direct and/or artificial sunlight while taking this medication.  Check with your doctor before becoming pregnant.  Do not take dairy products, antacids, or iron preparations within one hour of this medication.  Finish all this medication unless otherwise directed by prescriber.  Medication should be taken with plenty of water.    -- Indication: For antibiotics I will START or STAY ON the medications listed below when I get home from the hospital:    verapamil 240 mg/24 hours oral capsule, extended release  -- 1 cap(s) by mouth once a day  -- Indication: For hypertension    vancomycin 250 mg/5 mL oral solution  -- 2.5 milliliter(s) by mouth every 6 hours  -- Indication: For cdiff prevention    Cipro 250 mg oral tablet  -- 1 tab(s) by mouth 2 times a day  -- Avoid prolonged or excessive exposure to direct and/or artificial sunlight while taking this medication.  Check with your doctor before becoming pregnant.  Do not take dairy products, antacids, or iron preparations within one hour of this medication.  Finish all this medication unless otherwise directed by prescriber.  Medication should be taken with plenty of water.    -- Indication: For antibiotics I will START or STAY ON the medications listed below when I get home from the hospital:    verapamil 240 mg/24 hours oral capsule, extended release  -- 1 cap(s) by mouth once a day  -- Indication: For hypertension    vancomycin 250 mg/5 mL oral solution  -- 2.5 milliliter(s) by mouth every 6 hours  -- Indication: For cdiff prevention    Cipro 250 mg oral tablet  -- 1 tab(s) by mouth 2 times a day  -- Avoid prolonged or excessive exposure to direct and/or artificial sunlight while taking this medication.  Check with your doctor before becoming pregnant.  Do not take dairy products, antacids, or iron preparations within one hour of this medication.  Finish all this medication unless otherwise directed by prescriber.  Medication should be taken with plenty of water.    -- Indication: For antibiotic

## 2022-03-23 ENCOUNTER — APPOINTMENT (OUTPATIENT)
Dept: ELECTROPHYSIOLOGY | Facility: CLINIC | Age: 82
End: 2022-03-23
Payer: MEDICARE

## 2022-03-24 ENCOUNTER — NON-APPOINTMENT (OUTPATIENT)
Age: 82
End: 2022-03-24

## 2022-03-24 PROCEDURE — G2066: CPT

## 2022-03-24 PROCEDURE — 93298 REM INTERROG DEV EVAL SCRMS: CPT

## 2022-04-28 ENCOUNTER — NON-APPOINTMENT (OUTPATIENT)
Age: 82
End: 2022-04-28

## 2022-04-28 ENCOUNTER — APPOINTMENT (OUTPATIENT)
Dept: ELECTROPHYSIOLOGY | Facility: CLINIC | Age: 82
End: 2022-04-28
Payer: MEDICARE

## 2022-04-28 PROCEDURE — G2066: CPT

## 2022-04-28 PROCEDURE — 93298 REM INTERROG DEV EVAL SCRMS: CPT

## 2022-05-06 ENCOUNTER — NON-APPOINTMENT (OUTPATIENT)
Age: 82
End: 2022-05-06

## 2022-05-16 ENCOUNTER — APPOINTMENT (OUTPATIENT)
Dept: ORTHOPEDIC SURGERY | Facility: CLINIC | Age: 82
End: 2022-05-16
Payer: MEDICARE

## 2022-05-16 VITALS — WEIGHT: 134 LBS | HEIGHT: 59 IN | BODY MASS INDEX: 27.01 KG/M2

## 2022-05-16 DIAGNOSIS — Z86.79 PERSONAL HISTORY OF OTHER DISEASES OF THE CIRCULATORY SYSTEM: ICD-10-CM

## 2022-05-16 PROCEDURE — 73562 X-RAY EXAM OF KNEE 3: CPT | Mod: RT

## 2022-05-16 PROCEDURE — 99214 OFFICE O/P EST MOD 30 MIN: CPT | Mod: 25

## 2022-05-16 PROCEDURE — 20611 DRAIN/INJ JOINT/BURSA W/US: CPT

## 2022-05-16 RX ORDER — CIPROFLOXACIN HYDROCHLORIDE 500 MG/1
500 TABLET, FILM COATED ORAL
Qty: 20 | Refills: 4 | Status: COMPLETED | COMMUNITY
Start: 2022-01-20 | End: 2022-05-16

## 2022-05-16 RX ORDER — VANCOMYCIN HYDROCHLORIDE 125 MG/1
125 CAPSULE ORAL
Qty: 56 | Refills: 0 | Status: COMPLETED | COMMUNITY
Start: 2022-01-28 | End: 2022-05-16

## 2022-05-16 RX ORDER — METRONIDAZOLE 500 MG/1
500 TABLET ORAL TWICE DAILY
Qty: 20 | Refills: 1 | Status: COMPLETED | COMMUNITY
Start: 2022-01-20 | End: 2022-05-16

## 2022-05-16 NOTE — PHYSICAL EXAM
[Normal Mood and Affect] : normal mood and affect [Able to Communicate] : able to communicate [Well Developed] : well developed [Well Nourished] : well nourished [5___] : quadriceps 5[unfilled]/5 [Right] : right foot and ankle [2+] : posterior tibialis pulse: 2+ [FreeTextEntry3] : No swelling [FreeTextEntry9] : Reviewed and interpreted.  Right knee AP standing, lateral, sunrise views-severe degenerative changes with narrowing of the medial compartment on AP standing view, spurring patellofemoral joint [TWNoteComboBox7] : flexion 120 degrees [de-identified] : extension 0 degrees [] : no calf tenderness

## 2022-05-16 NOTE — PROCEDURE
[Large Joint Injection] : Large joint injection [Right] : of the right [Knee] : knee [Pain] : pain [Alcohol] : alcohol [Betadine] : betadine [Ethyl Chloride sprayed topically] : ethyl chloride sprayed topically [Sterile technique used] : sterile technique used [___ cc    32 units 5mg] : Zilretta ~Vcc of 32 units 5 mg  [] : Patient tolerated procedure well [Patient was advised to rest the joint(s) for ____ days] : patient was advised to rest the joint(s) for [unfilled] days [Risks, benefits, alternatives discussed / Verbal consent obtained] : the risks benefits, and alternatives have been discussed, and verbal consent was obtained [Altered anatomic landmarks d/t erosive arthritis] : altered anatomic landmarks d/t erosive arthritis [All ultrasound images have been permanently captured and stored accordingly in our picture archiving and communication system] : All ultrasound images have been permanently captured and stored accordingly in our picture archiving and communication system [FreeTextEntry3] : Do not submerge underwater for 48 hours

## 2022-05-16 NOTE — HISTORY OF PRESENT ILLNESS
[Gradual] : gradual [6] : 6 [Localized] : localized [Sharp] : sharp [Stabbing] : stabbing [Intermittent] : intermittent [Leisure] : leisure [Stairs] : stairs [Retired] : Work status: retired [de-identified] : The patient has continued pain right knee.  Mild to moderate pain standing and walking.  Pain with stairs and movie sign.  She did not have improvement after previous Euflexxa injections.  Seen today with her son, Abimael [] : Post Surgical Visit: no [FreeTextEntry1] : R Knee  [de-identified] : 8/24/2022 [de-identified] : Dr. Padgett

## 2022-05-16 NOTE — DISCUSSION/SUMMARY
[de-identified] : The patient did not have improvement after previous hyaluronic injections.\par I discussed total knee replacement.  The patient is not interested in this\par I discussed Zilretta injection right knee.  Risks benefits and the alternatives were discussed.  She would like to try this\par Tylenol p.r.n.  Unable to take NSAIDs because she is on Xarelto for atrial fibrillation\par Ice p.r.n.\par Continue home exercises\par \par Impression:\par Severe osteoarthritis right knee\par Atrial fibrillation\par

## 2022-06-02 ENCOUNTER — NON-APPOINTMENT (OUTPATIENT)
Age: 82
End: 2022-06-02

## 2022-06-03 ENCOUNTER — APPOINTMENT (OUTPATIENT)
Dept: ELECTROPHYSIOLOGY | Facility: CLINIC | Age: 82
End: 2022-06-03
Payer: MEDICARE

## 2022-06-03 PROCEDURE — 93298 REM INTERROG DEV EVAL SCRMS: CPT

## 2022-06-03 PROCEDURE — G2066: CPT

## 2022-06-10 NOTE — ED ADULT TRIAGE NOTE - CHIEF COMPLAINT QUOTE
Pt c/o fever x since yesterday. Tmax 102.6F.  took tylenol at 8a. Seen by urologist yesterday for uti.  Started on Augmentin today
No deformities present

## 2022-07-08 ENCOUNTER — APPOINTMENT (OUTPATIENT)
Dept: ELECTROPHYSIOLOGY | Facility: CLINIC | Age: 82
End: 2022-07-08

## 2022-07-13 NOTE — ASU PATIENT PROFILE, ADULT - FALL HARM RISK - HARM RISK INTERVENTIONS

## 2022-07-13 NOTE — ASU PATIENT PROFILE, ADULT - NSICDXPASTMEDICALHX_GEN_ALL_CORE_FT
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

## 2022-07-14 ENCOUNTER — OUTPATIENT (OUTPATIENT)
Dept: OUTPATIENT SERVICES | Facility: HOSPITAL | Age: 82
LOS: 1 days | Discharge: HOME | End: 2022-07-14
Payer: MEDICARE

## 2022-07-14 VITALS
OXYGEN SATURATION: 100 % | WEIGHT: 136.03 LBS | DIASTOLIC BLOOD PRESSURE: 63 MMHG | RESPIRATION RATE: 18 BRPM | TEMPERATURE: 98 F | SYSTOLIC BLOOD PRESSURE: 154 MMHG | HEIGHT: 58 IN | HEART RATE: 58 BPM

## 2022-07-14 VITALS
RESPIRATION RATE: 18 BRPM | OXYGEN SATURATION: 100 % | SYSTOLIC BLOOD PRESSURE: 168 MMHG | DIASTOLIC BLOOD PRESSURE: 78 MMHG | HEART RATE: 56 BPM

## 2022-07-14 DIAGNOSIS — Z98.890 OTHER SPECIFIED POSTPROCEDURAL STATES: Chronic | ICD-10-CM

## 2022-07-14 DIAGNOSIS — Z98.891 HISTORY OF UTERINE SCAR FROM PREVIOUS SURGERY: Chronic | ICD-10-CM

## 2022-07-14 DIAGNOSIS — Z45.09 ENCOUNTER FOR ADJUSTMENT AND MANAGEMENT OF OTHER CARDIAC DEVICE: ICD-10-CM

## 2022-07-14 DIAGNOSIS — D35.1 BENIGN NEOPLASM OF PARATHYROID GLAND: Chronic | ICD-10-CM

## 2022-07-14 DIAGNOSIS — Z87.440 PERSONAL HISTORY OF URINARY (TRACT) INFECTIONS: Chronic | ICD-10-CM

## 2022-07-14 DIAGNOSIS — Z87.448 PERSONAL HISTORY OF OTHER DISEASES OF URINARY SYSTEM: Chronic | ICD-10-CM

## 2022-07-14 PROCEDURE — 33286 RMVL SUBQ CAR RHYTHM MNTR: CPT

## 2022-07-14 NOTE — PROCEDURAL SAFETY CHECKLIST WITH OR WITHOUT SEDATION - NSPOSTCOMMENTFT_GEN_ALL_CORE
Patient tolerating procedure well. Denies complaint of pain or discomfort. Patient Left chest with dermabond in place. No bleeding redness pain or swelling noted at site. Comfort and safety measures provided

## 2022-07-14 NOTE — PACU DISCHARGE NOTE - COMMENTS
Patient discharged to home denies complaint of pain or discomfort. Left chest with dermabond in place on redness swelling or pain noted at site. Patient verbalizes understanding of discharge instruction offers no further questions

## 2022-07-14 NOTE — PROCEDURE NOTE - NSPOSTCAREGUIDE_GEN_A_CORE
Verbal/written post procedure instructions were given to patient/caregiver Cyclosporine Pregnancy And Lactation Text: This medication is Pregnancy Category C and it isn't know if it is safe during pregnancy. This medication is excreted in breast milk.

## 2022-07-15 DIAGNOSIS — T82.111A BREAKDOWN (MECHANICAL) OF CARDIAC PULSE GENERATOR (BATTERY), INITIAL ENCOUNTER: ICD-10-CM
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
## 2022-07-25 LAB — SARS-COV-2 N GENE NPH QL NAA+PROBE: NOT DETECTED

## 2022-07-26 NOTE — PROGRESS NOTE ADULT - SUBJECTIVE AND OBJECTIVE BOX
Called patient and left message to inform them of their negative cologuard results. HPI:  76y female w/ PMH recurrent UTIs, presents to ED for evaluation of abd pain. Pt states she was here recently with similar complaints and the workup revealed she had a UTI with a 9mm kidney stone that has remained in place and was d/c'd with ABx. Pt states today she woke up with severe abd pain located in the umbilical region. Pt also reports n/v. Pt cannot tolerate PO which prompted her to seek evaluation. Pt has had past episodes of Cdiff but this is not similar. No fever, chills, SOB, CP. last normal BM today.  No abdominal distention, no blood in stool, no burning urination, no diarrhea.  In Ed CT scan abd/pelvis - right hydronephrosis with large  ureteral stone with right perinephric stranding  IV vanco,po vanco and ceftriaxone given in ED.  Patient was seen by IR and urology dr Nice in ED and patient underwent urgent percutaneopus right nephrostomy tube drainage   In PACU patient awake ,denies any complaints.    7/12- chart reviewed.  Pt seen earlier today.  Feels tired, chills. Denies abd pain, no n/v/d.    7/13- feeling much better today.  No abd pain, minimal pain at tube site.  Good uop.  No n/v/d.     ROS- as per hpi otherwise 10pt ros negative    Vital Signs Last 24 Hrs  T(C): 37.3 (13 Jul 2017 10:56), Max: 37.6 (13 Jul 2017 08:42)  T(F): 99.1 (13 Jul 2017 10:56), Max: 99.6 (13 Jul 2017 08:42)  HR: 56 (13 Jul 2017 10:56) (56 - 62)  BP: 124/46 (13 Jul 2017 10:56) (119/55 - 130/48)  BP(mean): --  RR: 18 (13 Jul 2017 05:13) (17 - 18)  SpO2: 97% (13 Jul 2017 10:56) (97% - 98%)  T(C): 38.5 (12 Jul 2017 11:45), Max: 38.5 (12 Jul 2017 11:45)  T(F): 101.3 (12 Jul 2017 11:45), Max: 101.3 (12 Jul 2017 11:45)  HR: 76 (12 Jul 2017 11:45) (66 - 76)  BP: 123/48 (12 Jul 2017 11:45) (99/45 - 123/48)  BP(mean): --  RR: 16 (12 Jul 2017 11:45) (16 - 17)  SpO2: 96% (12 Jul 2017 11:45) (96% - 99%)      PHYSICAL EXAM:  General: NAD, comfortable  Neuro: AAOx3, no focal deficits  HEENT: NCAT, EOMI  Neck: Soft and supple, No JVD  Respiratory: CTA b/l, no w/r/r  Cardiovascular: S1 and S2, RRR, no m/g/r  Gastrointestinal: +BS, soft, NTND, no rebound/guarding  - right PCN tube w/ dark yellow urine, no blood, dressing c/d/i  Extremities: No c/c/e  Vascular: 2+ peripheral pulses  Musculoskeletal: 5/5 strength b/l UE and LE, sensation intact  Skin: No rashes          LABS: All Labs Reviewed:                        10.9   11.1  )-----------( 172      ( 13 Jul 2017 07:20 )             32.8     07-13    145  |  118<H>  |  19  ----------------------------<  102<H>  3.6   |  22  |  0.63    Ca    8.7      13 Jul 2017 07:20                                11.6   16.6  )-----------( 163      ( 12 Jul 2017 07:24 )             33.9     07-10    141  |  107  |  31<H>  ----------------------------<  138<H>  4.2   |  23  |  1.20    Ca    9.7      10 Jul 2017 16:06    TPro  8.0  /  Alb  3.6  /  TBili  1.5<H>  /  DBili  x   /  AST  18  /  ALT  29  /  AlkPhos  70  07-10    PT/INR - ( 10 Jul 2017 21:47 )   PT: 12.0 sec;   INR: 1.11 ratio         PTT - ( 10 Jul 2017 21:47 )  PTT:26.0 sec      Culture - Blood (07.10.17 @ 21:45)    Gram Stain:   Growth in aerobic and anaerobic bottles: Gram Negative Rods    Specimen Source: .Blood None    Culture Results:   Growth in aerobic and anaerobic bottles: Proteus mirabilis  See previous culture [id number]          Culture - Blood (07.10.17 @ 21:45)    Gram Stain:   Growth in aerobic and anaerobic bottles: Gram Negative Rods    Specimen Source: .Blood None    Culture Results:   Growth in aerobic and anaerobic bottles: Gram Negative Rods    Culture - Urine (07.10.17 @ 10:18)    Specimen Source: .Urine (R)Kidney cup recv'd    Culture Results:   Moderate Proteus mirabilis    Culture - Urine (06.28.17 @ 13:47)    -  Cefazolin: S 8    -  Cefoxitin: S <=4    -  Ceftazidime: S <=1    -  Ertapenem: S <=0.5    -  Gentamicin: S 2    -  Levofloxacin: R >4    -  Meropenem: S <=1    -  Ceftriaxone: S <=1    -  Amikacin: S <=8    -  Ampicillin: R >16    -  Ampicillin/Sulbactam: S    -  Aztreonam: S <=4    -  Cefepime: S <=2    -  Ciprofloxacin: R >2    -  Nitrofurantoin: R >64    -  Piperacillin/Tazobactam: S <=8    -  Tobramycin: S 4    -  Trimethoprim/Sulfamethoxazole: S    Specimen Source: .Urine None    Culture Results:   10,000 - 49,000 CFU/mL Proteus mirabilis    Organism Identification: Proteus mirabilis    Organism: Proteus mirabilis    Method Type: JASON    < from: CT Abdomen and Pelvis w/ Oral Cont (07.10.17 @ 19:52) >  IMPRESSION:     Mild right hydroureteronephrosis secondary to a right ureteropelvic   junction calculus measuring 1.2 x 0.7 cm. Mild right perinephric   stranding and trace right perinephric fluid        < end of copied text >    < from: Xray Chest 1 View AP/PA. (07.10.17 @ 22:39) >  The cardiac, hilar and mediastinal contours are unremarkable. The lungs   are clear. The osseous structures demonstrate no acute abnormality.    IMPRESSION:    Clear lungs. No change since June 28, 2017.    < end of copied text >    MEDICATIONS  (STANDING):  lactobacillus acidophilus 1 Tablet(s) Oral two times a day with meals  cefTRIAXone   IVPB 1 Gram(s) IV Intermittent every 24 hours  heparin  Injectable 5000 Unit(s) SubCutaneous every 12 hours  sodium chloride 0.9%. 1000 milliLiter(s) (80 mL/Hr) IV Continuous <Continuous>    MEDICATIONS  (PRN):  HYDROmorphone  Injectable 0.5 milliGRAM(s) IV Push every 4 hours PRN Severe Pain (7 - 10)  acetaminophen   Tablet 650 milliGRAM(s) Oral every 6 hours PRN For Temp greater than 38 C (100.4 F)        Assessment and Plan:   76y female w/     *sepsis, Proteus bacteremia due to right pyelonephritis, hydroureteronephrosis due to ureteropelvic stone  - s/p perc nephrostomy tube 7/11  - still spiking temps but wbc improving, normal lactate--> temps improving overnight 7/13  - urine culture 7/10 +proteus pending sensitivities (urine cx 6/28 +proteus sensitive to rocephin)  - blood cultures 7/10 + proteus  - repeat blood cultures x 2 now 7/12 still pending  - IV rocephin day 4  - d/c ivf  - Urology f/u appreciated --> if afebrile for 24hr may internalize stent Friday, and outpt ESWL    *hx recurrent Cdiff  - no diarrhea   - s/p fecal transplant few weeks ago at [place]  - cdiff negative   - continue home dose probiotics    *dvt px  - heparin sc

## 2022-07-28 ENCOUNTER — APPOINTMENT (OUTPATIENT)
Dept: ELECTROPHYSIOLOGY | Facility: CLINIC | Age: 82
End: 2022-07-28

## 2022-07-28 VITALS
WEIGHT: 135 LBS | OXYGEN SATURATION: 97 % | SYSTOLIC BLOOD PRESSURE: 153 MMHG | DIASTOLIC BLOOD PRESSURE: 63 MMHG | HEIGHT: 59 IN | BODY MASS INDEX: 27.21 KG/M2 | HEART RATE: 63 BPM

## 2022-07-28 PROCEDURE — 99212 OFFICE O/P EST SF 10 MIN: CPT

## 2022-07-28 NOTE — REASON FOR VISIT
[FreeTextEntry1] : 82 yo F with PAF on xarelto, s/p ILR which explanted on 7/14/22.\par Here for post-op wound check.

## 2022-08-08 ENCOUNTER — APPOINTMENT (OUTPATIENT)
Dept: UROLOGY | Facility: CLINIC | Age: 82
End: 2022-08-08

## 2022-08-08 VITALS
HEIGHT: 58 IN | DIASTOLIC BLOOD PRESSURE: 76 MMHG | SYSTOLIC BLOOD PRESSURE: 158 MMHG | HEART RATE: 60 BPM | RESPIRATION RATE: 17 BRPM | WEIGHT: 135 LBS | BODY MASS INDEX: 28.34 KG/M2 | TEMPERATURE: 98.5 F

## 2022-08-08 DIAGNOSIS — A04.72 ENTEROCOLITIS DUE TO CLOSTRIDIUM DIFFICILE, NOT SPECIFIED AS RECURRENT: ICD-10-CM

## 2022-08-08 PROCEDURE — 99214 OFFICE O/P EST MOD 30 MIN: CPT

## 2022-08-09 PROBLEM — A04.72 CLOSTRIDIUM DIFFICILE COLITIS: Status: ACTIVE | Noted: 2019-02-07

## 2022-08-09 NOTE — HISTORY OF PRESENT ILLNESS
[FreeTextEntry1] : 82y/o woman\par Here for follow up\par Hx of kidney stones\par \par on K-citrate and Allopurinol (for gout from PCP)\par Had pyelonephritis July 2021--stone incorrectly reported on hospital renal sono--note that renal sono from [facility] a few weeks earlier did not show any stones\par \par \par Renal sono ([facility] Jul 2022): no stones\par CT ([facility] Jan 2022): no stones\par \par 24 hr urine: low vol, low citrate, high UUN/PCR

## 2022-08-09 NOTE — ASSESSMENT
[FreeTextEntry1] : Increase fluids intake\par Reduce animal proteins intake\par Continue Potassium citrate 20mEq twice daily\par Allopurinol 100mg daily from PCP for gout\par \par \par 24 hr urine and renal sono before next visit\par follow up 9 months

## 2022-09-23 NOTE — DISCHARGE NOTE ADULT - REASON FOR ADMISSION
Preop complete. IV placed and infusing. All questions answered. Call light in reach and bed in low position. Clipped in preop. Marker at bedside.    difficulty speaking

## 2022-12-16 NOTE — BRIEF OPERATIVE NOTE - PRE-OP DX
.CM has reviewed pt's chart for needs. CM screening shows that pt has PCP, insurance and is independent PTA. If any d/c needs arise please contact [facility].   TE
Kidney stones  08/18/2017    Active  iMnor Barrera

## 2023-01-26 ENCOUNTER — APPOINTMENT (OUTPATIENT)
Dept: ORTHOPEDIC SURGERY | Facility: CLINIC | Age: 83
End: 2023-01-26
Payer: MEDICARE

## 2023-01-26 VITALS — HEIGHT: 58 IN | BODY MASS INDEX: 28.34 KG/M2 | WEIGHT: 135 LBS

## 2023-01-26 DIAGNOSIS — G89.29 PAIN IN RIGHT KNEE: ICD-10-CM

## 2023-01-26 DIAGNOSIS — M25.561 PAIN IN RIGHT KNEE: ICD-10-CM

## 2023-01-26 PROCEDURE — 20611 DRAIN/INJ JOINT/BURSA W/US: CPT | Mod: RT

## 2023-01-26 PROCEDURE — 99214 OFFICE O/P EST MOD 30 MIN: CPT | Mod: 25

## 2023-01-26 PROCEDURE — 73562 X-RAY EXAM OF KNEE 3: CPT | Mod: RT

## 2023-01-26 RX ORDER — METHYLPREDNISOLONE ACETATE 40 MG/ML
40 INJECTION, SUSPENSION INTRA-ARTICULAR; INTRALESIONAL; INTRAMUSCULAR; SOFT TISSUE
Refills: 0 | Status: COMPLETED | OUTPATIENT
Start: 2023-01-26

## 2023-01-26 RX ORDER — FLUTICASONE PROPIONATE 50 UG/1
SPRAY, METERED NASAL
Refills: 0 | Status: COMPLETED | COMMUNITY
End: 2023-01-26

## 2023-01-26 RX ADMIN — METHYLPREDNISOLONE ACETATE MG/ML: 40 INJECTION, SUSPENSION INTRA-ARTICULAR; INTRALESIONAL; INTRAMUSCULAR; SOFT TISSUE at 00:00

## 2023-01-26 NOTE — REASON FOR VISIT
[Family Member] : family member [FreeTextEntry2] : Increased pain right knee over the past several weeks

## 2023-01-26 NOTE — PROCEDURE
[Large Joint Injection] : Large joint injection [Right] : of the right [Knee] : knee [Pain] : pain [Alcohol] : alcohol [Betadine] : betadine [Ethyl Chloride sprayed topically] : ethyl chloride sprayed topically [Sterile technique used] : sterile technique used [___ cc    1%] : Lidocaine ~Vcc of 1%  [___ cc    40mg] : Methylprednisolone (Depomedrol) ~Vcc of 40 mg  [] : Patient tolerated procedure well [Patient was advised to rest the joint(s) for ____ days] : patient was advised to rest the joint(s) for [unfilled] days [Risks, benefits, alternatives discussed / Verbal consent obtained] : the risks benefits, and alternatives have been discussed, and verbal consent was obtained [Altered anatomic landmarks d/t erosive arthritis] : altered anatomic landmarks d/t erosive arthritis [All ultrasound images have been permanently captured and stored accordingly in our picture archiving and communication system] : All ultrasound images have been permanently captured and stored accordingly in our picture archiving and communication system [FreeTextEntry3] : Do not submerge underwater for 48 hours

## 2023-01-26 NOTE — IMAGING
[de-identified] : The patient ambulates with a cane\par \par Right thigh\par No swelling\par Mild tenderness medial aspect of the distal thigh.  No palpable swelling\par \par \par Right knee\par No swelling\par Mild to moderate medial facet and joint line tenderness\par Passive range of motion 0° to 120°\par Ligaments are stable\par Quad strength 5 minus/5\par \par Right leg\par No swelling\par Calf is soft and nontender\par Posterior tibial pulse 2+ [FreeTextEntry9] : Reviewed and interpreted.  Right knee AP standing, lateral, sunrise views-severe degenerative changes with narrowing of the medial compartment on AP standing view, spurring patellofemoral joint

## 2023-01-26 NOTE — DISCUSSION/SUMMARY
[Medication Risks Reviewed] : Medication risks reviewed [de-identified] : I offered her a cortisone injection right knee today.  Risks benefits and the alternatives were discussed.  She wanted to do this\par Ice p.r.n.\par Tylenol p.r.n.  Unable to take NSAIDs because she is on Xarelto\par Again I discussed possible total knee replacement.  She will think about this. She says she is considering cardiac ablation and is undergoing workup\par Continue home exercises\par Because of complaints of pain right distal thigh, recommend she have venous Doppler today.  I did explain to her that if positive for DVT this can be potentially life-threatening\par She can try methocarbamol 500 mg 1-2 q.8 h. p.r.n. pain.  If this does not help her, recommend she have pain management consult with Dr. [facility] for medication management\par \par Impression:\par Severe osteoarthritis right knee\par Atrial fibrillation

## 2023-01-26 NOTE — HISTORY OF PRESENT ILLNESS
[Gradual] : gradual [10] : 10 [6] : 6 [Sharp] : sharp [Constant] : constant [Household chores] : household chores [Sleep] : sleep [Walking] : walking [Stairs] : stairs [Retired] : Work status: retired [de-identified] : The patient has had increased pain right knee over the past several weeks.  Mild to moderate pain standing and walking.  Pain with stairs and movie sign.  Pain awakens her from sleep at night.  Taking Tylenol p.r.n.\par Over the past 2 weeks she's had some pain in the medial aspect of her right distal thigh.  No swelling.\par She says that previous Zilretta injection didn't help her more than regular cortisone.  Hyaluronate injections did not help her.  Seen today with her son, Abimael [] : Post Surgical Visit: no [FreeTextEntry7] : R thigh  [de-identified] : 8/24/2021 [de-identified] : Dr. Padgett

## 2023-01-27 ENCOUNTER — RESULT REVIEW (OUTPATIENT)
Age: 83
End: 2023-01-27

## 2023-01-29 ENCOUNTER — NON-APPOINTMENT (OUTPATIENT)
Age: 83
End: 2023-01-29

## 2023-01-30 NOTE — ED ADULT NURSE NOTE - NS ED NURSE LEVEL OF CONSCIOUSNESS MENTAL STATUS
-- DO NOT REPLY / DO NOT REPLY ALL --  -- Message is from Engagement Center Operations (ECO) --    Offered Waitlist if Available for the Visit Type? No    Caller is requesting an appointment - at a sooner time than what was available.      Caller wants sooner appointment - offered other approved options    Reason for Visit: Virtual follow up, mom said that Dr. Truong is not listed on the [employer] nelsy as someone she can make an appointment with.     Is the patient currently scheduled? No    Preferred time to be seen: Anyday 5 pm or later except Thursdays, needs virtual appointment     Caller Information       Type Contact Phone/Fax    01/30/2023 03:06 PM CST Phone (Incoming) GUILLERMO CLIFTON (Mother) [phone]          Alternative phone number: none     Can a detailed message be left? Yes    Message Turnaround: PEDS SPECIALTY:    Please give this turnaround time to the caller:   \"This message will be sent to [state Provider's name]. The clinical team will fulfill your request as soon as they review your message.\"   Alert/Awake

## 2023-02-07 ENCOUNTER — APPOINTMENT (OUTPATIENT)
Dept: ELECTROPHYSIOLOGY | Facility: CLINIC | Age: 83
End: 2023-02-07
Payer: MEDICARE

## 2023-02-07 VITALS
HEART RATE: 60 BPM | BODY MASS INDEX: 27.92 KG/M2 | OXYGEN SATURATION: 100 % | DIASTOLIC BLOOD PRESSURE: 68 MMHG | WEIGHT: 133 LBS | RESPIRATION RATE: 17 BRPM | SYSTOLIC BLOOD PRESSURE: 177 MMHG | HEIGHT: 58 IN

## 2023-02-07 DIAGNOSIS — Z95.818 PRESENCE OF OTHER CARDIAC IMPLANTS AND GRAFTS: ICD-10-CM

## 2023-02-07 DIAGNOSIS — G43.109 MIGRAINE WITH AURA, NOT INTRACTABLE, W/OUT STATUS MIGRAINOSUS: ICD-10-CM

## 2023-02-07 DIAGNOSIS — I25.10 ATHEROSCLEROTIC HEART DISEASE OF NATIVE CORONARY ARTERY W/OUT ANGINA PECTORIS: ICD-10-CM

## 2023-02-07 PROCEDURE — 93000 ELECTROCARDIOGRAM COMPLETE: CPT

## 2023-02-07 PROCEDURE — 99214 OFFICE O/P EST MOD 30 MIN: CPT

## 2023-02-07 RX ORDER — METOPROLOL SUCCINATE 25 MG/1
25 TABLET, EXTENDED RELEASE ORAL
Refills: 0 | Status: ACTIVE | COMMUNITY
Start: 2023-02-07

## 2023-02-07 RX ORDER — RIVAROXABAN 15 MG/1
15 TABLET, FILM COATED ORAL
Refills: 0 | Status: ACTIVE | COMMUNITY

## 2023-02-07 RX ORDER — VERAPAMIL HYDROCHLORIDE 180 MG/1
180 CAPSULE, DELAYED RELEASE PELLETS ORAL DAILY
Refills: 0 | Status: ACTIVE | COMMUNITY

## 2023-03-24 ENCOUNTER — NON-APPOINTMENT (OUTPATIENT)
Age: 83
End: 2023-03-24

## 2023-03-24 NOTE — CARDIOLOGY SUMMARY
[de-identified] : 27/23 : Sinus  Bradycardia  Nonspecific ST depression   Diffuse nonspecific T-abnormality.

## 2023-03-24 NOTE — HISTORY OF PRESENT ILLNESS
[FreeTextEntry1] : Mrs. Huynh is an 82 year old woman with paroxysmal atrial fibrillation past implantable cardiac monitor/loop recorder. Since getting linq explanted been having episodes of PAF. Episodes occur over  several months . Some episodes last 3 hours.  In past associated with infection.  Recently some not associated with anything.

## 2023-03-24 NOTE — ASSESSMENT
[FreeTextEntry1] : This is an 82-year-old woman with a history of paroxysmal atrial fibrillation.  She had a implantable loop recorder that was explanted in July 2022.  Since the explant she is reported intermittent episodes of palpitation though overall the AF burden sounds low.  Her symptoms not disturbing her lifestyle.  She is taking Xarelto for stroke prophylaxis.\par \par Discussed the risks benefits and alternatives of considering reimplant of the cardiac monitor wearable monitor.\par At this time will current therapy with metoprolol 25 mg a day and verapamil extended release 180 mg.\par \par Reviewed notes from Ortho, urology and prior cardiac notes

## 2023-04-03 ENCOUNTER — APPOINTMENT (OUTPATIENT)
Dept: ELECTROPHYSIOLOGY | Facility: CLINIC | Age: 83
End: 2023-04-03
Payer: MEDICARE

## 2023-04-03 VITALS
OXYGEN SATURATION: 100 % | HEART RATE: 46 BPM | SYSTOLIC BLOOD PRESSURE: 157 MMHG | DIASTOLIC BLOOD PRESSURE: 59 MMHG | HEIGHT: 58 IN | WEIGHT: 133 LBS | BODY MASS INDEX: 27.92 KG/M2

## 2023-04-03 PROCEDURE — 99214 OFFICE O/P EST MOD 30 MIN: CPT

## 2023-04-10 ENCOUNTER — OUTPATIENT (OUTPATIENT)
Dept: OUTPATIENT SERVICES | Facility: HOSPITAL | Age: 83
LOS: 1 days | End: 2023-04-10
Payer: MEDICARE

## 2023-04-10 ENCOUNTER — RESULT REVIEW (OUTPATIENT)
Age: 83
End: 2023-04-10

## 2023-04-10 VITALS
HEART RATE: 52 BPM | TEMPERATURE: 98 F | OXYGEN SATURATION: 99 % | RESPIRATION RATE: 16 BRPM | HEIGHT: 58 IN | SYSTOLIC BLOOD PRESSURE: 153 MMHG | DIASTOLIC BLOOD PRESSURE: 51 MMHG | WEIGHT: 132.94 LBS

## 2023-04-10 DIAGNOSIS — Z98.891 HISTORY OF UTERINE SCAR FROM PREVIOUS SURGERY: Chronic | ICD-10-CM

## 2023-04-10 DIAGNOSIS — I48.0 PAROXYSMAL ATRIAL FIBRILLATION: ICD-10-CM

## 2023-04-10 DIAGNOSIS — E89.2 POSTPROCEDURAL HYPOPARATHYROIDISM: Chronic | ICD-10-CM

## 2023-04-10 DIAGNOSIS — Z87.440 PERSONAL HISTORY OF URINARY (TRACT) INFECTIONS: Chronic | ICD-10-CM

## 2023-04-10 DIAGNOSIS — Z87.448 PERSONAL HISTORY OF OTHER DISEASES OF URINARY SYSTEM: Chronic | ICD-10-CM

## 2023-04-10 DIAGNOSIS — Z96.0 PRESENCE OF UROGENITAL IMPLANTS: Chronic | ICD-10-CM

## 2023-04-10 DIAGNOSIS — Z98.890 OTHER SPECIFIED POSTPROCEDURAL STATES: Chronic | ICD-10-CM

## 2023-04-10 DIAGNOSIS — D35.1 BENIGN NEOPLASM OF PARATHYROID GLAND: Chronic | ICD-10-CM

## 2023-04-10 LAB
ANION GAP SERPL CALC-SCNC: 2 MMOL/L — LOW (ref 5–17)
BASOPHILS # BLD AUTO: 0.05 K/UL — SIGNIFICANT CHANGE UP (ref 0–0.2)
BASOPHILS NFR BLD AUTO: 0.5 % — SIGNIFICANT CHANGE UP (ref 0–2)
BLD GP AB SCN SERPL QL: SIGNIFICANT CHANGE UP
BUN SERPL-MCNC: 30 MG/DL — HIGH (ref 7–23)
CALCIUM SERPL-MCNC: 9.5 MG/DL — SIGNIFICANT CHANGE UP (ref 8.5–10.1)
CHLORIDE SERPL-SCNC: 110 MMOL/L — HIGH (ref 96–108)
CO2 SERPL-SCNC: 26 MMOL/L — SIGNIFICANT CHANGE UP (ref 22–31)
CREAT SERPL-MCNC: 0.82 MG/DL — SIGNIFICANT CHANGE UP (ref 0.5–1.3)
EGFR: 71 ML/MIN/1.73M2 — SIGNIFICANT CHANGE UP
EOSINOPHIL # BLD AUTO: 0.12 K/UL — SIGNIFICANT CHANGE UP (ref 0–0.5)
EOSINOPHIL NFR BLD AUTO: 1.1 % — SIGNIFICANT CHANGE UP (ref 0–6)
GLUCOSE SERPL-MCNC: 120 MG/DL — HIGH (ref 70–99)
HCT VFR BLD CALC: 46.3 % — HIGH (ref 34.5–45)
HGB BLD-MCNC: 15.4 G/DL — SIGNIFICANT CHANGE UP (ref 11.5–15.5)
IMM GRANULOCYTES NFR BLD AUTO: 0.3 % — SIGNIFICANT CHANGE UP (ref 0–0.9)
LYMPHOCYTES # BLD AUTO: 2.79 K/UL — SIGNIFICANT CHANGE UP (ref 1–3.3)
LYMPHOCYTES # BLD AUTO: 25.3 % — SIGNIFICANT CHANGE UP (ref 13–44)
MCHC RBC-ENTMCNC: 29.8 PG — SIGNIFICANT CHANGE UP (ref 27–34)
MCHC RBC-ENTMCNC: 33.3 GM/DL — SIGNIFICANT CHANGE UP (ref 32–36)
MCV RBC AUTO: 89.6 FL — SIGNIFICANT CHANGE UP (ref 80–100)
MONOCYTES # BLD AUTO: 0.68 K/UL — SIGNIFICANT CHANGE UP (ref 0–0.9)
MONOCYTES NFR BLD AUTO: 6.2 % — SIGNIFICANT CHANGE UP (ref 2–14)
NEUTROPHILS # BLD AUTO: 7.37 K/UL — SIGNIFICANT CHANGE UP (ref 1.8–7.4)
NEUTROPHILS NFR BLD AUTO: 66.6 % — SIGNIFICANT CHANGE UP (ref 43–77)
PLATELET # BLD AUTO: 286 K/UL — SIGNIFICANT CHANGE UP (ref 150–400)
POTASSIUM SERPL-MCNC: 4.2 MMOL/L — SIGNIFICANT CHANGE UP (ref 3.5–5.3)
POTASSIUM SERPL-SCNC: 4.2 MMOL/L — SIGNIFICANT CHANGE UP (ref 3.5–5.3)
RBC # BLD: 5.17 M/UL — SIGNIFICANT CHANGE UP (ref 3.8–5.2)
RBC # FLD: 15.3 % — HIGH (ref 10.3–14.5)
SARS-COV-2 RNA SPEC QL NAA+PROBE: SIGNIFICANT CHANGE UP
SODIUM SERPL-SCNC: 138 MMOL/L — SIGNIFICANT CHANGE UP (ref 135–145)
WBC # BLD: 11.04 K/UL — HIGH (ref 3.8–10.5)
WBC # FLD AUTO: 11.04 K/UL — HIGH (ref 3.8–10.5)

## 2023-04-10 PROCEDURE — 71046 X-RAY EXAM CHEST 2 VIEWS: CPT | Mod: 26

## 2023-04-10 PROCEDURE — 71046 X-RAY EXAM CHEST 2 VIEWS: CPT

## 2023-04-10 PROCEDURE — 87635 SARS-COV-2 COVID-19 AMP PRB: CPT

## 2023-04-10 PROCEDURE — 93010 ELECTROCARDIOGRAM REPORT: CPT

## 2023-04-10 PROCEDURE — 86850 RBC ANTIBODY SCREEN: CPT

## 2023-04-10 PROCEDURE — 86901 BLOOD TYPING SEROLOGIC RH(D): CPT

## 2023-04-10 PROCEDURE — 86900 BLOOD TYPING SEROLOGIC ABO: CPT

## 2023-04-10 PROCEDURE — 87640 STAPH A DNA AMP PROBE: CPT

## 2023-04-10 PROCEDURE — 85025 COMPLETE CBC W/AUTO DIFF WBC: CPT

## 2023-04-10 PROCEDURE — 99214 OFFICE O/P EST MOD 30 MIN: CPT | Mod: 25

## 2023-04-10 PROCEDURE — 93005 ELECTROCARDIOGRAM TRACING: CPT

## 2023-04-10 PROCEDURE — 36415 COLL VENOUS BLD VENIPUNCTURE: CPT

## 2023-04-10 PROCEDURE — 87641 MR-STAPH DNA AMP PROBE: CPT

## 2023-04-10 PROCEDURE — 80048 BASIC METABOLIC PNL TOTAL CA: CPT

## 2023-04-10 NOTE — H&P PST ADULT - HISTORY OF PRESENT ILLNESS
82 year old female diagnosed with Atrial fibrillation on Xarelto; Pt said she has chronic syncope, bradycardia, BRASHER and fatigue as well as occasional chest pressure when her heart rate is low; she presents to PST for PPM implant

## 2023-04-10 NOTE — H&P PST ADULT - ASSESSMENT
82 year old female diagnosed with Atrial fibrillation on Xarelto; Pt said she has chronic syncope, bradycardia, BRASHER and fatigue as well as occasional chest pressure when her heart rate is low; she presents to PST for PPM implant       1. EPS department to give written instructions to patient regarding medication management .  2. EPS booking will call patient the day before procedure regarding patient arrival time the day of surgery.  3. Instructed patient to have responsible adult to drive patient home if being discharged same day.  4. EZ wash and mupirocin instructions explained to patient.   5. NPO post midnight

## 2023-04-10 NOTE — H&P PST ADULT - NSICDXPASTSURGICALHX_GEN_ALL_CORE_FT
PAST SURGICAL HISTORY:  H/O  section ,     H/O dilation and curettage , ,     H/O lithotripsy X2    H/O nephrostomy and stent insertion - 2017  S/P R ureteral stent placed 17    H/O parathyroidectomy     History of UTI "for 2.5 years"    S/P cystoscopy with ureteral stent placement     S/P left knee arthroscopy

## 2023-04-10 NOTE — H&P PST ADULT - NSICDXPASTMEDICALHX_GEN_ALL_CORE_FT
PAST MEDICAL HISTORY:  Asthma h/o last attack " 2 years ago"    Atrial fibrillation     Chronic sinusitis     Clostridium difficile colitis S/P stool transplant 06/2017  "with great relief"    Deviated septum     Environmental and seasonal allergies     Essential hypertension     Frequent UTI     Gout     H/o Lyme disease 1989  BELL'S PALSY    Heart murmur     HLD (hyperlipidemia)     Kidney stone     Ocular migraine     Parathyroid acinous adenoma     Renal calculi     Scoliosis     Sinus bradycardia     Spinal stenosis     Spinal stenosis of lumbar region

## 2023-04-11 DIAGNOSIS — I48.0 PAROXYSMAL ATRIAL FIBRILLATION: ICD-10-CM

## 2023-04-11 LAB
MRSA PCR RESULT.: SIGNIFICANT CHANGE UP
S AUREUS DNA NOSE QL NAA+PROBE: SIGNIFICANT CHANGE UP

## 2023-04-11 NOTE — ASU PATIENT PROFILE, ADULT - FALL HARM RISK - HARM RISK INTERVENTIONS

## 2023-04-11 NOTE — ASU PATIENT PROFILE, ADULT - ABILITY TO HEAR (WITH HEARING AID OR HEARING APPLIANCE IF NORMALLY USED):
Detail Level: Detailed Quality 130: Documentation Of Current Medications In The Medical Record: Current Medications Documented Quality 110: Preventive Care And Screening: Influenza Immunization: Influenza Immunization Administered during Influenza season Adequate: hears normal conversation without difficulty

## 2023-04-12 ENCOUNTER — OUTPATIENT (OUTPATIENT)
Dept: OUTPATIENT SERVICES | Facility: HOSPITAL | Age: 83
LOS: 1 days | Discharge: HOME | End: 2023-04-12
Payer: MEDICARE

## 2023-04-12 ENCOUNTER — RESULT REVIEW (OUTPATIENT)
Age: 83
End: 2023-04-12

## 2023-04-12 VITALS
WEIGHT: 132.94 LBS | DIASTOLIC BLOOD PRESSURE: 52 MMHG | RESPIRATION RATE: 16 BRPM | TEMPERATURE: 98 F | HEIGHT: 59 IN | SYSTOLIC BLOOD PRESSURE: 168 MMHG | OXYGEN SATURATION: 97 % | HEART RATE: 62 BPM

## 2023-04-12 DIAGNOSIS — Z87.448 PERSONAL HISTORY OF OTHER DISEASES OF URINARY SYSTEM: Chronic | ICD-10-CM

## 2023-04-12 DIAGNOSIS — Z98.890 OTHER SPECIFIED POSTPROCEDURAL STATES: Chronic | ICD-10-CM

## 2023-04-12 DIAGNOSIS — Z98.891 HISTORY OF UTERINE SCAR FROM PREVIOUS SURGERY: Chronic | ICD-10-CM

## 2023-04-12 DIAGNOSIS — E89.2 POSTPROCEDURAL HYPOPARATHYROIDISM: Chronic | ICD-10-CM

## 2023-04-12 DIAGNOSIS — I48.0 PAROXYSMAL ATRIAL FIBRILLATION: ICD-10-CM

## 2023-04-12 DIAGNOSIS — Z87.440 PERSONAL HISTORY OF URINARY (TRACT) INFECTIONS: Chronic | ICD-10-CM

## 2023-04-12 DIAGNOSIS — Z96.0 PRESENCE OF UROGENITAL IMPLANTS: Chronic | ICD-10-CM

## 2023-04-12 PROCEDURE — 36415 COLL VENOUS BLD VENIPUNCTURE: CPT

## 2023-04-12 PROCEDURE — 80048 BASIC METABOLIC PNL TOTAL CA: CPT

## 2023-04-12 PROCEDURE — 85025 COMPLETE CBC W/AUTO DIFF WBC: CPT

## 2023-04-12 PROCEDURE — 71045 X-RAY EXAM CHEST 1 VIEW: CPT

## 2023-04-12 PROCEDURE — 33208 INSRT HEART PM ATRIAL & VENT: CPT | Mod: KX

## 2023-04-12 PROCEDURE — 93010 ELECTROCARDIOGRAM REPORT: CPT

## 2023-04-12 PROCEDURE — C1898: CPT

## 2023-04-12 PROCEDURE — 71045 X-RAY EXAM CHEST 1 VIEW: CPT | Mod: 26

## 2023-04-12 PROCEDURE — C1785: CPT

## 2023-04-12 PROCEDURE — 93005 ELECTROCARDIOGRAM TRACING: CPT

## 2023-04-12 PROCEDURE — C1894: CPT

## 2023-04-12 RX ORDER — ATORVASTATIN CALCIUM 80 MG/1
20 TABLET, FILM COATED ORAL AT BEDTIME
Refills: 0 | Status: DISCONTINUED | OUTPATIENT
Start: 2023-04-12 | End: 2023-04-13

## 2023-04-12 RX ORDER — METOPROLOL TARTRATE 50 MG
25 TABLET ORAL DAILY
Refills: 0 | Status: DISCONTINUED | OUTPATIENT
Start: 2023-04-12 | End: 2023-04-13

## 2023-04-12 RX ORDER — POTASSIUM CHLORIDE 20 MEQ
20 PACKET (EA) ORAL DAILY
Refills: 0 | Status: DISCONTINUED | OUTPATIENT
Start: 2023-04-12 | End: 2023-04-13

## 2023-04-12 RX ORDER — ALLOPURINOL 300 MG
100 TABLET ORAL DAILY
Refills: 0 | Status: DISCONTINUED | OUTPATIENT
Start: 2023-04-12 | End: 2023-04-13

## 2023-04-12 RX ORDER — VANCOMYCIN HCL 1 G
1000 VIAL (EA) INTRAVENOUS ONCE
Refills: 0 | Status: COMPLETED | OUTPATIENT
Start: 2023-04-12 | End: 2023-04-12

## 2023-04-12 RX ORDER — VERAPAMIL HCL 240 MG
180 CAPSULE, EXTENDED RELEASE PELLETS 24 HR ORAL DAILY
Refills: 0 | Status: DISCONTINUED | OUTPATIENT
Start: 2023-04-12 | End: 2023-04-13

## 2023-04-12 RX ORDER — RIVAROXABAN 15 MG-20MG
15 KIT ORAL
Refills: 0 | Status: DISCONTINUED | OUTPATIENT
Start: 2023-04-13 | End: 2023-04-13

## 2023-04-12 RX ORDER — FLUTICASONE PROPIONATE 50 MCG
1 SPRAY, SUSPENSION NASAL
Refills: 0 | Status: DISCONTINUED | OUTPATIENT
Start: 2023-04-12 | End: 2023-04-13

## 2023-04-12 RX ORDER — CLOPIDOGREL BISULFATE 75 MG/1
75 TABLET, FILM COATED ORAL DAILY
Refills: 0 | Status: DISCONTINUED | OUTPATIENT
Start: 2023-04-12 | End: 2023-04-13

## 2023-04-12 RX ADMIN — Medication 25 MILLIGRAM(S): at 20:57

## 2023-04-12 RX ADMIN — Medication 250 MILLIGRAM(S): at 10:46

## 2023-04-12 RX ADMIN — ATORVASTATIN CALCIUM 20 MILLIGRAM(S): 80 TABLET, FILM COATED ORAL at 20:57

## 2023-04-12 NOTE — PACU DISCHARGE NOTE - COMMENTS
Report given to ramila Ring transfer with RN pt verbalizes understanding of post op care Monitor paired with device by rep.  Education provided regarding home use.  Pt and/or family verbalizes understanding.  Device and ID card given to pt/family.

## 2023-04-13 ENCOUNTER — TRANSCRIPTION ENCOUNTER (OUTPATIENT)
Age: 83
End: 2023-04-13

## 2023-04-13 VITALS
RESPIRATION RATE: 18 BRPM | DIASTOLIC BLOOD PRESSURE: 47 MMHG | OXYGEN SATURATION: 98 % | HEART RATE: 60 BPM | SYSTOLIC BLOOD PRESSURE: 150 MMHG | TEMPERATURE: 98 F

## 2023-04-13 LAB
ANION GAP SERPL CALC-SCNC: 4 MMOL/L — LOW (ref 5–17)
BASOPHILS # BLD AUTO: 0.04 K/UL — SIGNIFICANT CHANGE UP (ref 0–0.2)
BASOPHILS NFR BLD AUTO: 0.5 % — SIGNIFICANT CHANGE UP (ref 0–2)
BUN SERPL-MCNC: 32 MG/DL — HIGH (ref 7–23)
CALCIUM SERPL-MCNC: 8.7 MG/DL — SIGNIFICANT CHANGE UP (ref 8.5–10.1)
CHLORIDE SERPL-SCNC: 114 MMOL/L — HIGH (ref 96–108)
CO2 SERPL-SCNC: 22 MMOL/L — SIGNIFICANT CHANGE UP (ref 22–31)
CREAT SERPL-MCNC: 0.91 MG/DL — SIGNIFICANT CHANGE UP (ref 0.5–1.3)
EGFR: 63 ML/MIN/1.73M2 — SIGNIFICANT CHANGE UP
EOSINOPHIL # BLD AUTO: 0.13 K/UL — SIGNIFICANT CHANGE UP (ref 0–0.5)
EOSINOPHIL NFR BLD AUTO: 1.5 % — SIGNIFICANT CHANGE UP (ref 0–6)
GLUCOSE SERPL-MCNC: 102 MG/DL — HIGH (ref 70–99)
HCT VFR BLD CALC: 39.3 % — SIGNIFICANT CHANGE UP (ref 34.5–45)
HGB BLD-MCNC: 12.5 G/DL — SIGNIFICANT CHANGE UP (ref 11.5–15.5)
IMM GRANULOCYTES NFR BLD AUTO: 0.1 % — SIGNIFICANT CHANGE UP (ref 0–0.9)
LYMPHOCYTES # BLD AUTO: 2.74 K/UL — SIGNIFICANT CHANGE UP (ref 1–3.3)
LYMPHOCYTES # BLD AUTO: 31.9 % — SIGNIFICANT CHANGE UP (ref 13–44)
MCHC RBC-ENTMCNC: 29 PG — SIGNIFICANT CHANGE UP (ref 27–34)
MCHC RBC-ENTMCNC: 31.8 GM/DL — LOW (ref 32–36)
MCV RBC AUTO: 91.2 FL — SIGNIFICANT CHANGE UP (ref 80–100)
MONOCYTES # BLD AUTO: 0.83 K/UL — SIGNIFICANT CHANGE UP (ref 0–0.9)
MONOCYTES NFR BLD AUTO: 9.7 % — SIGNIFICANT CHANGE UP (ref 2–14)
NEUTROPHILS # BLD AUTO: 4.83 K/UL — SIGNIFICANT CHANGE UP (ref 1.8–7.4)
NEUTROPHILS NFR BLD AUTO: 56.3 % — SIGNIFICANT CHANGE UP (ref 43–77)
PLATELET # BLD AUTO: 212 K/UL — SIGNIFICANT CHANGE UP (ref 150–400)
POTASSIUM SERPL-MCNC: 4.2 MMOL/L — SIGNIFICANT CHANGE UP (ref 3.5–5.3)
POTASSIUM SERPL-SCNC: 4.2 MMOL/L — SIGNIFICANT CHANGE UP (ref 3.5–5.3)
RBC # BLD: 4.31 M/UL — SIGNIFICANT CHANGE UP (ref 3.8–5.2)
RBC # FLD: 15.2 % — HIGH (ref 10.3–14.5)
SODIUM SERPL-SCNC: 140 MMOL/L — SIGNIFICANT CHANGE UP (ref 135–145)
WBC # BLD: 8.58 K/UL — SIGNIFICANT CHANGE UP (ref 3.8–10.5)
WBC # FLD AUTO: 8.58 K/UL — SIGNIFICANT CHANGE UP (ref 3.8–10.5)

## 2023-04-13 PROCEDURE — 93010 ELECTROCARDIOGRAM REPORT: CPT

## 2023-04-13 RX ORDER — ACETAMINOPHEN 500 MG
650 TABLET ORAL ONCE
Refills: 0 | Status: COMPLETED | OUTPATIENT
Start: 2023-04-13 | End: 2023-04-13

## 2023-04-13 RX ADMIN — Medication 650 MILLIGRAM(S): at 04:33

## 2023-04-13 RX ADMIN — Medication 650 MILLIGRAM(S): at 04:30

## 2023-04-13 NOTE — DISCHARGE NOTE PROVIDER - NSDCMRMEDTOKEN_GEN_ALL_CORE_FT
allopurinol 100 mg oral tablet: 1 tab(s) orally once a day  atorvastatin 20 mg oral tablet: 1 tab(s) orally once a day (at bedtime)  clopidogrel 75 mg oral tablet: 1 tab(s) orally once a day  fluticasone 50 mcg/inh nasal spray: 1 in each nostril prn  lactobacillus acidophilus oral capsule: 1 tab(s) orally 2 times a day  metoprolol succinate 25 mg oral capsule, extended release: 1 orally once a day  potassium citrate 10 mEq oral tablet, extended release: 2 tab(s) orally 2 times a day  Ventolin HFA 90 mcg/inh inhalation aerosol: 2 inhaled prn  verapamil 180 mg/24 hours oral tablet, extended release: 1 tab(s) orally once a day  Xarelto 15 mg oral tablet: 1 tab(s) orally once a day (in the evening)

## 2023-04-13 NOTE — DISCHARGE NOTE PROVIDER - CARE PROVIDER_API CALL
Ethan Rubio (MD)  Cardiac Electrophysiology; Cardiovascular Disease  [address] [place] [address]  Phone: [phone]  Fax: [phone]  Follow Up Time:

## 2023-04-13 NOTE — DISCHARGE NOTE NURSING/CASE MANAGEMENT/SOCIAL WORK - PATIENT PORTAL LINK FT
You can access the FollowMyHealth Patient Portal offered by [facility] by registering at the following website: [email or web address]. By joining [facility]’s FollowMyHealth portal, you will also be able to view your health information using other applications (apps) compatible with our system.

## 2023-04-13 NOTE — DISCHARGE NOTE PROVIDER - NSDCCPCAREPLAN_GEN_ALL_CORE_FT
PRINCIPAL DISCHARGE DIAGNOSIS  Diagnosis: Sinus node dysfunction  Assessment and Plan of Treatment: No showers for 5 days  Avoid lifting > 10 lbs with left arm  No driving for 2 weeks

## 2023-04-13 NOTE — DISCHARGE NOTE NURSING/CASE MANAGEMENT/SOCIAL WORK - NSDCPEFALRISK_GEN_ALL_CORE
For information on Fall & Injury Prevention, visit: [email or web address] OR  [email or web address] OR  https://www.cdc.gov/steadi/patient.html

## 2023-04-13 NOTE — PROGRESS NOTE ADULT - SUBJECTIVE AND OBJECTIVE BOX
82 year old female with sick sinus syndrome presents for PPM implant and is POD #1 s/p Dual Chamber PPM      PAST MEDICAL HISTORY:  Asthma h/o last attack " 2 years ago"  Atrial fibrillation   Chronic sinusitis   Clostridium difficile colitis S/P stool transplant 2017  "with great relief"  Deviated septum   Environmental and seasonal allergies   Essential hypertension   Frequent UTI   Gout   H/o Lyme disease   BELL'S PALSY  Heart murmur   HLD (hyperlipidemia)   Kidney stone   Ocular migraine   Parathyroid acinous adenoma   Renal calculi   Scoliosis   Sinus bradycardia   Spinal stenosis   Spinal stenosis of lumbar region.     PAST SURGICAL HISTORY:  H/O  section ,   H/O dilation and curettage , ,   H/O lithotripsy X2  H/O nephrostomy and stent insertion - 2017  S/P R ureteral stent placed 17  H/O parathyroidectomy   History of UTI "for 2.5 years"  S/P cystoscopy with ureteral stent placement   S/P left knee arthroscopy     EKG:  TELE:    MEDICATIONS  (STANDING):  allopurinol 100 milliGRAM(s) Oral daily  atorvastatin 20 milliGRAM(s) Oral at bedtime  clopidogrel Tablet 75 milliGRAM(s) Oral daily  metoprolol succinate ER 25 milliGRAM(s) Oral daily  potassium chloride    Tablet ER 20 milliEquivalent(s) Oral daily  verapamil  milliGRAM(s) Oral daily    MEDICATIONS  (PRN):  fluticasone propionate 50 MICROgram(s)/spray Nasal Spray 1 Spray(s) Both Nostrils two times a day PRN nasal congetion      Allergies    adhesives (Unknown)  Cipro (Unknown)  Hay fever (Unknown)  shellfish. (Unknown)    Intolerances        Vital Signs Last 24 Hrs  T(C): 37.3 (2023 20:48), Max: 37.3 (2023 20:48)  T(F): 99.2 (2023 20:48), Max: 99.2 (2023 20:48)  HR: 61 (2023 20:48) (54 - 64)  BP: 147/53 (2023 20:48) (110/42 - 168/52)  BP(mean): --  RR: 18 (2023 20:48) (16 - 18)  SpO2: 96% (2023 20:48) (95% - 99%)    Parameters below as of 2023 20:48  Patient On (Oxygen Delivery Method): room air        PHYSICAL EXAMINATION:    GENERAL APPEARANCE:  Pt. is not currently dyspneic, in no distress. Pt. is alert, oriented, and pleasant.  HEENT:  Pupils are normal and react normally. No icterus. Mucous membranes well colored.  NECK:  Supple. No lymphadenopathy. Jugular venous pressure not elevated. Carotids equal.   HEART:   The cardiac impulse has a normal quality. There are no murmurs, rubs or gallops noted  CHEST:  Chest is clear to auscultation. Normal respiratory effort.  ABDOMEN:  Soft and nontender.   EXTREMITIES:  There is no edema.   SKIN:  No rash or significant lesions are noted.    LABS:                        12.5   8.58  )-----------( 212      ( 2023 05:39 )             39.3     04-13    140  |  114<H>  |  32<H>  ----------------------------<  102<H>  4.2   |  22  |  0.91    Ca    8.7      2023 05:39                RADIOLOGY & ADDITIONAL TESTS:     82 year old female with sick sinus syndrome presents for PPM implant and is POD #1 s/p Dual Chamber PPM      PAST MEDICAL HISTORY:  Asthma h/o last attack " 2 years ago"  Atrial fibrillation   Chronic sinusitis   Clostridium difficile colitis S/P stool transplant 2017  "with great relief"  Deviated septum   Environmental and seasonal allergies   Essential hypertension   Frequent UTI   Gout   H/o Lyme disease   BELL'S PALSY  Heart murmur   HLD (hyperlipidemia)   Kidney stone   Ocular migraine   Parathyroid acinous adenoma   Renal calculi   Scoliosis   Sinus bradycardia   Spinal stenosis   Spinal stenosis of lumbar region.     PAST SURGICAL HISTORY:  H/O  section ,   H/O dilation and curettage , ,   H/O lithotripsy X2  H/O nephrostomy and stent insertion - 2017  S/P R ureteral stent placed 17  H/O parathyroidectomy   History of UTI "for 2.5 years"  S/P cystoscopy with ureteral stent placement   S/P left knee arthroscopy     EKG:  AP with VS and tracking at 61 BPM  TELE:  AP with tracking and VS @ 66 BPM    MEDICATIONS  (STANDING):  allopurinol 100 milliGRAM(s) Oral daily  atorvastatin 20 milliGRAM(s) Oral at bedtime  clopidogrel Tablet 75 milliGRAM(s) Oral daily  metoprolol succinate ER 25 milliGRAM(s) Oral daily  potassium chloride    Tablet ER 20 milliEquivalent(s) Oral daily  verapamil  milliGRAM(s) Oral daily    MEDICATIONS  (PRN):  fluticasone propionate 50 MICROgram(s)/spray Nasal Spray 1 Spray(s) Both Nostrils two times a day PRN nasal congetion      Allergies    adhesives (Unknown)  Cipro (Unknown)  Hay fever (Unknown)  shellfish. (Unknown)    Intolerances        Vital Signs Last 24 Hrs  T(C): 37.3 (2023 20:48), Max: 37.3 (2023 20:48)  T(F): 99.2 (2023 20:48), Max: 99.2 (2023 20:48)  HR: 61 (2023 20:48) (54 - 64)  BP: 147/53 (2023 20:48) (110/42 - 168/52)  BP(mean): --  RR: 18 (2023 20:48) (16 - 18)  SpO2: 96% (2023 20:48) (95% - 99%)    Parameters below as of 2023 20:48  Patient On (Oxygen Delivery Method): room air        PHYSICAL EXAMINATION:    GENERAL APPEARANCE:  Pt. is not currently dyspneic, in no distress. Pt. is alert, oriented, and pleasant.  HEENT:  Pupils are normal and react normally. No icterus. Mucous membranes well colored.  NECK:  Supple. No lymphadenopathy. Jugular venous pressure not elevated. Carotids equal.   HEART:   The cardiac impulse has a normal quality. There are no murmurs, rubs or gallops noted  CHEST:  Chest is clear to auscultation. Normal respiratory effort.  ABDOMEN:  Soft and nontender.   EXTREMITIES:  There is no edema.   SKIN:  No rash or significant lesions are noted.    LABS:                        12.5   8.58  )-----------( 212      ( 2023 05:39 )             39.3     04-13    140  |  114<H>  |  32<H>  ----------------------------<  102<H>  4.2   |  22  |  0.91    Ca    8.7      2023 05:39                RADIOLOGY & ADDITIONAL TESTS:

## 2023-04-13 NOTE — PROGRESS NOTE ADULT - ASSESSMENT
82 year old female with sick sinus syndrome who is POD #1 and is stable for discharge.    Pt will f/u in [facility] Clinic  in 2 weeks or sooner with concerns or questions  Pt will carry PPM card and hook up the  remote transmitter  Pt will return to all pre-op medications  Pt will avoid lifting > than 10 lbs with left arm  Pt will not shower for 5 days or drive for 2 weeks  Copy of discharge instructions given  This patient's questions were answered and understands the importance of following discharge instructions     Interrogation [employer] PPM  Set AAIR/DDDR   at lead 513 ohms Vent lead 779 Ohms  P wave 3.5 mV R wave 1.5 mV  Thresholds both  lead than 1 volt at o.4 ms

## 2023-04-13 NOTE — DISCHARGE NOTE PROVIDER - HOSPITAL COURSE
82 year old female with sick sinus syndrome presents for PPM implant and is POD #1 s/p Dual Chamber PPM      PAST MEDICAL HISTORY:  Asthma h/o last attack " 2 years ago"  Atrial fibrillation   Chronic sinusitis   Clostridium difficile colitis S/P stool transplant 2017  "with great relief"  Deviated septum   Environmental and seasonal allergies   Essential hypertension   Frequent UTI   Gout   H/o Lyme disease   BELL'S PALSY  Heart murmur   HLD (hyperlipidemia)   Kidney stone   Ocular migraine   Parathyroid acinous adenoma   Renal calculi   Scoliosis   Sinus bradycardia   Spinal stenosis   Spinal stenosis of lumbar region.     PAST SURGICAL HISTORY:  H/O  section ,   H/O dilation and curettage , ,   H/O lithotripsy X2  H/O nephrostomy and stent insertion - 2017  S/P R ureteral stent placed 17  H/O parathyroidectomy   History of UTI "for 2.5 years"  S/P cystoscopy with ureteral stent placement   S/P left knee arthroscopy

## 2023-04-17 DIAGNOSIS — I49.5 SICK SINUS SYNDROME: ICD-10-CM

## 2023-04-17 PROBLEM — I48.91 UNSPECIFIED ATRIAL FIBRILLATION: Chronic | Status: ACTIVE | Noted: 2023-04-10

## 2023-04-17 PROBLEM — N39.0 URINARY TRACT INFECTION, SITE NOT SPECIFIED: Chronic | Status: ACTIVE | Noted: 2023-04-10

## 2023-04-17 PROBLEM — N20.0 CALCULUS OF KIDNEY: Chronic | Status: ACTIVE | Noted: 2023-04-10

## 2023-04-17 PROBLEM — J32.9 CHRONIC SINUSITIS, UNSPECIFIED: Chronic | Status: ACTIVE | Noted: 2023-04-10

## 2023-04-17 PROBLEM — E78.5 HYPERLIPIDEMIA, UNSPECIFIED: Chronic | Status: ACTIVE | Noted: 2023-04-10

## 2023-04-17 PROBLEM — J30.89 OTHER ALLERGIC RHINITIS: Chronic | Status: ACTIVE | Noted: 2023-04-10

## 2023-04-17 PROBLEM — J34.2 DEVIATED NASAL SEPTUM: Chronic | Status: ACTIVE | Noted: 2023-04-10

## 2023-04-17 PROBLEM — R01.1 CARDIAC MURMUR, UNSPECIFIED: Chronic | Status: ACTIVE | Noted: 2023-04-10

## 2023-04-17 PROBLEM — R00.1 BRADYCARDIA, UNSPECIFIED: Chronic | Status: ACTIVE | Noted: 2023-04-10

## 2023-04-17 PROBLEM — D35.1 BENIGN NEOPLASM OF PARATHYROID GLAND: Chronic | Status: ACTIVE | Noted: 2023-04-10

## 2023-04-19 NOTE — DISCUSSION/SUMMARY
[Patient] : the patient [FreeTextEntry1] : Normal ILR function.\par Continue monthly remote monitoring.\par F/U in office in 6 months. Stable

## 2023-05-01 ENCOUNTER — APPOINTMENT (OUTPATIENT)
Dept: ELECTROPHYSIOLOGY | Facility: CLINIC | Age: 83
End: 2023-05-01
Payer: MEDICARE

## 2023-05-01 ENCOUNTER — NON-APPOINTMENT (OUTPATIENT)
Age: 83
End: 2023-05-01

## 2023-05-01 VITALS
WEIGHT: 133 LBS | OXYGEN SATURATION: 99 % | HEART RATE: 72 BPM | DIASTOLIC BLOOD PRESSURE: 59 MMHG | BODY MASS INDEX: 27.92 KG/M2 | SYSTOLIC BLOOD PRESSURE: 136 MMHG | HEIGHT: 58 IN

## 2023-05-01 DIAGNOSIS — R55 SYNCOPE AND COLLAPSE: ICD-10-CM

## 2023-05-01 PROCEDURE — 99024 POSTOP FOLLOW-UP VISIT: CPT

## 2023-05-01 RX ORDER — METHOCARBAMOL 500 MG/1
500 TABLET, FILM COATED ORAL
Qty: 60 | Refills: 1 | Status: DISCONTINUED | COMMUNITY
Start: 2023-01-26 | End: 2023-05-01

## 2023-05-10 NOTE — CARDIOLOGY SUMMARY
[de-identified] : 27/23 : Sinus  Bradycardia  Nonspecific ST depression   Diffuse nonspecific T-abnormality.

## 2023-05-10 NOTE — HISTORY OF PRESENT ILLNESS
[FreeTextEntry1] : Mrs. Reese is an 82 year old woman with paroxysmal atrial fibrillation past implantable cardiac monitor/loop recorder. Since getting linq explanted been having episodes of PAF. Episodes occur over  several months . Some episodes last 3 days.  In past associated with infection.  Recently some not associated with anything.

## 2023-05-10 NOTE — ASSESSMENT
[FreeTextEntry1] : Mrs. Reese is an 82 year old woman with sick sinus syndrome\par \par Discussed pacemaker to allow better medical management of her arrhythmia.

## 2023-05-23 ENCOUNTER — NON-APPOINTMENT (OUTPATIENT)
Age: 83
End: 2023-05-23

## 2023-05-23 ENCOUNTER — APPOINTMENT (OUTPATIENT)
Dept: ELECTROPHYSIOLOGY | Facility: CLINIC | Age: 83
End: 2023-05-23
Payer: MEDICARE

## 2023-05-23 VITALS
RESPIRATION RATE: 16 BRPM | WEIGHT: 133 LBS | HEIGHT: 58 IN | SYSTOLIC BLOOD PRESSURE: 153 MMHG | BODY MASS INDEX: 27.92 KG/M2 | HEART RATE: 84 BPM | OXYGEN SATURATION: 98 % | DIASTOLIC BLOOD PRESSURE: 64 MMHG

## 2023-05-23 PROCEDURE — 93280 PM DEVICE PROGR EVAL DUAL: CPT

## 2023-06-05 ENCOUNTER — APPOINTMENT (OUTPATIENT)
Dept: INTERNAL MEDICINE | Facility: CLINIC | Age: 83
End: 2023-06-05

## 2023-06-05 RX ORDER — NITROFURANTOIN MACROCRYSTAL 50 MG
1 CAPSULE ORAL
Refills: 0 | DISCHARGE
Start: 2023-06-05

## 2023-06-06 ENCOUNTER — INPATIENT (INPATIENT)
Facility: HOSPITAL | Age: 83
LOS: 2 days | Discharge: HOME | DRG: 690 | End: 2023-06-09
Attending: FAMILY MEDICINE | Admitting: INTERNAL MEDICINE
Payer: MEDICARE

## 2023-06-06 VITALS — HEIGHT: 59 IN

## 2023-06-06 DIAGNOSIS — Z98.891 HISTORY OF UTERINE SCAR FROM PREVIOUS SURGERY: Chronic | ICD-10-CM

## 2023-06-06 DIAGNOSIS — Z96.0 PRESENCE OF UROGENITAL IMPLANTS: Chronic | ICD-10-CM

## 2023-06-06 DIAGNOSIS — Z98.890 OTHER SPECIFIED POSTPROCEDURAL STATES: Chronic | ICD-10-CM

## 2023-06-06 DIAGNOSIS — N39.0 URINARY TRACT INFECTION, SITE NOT SPECIFIED: ICD-10-CM

## 2023-06-06 DIAGNOSIS — E89.2 POSTPROCEDURAL HYPOPARATHYROIDISM: Chronic | ICD-10-CM

## 2023-06-06 DIAGNOSIS — Z87.448 PERSONAL HISTORY OF OTHER DISEASES OF URINARY SYSTEM: Chronic | ICD-10-CM

## 2023-06-06 DIAGNOSIS — Z87.440 PERSONAL HISTORY OF URINARY (TRACT) INFECTIONS: Chronic | ICD-10-CM

## 2023-06-06 LAB
ALBUMIN SERPL ELPH-MCNC: 3.6 G/DL — SIGNIFICANT CHANGE UP (ref 3.3–5)
ALP SERPL-CCNC: 89 U/L — SIGNIFICANT CHANGE UP (ref 40–120)
ALT FLD-CCNC: 65 U/L — SIGNIFICANT CHANGE UP (ref 12–78)
ANION GAP SERPL CALC-SCNC: 7 MMOL/L — SIGNIFICANT CHANGE UP (ref 5–17)
ANISOCYTOSIS BLD QL: SLIGHT — SIGNIFICANT CHANGE UP
APPEARANCE UR: ABNORMAL
APTT BLD: 32.1 SEC — SIGNIFICANT CHANGE UP (ref 27.5–35.5)
AST SERPL-CCNC: 46 U/L — HIGH (ref 15–37)
BACTERIA # UR AUTO: ABNORMAL
BASOPHILS # BLD AUTO: 0 K/UL — SIGNIFICANT CHANGE UP (ref 0–0.2)
BASOPHILS NFR BLD AUTO: 0 % — SIGNIFICANT CHANGE UP (ref 0–2)
BILIRUB SERPL-MCNC: 1.6 MG/DL — HIGH (ref 0.2–1.2)
BILIRUB UR-MCNC: ABNORMAL
BUN SERPL-MCNC: 25 MG/DL — HIGH (ref 7–23)
CALCIUM SERPL-MCNC: 9.8 MG/DL — SIGNIFICANT CHANGE UP (ref 8.5–10.1)
CHLORIDE SERPL-SCNC: 106 MMOL/L — SIGNIFICANT CHANGE UP (ref 96–108)
CO2 SERPL-SCNC: 24 MMOL/L — SIGNIFICANT CHANGE UP (ref 22–31)
COLOR SPEC: ABNORMAL
CREAT SERPL-MCNC: 1.08 MG/DL — SIGNIFICANT CHANGE UP (ref 0.5–1.3)
DIFF PNL FLD: ABNORMAL
EGFR: 51 ML/MIN/1.73M2 — LOW
EOSINOPHIL # BLD AUTO: 0 K/UL — SIGNIFICANT CHANGE UP (ref 0–0.5)
EOSINOPHIL NFR BLD AUTO: 0 % — SIGNIFICANT CHANGE UP (ref 0–6)
EPI CELLS # UR: SIGNIFICANT CHANGE UP
GLUCOSE SERPL-MCNC: 157 MG/DL — HIGH (ref 70–99)
GLUCOSE UR QL: NEGATIVE — SIGNIFICANT CHANGE UP
HCT VFR BLD CALC: 44 % — SIGNIFICANT CHANGE UP (ref 34.5–45)
HGB BLD-MCNC: 14.9 G/DL — SIGNIFICANT CHANGE UP (ref 11.5–15.5)
INR BLD: 1.56 RATIO — HIGH (ref 0.88–1.16)
KETONES UR-MCNC: ABNORMAL
LACTATE SERPL-SCNC: 1.8 MMOL/L — SIGNIFICANT CHANGE UP (ref 0.7–2)
LEUKOCYTE ESTERASE UR-ACNC: ABNORMAL
LYMPHOCYTES # BLD AUTO: 1.29 K/UL — SIGNIFICANT CHANGE UP (ref 1–3.3)
LYMPHOCYTES # BLD AUTO: 4 % — LOW (ref 13–44)
MAGNESIUM SERPL-MCNC: 1.8 MG/DL — SIGNIFICANT CHANGE UP (ref 1.6–2.6)
MANUAL SMEAR VERIFICATION: SIGNIFICANT CHANGE UP
MCHC RBC-ENTMCNC: 29.9 PG — SIGNIFICANT CHANGE UP (ref 27–34)
MCHC RBC-ENTMCNC: 33.9 GM/DL — SIGNIFICANT CHANGE UP (ref 32–36)
MCV RBC AUTO: 88.4 FL — SIGNIFICANT CHANGE UP (ref 80–100)
MONOCYTES # BLD AUTO: 1.29 K/UL — HIGH (ref 0–0.9)
MONOCYTES NFR BLD AUTO: 4 % — SIGNIFICANT CHANGE UP (ref 2–14)
NEUTROPHILS # BLD AUTO: 29.73 K/UL — HIGH (ref 1.8–7.4)
NEUTROPHILS NFR BLD AUTO: 92 % — HIGH (ref 43–77)
NITRITE UR-MCNC: NEGATIVE — SIGNIFICANT CHANGE UP
NRBC # BLD: 0 /100 — SIGNIFICANT CHANGE UP (ref 0–0)
NRBC # BLD: SIGNIFICANT CHANGE UP /100 WBCS (ref 0–0)
PH UR: 5 — SIGNIFICANT CHANGE UP (ref 5–8)
PHOSPHATE SERPL-MCNC: 2.6 MG/DL — SIGNIFICANT CHANGE UP (ref 2.5–4.5)
PLAT MORPH BLD: NORMAL — SIGNIFICANT CHANGE UP
PLATELET # BLD AUTO: 266 K/UL — SIGNIFICANT CHANGE UP (ref 150–400)
POTASSIUM SERPL-MCNC: 3.9 MMOL/L — SIGNIFICANT CHANGE UP (ref 3.5–5.3)
POTASSIUM SERPL-SCNC: 3.9 MMOL/L — SIGNIFICANT CHANGE UP (ref 3.5–5.3)
PROT SERPL-MCNC: 8 GM/DL — SIGNIFICANT CHANGE UP (ref 6–8.3)
PROT UR-MCNC: 30 MG/DL
PROTHROM AB SERPL-ACNC: 18.2 SEC — HIGH (ref 10.5–13.4)
RAPID RVP RESULT: SIGNIFICANT CHANGE UP
RBC # BLD: 4.98 M/UL — SIGNIFICANT CHANGE UP (ref 3.8–5.2)
RBC # FLD: 15.6 % — HIGH (ref 10.3–14.5)
RBC BLD AUTO: SIGNIFICANT CHANGE UP
RBC CASTS # UR COMP ASSIST: ABNORMAL /HPF (ref 0–4)
SARS-COV-2 RNA SPEC QL NAA+PROBE: SIGNIFICANT CHANGE UP
SODIUM SERPL-SCNC: 137 MMOL/L — SIGNIFICANT CHANGE UP (ref 135–145)
SP GR SPEC: 1.02 — SIGNIFICANT CHANGE UP (ref 1.01–1.02)
TOXIC GRANULES BLD QL SMEAR: PRESENT — SIGNIFICANT CHANGE UP
TROPONIN I, HIGH SENSITIVITY RESULT: 4.06 NG/L — SIGNIFICANT CHANGE UP
TROPONIN I, HIGH SENSITIVITY RESULT: 4.78 NG/L — SIGNIFICANT CHANGE UP
UROBILINOGEN FLD QL: 1
WBC # BLD: 32.32 K/UL — HIGH (ref 3.8–10.5)
WBC # FLD AUTO: 32.32 K/UL — HIGH (ref 3.8–10.5)
WBC UR QL: SIGNIFICANT CHANGE UP /HPF (ref 0–5)

## 2023-06-06 PROCEDURE — 80053 COMPREHEN METABOLIC PANEL: CPT

## 2023-06-06 PROCEDURE — 93306 TTE W/DOPPLER COMPLETE: CPT

## 2023-06-06 PROCEDURE — 36415 COLL VENOUS BLD VENIPUNCTURE: CPT

## 2023-06-06 PROCEDURE — 97116 GAIT TRAINING THERAPY: CPT | Mod: GP

## 2023-06-06 PROCEDURE — 83036 HEMOGLOBIN GLYCOSYLATED A1C: CPT

## 2023-06-06 PROCEDURE — 85025 COMPLETE CBC W/AUTO DIFF WBC: CPT

## 2023-06-06 PROCEDURE — 93005 ELECTROCARDIOGRAM TRACING: CPT

## 2023-06-06 PROCEDURE — 85027 COMPLETE CBC AUTOMATED: CPT

## 2023-06-06 PROCEDURE — 97162 PT EVAL MOD COMPLEX 30 MIN: CPT | Mod: GP

## 2023-06-06 PROCEDURE — 84484 ASSAY OF TROPONIN QUANT: CPT

## 2023-06-06 PROCEDURE — 0225U NFCT DS DNA&RNA 21 SARSCOV2: CPT

## 2023-06-06 PROCEDURE — 99285 EMERGENCY DEPT VISIT HI MDM: CPT | Mod: FS

## 2023-06-06 PROCEDURE — 71046 X-RAY EXAM CHEST 2 VIEWS: CPT | Mod: 26

## 2023-06-06 PROCEDURE — 99223 1ST HOSP IP/OBS HIGH 75: CPT | Mod: GC

## 2023-06-06 PROCEDURE — 93010 ELECTROCARDIOGRAM REPORT: CPT

## 2023-06-06 RX ORDER — ATORVASTATIN CALCIUM 80 MG/1
20 TABLET, FILM COATED ORAL AT BEDTIME
Refills: 0 | Status: DISCONTINUED | OUTPATIENT
Start: 2023-06-06 | End: 2023-06-09

## 2023-06-06 RX ORDER — METOPROLOL TARTRATE 50 MG
1 TABLET ORAL
Refills: 0 | DISCHARGE

## 2023-06-06 RX ORDER — ALLOPURINOL 300 MG
1 TABLET ORAL
Qty: 0 | Refills: 0 | DISCHARGE

## 2023-06-06 RX ORDER — LACTOBACILLUS ACIDOPHILUS 100MM CELL
1 CAPSULE ORAL
Refills: 0 | Status: DISCONTINUED | OUTPATIENT
Start: 2023-06-06 | End: 2023-06-09

## 2023-06-06 RX ORDER — VANCOMYCIN HCL 1 G
1000 VIAL (EA) INTRAVENOUS ONCE
Refills: 0 | Status: COMPLETED | OUTPATIENT
Start: 2023-06-06 | End: 2023-06-06

## 2023-06-06 RX ORDER — KETOTIFEN FUMARATE 0.34 MG/ML
1 SOLUTION OPHTHALMIC
Refills: 0 | Status: DISCONTINUED | OUTPATIENT
Start: 2023-06-06 | End: 2023-06-09

## 2023-06-06 RX ORDER — CEFEPIME 1 G/1
1000 INJECTION, POWDER, FOR SOLUTION INTRAMUSCULAR; INTRAVENOUS ONCE
Refills: 0 | Status: DISCONTINUED | OUTPATIENT
Start: 2023-06-06 | End: 2023-06-06

## 2023-06-06 RX ORDER — METOPROLOL TARTRATE 50 MG
25 TABLET ORAL DAILY
Refills: 0 | Status: DISCONTINUED | OUTPATIENT
Start: 2023-06-06 | End: 2023-06-09

## 2023-06-06 RX ORDER — RIVAROXABAN 15 MG-20MG
1 KIT ORAL
Qty: 0 | Refills: 0 | DISCHARGE

## 2023-06-06 RX ORDER — ALBUTEROL 90 UG/1
2 AEROSOL, METERED ORAL EVERY 6 HOURS
Refills: 0 | Status: DISCONTINUED | OUTPATIENT
Start: 2023-06-06 | End: 2023-06-09

## 2023-06-06 RX ORDER — VERAPAMIL HCL 240 MG
180 CAPSULE, EXTENDED RELEASE PELLETS 24 HR ORAL DAILY
Refills: 0 | Status: DISCONTINUED | OUTPATIENT
Start: 2023-06-06 | End: 2023-06-09

## 2023-06-06 RX ORDER — ALLOPURINOL 300 MG
100 TABLET ORAL DAILY
Refills: 0 | Status: DISCONTINUED | OUTPATIENT
Start: 2023-06-06 | End: 2023-06-09

## 2023-06-06 RX ORDER — CLOPIDOGREL BISULFATE 75 MG/1
75 TABLET, FILM COATED ORAL DAILY
Refills: 0 | Status: DISCONTINUED | OUTPATIENT
Start: 2023-06-06 | End: 2023-06-09

## 2023-06-06 RX ORDER — CEFEPIME 1 G/1
2000 INJECTION, POWDER, FOR SOLUTION INTRAMUSCULAR; INTRAVENOUS EVERY 24 HOURS
Refills: 0 | Status: DISCONTINUED | OUTPATIENT
Start: 2023-06-06 | End: 2023-06-07

## 2023-06-06 RX ORDER — VERAPAMIL HCL 240 MG
1 CAPSULE, EXTENDED RELEASE PELLETS 24 HR ORAL
Qty: 0 | Refills: 0 | DISCHARGE

## 2023-06-06 RX ORDER — POTASSIUM CITRATE MONOHYDRATE 100 %
2 POWDER (GRAM) MISCELLANEOUS
Qty: 0 | Refills: 0 | DISCHARGE

## 2023-06-06 RX ORDER — CEFEPIME 1 G/1
2000 INJECTION, POWDER, FOR SOLUTION INTRAMUSCULAR; INTRAVENOUS EVERY 24 HOURS
Refills: 0 | Status: DISCONTINUED | OUTPATIENT
Start: 2023-06-06 | End: 2023-06-06

## 2023-06-06 RX ORDER — ALBUTEROL 90 UG/1
2 AEROSOL, METERED ORAL
Refills: 0 | DISCHARGE

## 2023-06-06 RX ORDER — ONDANSETRON 8 MG/1
4 TABLET, FILM COATED ORAL ONCE
Refills: 0 | Status: COMPLETED | OUTPATIENT
Start: 2023-06-06 | End: 2023-06-06

## 2023-06-06 RX ORDER — KETOTIFEN FUMARATE 0.34 MG/ML
1 SOLUTION OPHTHALMIC
Refills: 0 | DISCHARGE

## 2023-06-06 RX ORDER — SODIUM CHLORIDE 9 MG/ML
1000 INJECTION INTRAMUSCULAR; INTRAVENOUS; SUBCUTANEOUS ONCE
Refills: 0 | Status: COMPLETED | OUTPATIENT
Start: 2023-06-06 | End: 2023-06-06

## 2023-06-06 RX ORDER — RIVAROXABAN 15 MG-20MG
15 KIT ORAL DAILY
Refills: 0 | Status: DISCONTINUED | OUTPATIENT
Start: 2023-06-06 | End: 2023-06-09

## 2023-06-06 RX ORDER — CEFEPIME 1 G/1
1000 INJECTION, POWDER, FOR SOLUTION INTRAMUSCULAR; INTRAVENOUS ONCE
Refills: 0 | Status: COMPLETED | OUTPATIENT
Start: 2023-06-06 | End: 2023-06-06

## 2023-06-06 RX ORDER — FLUTICASONE PROPIONATE 50 MCG
1 SPRAY, SUSPENSION NASAL
Refills: 0 | DISCHARGE

## 2023-06-06 RX ORDER — VANCOMYCIN HCL 1 G
125 VIAL (EA) INTRAVENOUS EVERY 24 HOURS
Refills: 0 | Status: DISCONTINUED | OUTPATIENT
Start: 2023-06-06 | End: 2023-06-09

## 2023-06-06 RX ORDER — ATORVASTATIN CALCIUM 80 MG/1
1 TABLET, FILM COATED ORAL
Qty: 0 | Refills: 3 | DISCHARGE

## 2023-06-06 RX ADMIN — Medication 250 MILLIGRAM(S): at 19:08

## 2023-06-06 RX ADMIN — SODIUM CHLORIDE 2000 MILLILITER(S): 9 INJECTION INTRAMUSCULAR; INTRAVENOUS; SUBCUTANEOUS at 16:43

## 2023-06-06 RX ADMIN — CEFEPIME 1000 MILLIGRAM(S): 1 INJECTION, POWDER, FOR SOLUTION INTRAMUSCULAR; INTRAVENOUS at 17:21

## 2023-06-06 RX ADMIN — ONDANSETRON 4 MILLIGRAM(S): 8 TABLET, FILM COATED ORAL at 16:43

## 2023-06-06 NOTE — PHARMACOTHERAPY INTERVENTION NOTE - COMMENTS
Medication reconciliation completed.  Reviewed Medication list and confirmed med allergies with patient; confirmed with Dr. First Medkell.

## 2023-06-06 NOTE — ED STATDOCS - CARE PLAN
1 Principal Discharge DX:	Recurrent UTI  Secondary Diagnosis:	Atrial fibrillation   Principal Discharge DX:	Leukocytosis  Secondary Diagnosis:	Atrial fibrillation  Secondary Diagnosis:	Acute UTI

## 2023-06-06 NOTE — H&P ADULT - HISTORY OF PRESENT ILLNESS
81 y/o female w/ PMHx of w/ pacemaker, CAD w/ stent, Afib on Xarelto and Plavix, recurrent C. diff s/p fecal transplant, chronic sinusitis, HTN, Lyme, Gout, frequent UTI, HLD, kidney stones, parathyroid acinous adenoma s/p parathyroidectomy presents with c/o of fever and chills. Pt went to urgent care yesterday as was having two days of urinary frequency and urgency. At urgent care she had a UA done and was diagnosed with UTI and  started on macrobid. She states last night was having fever and checked her temp which was 101. Endorses feeling very weak and nauseous as well as having difficulty ambulating today and came to hospital. Yesterday did have chest discomfort and states was in Afib all day which usually happens when she has UTIs. Denies current chest pain, SOB, vomiting, diarrhea, constipation, cough, dizziness,     In the ED temp 99.7, HR 88, /41 and RR 16. Given vanc and cefepime x1 81 y/o female w/ PMHx of w/ pacemaker, CAD w/ stent, Afib on Xarelto and Plavix, recurrent C. diff s/p fecal transplant, chronic sinusitis, HTN, Lyme, Gout, frequent UTI, HLD, kidney stones, parathyroid acinous adenoma s/p parathyroidectomy presents with c/o of fever and chills. Pt went to urgent care yesterday as was having two days of urinary frequency and urgency. At urgent care she had a UA done and was diagnosed with UTI and  started on macrobid. She states last night was having fever and checked her temp which was 101. Endorses feeling very weak and nauseous as well as having difficulty ambulating today and came to hospital. Yesterday did have chest pain and palpitations, and states was in Afib all day which usually happens when she has UTIs. Denies current chest pain, SOB, vomiting, diarrhea, constipation, cough, dizziness,     In the ED temp 99.7, HR 88, /41 and RR 16. Given vanc and cefepime x1 83 y/o female w/ PMHx of w/ pacemaker, CAD w/ stent, Afib on Xarelto and Plavix, recurrent C. diff s/p fecal transplant, chronic sinusitis, HTN, Lyme, Gout, frequent UTI, HLD, kidney stones, parathyroid acinous adenoma s/p parathyroidectomy presents with c/o of fever and weakness. Pt went to urgent care yesterday as was having two days of urinary frequency and urgency. At urgent care she had a UA done and was diagnosed with UTI and  started on macrobid. She states last night was having fever and checked her temp which was 101. Endorses feeling very weak and nauseous as well as having difficulty ambulating today and came to hospital. Last night she had episode of chest pain. She also endorses having palpitations, and states was in Afib all day which usually happens when she has UTIs. Denies current chest pain, palpitations SOB, abdominal pain, vomiting, diarrhea, constipation, cough, dizziness, lightheadedness.    In the ED temp 99.7, HR 88, /41 and RR 16. Given vanc and cefepime x1

## 2023-06-06 NOTE — ED STATDOCS - CLINICAL SUMMARY MEDICAL DECISION MAKING FREE TEXT BOX
Pt presents to ED on abx for the last several days, now with fevers, palpitations, chest discomfort. Plan: check labs, EKG, CXR

## 2023-06-06 NOTE — H&P ADULT - ASSESSMENT
81 y/o female w/ PMHx of w/ pacemaker, CAD w/ stent, Afib on Xarelto and Plavix, recurrent C. diff s/p fecal transplant, chronic sinusitis, HTN, Lyme, Gout, frequent UTI, HLD, kidney stones, parathyroid acinous adenoma s/p parathyroidectomy     #Weakness likely 2/2 UTI  -hx of recurrent UTI  -failed outpatient antibiotics  -WBC 32  -lactate WNL  -UA with moderate blood and trace LE  -does not meet SIRS criteria  -continue IV fluids  -previous urine cx reviewed, hx of multiple UTI with pseudomonas   -s/p cefepime and vanc in the ED, will continue cefepime  -f/u urine culture  -f/u blood cultures  -PT consult     #Leukocytosis  -WBC 32  -likely in setting of UTI  -monitor CBC    #Afib  -currently rate controlled   -continue verapamil   -continue metoprolol  -continue xarelto    #Hx recurrent Cdiff s/p fecal transplant   -will start oral vancomycin for prophylaxis    #Hyperglycemia  -f/u A1c    #HTN  -continue home veramipril  -continue metoprolol succinate    #Gout  -continue allopurinol    #HLD  -continue atorvastatin 20 mg    #DVT prophylaxis  -xarelto    #Full Code    Case discussed with Dr. Crooks   83 y/o female w/ PMHx of w/ pacemaker, CAD w/ stent, Afib on Xarelto and Plavix, recurrent C. diff s/p fecal transplant, chronic sinusitis, HTN, Lyme, Gout, frequent UTI, HLD, kidney stones, parathyroid acinous adenoma s/p parathyroidectomy     #Weakness likely 2/2 UTI  -hx of recurrent UTI  -does not meet SIRS criteria  -failed outpatient antibiotics  -WBC 32  -lactate WNL  -UA with moderate blood and trace LE  -continue IV fluids  -previous urine cx reviewed, hx of multiple UTIs with pseudomonas   -s/p cefepime and vanc in the ED, will continue cefepime  -f/u urine culture  -f/u blood cultures  -PT consult     #Leukocytosis  -WBC 32 on admission   -likely in setting of UTI  -monitor CBC  -f/u urine cultures and blood cultures     #Afib  -currently rate controlled   -continue verapamil   -continue metoprolol  -continue xarelto    #Hx recurrent Cdiff s/p fecal transplant   -will start oral vancomycin for prophylaxis    #Hyperglycemia  -f/u A1c    #HTN  -continue home veramipril  -continue metoprolol succinate    #Gout  -continue allopurinol    #HLD  -continue atorvastatin 20 mg    #DVT prophylaxis  -xarelto    #Advanced Directives  -full code    Case discussed with Dr. Crooks 83 y/o female w/ PMHx of w/ pacemaker, CAD w/ stent, Afib on Xarelto and Plavix, recurrent C. diff s/p fecal transplant, chronic sinusitis, HTN, Lyme, Gout, frequent UTI, HLD, kidney stones, parathyroid acinous adenoma s/p parathyroidectomy     #Weakness likely 2/2 UTI  -hx of recurrent UTI  -does not meet SIRS criteria  -failed outpatient antibiotics  -WBC 32  -lactate WNL  -UA with moderate blood and trace LE  -continue IV fluids  -previous urine cx reviewed, hx of multiple UTIs with pseudomonas   -s/p cefepime and vanc in the ED, will continue cefepime  -f/u urine culture  -f/u blood cultures  -PT consult     #Chest pain and palpitations likely 2/2 Afib, rule out ACS  -currently asx  -will monitor on tele  -troponin negative x2, f/u repeat trop  -EKG sinus rhythm with prolonged  ms and shortened HI interval 94 ms  -mag and phos WNL  -f/u echo, last echo in 2018  -EP consult for pacemaker interrogation  -cardio consult    #Leukocytosis  -WBC 32 on admission   -likely in setting of UTI  -monitor CBC  -f/u urine cultures and blood cultures     #Afib  -currently rate controlled   -continue verapamil   -continue metoprolol  -continue xarelto    #Hx recurrent Cdiff s/p fecal transplant   -will start oral vancomycin 125 daily for prophylaxis, continue for 5-7 days after antibiotics completed    #Hyperglycemia  -f/u A1c    #HTN  -continue home verapamil  -continue metoprolol succinate    #Gout  -continue allopurinol    #HLD  -continue atorvastatin 20 mg    #DVT prophylaxis  -xarelto    #Advanced Directives  -full code    Case discussed with Dr. Crooks 83 y/o female w/ PMHx of w/ pacemaker, CAD w/ stent, Afib on Xarelto and Plavix, recurrent C. diff s/p fecal transplant, chronic sinusitis, HTN, Lyme, Gout, frequent UTI, HLD, kidney stones, parathyroid acinous adenoma s/p parathyroidectomy presents with UTI sxs and chest pain with palpitations    #Weakness likely 2/2 UTI  -hx of recurrent UTI  -does not meet SIRS criteria  -failed outpatient antibiotics  -WBC 32  -lactate WNL  -UA with moderate blood and trace LE  -continue IV fluids  -previous urine cx reviewed, hx of multiple UTIs with pseudomonas   -s/p cefepime and vanc in the ED, will continue cefepime  -f/u urine culture  -f/u blood cultures  -PT consult     #Chest pain and palpitations likely 2/2 Afib, rule out ACS  -currently asx  -will monitor on tele  -troponin negative x2, f/u repeat trop  -EKG sinus rhythm with prolonged  ms and shortened HI interval 94 ms. Repeat EKG ordered.  -mag and phos WNL  -f/u echo, last echo in 2018  -EP consult for pacemaker interrogation  -cardio consult    #Leukocytosis  -WBC 32 on admission   -likely in setting of UTI  -monitor CBC  -f/u urine cultures and blood cultures     #Afib  -currently rate controlled   -continue verapamil   -continue metoprolol  -continue xarelto    #Hx recurrent Cdiff s/p fecal transplant   -will start oral vancomycin 125 daily for prophylaxis, continue for 5-7 days after antibiotics completed    #Hyperglycemia  -f/u A1c    #HTN  -continue home verapamil  -continue metoprolol succinate    #Gout  -continue allopurinol    #HLD  -continue atorvastatin 20 mg    #DVT prophylaxis  -xarelto    #Advanced Directives  -full code    Case discussed with Dr. Crooks

## 2023-06-06 NOTE — ED STATDOCS - ATTENDING APP SHARED VISIT CONTRIBUTION OF CARE
I, Leeann Diaz MD,  performed the initial face to face bedside interview with this patient regarding history of present illness, review of symptoms and relevant past medical, social and family history.  I completed an independent physical examination.  I was the initial provider who evaluated this patient.   I personally saw the patient and performed a substantive portion of the visit including all aspects of the medical decision making.  I have signed out the follow up of any pending tests (i.e. labs, radiological studies) to the ELO.  I have communicated the patient’s plan of care and disposition with the ELO.  The history, relevant review of systems, past medical and surgical history, medical decision making, and physical examination was documented by the scribe in my presence and I attest to the accuracy of the documentation.

## 2023-06-06 NOTE — ED STATDOCS - NSCAREINITIATED _GEN_ER
Interval/Overnight Events:    2 brief episodes of desaturations    VITAL SIGNS:  T(C): 36.9 (03-29-22 @ 08:00), Max: 37 (03-28-22 @ 14:00)  HR: 134 (03-29-22 @ 08:00) (96 - 134)  BP: 127/83 (03-29-22 @ 08:00) (89/57 - 127/83)  ABP: --  ABP(mean): --  RR: 25 (03-29-22 @ 08:00) (22 - 25)  SpO2: 100% (03-29-22 @ 08:00) (96% - 100%)  CVP(mm Hg): --  End-Tidal CO2:  NIRS:    Physical Exam:    General: NAD  HEENT: no acute changes from baseline  Resp: coarse breath sounds, fair-good aeration, unlabored, vent-assisted  CV: RRR, nl S1/S2, no m/r/g appreciated, CR < 2s, distal pulses 2+ and equal  Abd: soft, NTND, no HSM appreciated  Ext: wwp, no gross deformities  Neuro: minimally interactive, no acute change from baseline  Skin: no rash    =======================RESPIRATORY=======================  [ ] FiO2: ___ 	[ ] Heliox: ____ 		[ ] BiPAP: ___   [ ] NC: __  Liters			[ ] HFNC: __ 	Liters, FiO2: __  [x ] Mechanical Ventilation: Mode: SIMV with PS, RR (machine): 25, TV (machine): 140, FiO2: 40, PEEP: 6, PS: 15, ITime: 0.9, MAP: 16, PIP: 34  [ ] Inhaled Nitric Oxide:  [ ] Extubation Readiness Assessed  Comments:    =====================CARDIOVASCULAR======================  Cardiovascular Medications:    Chest Tube Output: ___ in 24 hours, ___ in last 12 hours   [ ] Right     [ ] Left    [ ] Mediastinal  Cardiac Rhythm:	[x] NSR		[ ] Other:    [ ] Central Venous Line	[ ] R	[ ] L	[ ] IJ	[ ] Fem	[ ] SC			Placed:   [ ] Arterial Line		[ ] R	[ ] L	[ ] PT	[ ] DP	[ ] Fem	[ ] Rad	[ ] Ax	Placed:   [ ] PICC:				[ ] Broviac		[ ] Mediport  Comments:    ==========HEMATOLOGY/ONCOLOGY=================  Transfusions:	[ ] PRBC	[ ] Platelets	[ ] FFP		[ ] Cryoprecipitate  DVT Prophylaxis:  Comments:    =================INFECTIOUS DISEASE==================  [ ] Cooling Logan being used. Target Temperature:     ===========FLUIDS/ELECTROLYTES/NUTRITION=============  I&O's Summary    28 Mar 2022 07:01  -  29 Mar 2022 07:00  --------------------------------------------------------  IN: 1344 mL / OUT: 807 mL / NET: 537 mL    29 Mar 2022 07:01  -  29 Mar 2022 09:50  --------------------------------------------------------  IN: 112 mL / OUT: 78 mL / NET: 34 mL      Daily   Diet:	[ ] Regular	[ ] Soft		[ ] Clears	[x ] NPO  .	[ ] Other:  .	[ ] NGT		[ ] NDT		[ ] GT		[ ] GJT    [ ] Urinary Catheter, Date Placed:   Comments:    ====================NEUROLOGY===================  [ ] SBS:		[ ] ANYI-1:	[ ] BIS:	[ ] CAPD:  [ ] EVD set at: ___ , Drainage in last 24 hours: ___ ml    [x] Adequacy of sedation and pain control has been assessed and adjusted  Comments:      ==================PATIENT CARE=================  [ ] There are pressure ulcers/areas of breakdown that are being addressed -   [x] Preventative measures are being taken to decrease risk for skin breakdown.  [x] Necessity of urinary, arterial, and venous catheters discussed    ==================LABS============================  CBG - ( 28 Mar 2022 15:34 )  pH: 7.34  /  pCO2: 49.0  /  pO2: 76.0  / HCO3: 26    / Base Excess: 0.3   /  SO2: 95.3  / Lactate: x        RECENT CULTURES:  03-24 @ 15:05 Clean Catch Clean Catch (Midstream)     <10,000 CFU/mL Normal Urogenital Criselda          =================MEDICATIONS======================  MEDICATIONS  MEDICATIONS  (STANDING):  acetylcysteine 20% for Nebulization - Peds 4 milliLiter(s) Nebulizer every 8 hours  ALBUTerol  Intermittent Nebulization - Peds 2.5 milliGRAM(s) Nebulizer every 8 hours  buDESOnide   for Nebulization - Peds 0.5 milliGRAM(s) Nebulizer every 12 hours  chlorhexidine 0.12% Oral Liquid - Peds 15 milliLiter(s) Swish and Spit daily  cloBAZam Oral Liquid - Peds 10 milliGRAM(s) Oral <User Schedule>  dextrose 5% + sodium chloride 0.9% - Pediatric 1000 milliLiter(s) (56 mL/Hr) IV Continuous <Continuous>  famotidine  Oral Liquid - Peds 9 milliGRAM(s) Enteral Tube every 12 hours  lactobacillus Oral Powder (CULTURELLE KIDS) - Peds 1 Packet(s) Oral daily  lansoprazole   Oral  Liquid - Peds 15 milliGRAM(s) Oral daily  levETIRAcetam  Oral Liquid - Peds 280 milliGRAM(s) Oral three times a day  melatonin Oral Liquid - Peds 3 milliGRAM(s) Oral at bedtime  petrolatum, white/mineral oil Ophthalmic Ointment - Peds 1 Application(s) Both EYES every 4 hours  PHENobarbital  Oral Liquid - Peds 57 milliGRAM(s) Enteral Tube daily  sodium bicarbonate   Oral Tab/Cap - Peds 325 milliGRAM(s) Oral every 4 hours  sodium chloride 3% for Nebulization - Peds 3 milliLiter(s) Nebulizer every 8 hours    MEDICATIONS  (PRN):  acetaminophen   Oral Liquid - Peds. 240 milliGRAM(s) Oral every 6 hours PRN Temp greater or equal to 38 C (100.4 F), Moderate Pain (4 - 6)  diazepam Rectal Gel - Peds 10 milliGRAM(s) Rectal once PRN Seizures  hydrocortisone 2.5% Topical Cream - Peds 1 Application(s) Topical every 6 hours PRN Itching  hydrOXYzine  Oral Liquid - Peds 11 milliGRAM(s) Oral every 8 hours PRN Itching  ibuprofen  Oral Liquid - Peds. 150 milliGRAM(s) Oral every 6 hours PRN Temp greater or equal to 38 C (100.4 F)  polyethylene glycol 3350 Oral Powder - Peds 8.5 Gram(s) Oral daily PRN Constipation    ===================================================  IMAGING STUDIES:    [ ] XR   [ ] CT   [ ] MR   [ ] US  [ ] Echo  ===========================================================  Parent/Guardian is at the bedside:	[ ] Yes	[ ] No  Patient and Parent/Guardian updated as to the progress/plan of care:	[ ] Yes	[ ] No    [ ] The patient remains in critical and unstable condition, and requires ICU care and monitoring, assessment, and treatment  [ x] The patient is improving but requires continued monitoring, assessment, treatment, and adjustment of therapy    [ ] The total critical care time spent by attending physician was ____ minutes, excluding procedure time. Leeann Diaz(Attending)

## 2023-06-06 NOTE — ED STATDOCS - PROGRESS NOTE DETAILS
pt seen with ER attending for known UTI, with fevers and chills for the past 2 days was seen at urgent care yesterday and started on antibiotics, developed A fib, chest pain and weakness over the night, a fib has resolved no current chest pain, pt seen with ER attending for known UTI, with fevers and chills for the past 2 days was seen at urgent care yesterday and started on antibiotics, developed A fib, chest pain and weakness over the night, a fib has resolved no current chest pain, chills,

## 2023-06-06 NOTE — H&P ADULT - ATTENDING COMMENTS
81 y/o F presented with increased urination, chest pain/palpations last night now resolved.    General: Awake and alert, cooperative with exam. No acute distress.   Skin: Warm, dry, and pink.   Eyes: Pupils equal and reactive to light. Extraocular eye movements intact. No conjunctival injection, discharge, or scleral icterus.   HEENT: Atraumatic, normocephalic. Moist mucus membranes.  Cardiology: Normal S1, S2. No murmurs, rubs, or gallops. Regular rate and rhythm.   Respiratory: Lungs clear to ascultation bilaterally. Good air exchange. No wheezes, rales, or rhonchi. Normal chest expansion.   Gastrointestinal: Positive bowel sounds. Soft, non-tender, non-distended. No guarding, rigidity, or rebound tenderness. No hepatosplenomegaly.   Musculoskeletal: 5/5 motor strength in all extremities. Normal range of motion.   Extremities: No peripheral edema bilaterally. Dorsalis pedis pulses 2+ bilaterally.   Neurological: A+Ox3 (person, place, and time). Cranial nerves 2-12 intact. Normal speech. No facial droop. No focal neurological deficits.   Psychiatric: Normal affect. Normal mood.     Prior admission:   - 4/13/23: PPM placement     ER course: VSS. Labs: WBC 32.32, neutrophils 92%, INR 1.56, glucose 157, total bilirubin 1.6, AST 46. UA: small bilirubin, trace ketones, trace leukocyte esterase, moderate blood, RBC 3-5, occasional bacteria. EKG: NSR with HR 69 bpm, CT 94 ms,  ms, T wave inversions in AVL, V4-V5, no ST segment changes (personally reviewed). CXR: linear atelectasis left lung base, no consolidation, no effusion, no pneumothorax, PPM (personally reviewed).     Pt was given Cefepime, Vancomycin, 1L of NS, Zofran. She is being admitted to telemetry for further management.     81 y/o F presented with increased urination, chest pain, palpitations     1. Leukocytosis secondary to UTI   - Admit to telemetry   - WBC 32.32, UA: trace leukocyte esterase, moderate blood, RBC 3-5, occasional bacteria  - s/p Cefepime, Vancomycin in ER; c/w Cefepime (last UCx 50,000-99,000 of Proteus resistance to some abx)    - f/u UCx, BCx x 2; adjust abx based on sensitivities  - Trend WBC, monitor for temperatures   - Tylenol for temperatures PRN   - s/p 1L of NS, c/w 75 ml/hr (monitor for fluid overload)   - Monitor BP closely     2. Chest pain/palpitations likely secondary to A fib r/o ACS   - EKG: CT 94 ms,  ms   - Troponins 4.78 -> 4.06, will trend 1 more troponin   - No active chest pain at the time of my evaluation   - If troponins trend upwards -> will order heparin, place pt NPO  - Cardiology consult - Dr. Heredia   - EP consult - Dr. Das (PPM interrogation)     3. Abnormal EKG   - Short CT 94 ms     4. QTC prolongation   -  ms   - Avoid QTC prolonging medications   - Ordered repeat EKG  - Mg and Phosphorous WNL     5. Hyperglycemia   - Glucose 157, monitor   - Ordered HbA1c    6. Hx of C diff x 8 occurrences   - PO Vancomycin 125 mg daily for prophylaxis, continue for 5-7 days after abx complete     7. 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 This includes reviewing prior documentation, results and imaging in addition to completing a full history and physical examination on the patient. Further tests, medications, and procedures have been ordered as indicated. Laboratory results and the plan of care were communicated to the patient and/or their family member. Supporting documentation was completed and added to the patient's chart. 83 y/o F presented with increased urination, chest pain/palpations last night now resolved.    General: Awake and alert, cooperative with exam. No acute distress.   Skin: Warm, dry, and pink.   Eyes: Pupils equal and reactive to light. Extraocular eye movements intact. No conjunctival injection, discharge, or scleral icterus.   HEENT: Atraumatic, normocephalic. Moist mucus membranes.  Cardiology: Normal S1, S2. No murmurs, rubs, or gallops. Regular rate and rhythm.   Respiratory: Lungs clear to ascultation bilaterally. Good air exchange. No wheezes, rales, or rhonchi. Normal chest expansion.   Gastrointestinal: Positive bowel sounds. Soft, non-tender, non-distended. No guarding, rigidity, or rebound tenderness. No hepatosplenomegaly.   Musculoskeletal: 5/5 motor strength in all extremities. Normal range of motion.   Extremities: No peripheral edema bilaterally. Dorsalis pedis pulses 2+ bilaterally.   Neurological: A+Ox3 (person, place, and time). Cranial nerves 2-12 intact. Normal speech. No facial droop. No focal neurological deficits.   Psychiatric: Normal affect. Normal mood.     Prior admission:   - 4/13/23: PPM placement     ER course: VSS. Labs: WBC 32.32, neutrophils 92%, INR 1.56, glucose 157, total bilirubin 1.6, AST 46. UA: small bilirubin, trace ketones, trace leukocyte esterase, moderate blood, RBC 3-5, occasional bacteria. EKG: NSR with HR 69 bpm, IA 94 ms,  ms, T wave inversions in AVL, V4-V5, no ST segment changes (personally reviewed). CXR: linear atelectasis left lung base, no consolidation, no effusion, no pneumothorax, PPM (personally reviewed).     Pt was given Cefepime, Vancomycin, 1L of NS, Zofran. She is being admitted to telemetry for further management.     83 y/o F presented with increased urination, chest pain, palpitations     1. Leukocytosis secondary to UTI   - Admit to telemetry   - WBC 32.32, UA: trace leukocyte esterase, moderate blood, RBC 3-5, occasional bacteria  - s/p Cefepime, Vancomycin in ER; c/w Cefepime (last UCx 50,000-99,000 of Proteus resistance to some abx)    - f/u UCx, BCx x 2; adjust abx based on sensitivities  - Trend WBC, monitor for temperatures   - Tylenol for temperatures PRN   - s/p 1L of NS, c/w 75 ml/hr (monitor for fluid overload)   - Monitor BP closely     2. Chest pain/palpitations likely secondary to A fib r/o ACS   - EKG: IA 94 ms,  ms   - Troponins 4.78 -> 4.06, will trend 1 more troponin   - No active chest pain at the time of my evaluation   - If troponins trend upwards -> will order heparin, place pt NPO  - Cardiology consult - Dr. Heredia   - EP consult - Dr. Das (PPM interrogation)     3. Abnormal EKG   - Short IA 94 ms     4. QTC prolongation   -  ms   - Avoid QTC prolonging medications   - Ordered repeat EKG  - Mg and Phosphorous WNL     5. Hyperglycemia   - Glucose 157, monitor   - Ordered HbA1c    6. Hx of C diff x 8 occurrences   - PO Vancomycin 125 mg daily for prophylaxis, continue for 5-7 days after abx complete     7. 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 This includes reviewing prior documentation, results and imaging in addition to completing a full history and physical examination on the patient. Further tests, medications, and procedures have been ordered as indicated. Laboratory results and the plan of care were communicated to the patient and/or their family member. Supporting documentation was completed and added to the patient's chart.

## 2023-06-06 NOTE — ED STATDOCS - NS ED ATTENDING STATEMENT MOD
This was a shared visit with the ELO. I reviewed and verified the documentation and independently performed the documented:

## 2023-06-06 NOTE — ED STATDOCS - ENMT, MLM
Nasal mucosa clear.  Mouth with normal mucosa  Throat has no vesicles, no oropharyngeal exudates and uvula is midline. normal TMs

## 2023-06-06 NOTE — ED STATDOCS - OBJECTIVE STATEMENT
83 y/o female w/ PMHx of w/ pacemaker placed by Dr. Rubio, CAD w/ stent, Afib on Xarelto and Plavix, C. diff, chronic sinusitis, HTN, Lyme, Gout, frequent UTI, HLD, kidney stones, parathyroid acinous adenoma s/p parathyroidectomy presents to the ED c/o fever, nausea and chest pain that started last night, radiating to her back. Pt on Macrobid since yesterday for recently diagnosed UTI. Denies SOB. Pt states she was in Afib all day yesterday with chest pain. States she has been feeling so weak she has difficulty walking. States her chest pain currently feels better, but she still feels nauseous, endorses decreased appetite.

## 2023-06-06 NOTE — ED ADULT NURSE NOTE - OBJECTIVE STATEMENT
PT presents to ED c/o fever and back pain X 2 days. Recently diagnosed with UTI, started macrobid at home with no symptom relief. PT skin color appropriate for race, respirations even and unlabored. ED workup in progress, will continue to monitor. Duane Whyte rN

## 2023-06-06 NOTE — ED ADULT NURSE NOTE - NSFALLHARMRISKINTERV_ED_ALL_ED

## 2023-06-06 NOTE — H&P ADULT - NSHPPHYSICALEXAM_GEN_ALL_CORE
Vitals  T(F): 99.1 (06-06-23 @ 21:36), Max: 99.7 (06-06-23 @ 15:02)  HR: 60 (06-06-23 @ 21:36) (60 - 88)  BP: 125/47 (06-06-23 @ 21:36) (125/47 - 137/56)  RR: 18 (06-06-23 @ 21:36) (16 - 18)  SpO2: 96% (06-06-23 @ 21:36) (94% - 97%)    Physical Exam   Gen: NAD, comfortable  HENT: atraumatic head and ears, no gross abnormalities of ears, mucous membranes moist, no oral lesions, neck supple without masses/goiter/lymphadenopathy  CV: RRR, nl s1/s2, no M/R/G  Pulm: nl respiratory effort, CTAB, no wheezes/crackles/rhonchi  Back: no scoliosis, lordosis, or kyphosis, no tenderness  Abd: normoactive bowel sounds in all 4 quadrants, soft, nontender, nondistended, no rebound, no guarding, no masses  Extremities: no pedal edema, pedal pulses palpable   Skin: nl warm and dry, no wounds   Neuro: A&Ox3, answering questions appropriately, PERRL, EOMI, face symmetric, sensation equal bilaterally in face, tongue midline, no dysarthria, 5/5 strength in upper and lower extremities bilaterally, sensation intact in upper and lower extremities bilaterally  Pysch: no depression, no SI, no HI

## 2023-06-06 NOTE — H&P ADULT - NSHPREVIEWOFSYSTEMS_GEN_ALL_CORE
REVIEW OF SYSTEMS:    CONSTITUTIONAL: No weakness, + fevers or chills  EYES/ENT: No visual changes;  No vertigo or throat pain   NECK: No pain or stiffness  RESPIRATORY: No cough, wheezing, hemoptysis; No shortness of breath  CARDIOVASCULAR: No chest pain or palpitations  GASTROINTESTINAL: No abdominal or epigastric pain. No nausea, vomiting, or hematemesis; No diarrhea or constipation. No melena or hematochezia.  GENITOURINARY: No dysuria, +frequency, no hematuria  NEUROLOGICAL: No numbness or weakness  SKIN: No itching, rashes

## 2023-06-06 NOTE — ED ADULT TRIAGE NOTE - CHIEF COMPLAINT QUOTE
Pt to ED c/o chest pain since last night radiating to back. Pt with PPM for bradycardia. pt with hx of Afib on Xarelto and Plavix, Reports nausea, recently dx with UTI on Macrobid since yesterday. Denies SOB at this time. No medication for CP PTA. sent to intake for EKG.

## 2023-06-07 LAB
A1C WITH ESTIMATED AVERAGE GLUCOSE RESULT: 5.8 % — HIGH (ref 4–5.6)
ALBUMIN SERPL ELPH-MCNC: 2.8 G/DL — LOW (ref 3.3–5)
ALP SERPL-CCNC: 68 U/L — SIGNIFICANT CHANGE UP (ref 40–120)
ALT FLD-CCNC: 50 U/L — SIGNIFICANT CHANGE UP (ref 12–78)
ANION GAP SERPL CALC-SCNC: 4 MMOL/L — LOW (ref 5–17)
AST SERPL-CCNC: 26 U/L — SIGNIFICANT CHANGE UP (ref 15–37)
BASOPHILS # BLD AUTO: 0.02 K/UL — SIGNIFICANT CHANGE UP (ref 0–0.2)
BASOPHILS NFR BLD AUTO: 0.1 % — SIGNIFICANT CHANGE UP (ref 0–2)
BILIRUB SERPL-MCNC: 1 MG/DL — SIGNIFICANT CHANGE UP (ref 0.2–1.2)
BUN SERPL-MCNC: 23 MG/DL — SIGNIFICANT CHANGE UP (ref 7–23)
CALCIUM SERPL-MCNC: 8.7 MG/DL — SIGNIFICANT CHANGE UP (ref 8.5–10.1)
CHLORIDE SERPL-SCNC: 115 MMOL/L — HIGH (ref 96–108)
CO2 SERPL-SCNC: 23 MMOL/L — SIGNIFICANT CHANGE UP (ref 22–31)
CREAT SERPL-MCNC: 0.79 MG/DL — SIGNIFICANT CHANGE UP (ref 0.5–1.3)
EGFR: 74 ML/MIN/1.73M2 — SIGNIFICANT CHANGE UP
EOSINOPHIL # BLD AUTO: 0.17 K/UL — SIGNIFICANT CHANGE UP (ref 0–0.5)
EOSINOPHIL NFR BLD AUTO: 1.1 % — SIGNIFICANT CHANGE UP (ref 0–6)
ESTIMATED AVERAGE GLUCOSE: 120 MG/DL — HIGH (ref 68–114)
GLUCOSE SERPL-MCNC: 99 MG/DL — SIGNIFICANT CHANGE UP (ref 70–99)
HCT VFR BLD CALC: 38.2 % — SIGNIFICANT CHANGE UP (ref 34.5–45)
HGB BLD-MCNC: 12.4 G/DL — SIGNIFICANT CHANGE UP (ref 11.5–15.5)
IMM GRANULOCYTES NFR BLD AUTO: 0.4 % — SIGNIFICANT CHANGE UP (ref 0–0.9)
LYMPHOCYTES # BLD AUTO: 15.6 % — SIGNIFICANT CHANGE UP (ref 13–44)
LYMPHOCYTES # BLD AUTO: 2.42 K/UL — SIGNIFICANT CHANGE UP (ref 1–3.3)
MCHC RBC-ENTMCNC: 29.2 PG — SIGNIFICANT CHANGE UP (ref 27–34)
MCHC RBC-ENTMCNC: 32.5 GM/DL — SIGNIFICANT CHANGE UP (ref 32–36)
MCV RBC AUTO: 90.1 FL — SIGNIFICANT CHANGE UP (ref 80–100)
MONOCYTES # BLD AUTO: 0.85 K/UL — SIGNIFICANT CHANGE UP (ref 0–0.9)
MONOCYTES NFR BLD AUTO: 5.5 % — SIGNIFICANT CHANGE UP (ref 2–14)
NEUTROPHILS # BLD AUTO: 12.03 K/UL — HIGH (ref 1.8–7.4)
NEUTROPHILS NFR BLD AUTO: 77.3 % — HIGH (ref 43–77)
PLATELET # BLD AUTO: 225 K/UL — SIGNIFICANT CHANGE UP (ref 150–400)
POTASSIUM SERPL-MCNC: 3.6 MMOL/L — SIGNIFICANT CHANGE UP (ref 3.5–5.3)
POTASSIUM SERPL-SCNC: 3.6 MMOL/L — SIGNIFICANT CHANGE UP (ref 3.5–5.3)
PROT SERPL-MCNC: 6.6 GM/DL — SIGNIFICANT CHANGE UP (ref 6–8.3)
RBC # BLD: 4.24 M/UL — SIGNIFICANT CHANGE UP (ref 3.8–5.2)
RBC # FLD: 15.7 % — HIGH (ref 10.3–14.5)
SODIUM SERPL-SCNC: 142 MMOL/L — SIGNIFICANT CHANGE UP (ref 135–145)
TROPONIN I, HIGH SENSITIVITY RESULT: 4.57 NG/L — SIGNIFICANT CHANGE UP
WBC # BLD: 15.56 K/UL — HIGH (ref 3.8–10.5)
WBC # FLD AUTO: 15.56 K/UL — HIGH (ref 3.8–10.5)

## 2023-06-07 PROCEDURE — 93010 ELECTROCARDIOGRAM REPORT: CPT

## 2023-06-07 PROCEDURE — 93280 PM DEVICE PROGR EVAL DUAL: CPT | Mod: 26

## 2023-06-07 PROCEDURE — 99233 SBSQ HOSP IP/OBS HIGH 50: CPT | Mod: GC

## 2023-06-07 PROCEDURE — 93306 TTE W/DOPPLER COMPLETE: CPT | Mod: 26

## 2023-06-07 RX ORDER — SODIUM CHLORIDE 9 MG/ML
1000 INJECTION INTRAMUSCULAR; INTRAVENOUS; SUBCUTANEOUS
Refills: 0 | Status: DISCONTINUED | OUTPATIENT
Start: 2023-06-07 | End: 2023-06-09

## 2023-06-07 RX ORDER — CEFEPIME 1 G/1
1000 INJECTION, POWDER, FOR SOLUTION INTRAMUSCULAR; INTRAVENOUS EVERY 12 HOURS
Refills: 0 | Status: DISCONTINUED | OUTPATIENT
Start: 2023-06-08 | End: 2023-06-09

## 2023-06-07 RX ADMIN — SODIUM CHLORIDE 75 MILLILITER(S): 9 INJECTION INTRAMUSCULAR; INTRAVENOUS; SUBCUTANEOUS at 02:30

## 2023-06-07 RX ADMIN — CLOPIDOGREL BISULFATE 75 MILLIGRAM(S): 75 TABLET, FILM COATED ORAL at 08:37

## 2023-06-07 RX ADMIN — Medication 180 MILLIGRAM(S): at 14:42

## 2023-06-07 RX ADMIN — Medication 25 MILLIGRAM(S): at 21:06

## 2023-06-07 RX ADMIN — Medication 125 MILLIGRAM(S): at 06:14

## 2023-06-07 RX ADMIN — Medication 100 MILLIGRAM(S): at 08:37

## 2023-06-07 RX ADMIN — Medication 1 TABLET(S): at 08:37

## 2023-06-07 RX ADMIN — CEFEPIME 100 MILLIGRAM(S): 1 INJECTION, POWDER, FOR SOLUTION INTRAMUSCULAR; INTRAVENOUS at 06:17

## 2023-06-07 RX ADMIN — ATORVASTATIN CALCIUM 20 MILLIGRAM(S): 80 TABLET, FILM COATED ORAL at 21:05

## 2023-06-07 RX ADMIN — Medication 1 TABLET(S): at 17:07

## 2023-06-07 RX ADMIN — RIVAROXABAN 15 MILLIGRAM(S): KIT at 08:37

## 2023-06-07 NOTE — CONSULT NOTE ADULT - SUBJECTIVE AND OBJECTIVE BOX
CHIEF COMPLAINT: Patient is a 82y old  Female who presents with a chief complaint of     HPI:  83 y/o female w/ PMHx of w/ pacemaker, CAD w/ stent, Afib on Xarelto and Plavix, recurrent C. diff s/p fecal transplant, chronic sinusitis, HTN, Lyme, Gout, frequent UTI, HLD, kidney stones, parathyroid acinous adenoma s/p parathyroidectomy presents with c/o of fever and weakness. Pt went to urgent care yesterday as was having two days of urinary frequency and urgency. At urgent care she had a UA done and was diagnosed with UTI and  started on macrobid. She states last night was having fever and checked her temp which was 101. Endorses feeling very weak and nauseous as well as having difficulty ambulating today and came to hospital. Last night she had episode of chest pain. She also endorses having palpitations, and states was in Afib all day which usually happens when she has UTIs. Denies current chest pain, palpitations SOB, abdominal pain, vomiting, diarrhea, constipation, cough, dizziness, lightheadedness.    In the ED temp 99.7, HR 88, /41 and RR 16. Given vanc and cefepime x1 (2023 23:25)      PMHx: PAST MEDICAL & SURGICAL HISTORY:  Clostridium difficile colitis  S/P stool transplant 2017  "with great relief"      Essential hypertension      Kidney stone      Ocular migraine      Scoliosis      Spinal stenosis      Gout      H/o Lyme disease    BELL'S PALSY      Asthma  h/o last attack " 2 years ago"      Spinal stenosis of lumbar region      Deviated septum      Chronic sinusitis      Environmental and seasonal allergies      Atrial fibrillation      Sinus bradycardia      Heart murmur      Frequent UTI      Renal calculi      HLD (hyperlipidemia)      Parathyroid acinous adenoma      S/P left knee arthroscopy        H/O  section  ,       H/O nephrostomy  and stent insertion - 2017  S/P R ureteral stent placed 17      H/O dilation and curettage  , ,       History of UTI  "for 2.5 years"      H/O lithotripsy  X2      S/P cystoscopy with ureteral stent placement      H/O parathyroidectomy            Soc Hx:       Allergies: Allergies    Cipro (Unknown)  Hay fever (Unknown)  shellfish. (Unknown)  adhesives (Unknown)    Intolerances          REVIEW OF SYSTEMS:    CONSTITUTIONAL: No weakness, fevers or chills  EYES/ENT: No visual changes;  No vertigo or throat pain   NECK: No pain or stiffness  RESPIRATORY: No cough, wheezing, hemoptysis; No shortness of breath  CARDIOVASCULAR: No chest pain or palpitations  GASTROINTESTINAL: No abdominal or epigastric pain. No nausea, vomiting, or hematemesis; No diarrhea or constipation. No melena or hematochezia.  GENITOURINARY: No dysuria, frequency or hematuria  NEUROLOGICAL: No numbness or weakness  SKIN: No itching, burning, rashes, or lesions   All other review of systems is negative unless indicated above    Vital Signs Last 24 Hrs  T(C): 36.6 (2023 02:36), Max: 37.6 (2023 15:02)  T(F): 97.9 (2023 02:36), Max: 99.7 (2023 15:02)  HR: 67 (2023 02:36) (60 - 88)  BP: 143/49 (2023 02:36) (123/51 - 143/49)  BP(mean): 71 (2023 21:36) (64 - 80)  RR: 16 (2023 02:36) (16 - 18)  SpO2: 100% (2023 02:36) (94% - 100%)    Parameters below as of 2023 02:36  Patient On (Oxygen Delivery Method): room air        I&O's Summary          PHYSICAL EXAM:   Constitutional: NAD, awake and alert, well-developed  HEENT: PERR, EOMI, Normal Hearing, MMM  Neck: Soft and supple, No LAD, No JVD  Respiratory: Breath sounds are clear bilaterally, No wheezing, rales or rhonchi  Cardiovascular: S1 and S2, regular rate and rhythm, no Murmurs, gallops or rubs  Gastrointestinal: Bowel Sounds present, soft, nontender, nondistended, no guarding, no rebound  Extremities: No peripheral edema  Vascular: 2+ peripheral pulses  Neurological: A/O x 3, no focal deficits  Musculoskeletal: 5/5 strength b/l upper and lower extremities  Skin: No rashes    MEDICATIONS:  MEDICATIONS  (STANDING):  allopurinol 100 milliGRAM(s) Oral daily  atorvastatin 20 milliGRAM(s) Oral at bedtime  cefepime   IVPB 2000 milliGRAM(s) IV Intermittent every 24 hours  clopidogrel Tablet 75 milliGRAM(s) Oral daily  lactobacillus acidophilus 1 Tablet(s) Oral two times a day with meals  metoprolol succinate ER 25 milliGRAM(s) Oral daily  rivaroxaban 15 milliGRAM(s) Oral daily  sodium chloride 0.9%. 1000 milliLiter(s) (75 mL/Hr) IV Continuous <Continuous>  vancomycin    Solution 125 milliGRAM(s) Oral every 24 hours  verapamil  milliGRAM(s) Oral daily      LABS: All Labs Reviewed:                        14.9   32.32 )-----------( 266      ( 2023 16:27 )             44.0     06-06    137  |  106  |  25<H>  ----------------------------<  157<H>  3.9   |  24  |  1.08    Ca    9.8      2023 16:27  Phos  2.6     06-06  Mg     1.8     06-06    TPro  8.0  /  Alb  3.6  /  TBili  1.6<H>  /  DBili  x   /  AST  46<H>  /  ALT  65  /  AlkPhos  89  06-06    PT/INR - ( 2023 16:27 )   PT: 18.2 sec;   INR: 1.56 ratio         PTT - ( 2023 16:27 )  PTT:32.1 sec        Blood Culture:   BNP     RADIOLOGY:    EKG:    Telemetry:    ECHO:

## 2023-06-07 NOTE — PROGRESS NOTE ADULT - ASSESSMENT
83 y/o female w/ PMHx of w/ pacemaker, CAD w/ stent, Afib on Xarelto and Plavix, recurrent C. diff s/p fecal transplant, chronic sinusitis, HTN, Lyme, Gout, frequent UTI, HLD, kidney stones, parathyroid acinous adenoma s/p parathyroidectomy presents with UTI sxs and chest pain with palpitations    #Weakness likely 2/2 UTI  -hx of recurrent UTI  -does not meet SIRS criteria on admission   -failed outpatient antibiotics  -WBC 32 >  -lactate WNL  -UA with moderate blood and trace LE  -continue IV fluids  -previous urine cx reviewed, hx of multiple UTIs with pseudomonas   -c/w cefepime  -f/u urine culture pending   -f/u blood cultures pending  -PT consult     #Chest pain and palpitations likely 2/2 Afib, rule out ACS  -currently asx  -will monitor on tele  -troponin negative x3  -EKG sinus rhythm with prolonged  ms and shortened LA interval 94 ms. Repeat EKG ordered.  -mag and phos WNL  -f/u echo, last echo in 2018  -EP consult for pacemaker interrogation  -cardio consult appreciated: no evidence for ACS. Continue lipior and plavix, and CCB and xarelto     #Leukocytosis  -WBC 32 on admission   -likely in setting of UTI  -monitor CBC  -f/u urine cultures and blood cultures     #Afib  -currently rate controlled   -continue verapamil   -continue metoprolol  -continue xarelto    #Hx recurrent Cdiff s/p fecal transplant   -will start oral vancomycin 125 daily for prophylaxis, continue for 5-7 days after antibiotics completed    #Hyperglycemia  -f/u A1c    #HTN  -continue home verapamil  -continue metoprolol succinate    #Gout  -continue allopurinol    #HLD  -continue atorvastatin 20 mg    #DVT prophylaxis  -xarelto    #Advanced Directives  -full code    Case discussed with Dr. Damico   81 y/o female w/ PMHx of w/ pacemaker, CAD w/ stent, Afib on Xarelto and Plavix, recurrent C. diff s/p fecal transplant, chronic sinusitis, HTN, Lyme, Gout, frequent UTI, HLD, kidney stones, parathyroid acinous adenoma s/p parathyroidectomy presents with UTI sxs and chest pain with palpitations    #Weakness likely 2/2 UTI  -hx of recurrent UTI  -does not meet SIRS criteria on admission   -failed outpatient antibiotics  -WBC 32 >  -lactate WNL  -UA with moderate blood and trace LE  -continue IV fluids  -previous urine cx reviewed, hx of multiple UTIs with pseudomonas   -c/w cefepime  -f/u urine culture pending   -f/u blood cultures pending  -PT consult     #Chest pain and palpitations likely 2/2 Afib, rule out ACS  -currently asx  -will monitor on tele  -troponin negative x3  -EKG sinus rhythm with prolonged  ms and shortened MN interval 94 ms. Repeat EKG ordered.  -mag and phos WNL  -f/u echo, last echo in 2018  -EP consult for pacemaker interrogation  -cardio consult appreciated: no evidence for ACS. Continue lipior and plavix, and CCB and xarelto     #Leukocytosis  -WBC 32 on admission >>15 downtrending    -likely in setting of UTI  -monitor CBC  -f/u urine cultures and blood cultures     #Afib  -currently rate controlled   -continue verapamil   -continue metoprolol  -continue xarelto    #Hx recurrent Cdiff s/p fecal transplant   -will start oral vancomycin 125 daily for prophylaxis, continue for 5-7 days after antibiotics completed    #Hyperglycemia  -  on admission  -f/u A1c    #HTN  -continue home verapamil  -continue metoprolol succinate    #Gout  -continue allopurinol    #HLD  -continue atorvastatin 20 mg    #DVT prophylaxis  -xarelto    #Advanced Directives  -full code    Case discussed with Dr. Damico

## 2023-06-07 NOTE — PHYSICAL THERAPY INITIAL EVALUATION ADULT - PERTINENT HX OF CURRENT PROBLEM, REHAB EVAL
81 y/o female w/ PMHx of w/ pacemaker, CAD w/ stent, Afib on Xarelto and Plavix, recurrent C. diff s/p fecal transplant, chronic sinusitis, HTN, Lyme, Gout, frequent UTI, HLD, kidney stones, parathyroid acinous adenoma s/p parathyroidectomy presents with UTI sxs and chest pain with palpitations

## 2023-06-07 NOTE — CONSULT NOTE ADULT - ASSESSMENT
82 F with UTI - improving on abx      UTI - abx as per medicine-- continue proph for C diff    CAD- no evindece for ACS- continue with lipitor and plavix    AF- currently in SR, continue with CCb and xarelto     DC planning from cardiac perspective

## 2023-06-07 NOTE — PHYSICAL THERAPY INITIAL EVALUATION ADULT - ORIENTATION, REHAB EVAL
This patient made 2 appointments back to back on the same day 4/25/18 with you.  Insurance typically will not cover this, so the patient is wondering if you can see her for both issues in one appointment?  Diabetes/Chol & Back Pain.  Or will she need to make 2 appointments on 2 separate days to see you?  Please advise.  Thank you.  Oralia Kong,      oriented to person, place, time and situation

## 2023-06-07 NOTE — PHYSICAL THERAPY INITIAL EVALUATION ADULT - GENERAL OBSERVATIONS, REHAB EVAL
Pt rec'd supine in bed, pleasant and cooperative with PT, reports feeling better today, no c/o pain.

## 2023-06-07 NOTE — PROGRESS NOTE ADULT - SUBJECTIVE AND OBJECTIVE BOX
HPI:   81 y/o female w/ PMHx of w/ pacemaker, CAD w/ stent, Afib on Xarelto and Plavix, recurrent C. diff s/p fecal transplant, chronic sinusitis, HTN, Lyme, Gout, frequent UTI, HLD, kidney stones, parathyroid acinous adenoma s/p parathyroidectomy presents with c/o of fever and weakness. Pt went to urgent care yesterday as was having two days of urinary frequency and urgency. At urgent care she had a UA done and was diagnosed with UTI and  started on macrobid. She states last night was having fever and checked her temp which was 101. Endorses feeling very weak and nauseous as well as having difficulty ambulating today and came to hospital. Last night she had episode of chest pain. She also endorses having palpitations, and states was in Afib all day which usually happens when she has UTIs. Denies current chest pain, palpitations SOB, abdominal pain, vomiting, diarrhea, constipation, cough, dizziness, lightheadedness. In the ED temp 99.7, HR 88, /41 and RR 16. Given vanc and cefepime x1 (06 Jun 2023 23:25)    Subjective:   6/7: Patient seen at bedside in AM.       Objective:   Vital Signs Last 24 Hrs  T(C): 36.6 (07 Jun 2023 07:19), Max: 37.6 (06 Jun 2023 15:02)  T(F): 97.8 (07 Jun 2023 07:19), Max: 99.7 (06 Jun 2023 15:02)  HR: 65 (07 Jun 2023 07:19) (60 - 88)  BP: 127/38 (07 Jun 2023 07:19) (123/51 - 143/49)  BP(mean): 71 (06 Jun 2023 21:36) (64 - 80)  RR: 18 (07 Jun 2023 07:19) (16 - 18)  SpO2: 98% (07 Jun 2023 07:19) (94% - 100%)    O2 Parameters below as of 07 Jun 2023 07:19  Patient On (Oxygen Delivery Method): room air    GENERAL: A&Ox3, non-toxic appearing, no acute distress  HEENT: NCAT, EOMI, oral mucosa moist, pink conjunctiva  RESP: CTABL  CV: RRR, no murmurs/rubs/gallops  ABDOMEN: soft, non-tender, non-distended, no guarding, no CVA tenderness  MSK: no visible deformities  NEURO: no focal sensory or motor deficits, CN 2-12 grossly intact  SKIN: warm, normal color, well perfused, no rash  PSYCH: normal affect      MEDICATIONS  (STANDING):  allopurinol 100 milliGRAM(s) Oral daily  atorvastatin 20 milliGRAM(s) Oral at bedtime  cefepime   IVPB 2000 milliGRAM(s) IV Intermittent every 24 hours  clopidogrel Tablet 75 milliGRAM(s) Oral daily  lactobacillus acidophilus 1 Tablet(s) Oral two times a day with meals  metoprolol succinate ER 25 milliGRAM(s) Oral daily  rivaroxaban 15 milliGRAM(s) Oral daily  sodium chloride 0.9%. 1000 milliLiter(s) (75 mL/Hr) IV Continuous <Continuous>  vancomycin    Solution 125 milliGRAM(s) Oral every 24 hours  verapamil  milliGRAM(s) Oral daily    MEDICATIONS  (PRN):  albuterol    90 MICROgram(s) HFA Inhaler 2 Puff(s) Inhalation every 6 hours PRN Shortness of Breath and/or Wheezing  ketotifen 0.025% Ophthalmic Solution 1 Drop(s) Both EYES two times a day PRN for allergy symptoms     HPI:   81 y/o female w/ PMHx of w/ pacemaker, CAD w/ stent, Afib on Xarelto and Plavix, recurrent C. diff s/p fecal transplant, chronic sinusitis, HTN, Lyme, Gout, frequent UTI, HLD, kidney stones, parathyroid acinous adenoma s/p parathyroidectomy presents with c/o of fever and weakness. Pt went to urgent care yesterday as was having two days of urinary frequency and urgency. At urgent care she had a UA done and was diagnosed with UTI and  started on macrobid. She states last night was having fever and checked her temp which was 101. Endorses feeling very weak and nauseous as well as having difficulty ambulating today and came to hospital. Last night she had episode of chest pain. She also endorses having palpitations, and states was in Afib all day which usually happens when she has UTIs. Denies current chest pain, palpitations SOB, abdominal pain, vomiting, diarrhea, constipation, cough, dizziness, lightheadedness. In the ED temp 99.7, HR 88, /41 and RR 16. Given vanc and cefepime x1 (06 Jun 2023 23:25)    Subjective:   6/7: Patient seen at bedside in AM. Feels much better than yesterday. No complaints. No urinary symptoms       Objective:   Vital Signs Last 24 Hrs  T(C): 36.6 (07 Jun 2023 07:19), Max: 37.6 (06 Jun 2023 15:02)  T(F): 97.8 (07 Jun 2023 07:19), Max: 99.7 (06 Jun 2023 15:02)  HR: 65 (07 Jun 2023 07:19) (60 - 88)  BP: 127/38 (07 Jun 2023 07:19) (123/51 - 143/49)  BP(mean): 71 (06 Jun 2023 21:36) (64 - 80)  RR: 18 (07 Jun 2023 07:19) (16 - 18)  SpO2: 98% (07 Jun 2023 07:19) (94% - 100%)    O2 Parameters below as of 07 Jun 2023 07:19  Patient On (Oxygen Delivery Method): room air    GENERAL: A&Ox3, non-toxic appearing, no acute distress  HEENT: NCAT, EOMI, oral mucosa moist, pink conjunctiva  RESP: CTABL  CV: RRR, no murmurs/rubs/gallops  ABDOMEN: soft, non-tender, non-distended, no guarding, no CVA tenderness  MSK: no visible deformities  NEURO: no focal sensory or motor deficits, CN 2-12 grossly intact  SKIN: warm, normal color, well perfused, no rash  PSYCH: normal affect                              12.4   15.56 )-----------( 225      ( 07 Jun 2023 07:40 )             38.2         06-07    142  |  115<H>  |  23  ----------------------------<  99  3.6   |  23  |  0.79    Ca    8.7      07 Jun 2023 07:40  Phos  2.6     06-06  Mg     1.8     06-06      MEDICATIONS  (STANDING):  TPro  6.6  /  Alb  2.8<L>  /  TBili  1.0  /  DBili  x   /  AST  26  /  ALT  50  /  AlkPhos  68  06-07  allopurinol 100 milliGRAM(s) Oral daily  atorvastatin 20 milliGRAM(s) Oral at bedtime  cefepime   IVPB 2000 milliGRAM(s) IV Intermittent every 24 hours  clopidogrel Tablet 75 milliGRAM(s) Oral daily  lactobacillus acidophilus 1 Tablet(s) Oral two times a day with meals  metoprolol succinate ER 25 milliGRAM(s) Oral daily  rivaroxaban 15 milliGRAM(s) Oral daily  sodium chloride 0.9%. 1000 milliLiter(s) (75 mL/Hr) IV Continuous <Continuous>  vancomycin    Solution 125 milliGRAM(s) Oral every 24 hours  verapamil  milliGRAM(s) Oral daily    MEDICATIONS  (PRN):  albuterol    90 MICROgram(s) HFA Inhaler 2 Puff(s) Inhalation every 6 hours PRN Shortness of Breath and/or Wheezing  ketotifen 0.025% Ophthalmic Solution 1 Drop(s) Both EYES two times a day PRN for allergy symptoms

## 2023-06-07 NOTE — PATIENT PROFILE ADULT - FALL HARM RISK - HARM RISK INTERVENTIONS

## 2023-06-07 NOTE — PATIENT PROFILE ADULT - FUNCTIONAL ASSESSMENT - BASIC MOBILITY 6.
4-calculated by average/Not able to assess (calculate score using [facility] averaging method)

## 2023-06-08 ENCOUNTER — APPOINTMENT (OUTPATIENT)
Dept: ELECTROPHYSIOLOGY | Facility: CLINIC | Age: 83
End: 2023-06-08

## 2023-06-08 LAB
HCT VFR BLD CALC: 39.8 % — SIGNIFICANT CHANGE UP (ref 34.5–45)
HGB BLD-MCNC: 12.8 G/DL — SIGNIFICANT CHANGE UP (ref 11.5–15.5)
MCHC RBC-ENTMCNC: 29.3 PG — SIGNIFICANT CHANGE UP (ref 27–34)
MCHC RBC-ENTMCNC: 32.2 GM/DL — SIGNIFICANT CHANGE UP (ref 32–36)
MCV RBC AUTO: 91.1 FL — SIGNIFICANT CHANGE UP (ref 80–100)
PLATELET # BLD AUTO: 241 K/UL — SIGNIFICANT CHANGE UP (ref 150–400)
RBC # BLD: 4.37 M/UL — SIGNIFICANT CHANGE UP (ref 3.8–5.2)
RBC # FLD: 15.7 % — HIGH (ref 10.3–14.5)
WBC # BLD: 10.4 K/UL — SIGNIFICANT CHANGE UP (ref 3.8–10.5)
WBC # FLD AUTO: 10.4 K/UL — SIGNIFICANT CHANGE UP (ref 3.8–10.5)

## 2023-06-08 PROCEDURE — 99232 SBSQ HOSP IP/OBS MODERATE 35: CPT | Mod: GC

## 2023-06-08 RX ADMIN — Medication 100 MILLIGRAM(S): at 09:04

## 2023-06-08 RX ADMIN — Medication 1 TABLET(S): at 09:03

## 2023-06-08 RX ADMIN — Medication 180 MILLIGRAM(S): at 09:04

## 2023-06-08 RX ADMIN — RIVAROXABAN 15 MILLIGRAM(S): KIT at 11:51

## 2023-06-08 RX ADMIN — CLOPIDOGREL BISULFATE 75 MILLIGRAM(S): 75 TABLET, FILM COATED ORAL at 09:04

## 2023-06-08 RX ADMIN — Medication 125 MILLIGRAM(S): at 06:21

## 2023-06-08 RX ADMIN — ATORVASTATIN CALCIUM 20 MILLIGRAM(S): 80 TABLET, FILM COATED ORAL at 21:29

## 2023-06-08 RX ADMIN — CEFEPIME 1000 MILLIGRAM(S): 1 INJECTION, POWDER, FOR SOLUTION INTRAMUSCULAR; INTRAVENOUS at 21:29

## 2023-06-08 RX ADMIN — CEFEPIME 1000 MILLIGRAM(S): 1 INJECTION, POWDER, FOR SOLUTION INTRAMUSCULAR; INTRAVENOUS at 09:04

## 2023-06-08 RX ADMIN — Medication 1 TABLET(S): at 16:48

## 2023-06-08 NOTE — PROGRESS NOTE ADULT - ASSESSMENT
83 y/o female w/ PMHx of w/ pacemaker, CAD w/ stent, Afib on Xarelto and Plavix, recurrent C. diff s/p fecal transplant, chronic sinusitis, HTN, Lyme, Gout, frequent UTI, HLD, kidney stones, parathyroid acinous adenoma s/p parathyroidectomy presents with UTI sxs and chest pain with palpitations    #Weakness likely 2/2 UTI  -hx of recurrent UTI  -does not meet SIRS criteria on admission   -failed outpatient antibiotics  -WBC 32 >  -lactate WNL  -UA with moderate blood and trace LE  -continue IV fluids  -previous urine cx reviewed, hx of multiple UTIs with pseudomonas   -c/w cefepime day 3  -f/u urine culture 10k-49k CFU gram neg rods. sensitivities pending   -blood cultures negative   -PT eval appreciated: independent     #Chest pain and palpitations likely 2/2 Afib, rule out ACS  -currently asx  -troponin negative x3, no events on tele (now discontinued)  -EKG sinus rhythm with prolonged  ms and shortened NH interval 94 ms.  -mag and phos WNL  -Pacemaker interrogated 6/7. Event of Afib for 11.5 hours on 6/5   -cardio consult appreciated: no evidence for ACS. Continue lipior and plavix, and CCB and xarelto     #Leukocytosis  -WBC 32 on admission  -downtrending   -likely in setting of UTI  -monitor CBC      #Afib  -currently rate controlled   -continue verapamil   -continue metoprolol  -continue xarelto    #Hx recurrent Cdiff s/p fecal transplant   -will start oral vancomycin 125 daily for prophylaxis, continue for 5-7 days after antibiotics completed    #Hyperglycemia  -  on admission  - A1C 5.8    #HTN  -continue home verapamil  -continue metoprolol succinate    #Gout  -continue allopurinol    #HLD  -continue atorvastatin 20 mg    #DVT prophylaxis  -xarelto    #Advanced Directives  -full code    #Dispo   - pending sensitivities. possibly tomorrow to home, No skilled needs      Case discussed with Dr. Damico

## 2023-06-08 NOTE — PROGRESS NOTE ADULT - ATTENDING COMMENTS
81 y/o female w/ PMHx of w/ pacemaker, CAD w/ stent, Afib on Xarelto and Plavix, recurrent C. diff s/p fecal transplant, chronic sinusitis, HTN, Lyme, Gout, frequent UTI, HLD, kidney stones, parathyroid acinous adenoma s/p parathyroidectomy presents with UTI sxs and chest pain with palpitations    #Weakness likely 2/2 UTI  -hx of recurrent UTI  -does not meet SIRS criteria on admission   -failed outpatient antibiotics  -WBC 32 >  -lactate WNL  -UA with moderate blood and trace LE  -continue IV fluids  -previous urine cx reviewed, hx of multiple UTIs with pseudomonas   -c/w cefepime day 3  -f/u urine culture 10k-49k CFU gram neg rods. sensitivities pending   -blood cultures negative
81 y/o female w/ PMHx of w/ pacemaker, CAD w/ stent, Afib on Xarelto and Plavix, recurrent C. diff s/p fecal transplant, chronic sinusitis, HTN, Lyme, Gout, frequent UTI, HLD, kidney stones, parathyroid acinous adenoma s/p parathyroidectomy presents with UTI sxs and chest pain with palpitations    #Weakness likely 2/2 UTI  -hx of recurrent UTI  -does not meet SIRS criteria on admission   -failed outpatient antibiotics  -WBC 32 >  -lactate WNL  -UA with moderate blood and trace LE  -continue IV fluids  -previous urine cx reviewed, hx of multiple UTIs with pseudomonas   -c/w cefepime  -f/u urine culture pending   -f/u blood cultures pending  -PT consult

## 2023-06-08 NOTE — PROGRESS NOTE ADULT - SUBJECTIVE AND OBJECTIVE BOX
HPI:   81 y/o female w/ PMHx of w/ pacemaker, CAD w/ stent, Afib on Xarelto and Plavix, recurrent C. diff s/p fecal transplant, chronic sinusitis, HTN, Lyme, Gout, frequent UTI, HLD, kidney stones, parathyroid acinous adenoma s/p parathyroidectomy presents with c/o of fever and weakness, and palpitations while be treated for UTI with PO macrobid. In the ED temp 99.7, HR 88, /41 and RR 16. Given vanc and cefepime x1.     Subjective:   6/8: Patient seen at bedside at lunch time. Feels well. No fevers, chills, or palpitations       Objective:   Vital Signs Last 24 Hrs  T(C): 36.7 (08 Jun 2023 15:22), Max: 36.8 (07 Jun 2023 20:15)  T(F): 98.1 (08 Jun 2023 15:22), Max: 98.2 (07 Jun 2023 20:15)  HR: 60 (08 Jun 2023 15:22) (60 - 64)  BP: 119/47 (08 Jun 2023 15:22) (119/47 - 139/54)  RR: 18 (08 Jun 2023 15:22) (18 - 18)  SpO2: 96% (08 Jun 2023 15:22) (96% - 100%)    Parameters below as of 08 Jun 2023 15:22  Patient On (Oxygen Delivery Method): room air      GENERAL: A&Ox3, non-toxic appearing, no acute distress  HEENT: NCAT, EOMI, oral mucosa moist, pink conjunctiva  RESP: CTABL  CV: RRR, no murmurs/rubs/gallops  ABDOMEN: soft, non-tender, non-distended, no guarding, no CVA tenderness  MSK: no visible deformities  NEURO: no focal sensory or motor deficits, CN 2-12 grossly intact  SKIN: warm, normal color, well perfused, no rash  PSYCH: normal affect         Labs:                  12.8   10.40 )-----------( 241      ( 08 Jun 2023 06:31 )             39.8       06-07    142  |  115<H>  |  23  ----------------------------<  99  3.6   |  23  |  0.79    Ca    8.7      07 Jun 2023 07:40    TPro  6.6  /  Alb  2.8<L>  /  TBili  1.0  /  DBili  x   /  AST  26  /  ALT  50  /  AlkPhos  68  06-07      MEDICATIONS  (STANDING):  allopurinol 100 milliGRAM(s) Oral daily  atorvastatin 20 milliGRAM(s) Oral at bedtime  cefepime  Injectable. 1000 milliGRAM(s) IV Push every 12 hours  clopidogrel Tablet 75 milliGRAM(s) Oral daily  lactobacillus acidophilus 1 Tablet(s) Oral two times a day with meals  metoprolol succinate ER 25 milliGRAM(s) Oral daily  rivaroxaban 15 milliGRAM(s) Oral daily  sodium chloride 0.9%. 1000 milliLiter(s) (75 mL/Hr) IV Continuous <Continuous>  vancomycin    Solution 125 milliGRAM(s) Oral every 24 hours  verapamil  milliGRAM(s) Oral daily    MEDICATIONS  (PRN):  albuterol    90 MICROgram(s) HFA Inhaler 2 Puff(s) Inhalation every 6 hours PRN Shortness of Breath and/or Wheezing  ketotifen 0.025% Ophthalmic Solution 1 Drop(s) Both EYES two times a day PRN for allergy symptoms

## 2023-06-09 ENCOUNTER — TRANSCRIPTION ENCOUNTER (OUTPATIENT)
Age: 83
End: 2023-06-09

## 2023-06-09 VITALS — HEART RATE: 62 BPM

## 2023-06-09 LAB
-  AMIKACIN: SIGNIFICANT CHANGE UP
-  AMOXICILLIN/CLAVULANIC ACID: SIGNIFICANT CHANGE UP
-  AMPICILLIN/SULBACTAM: SIGNIFICANT CHANGE UP
-  AMPICILLIN: SIGNIFICANT CHANGE UP
-  AZTREONAM: SIGNIFICANT CHANGE UP
-  CEFAZOLIN: SIGNIFICANT CHANGE UP
-  CEFEPIME: SIGNIFICANT CHANGE UP
-  CEFOXITIN: SIGNIFICANT CHANGE UP
-  CEFTRIAXONE: SIGNIFICANT CHANGE UP
-  CEFUROXIME: SIGNIFICANT CHANGE UP
-  CIPROFLOXACIN: SIGNIFICANT CHANGE UP
-  ERTAPENEM: SIGNIFICANT CHANGE UP
-  GENTAMICIN: SIGNIFICANT CHANGE UP
-  LEVOFLOXACIN: SIGNIFICANT CHANGE UP
-  MEROPENEM: SIGNIFICANT CHANGE UP
-  NITROFURANTOIN: SIGNIFICANT CHANGE UP
-  PIPERACILLIN/TAZOBACTAM: SIGNIFICANT CHANGE UP
-  TOBRAMYCIN: SIGNIFICANT CHANGE UP
-  TRIMETHOPRIM/SULFAMETHOXAZOLE: SIGNIFICANT CHANGE UP
METHOD TYPE: SIGNIFICANT CHANGE UP

## 2023-06-09 PROCEDURE — 99238 HOSP IP/OBS DSCHRG MGMT 30/<: CPT

## 2023-06-09 RX ORDER — VANCOMYCIN HCL 1 G
1 VIAL (EA) INTRAVENOUS
Qty: 9 | Refills: 0
Start: 2023-06-09 | End: 2023-06-17

## 2023-06-09 RX ORDER — POLYETHYLENE GLYCOL 3350 17 G/17G
17 POWDER, FOR SOLUTION ORAL DAILY
Refills: 0 | Status: DISCONTINUED | OUTPATIENT
Start: 2023-06-09 | End: 2023-06-09

## 2023-06-09 RX ORDER — CEFUROXIME AXETIL 250 MG
1 TABLET ORAL
Qty: 8 | Refills: 0
Start: 2023-06-09 | End: 2023-06-12

## 2023-06-09 RX ADMIN — Medication 125 MILLIGRAM(S): at 06:33

## 2023-06-09 RX ADMIN — Medication 100 MILLIGRAM(S): at 09:35

## 2023-06-09 RX ADMIN — Medication 1 TABLET(S): at 09:34

## 2023-06-09 RX ADMIN — CLOPIDOGREL BISULFATE 75 MILLIGRAM(S): 75 TABLET, FILM COATED ORAL at 09:35

## 2023-06-09 RX ADMIN — RIVAROXABAN 15 MILLIGRAM(S): KIT at 12:01

## 2023-06-09 RX ADMIN — Medication 25 MILLIGRAM(S): at 09:35

## 2023-06-09 RX ADMIN — CEFEPIME 1000 MILLIGRAM(S): 1 INJECTION, POWDER, FOR SOLUTION INTRAMUSCULAR; INTRAVENOUS at 09:35

## 2023-06-09 RX ADMIN — Medication 180 MILLIGRAM(S): at 09:34

## 2023-06-09 RX ADMIN — POLYETHYLENE GLYCOL 3350 17 GRAM(S): 17 POWDER, FOR SOLUTION ORAL at 12:43

## 2023-06-09 NOTE — DISCHARGE NOTE PROVIDER - CARE PROVIDER_API CALL
Roberto Heredia  Cardiovascular Disease  [address], [address]  [place] [address]  Phone: [phone]  Fax: [phone]  Follow Up Time: 2 weeks

## 2023-06-09 NOTE — DISCHARGE NOTE PROVIDER - NSDCFUSCHEDAPPT_GEN_ALL_CORE_FT
Mark Sorenson  [facility]  UROLOGY [address]  Scheduled Appointment: 08/09/2023    [facility]  ELECTROPH [address] [place] [address] Av  Scheduled Appointment: 08/22/2023

## 2023-06-09 NOTE — DISCHARGE NOTE NURSING/CASE MANAGEMENT/SOCIAL WORK - PATIENT PORTAL LINK FT
You can access the FollowMyHealth Patient Portal offered by [facility] by registering at the following website: [email or web address]. By joining [employer]’s FollowMyHealth portal, you will also be able to view your health information using other applications (apps) compatible with our system.

## 2023-06-09 NOTE — DISCHARGE NOTE PROVIDER - NSDCCPCAREPLAN_GEN_ALL_CORE_FT
PRINCIPAL DISCHARGE DIAGNOSIS  Diagnosis: Acute UTI  Assessment and Plan of Treatment: You were admitted for a symptomatic UTI and treated with IV antibiotics. Your urianry sympotms have improved and you are not showing any further signs of infection. Please continue to take Ceftin 250mg po twice a day for the next 4 days as well as vancomycin 125mg daily for the next 9 days for C.diff prevention. You do NOT need to keep taking the Nitrofurantoin (macrobid). Please follow up with your PCP within 1 week.    To prevent another UTI, you can do the following:   Empty your bladder often.    Wipe from front to back after you urinate or have a bowel movement. This will help prevent germs from getting into your urinary tract.   Drink water, but do not drink alcohol, caffeine, or citrus juices. These can irritate your bladder and increase your symptoms.   Urinate after you have sex. This can help flush out bacteria passed during sex.   Do not douche or use feminine deodorants. These can change the chemical balance in your vagina.   Do pelvic muscle exercises often. Pelvic muscle exercises may help you start and stop urinating. Strong pelvic muscles may help you empty your bladder easier. Squeeze these muscles tightly for 5 seconds like you are trying to hold back urine. Then relax for 5 seconds. Gradually work up to squeezing for 10 seconds. Do 3 sets of 15 repetitions a day   Seek care immediately if:   •You are urinating very little or not at all.   •You have a high fever with shaking chills.   •You have side or back pain that gets worse.

## 2023-06-09 NOTE — DISCHARGE NOTE PROVIDER - HOSPITAL COURSE
83 y/o female w/ PMHx of w/ pacemaker, CAD w/ stent, Afib on Xarelto and Plavix, recurrent C. diff s/p fecal transplant, chronic sinusitis, HTN, Lyme, Gout, frequent UTI, HLD, kidney stones, parathyroid acinous adenoma s/p parathyroidectomy presents with c/o of fever and weakness, and palpitations while be treated for UTI with PO macrobid. In the ED temp 99.7, HR 88, /41 and RR 16.   She had 4 days of IV Cefepime, and her cultures came back positive for Proteus miraballis and Staph . She no longer reports fever, chills, or chest pain.       6/9: Patient seen at bedside in the AM. Requests miralax for constipation. Denies chest pain or urinary discomfort. VSS. All labs reviewed. Patient medicalyl stable upon dishcarge.        83 y/o female w/ PMHx of w/ pacemaker, CAD w/ stent, Afib on Xarelto and Plavix, recurrent C. diff s/p fecal transplant, chronic sinusitis, HTN, Lyme, Gout, frequent UTI, HLD, kidney stones, parathyroid acinous adenoma s/p parathyroidectomy presents with c/o of fever and weakness, and palpitations while be treated for UTI with PO macrobid. In the ED temp 99.7, HR 88, /41 and RR 16.   She had 4 days of IV Cefepime, and her cultures came back positive for Proteus mirabilis and Staph . She no longer reports fever, chills, or chest pain.       6/9: Patient seen at bedside in the AM. Requests Miralax for constipation. Denies chest pain or urinary discomfort. VSS. All labs reviewed. Patient medicalyl stable upon dishcarge.        83 y/o female w/ PMHx of w/ pacemaker, CAD w/ stent, Afib on Xarelto and Plavix, recurrent C. diff s/p fecal transplant, chronic sinusitis, HTN, Lyme, Gout, frequent UTI, HLD, kidney stones, parathyroid acinous adenoma s/p parathyroidectomy presented to  ED on 6/6 c/o of fever and weakness, and palpitations while being treated for UTI with PO macrobid. In the ED temp 99.7, HR 88, /41 and RR 16. UA showed moderate blood and trace LE. She was admitted and started on cefepime since she had a history of pseudomonas UTI. She had 4 days of IV Cefepime, along with prophylactic po vancomycin due to her c.diff history. Her blood cultures were negative but her urine cultures came back positive for Proteus mirabilis and Staph . She had her pacemaker interrogated by EP which showed she had an episode on Afib on 6/5 - the day she was complaining of palpitations. She no longer reports fever, chills, or dysuria. Upon discharge she will continue her antibiotics course with 4 more days of oral antibiotics, as well as oral vancomycin daily for the next 5 days.          6/9: Patient seen at bedside in the AM. Requests Miralax for constipation. Denies chest pain or urinary discomfort. VSS. All labs reviewed. Patient medically stable upon discharge.     Vital Signs Last 24 Hrs  T(C): 36.4 (09 Jun 2023 07:32), Max: 36.7 (08 Jun 2023 15:22)  T(F): 97.6 (09 Jun 2023 07:32), Max: 98.1 (08 Jun 2023 15:22)  HR: 62 (09 Jun 2023 09:30) (59 - 62)  BP: 144/50 (09 Jun 2023 07:32) (116/57 - 144/50)  RR: 17 (09 Jun 2023 07:32) (17 - 18)  SpO2: 97% (09 Jun 2023 07:32) (96% - 98%)    Parameters below as of 09 Jun 2023 07:32  Patient On (Oxygen Delivery Method): room air    GENERAL: A&Ox3, non-toxic appearing, no acute distress  HEENT: NCAT, EOMI, oral mucosa moist, pink conjunctiva  RESP: CTABL  CV: RRR, no murmurs/rubs/gallops  ABDOMEN: soft, non-tender, non-distended, no guarding, no CVA tenderness  MSK: no visible deformities  NEURO: no focal sensory or motor deficits, CN 2-12 grossly intact  SKIN: warm, normal color, well perfused, no rash  PSYCH: normal affect                          12.8   10.40 )-----------( 241      ( 08 Jun 2023 06:31 )             39.8

## 2023-06-09 NOTE — DISCHARGE NOTE NURSING/CASE MANAGEMENT/SOCIAL WORK - NSDCPEFALRISK_GEN_ALL_CORE
For information on Fall & Injury Prevention, visit: [email or web address] OR  [email or web address] OR  https://www.cdc.gov/steadi/patient.html

## 2023-06-09 NOTE — INPATIENT CERTIFICATION FOR MEDICARE PATIENTS - IN ORDER TO MEET MEDICARE REQUIREMENTS.
In order to meet Medicare requirements, the clinical documentation must support the information cited in the admission order.  Please be sure to provide detailed and clear documentation about the following in the admitting note/history and physical: <<-----Click on this checkbox to enter Procedure Charcot joint repair  01/11/2019  Right foot with fusion of medial column, Right 2nd met beam, Right 4th met beam, and calcaneal cuboid articularion with tendo achilles lengthening  Active  ARTI

## 2023-06-09 NOTE — DISCHARGE NOTE PROVIDER - NSDCMRMEDTOKEN_GEN_ALL_CORE_FT
allopurinol 100 mg oral tablet: 1 tab(s) orally once a day  atorvastatin 20 mg oral tablet: 1 tab(s) orally once a day (at bedtime)  clopidogrel 75 mg oral tablet: 1 tab(s) orally once a day  fluticasone 50 mcg/inh nasal spray: 1 in each nostril prn  lactobacillus acidophilus oral capsule: 1 tab(s) orally 2 times a day  metoprolol succinate 25 mg oral tablet, extended release: 1 tab(s) orally once a day (at bedtime)  nitrofurantoin macrocrystals-monohydrate 100 mg oral capsule: 1 cap(s) orally 2 times a day for 7 days ***course not complete, pt took 2 doses total***  potassium citrate 10 mEq oral tablet, extended release: 2 tab(s) orally 2 times a day  Ventolin HFA 90 mcg/inh inhalation aerosol: 2 inhaled prn  verapamil 180 mg/24 hours oral tablet, extended release: 1 tab(s) orally once a day  Xarelto 15 mg oral tablet: 1 tab(s) orally once a day (in the evening)  Zaditor 0.025% ophthalmic solution: 1 drop(s) in each affected eye 2 times a day as needed for  allergy symptoms   allopurinol 100 mg oral tablet: 1 tab(s) orally once a day  atorvastatin 20 mg oral tablet: 1 tab(s) orally once a day (at bedtime)  cefuroxime 250 mg oral tablet: 1 tab(s) orally 2 times a day  clopidogrel 75 mg oral tablet: 1 tab(s) orally once a day  fluticasone 50 mcg/inh nasal spray: 1 in each nostril prn  lactobacillus acidophilus oral capsule: 1 tab(s) orally 2 times a day  metoprolol succinate 25 mg oral tablet, extended release: 1 tab(s) orally once a day (at bedtime)  potassium citrate 10 mEq oral tablet, extended release: 2 tab(s) orally 2 times a day  vancomycin 125 mg oral capsule: 1 cap(s) orally once a day  Ventolin HFA 90 mcg/inh inhalation aerosol: 2 inhaled prn  verapamil 180 mg/24 hours oral tablet, extended release: 1 tab(s) orally once a day  Xarelto 15 mg oral tablet: 1 tab(s) orally once a day (in the evening)  Zaditor 0.025% ophthalmic solution: 1 drop(s) in each affected eye 2 times a day as needed for  allergy symptoms

## 2023-06-10 LAB
-  CEFTRIAXONE: SIGNIFICANT CHANGE UP
-  LEVOFLOXACIN: SIGNIFICANT CHANGE UP
-  PENICILLIN: SIGNIFICANT CHANGE UP
CULTURE RESULTS: SIGNIFICANT CHANGE UP
METHOD TYPE: SIGNIFICANT CHANGE UP
ORGANISM # SPEC MICROSCOPIC CNT: SIGNIFICANT CHANGE UP
SPECIMEN SOURCE: SIGNIFICANT CHANGE UP

## 2023-06-14 DIAGNOSIS — Z95.5 PRESENCE OF CORONARY ANGIOPLASTY IMPLANT AND GRAFT: ICD-10-CM

## 2023-06-14 DIAGNOSIS — I48.0 PAROXYSMAL ATRIAL FIBRILLATION: ICD-10-CM

## 2023-06-14 DIAGNOSIS — E89.2 POSTPROCEDURAL HYPOPARATHYROIDISM: ICD-10-CM

## 2023-06-14 DIAGNOSIS — A69.20 LYME DISEASE, UNSPECIFIED: ICD-10-CM

## 2023-06-14 DIAGNOSIS — J32.9 CHRONIC SINUSITIS, UNSPECIFIED: ICD-10-CM

## 2023-06-14 DIAGNOSIS — R94.31 ABNORMAL ELECTROCARDIOGRAM [ECG] [EKG]: ICD-10-CM

## 2023-06-14 DIAGNOSIS — G43.909 MIGRAINE, UNSPECIFIED, NOT INTRACTABLE, WITHOUT STATUS MIGRAINOSUS: ICD-10-CM

## 2023-06-14 DIAGNOSIS — D72.829 ELEVATED WHITE BLOOD CELL COUNT, UNSPECIFIED: ICD-10-CM

## 2023-06-14 DIAGNOSIS — Z79.01 LONG TERM (CURRENT) USE OF ANTICOAGULANTS: ICD-10-CM

## 2023-06-14 DIAGNOSIS — I25.10 ATHEROSCLEROTIC HEART DISEASE OF NATIVE CORONARY ARTERY WITHOUT ANGINA PECTORIS: ICD-10-CM

## 2023-06-14 DIAGNOSIS — J45.909 UNSPECIFIED ASTHMA, UNCOMPLICATED: ICD-10-CM

## 2023-06-14 DIAGNOSIS — B96.4 PROTEUS (MIRABILIS) (MORGANII) AS THE CAUSE OF DISEASES CLASSIFIED ELSEWHERE: ICD-10-CM

## 2023-06-14 DIAGNOSIS — Z80.42 FAMILY HISTORY OF MALIGNANT NEOPLASM OF PROSTATE: ICD-10-CM

## 2023-06-14 DIAGNOSIS — I10 ESSENTIAL (PRIMARY) HYPERTENSION: ICD-10-CM

## 2023-06-14 DIAGNOSIS — N39.0 URINARY TRACT INFECTION, SITE NOT SPECIFIED: ICD-10-CM

## 2023-06-14 DIAGNOSIS — Z91.013 ALLERGY TO SEAFOOD: ICD-10-CM

## 2023-06-14 DIAGNOSIS — K59.00 CONSTIPATION, UNSPECIFIED: ICD-10-CM

## 2023-06-14 DIAGNOSIS — M41.9 SCOLIOSIS, UNSPECIFIED: ICD-10-CM

## 2023-06-14 DIAGNOSIS — Z91.048 OTHER NONMEDICINAL SUBSTANCE ALLERGY STATUS: ICD-10-CM

## 2023-06-14 DIAGNOSIS — M10.9 GOUT, UNSPECIFIED: ICD-10-CM

## 2023-06-14 DIAGNOSIS — E78.5 HYPERLIPIDEMIA, UNSPECIFIED: ICD-10-CM

## 2023-06-14 DIAGNOSIS — B95.7 OTHER STAPHYLOCOCCUS AS THE CAUSE OF DISEASES CLASSIFIED ELSEWHERE: ICD-10-CM

## 2023-06-14 DIAGNOSIS — Z82.49 FAMILY HISTORY OF ISCHEMIC HEART DISEASE AND OTHER DISEASES OF THE CIRCULATORY SYSTEM: ICD-10-CM

## 2023-06-14 DIAGNOSIS — R73.9 HYPERGLYCEMIA, UNSPECIFIED: ICD-10-CM

## 2023-06-14 DIAGNOSIS — Z88.1 ALLERGY STATUS TO OTHER ANTIBIOTIC AGENTS STATUS: ICD-10-CM

## 2023-06-14 DIAGNOSIS — M48.061 SPINAL STENOSIS, LUMBAR REGION WITHOUT NEUROGENIC CLAUDICATION: ICD-10-CM

## 2023-06-14 DIAGNOSIS — Z79.02 LONG TERM (CURRENT) USE OF ANTITHROMBOTICS/ANTIPLATELETS: ICD-10-CM

## 2023-07-02 ENCOUNTER — EMERGENCY (EMERGENCY)
Facility: HOSPITAL | Age: 83
LOS: 0 days | Discharge: HOME | End: 2023-07-02
Attending: FAMILY MEDICINE
Payer: MEDICARE

## 2023-07-02 VITALS
HEART RATE: 66 BPM | TEMPERATURE: 99 F | SYSTOLIC BLOOD PRESSURE: 149 MMHG | DIASTOLIC BLOOD PRESSURE: 57 MMHG | RESPIRATION RATE: 18 BRPM | OXYGEN SATURATION: 95 %

## 2023-07-02 VITALS
DIASTOLIC BLOOD PRESSURE: 53 MMHG | HEART RATE: 62 BPM | RESPIRATION RATE: 18 BRPM | TEMPERATURE: 98 F | OXYGEN SATURATION: 97 % | SYSTOLIC BLOOD PRESSURE: 141 MMHG

## 2023-07-02 DIAGNOSIS — M10.9 GOUT, UNSPECIFIED: ICD-10-CM

## 2023-07-02 DIAGNOSIS — R11.2 NAUSEA WITH VOMITING, UNSPECIFIED: ICD-10-CM

## 2023-07-02 DIAGNOSIS — Z79.02 LONG TERM (CURRENT) USE OF ANTITHROMBOTICS/ANTIPLATELETS: ICD-10-CM

## 2023-07-02 DIAGNOSIS — Z98.890 OTHER SPECIFIED POSTPROCEDURAL STATES: Chronic | ICD-10-CM

## 2023-07-02 DIAGNOSIS — J32.9 CHRONIC SINUSITIS, UNSPECIFIED: ICD-10-CM

## 2023-07-02 DIAGNOSIS — Z86.018 PERSONAL HISTORY OF OTHER BENIGN NEOPLASM: ICD-10-CM

## 2023-07-02 DIAGNOSIS — Z95.5 PRESENCE OF CORONARY ANGIOPLASTY IMPLANT AND GRAFT: ICD-10-CM

## 2023-07-02 DIAGNOSIS — I10 ESSENTIAL (PRIMARY) HYPERTENSION: ICD-10-CM

## 2023-07-02 DIAGNOSIS — J45.909 UNSPECIFIED ASTHMA, UNCOMPLICATED: ICD-10-CM

## 2023-07-02 DIAGNOSIS — Z95.0 PRESENCE OF CARDIAC PACEMAKER: ICD-10-CM

## 2023-07-02 DIAGNOSIS — Z98.891 HISTORY OF UTERINE SCAR FROM PREVIOUS SURGERY: Chronic | ICD-10-CM

## 2023-07-02 DIAGNOSIS — M54.50 LOW BACK PAIN, UNSPECIFIED: ICD-10-CM

## 2023-07-02 DIAGNOSIS — R19.7 DIARRHEA, UNSPECIFIED: ICD-10-CM

## 2023-07-02 DIAGNOSIS — E89.2 POSTPROCEDURAL HYPOPARATHYROIDISM: Chronic | ICD-10-CM

## 2023-07-02 DIAGNOSIS — I48.91 UNSPECIFIED ATRIAL FIBRILLATION: ICD-10-CM

## 2023-07-02 DIAGNOSIS — Z79.01 LONG TERM (CURRENT) USE OF ANTICOAGULANTS: ICD-10-CM

## 2023-07-02 DIAGNOSIS — Z96.0 PRESENCE OF UROGENITAL IMPLANTS: Chronic | ICD-10-CM

## 2023-07-02 DIAGNOSIS — Z88.1 ALLERGY STATUS TO OTHER ANTIBIOTIC AGENTS STATUS: ICD-10-CM

## 2023-07-02 DIAGNOSIS — M48.061 SPINAL STENOSIS, LUMBAR REGION WITHOUT NEUROGENIC CLAUDICATION: ICD-10-CM

## 2023-07-02 DIAGNOSIS — Z87.448 PERSONAL HISTORY OF OTHER DISEASES OF URINARY SYSTEM: Chronic | ICD-10-CM

## 2023-07-02 DIAGNOSIS — E78.5 HYPERLIPIDEMIA, UNSPECIFIED: ICD-10-CM

## 2023-07-02 DIAGNOSIS — Z87.442 PERSONAL HISTORY OF URINARY CALCULI: ICD-10-CM

## 2023-07-02 DIAGNOSIS — Z91.013 ALLERGY TO SEAFOOD: ICD-10-CM

## 2023-07-02 DIAGNOSIS — I25.10 ATHEROSCLEROTIC HEART DISEASE OF NATIVE CORONARY ARTERY WITHOUT ANGINA PECTORIS: ICD-10-CM

## 2023-07-02 DIAGNOSIS — Z90.89 ACQUIRED ABSENCE OF OTHER ORGANS: ICD-10-CM

## 2023-07-02 DIAGNOSIS — Z91.09 OTHER ALLERGY STATUS, OTHER THAN TO DRUGS AND BIOLOGICAL SUBSTANCES: ICD-10-CM

## 2023-07-02 DIAGNOSIS — M54.89 OTHER DORSALGIA: ICD-10-CM

## 2023-07-02 DIAGNOSIS — Z87.59 PERSONAL HISTORY OF OTHER COMPLICATIONS OF PREGNANCY, CHILDBIRTH AND THE PUERPERIUM: ICD-10-CM

## 2023-07-02 DIAGNOSIS — Z87.440 PERSONAL HISTORY OF URINARY (TRACT) INFECTIONS: Chronic | ICD-10-CM

## 2023-07-02 LAB
ALBUMIN SERPL ELPH-MCNC: 3.5 G/DL — SIGNIFICANT CHANGE UP (ref 3.3–5)
ALP SERPL-CCNC: 87 U/L — SIGNIFICANT CHANGE UP (ref 40–120)
ALT FLD-CCNC: 46 U/L — SIGNIFICANT CHANGE UP (ref 12–78)
ANION GAP SERPL CALC-SCNC: 5 MMOL/L — SIGNIFICANT CHANGE UP (ref 5–17)
APPEARANCE UR: CLEAR — SIGNIFICANT CHANGE UP
APTT BLD: 22.2 SEC — LOW (ref 27.5–35.5)
AST SERPL-CCNC: 25 U/L — SIGNIFICANT CHANGE UP (ref 15–37)
BACTERIA # UR AUTO: ABNORMAL
BASOPHILS # BLD AUTO: 0.04 K/UL — SIGNIFICANT CHANGE UP (ref 0–0.2)
BASOPHILS NFR BLD AUTO: 0.3 % — SIGNIFICANT CHANGE UP (ref 0–2)
BILIRUB SERPL-MCNC: 0.9 MG/DL — SIGNIFICANT CHANGE UP (ref 0.2–1.2)
BILIRUB UR-MCNC: NEGATIVE — SIGNIFICANT CHANGE UP
BLD GP AB SCN SERPL QL: SIGNIFICANT CHANGE UP
BUN SERPL-MCNC: 21 MG/DL — SIGNIFICANT CHANGE UP (ref 7–23)
CALCIUM SERPL-MCNC: 9.1 MG/DL — SIGNIFICANT CHANGE UP (ref 8.5–10.1)
CHLORIDE SERPL-SCNC: 108 MMOL/L — SIGNIFICANT CHANGE UP (ref 96–108)
CO2 SERPL-SCNC: 21 MMOL/L — LOW (ref 22–31)
COLOR SPEC: YELLOW — SIGNIFICANT CHANGE UP
COMMENT - URINE: SIGNIFICANT CHANGE UP
CREAT SERPL-MCNC: 0.71 MG/DL — SIGNIFICANT CHANGE UP (ref 0.5–1.3)
DIFF PNL FLD: ABNORMAL
EGFR: 84 ML/MIN/1.73M2 — SIGNIFICANT CHANGE UP
EOSINOPHIL # BLD AUTO: 0 K/UL — SIGNIFICANT CHANGE UP (ref 0–0.5)
EOSINOPHIL NFR BLD AUTO: 0 % — SIGNIFICANT CHANGE UP (ref 0–6)
EPI CELLS # UR: SIGNIFICANT CHANGE UP
GLUCOSE SERPL-MCNC: 134 MG/DL — HIGH (ref 70–99)
GLUCOSE UR QL: NEGATIVE — SIGNIFICANT CHANGE UP
HCT VFR BLD CALC: 44.9 % — SIGNIFICANT CHANGE UP (ref 34.5–45)
HGB BLD-MCNC: 15 G/DL — SIGNIFICANT CHANGE UP (ref 11.5–15.5)
IMM GRANULOCYTES NFR BLD AUTO: 0.5 % — SIGNIFICANT CHANGE UP (ref 0–0.9)
INR BLD: 1.16 RATIO — SIGNIFICANT CHANGE UP (ref 0.88–1.16)
KETONES UR-MCNC: ABNORMAL
LACTATE SERPL-SCNC: 1.4 MMOL/L — SIGNIFICANT CHANGE UP (ref 0.7–2)
LEUKOCYTE ESTERASE UR-ACNC: NEGATIVE — SIGNIFICANT CHANGE UP
LYMPHOCYTES # BLD AUTO: 1.42 K/UL — SIGNIFICANT CHANGE UP (ref 1–3.3)
LYMPHOCYTES # BLD AUTO: 11.9 % — LOW (ref 13–44)
MCHC RBC-ENTMCNC: 29.4 PG — SIGNIFICANT CHANGE UP (ref 27–34)
MCHC RBC-ENTMCNC: 33.4 GM/DL — SIGNIFICANT CHANGE UP (ref 32–36)
MCV RBC AUTO: 87.9 FL — SIGNIFICANT CHANGE UP (ref 80–100)
MONOCYTES # BLD AUTO: 0.27 K/UL — SIGNIFICANT CHANGE UP (ref 0–0.9)
MONOCYTES NFR BLD AUTO: 2.3 % — SIGNIFICANT CHANGE UP (ref 2–14)
NEUTROPHILS # BLD AUTO: 10.17 K/UL — HIGH (ref 1.8–7.4)
NEUTROPHILS NFR BLD AUTO: 85 % — HIGH (ref 43–77)
NITRITE UR-MCNC: NEGATIVE — SIGNIFICANT CHANGE UP
PH UR: 5 — SIGNIFICANT CHANGE UP (ref 5–8)
PLATELET # BLD AUTO: 257 K/UL — SIGNIFICANT CHANGE UP (ref 150–400)
POTASSIUM SERPL-MCNC: 3.9 MMOL/L — SIGNIFICANT CHANGE UP (ref 3.5–5.3)
POTASSIUM SERPL-SCNC: 3.9 MMOL/L — SIGNIFICANT CHANGE UP (ref 3.5–5.3)
PROT SERPL-MCNC: 7.6 GM/DL — SIGNIFICANT CHANGE UP (ref 6–8.3)
PROT UR-MCNC: 30 MG/DL
PROTHROM AB SERPL-ACNC: 13.5 SEC — HIGH (ref 10.5–13.4)
RBC # BLD: 5.11 M/UL — SIGNIFICANT CHANGE UP (ref 3.8–5.2)
RBC # FLD: 14.7 % — HIGH (ref 10.3–14.5)
RBC CASTS # UR COMP ASSIST: ABNORMAL /HPF (ref 0–4)
SODIUM SERPL-SCNC: 134 MMOL/L — LOW (ref 135–145)
SP GR SPEC: 1.02 — SIGNIFICANT CHANGE UP (ref 1.01–1.02)
UROBILINOGEN FLD QL: NEGATIVE — SIGNIFICANT CHANGE UP
WBC # BLD: 11.96 K/UL — HIGH (ref 3.8–10.5)
WBC # FLD AUTO: 11.96 K/UL — HIGH (ref 3.8–10.5)
WBC UR QL: NEGATIVE /HPF — SIGNIFICANT CHANGE UP (ref 0–5)

## 2023-07-02 PROCEDURE — 80053 COMPREHEN METABOLIC PANEL: CPT

## 2023-07-02 PROCEDURE — 99285 EMERGENCY DEPT VISIT HI MDM: CPT | Mod: 25

## 2023-07-02 PROCEDURE — 86900 BLOOD TYPING SEROLOGIC ABO: CPT

## 2023-07-02 PROCEDURE — 74176 CT ABD & PELVIS W/O CONTRAST: CPT | Mod: 26,MA

## 2023-07-02 PROCEDURE — 96375 TX/PRO/DX INJ NEW DRUG ADDON: CPT

## 2023-07-02 PROCEDURE — 93010 ELECTROCARDIOGRAM REPORT: CPT

## 2023-07-02 PROCEDURE — 93005 ELECTROCARDIOGRAM TRACING: CPT

## 2023-07-02 PROCEDURE — 87086 URINE CULTURE/COLONY COUNT: CPT

## 2023-07-02 PROCEDURE — 96374 THER/PROPH/DIAG INJ IV PUSH: CPT

## 2023-07-02 PROCEDURE — 36415 COLL VENOUS BLD VENIPUNCTURE: CPT

## 2023-07-02 PROCEDURE — 85730 THROMBOPLASTIN TIME PARTIAL: CPT

## 2023-07-02 PROCEDURE — 87040 BLOOD CULTURE FOR BACTERIA: CPT

## 2023-07-02 PROCEDURE — 86850 RBC ANTIBODY SCREEN: CPT

## 2023-07-02 PROCEDURE — 81001 URINALYSIS AUTO W/SCOPE: CPT

## 2023-07-02 PROCEDURE — 85610 PROTHROMBIN TIME: CPT

## 2023-07-02 PROCEDURE — 83605 ASSAY OF LACTIC ACID: CPT

## 2023-07-02 PROCEDURE — 86901 BLOOD TYPING SEROLOGIC RH(D): CPT

## 2023-07-02 PROCEDURE — 85025 COMPLETE CBC W/AUTO DIFF WBC: CPT

## 2023-07-02 PROCEDURE — 74176 CT ABD & PELVIS W/O CONTRAST: CPT | Mod: MA

## 2023-07-02 PROCEDURE — 99285 EMERGENCY DEPT VISIT HI MDM: CPT

## 2023-07-02 RX ORDER — SODIUM CHLORIDE 9 MG/ML
3 INJECTION INTRAMUSCULAR; INTRAVENOUS; SUBCUTANEOUS ONCE
Refills: 0 | Status: COMPLETED | OUTPATIENT
Start: 2023-07-02 | End: 2023-07-02

## 2023-07-02 RX ORDER — HYDROMORPHONE HYDROCHLORIDE 2 MG/ML
0.5 INJECTION INTRAMUSCULAR; INTRAVENOUS; SUBCUTANEOUS ONCE
Refills: 0 | Status: DISCONTINUED | OUTPATIENT
Start: 2023-07-02 | End: 2023-07-02

## 2023-07-02 RX ORDER — ONDANSETRON 8 MG/1
4 TABLET, FILM COATED ORAL ONCE
Refills: 0 | Status: COMPLETED | OUTPATIENT
Start: 2023-07-02 | End: 2023-07-02

## 2023-07-02 RX ORDER — OXYCODONE AND ACETAMINOPHEN 5; 325 MG/1; MG/1
1 TABLET ORAL
Qty: 9 | Refills: 0
Start: 2023-07-02 | End: 2023-07-04

## 2023-07-02 RX ORDER — ONDANSETRON 8 MG/1
1 TABLET, FILM COATED ORAL
Qty: 9 | Refills: 0
Start: 2023-07-02 | End: 2023-07-04

## 2023-07-02 RX ORDER — SODIUM CHLORIDE 9 MG/ML
1000 INJECTION INTRAMUSCULAR; INTRAVENOUS; SUBCUTANEOUS ONCE
Refills: 0 | Status: COMPLETED | OUTPATIENT
Start: 2023-07-02 | End: 2023-07-02

## 2023-07-02 RX ADMIN — ONDANSETRON 4 MILLIGRAM(S): 8 TABLET, FILM COATED ORAL at 19:43

## 2023-07-02 RX ADMIN — SODIUM CHLORIDE 3 MILLILITER(S): 9 INJECTION INTRAMUSCULAR; INTRAVENOUS; SUBCUTANEOUS at 20:06

## 2023-07-02 RX ADMIN — SODIUM CHLORIDE 1000 MILLILITER(S): 9 INJECTION INTRAMUSCULAR; INTRAVENOUS; SUBCUTANEOUS at 19:44

## 2023-07-02 RX ADMIN — HYDROMORPHONE HYDROCHLORIDE 0.5 MILLIGRAM(S): 2 INJECTION INTRAMUSCULAR; INTRAVENOUS; SUBCUTANEOUS at 19:37

## 2023-07-02 NOTE — ED PROVIDER NOTE - CLINICAL SUMMARY MEDICAL DECISION MAKING FREE TEXT BOX
81 y/o female w/ PMHx of kidney stones, CAD w/ stent and pacemaker, Afib on Xarelto and Plavix, recurrent C. diff s/p fecal transplant, HTN, frequent UTI, HLD, here with L flank pain x 2 days with n/v/d. No dysuria now but was treated for 81 y/o female w/ PMHx of kidney stones, CAD w/ stent and pacemaker, Afib on Xarelto and Plavix, recurrent C. diff s/p fecal transplant, HTN, frequent UTI, HLD, here with L flank pain x 2 days with n/v/d. No dysuria now but was treated for UTI. Plan for labs, IV fluids, pain meds, zofran, CT A/P.

## 2023-07-02 NOTE — ED PROVIDER NOTE - PATIENT PORTAL LINK FT
You can access the FollowMyHealth Patient Portal offered by [facility] by registering at the following website: [email or web address]. By joining [employer]’s FollowMyHealth portal, you will also be able to view your health information using other applications (apps) compatible with our system.

## 2023-07-02 NOTE — ED ADULT NURSE NOTE - OBJECTIVE STATEMENT
pt present to ed for evaluation of left sided back pain with associated nausea and vomiting since last night 9 pm, pt has hx of kidney stones and cardiac stent, ekg done

## 2023-07-02 NOTE — ED PROVIDER NOTE - NSFOLLOWUPINSTRUCTIONS_ED_ALL_ED_FT
Take percocet every eight hours for pain. Drink lots of fluids. Follow up with your doctor. Return to er if worse.

## 2023-07-02 NOTE — ED ADULT TRIAGE NOTE - CHIEF COMPLAINT QUOTE
pt present to ed for evaluation of left sided back pain with associated nausea and vomiting since last night 9 pm, pt has hx of kidney stones and cardiac stent, ekg in progress

## 2023-07-02 NOTE — ED PROVIDER NOTE - OBJECTIVE STATEMENT
83 y/o female w/ PMHx of w/ pacemaker, CAD w/ stent, Afib on Xarelto and Plavix, recurrent C. diff s/p fecal transplant, chronic sinusitis, HTN, Lyme, Gout, frequent UTI, HLD, kidney stones, parathyroid acinous adenoma s/p parathyroidectomy presents to ED c/o severe L sided back pain with worsening nausea x last night, vomiting and multiple episodes of diarrhea x today. States she went to bed at 9pm and woke up at 9:15pm with severe L sided back pain radiating to the front. Denies difficulty urinating. Tried to take Tylenol with no relief. States pain does not worsen with movement, but the nausea does. Reports last kidney stones was 4-5 years ago, had a nephrostomy tube surgery to remove the stone. States she takes Vancomycin for C diff precautions, does not gets diarrhea from it. Allergic to Cipro.

## 2023-07-03 ENCOUNTER — NON-APPOINTMENT (OUTPATIENT)
Age: 83
End: 2023-07-03

## 2023-07-04 LAB
CULTURE RESULTS: SIGNIFICANT CHANGE UP
SPECIMEN SOURCE: SIGNIFICANT CHANGE UP

## 2023-07-05 NOTE — ED POST DISCHARGE NOTE - DETAILS
pt aware of her urine culture contaminated and spoke to her urologist Dr. Bishop, was started on Augmentin and going to lab today for repeat Urine culture. THONY MOFFETT

## 2023-07-06 LAB
APPEARANCE: CLEAR
BACTERIA UR CULT: NORMAL
BACTERIA: NEGATIVE /HPF
BILIRUBIN URINE: NEGATIVE
BLOOD URINE: NEGATIVE
CALCIUM OXALATE CRYSTALS: PRESENT
CAST: 0 /LPF
COLOR: YELLOW
EPITHELIAL CELLS: 1 /HPF
GLUCOSE QUALITATIVE U: NEGATIVE MG/DL
KETONES URINE: NEGATIVE MG/DL
LEUKOCYTE ESTERASE URINE: ABNORMAL
MICROSCOPIC-UA: NORMAL
NITRITE URINE: NEGATIVE
PH URINE: 5.5
PROTEIN URINE: NEGATIVE MG/DL
RED BLOOD CELLS URINE: 4 /HPF
REVIEW: NORMAL
SPECIFIC GRAVITY URINE: 1.02
UROBILINOGEN URINE: 0.2 MG/DL
WHITE BLOOD CELLS URINE: 2 /HPF

## 2023-07-21 DIAGNOSIS — R19.7 DIARRHEA, UNSPECIFIED: ICD-10-CM

## 2023-07-22 ENCOUNTER — EMERGENCY (EMERGENCY)
Facility: HOSPITAL | Age: 83
LOS: 0 days | Discharge: HOME | End: 2023-07-23
Attending: STUDENT IN AN ORGANIZED HEALTH CARE EDUCATION/TRAINING PROGRAM
Payer: MEDICARE

## 2023-07-22 VITALS — WEIGHT: 149.91 LBS | HEIGHT: 59 IN

## 2023-07-22 DIAGNOSIS — J45.909 UNSPECIFIED ASTHMA, UNCOMPLICATED: ICD-10-CM

## 2023-07-22 DIAGNOSIS — Z91.048 OTHER NONMEDICINAL SUBSTANCE ALLERGY STATUS: ICD-10-CM

## 2023-07-22 DIAGNOSIS — Z98.890 OTHER SPECIFIED POSTPROCEDURAL STATES: Chronic | ICD-10-CM

## 2023-07-22 DIAGNOSIS — Z91.013 ALLERGY TO SEAFOOD: ICD-10-CM

## 2023-07-22 DIAGNOSIS — D72.829 ELEVATED WHITE BLOOD CELL COUNT, UNSPECIFIED: ICD-10-CM

## 2023-07-22 DIAGNOSIS — Z96.0 PRESENCE OF UROGENITAL IMPLANTS: ICD-10-CM

## 2023-07-22 DIAGNOSIS — J34.2 DEVIATED NASAL SEPTUM: ICD-10-CM

## 2023-07-22 DIAGNOSIS — A04.72 ENTEROCOLITIS DUE TO CLOSTRIDIUM DIFFICILE, NOT SPECIFIED AS RECURRENT: ICD-10-CM

## 2023-07-22 DIAGNOSIS — Z96.0 PRESENCE OF UROGENITAL IMPLANTS: Chronic | ICD-10-CM

## 2023-07-22 DIAGNOSIS — Z90.89 ACQUIRED ABSENCE OF OTHER ORGANS: ICD-10-CM

## 2023-07-22 DIAGNOSIS — M41.9 SCOLIOSIS, UNSPECIFIED: ICD-10-CM

## 2023-07-22 DIAGNOSIS — I48.91 UNSPECIFIED ATRIAL FIBRILLATION: ICD-10-CM

## 2023-07-22 DIAGNOSIS — M10.9 GOUT, UNSPECIFIED: ICD-10-CM

## 2023-07-22 DIAGNOSIS — Z87.448 PERSONAL HISTORY OF OTHER DISEASES OF URINARY SYSTEM: Chronic | ICD-10-CM

## 2023-07-22 DIAGNOSIS — I10 ESSENTIAL (PRIMARY) HYPERTENSION: ICD-10-CM

## 2023-07-22 DIAGNOSIS — Z87.442 PERSONAL HISTORY OF URINARY CALCULI: ICD-10-CM

## 2023-07-22 DIAGNOSIS — Z88.1 ALLERGY STATUS TO OTHER ANTIBIOTIC AGENTS STATUS: ICD-10-CM

## 2023-07-22 DIAGNOSIS — R53.83 OTHER FATIGUE: ICD-10-CM

## 2023-07-22 DIAGNOSIS — R19.7 DIARRHEA, UNSPECIFIED: ICD-10-CM

## 2023-07-22 DIAGNOSIS — Z79.02 LONG TERM (CURRENT) USE OF ANTITHROMBOTICS/ANTIPLATELETS: ICD-10-CM

## 2023-07-22 DIAGNOSIS — B34.0 ADENOVIRUS INFECTION, UNSPECIFIED: ICD-10-CM

## 2023-07-22 DIAGNOSIS — Z87.440 PERSONAL HISTORY OF URINARY (TRACT) INFECTIONS: Chronic | ICD-10-CM

## 2023-07-22 DIAGNOSIS — Z20.822 CONTACT WITH AND (SUSPECTED) EXPOSURE TO COVID-19: ICD-10-CM

## 2023-07-22 DIAGNOSIS — R10.9 UNSPECIFIED ABDOMINAL PAIN: ICD-10-CM

## 2023-07-22 DIAGNOSIS — Z79.01 LONG TERM (CURRENT) USE OF ANTICOAGULANTS: ICD-10-CM

## 2023-07-22 DIAGNOSIS — E89.2 POSTPROCEDURAL HYPOPARATHYROIDISM: Chronic | ICD-10-CM

## 2023-07-22 DIAGNOSIS — E78.5 HYPERLIPIDEMIA, UNSPECIFIED: ICD-10-CM

## 2023-07-22 DIAGNOSIS — Z98.891 HISTORY OF UTERINE SCAR FROM PREVIOUS SURGERY: Chronic | ICD-10-CM

## 2023-07-22 DIAGNOSIS — M48.061 SPINAL STENOSIS, LUMBAR REGION WITHOUT NEUROGENIC CLAUDICATION: ICD-10-CM

## 2023-07-22 LAB
ALBUMIN SERPL ELPH-MCNC: 3 G/DL — LOW (ref 3.3–5)
ALP SERPL-CCNC: 90 U/L — SIGNIFICANT CHANGE UP (ref 40–120)
ALT FLD-CCNC: 32 U/L — SIGNIFICANT CHANGE UP (ref 12–78)
ANION GAP SERPL CALC-SCNC: 9 MMOL/L — SIGNIFICANT CHANGE UP (ref 5–17)
APTT BLD: 35.3 SEC — SIGNIFICANT CHANGE UP (ref 27.5–35.5)
AST SERPL-CCNC: 19 U/L — SIGNIFICANT CHANGE UP (ref 15–37)
BASOPHILS # BLD AUTO: 0 K/UL — SIGNIFICANT CHANGE UP (ref 0–0.2)
BASOPHILS NFR BLD AUTO: 0 % — SIGNIFICANT CHANGE UP (ref 0–2)
BILIRUB SERPL-MCNC: 1.5 MG/DL — HIGH (ref 0.2–1.2)
BUN SERPL-MCNC: 15 MG/DL — SIGNIFICANT CHANGE UP (ref 7–23)
CALCIUM SERPL-MCNC: 9.1 MG/DL — SIGNIFICANT CHANGE UP (ref 8.5–10.1)
CHLORIDE SERPL-SCNC: 109 MMOL/L — HIGH (ref 96–108)
CO2 SERPL-SCNC: 19 MMOL/L — LOW (ref 22–31)
CREAT SERPL-MCNC: 1.15 MG/DL — SIGNIFICANT CHANGE UP (ref 0.5–1.3)
EGFR: 47 ML/MIN/1.73M2 — LOW
EOSINOPHIL # BLD AUTO: 0 K/UL — SIGNIFICANT CHANGE UP (ref 0–0.5)
EOSINOPHIL NFR BLD AUTO: 0 % — SIGNIFICANT CHANGE UP (ref 0–6)
GLUCOSE SERPL-MCNC: 122 MG/DL — HIGH (ref 70–99)
HCT VFR BLD CALC: 43.1 % — SIGNIFICANT CHANGE UP (ref 34.5–45)
HGB BLD-MCNC: 14.7 G/DL — SIGNIFICANT CHANGE UP (ref 11.5–15.5)
INR BLD: 2.73 RATIO — HIGH (ref 0.88–1.16)
LACTATE SERPL-SCNC: 1.2 MMOL/L — SIGNIFICANT CHANGE UP (ref 0.7–2)
LYMPHOCYTES # BLD AUTO: 1.91 K/UL — SIGNIFICANT CHANGE UP (ref 1–3.3)
LYMPHOCYTES # BLD AUTO: 11 % — LOW (ref 13–44)
MANUAL SMEAR VERIFICATION: SIGNIFICANT CHANGE UP
MCHC RBC-ENTMCNC: 29.9 PG — SIGNIFICANT CHANGE UP (ref 27–34)
MCHC RBC-ENTMCNC: 34.1 GM/DL — SIGNIFICANT CHANGE UP (ref 32–36)
MCV RBC AUTO: 87.8 FL — SIGNIFICANT CHANGE UP (ref 80–100)
MONOCYTES # BLD AUTO: 1.39 K/UL — HIGH (ref 0–0.9)
MONOCYTES NFR BLD AUTO: 8 % — SIGNIFICANT CHANGE UP (ref 2–14)
NEUTROPHILS # BLD AUTO: 13.68 K/UL — HIGH (ref 1.8–7.4)
NEUTROPHILS NFR BLD AUTO: 78 % — HIGH (ref 43–77)
NEUTS BAND # BLD: 1 % — SIGNIFICANT CHANGE UP (ref 0–8)
NRBC # BLD: 0 /100 — SIGNIFICANT CHANGE UP (ref 0–0)
NRBC # BLD: SIGNIFICANT CHANGE UP /100 WBCS (ref 0–0)
PLAT MORPH BLD: NORMAL — SIGNIFICANT CHANGE UP
PLATELET # BLD AUTO: 258 K/UL — SIGNIFICANT CHANGE UP (ref 150–400)
PLATELET COUNT - ESTIMATE: NORMAL — SIGNIFICANT CHANGE UP
POTASSIUM SERPL-MCNC: 3.7 MMOL/L — SIGNIFICANT CHANGE UP (ref 3.5–5.3)
POTASSIUM SERPL-SCNC: 3.7 MMOL/L — SIGNIFICANT CHANGE UP (ref 3.5–5.3)
PROT SERPL-MCNC: 7.5 GM/DL — SIGNIFICANT CHANGE UP (ref 6–8.3)
PROTHROM AB SERPL-ACNC: 32 SEC — HIGH (ref 10.5–13.4)
RBC # BLD: 4.91 M/UL — SIGNIFICANT CHANGE UP (ref 3.8–5.2)
RBC # FLD: 15.2 % — HIGH (ref 10.3–14.5)
RBC BLD AUTO: NORMAL — SIGNIFICANT CHANGE UP
SODIUM SERPL-SCNC: 137 MMOL/L — SIGNIFICANT CHANGE UP (ref 135–145)
VARIANT LYMPHS # BLD: 2 % — SIGNIFICANT CHANGE UP (ref 0–6)
WBC # BLD: 17.32 K/UL — HIGH (ref 3.8–10.5)
WBC # FLD AUTO: 17.32 K/UL — HIGH (ref 3.8–10.5)

## 2023-07-22 PROCEDURE — 83605 ASSAY OF LACTIC ACID: CPT

## 2023-07-22 PROCEDURE — 80053 COMPREHEN METABOLIC PANEL: CPT

## 2023-07-22 PROCEDURE — 71045 X-RAY EXAM CHEST 1 VIEW: CPT

## 2023-07-22 PROCEDURE — 99285 EMERGENCY DEPT VISIT HI MDM: CPT

## 2023-07-22 PROCEDURE — 0225U NFCT DS DNA&RNA 21 SARSCOV2: CPT

## 2023-07-22 PROCEDURE — 85610 PROTHROMBIN TIME: CPT

## 2023-07-22 PROCEDURE — 87040 BLOOD CULTURE FOR BACTERIA: CPT | Mod: 59

## 2023-07-22 PROCEDURE — 93010 ELECTROCARDIOGRAM REPORT: CPT

## 2023-07-22 PROCEDURE — 85025 COMPLETE CBC W/AUTO DIFF WBC: CPT

## 2023-07-22 PROCEDURE — 85730 THROMBOPLASTIN TIME PARTIAL: CPT

## 2023-07-22 PROCEDURE — 99285 EMERGENCY DEPT VISIT HI MDM: CPT | Mod: 25

## 2023-07-22 PROCEDURE — 71045 X-RAY EXAM CHEST 1 VIEW: CPT | Mod: 26

## 2023-07-22 PROCEDURE — 93005 ELECTROCARDIOGRAM TRACING: CPT

## 2023-07-22 PROCEDURE — 36415 COLL VENOUS BLD VENIPUNCTURE: CPT

## 2023-07-22 RX ORDER — VANCOMYCIN HCL 1 G
125 VIAL (EA) INTRAVENOUS ONCE
Refills: 0 | Status: COMPLETED | OUTPATIENT
Start: 2023-07-22 | End: 2023-07-22

## 2023-07-22 RX ADMIN — Medication 125 MILLIGRAM(S): at 22:47

## 2023-07-22 NOTE — ED PROVIDER NOTE - EKG ADDITIONAL INFORMATION FREE TEXT
My independent interpretation is rate 68, normal sinus rhythm, normal axis, no ST segment elevations.

## 2023-07-22 NOTE — ED PROVIDER NOTE - CLINICAL SUMMARY MEDICAL DECISION MAKING FREE TEXT BOX
Pt with + C.diff seen on [facility]. Also + adenovirus in stool. Non-tender belly. Will check screening labs for elevated lactate or leukocytosis. Pt appears well. Pending work-up, possible discharge with PO Vancomycin.

## 2023-07-22 NOTE — ED ADULT TRIAGE NOTE - WEIGHT METHOD
Chief Complaint   Patient presents with   • Other     bump on middle back, would like medication to help to stop smoking         HPI   Armando Sarah is a 33 year old male is here as a new Patient with c/o pain less lump in his back, getting bigger & wants it to be removed.  Needs help with quitting smoking. Has been smoking 1 PPD for 15 years, tried quitting without success. Wants to take Chantix as few of his friends quit smoking.using it.  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 Has been drinking lot of coffee. Also drinking lot of beer, but he is ready to quit drinking beer.    Current Outpatient Medications   Medication Sig Dispense Refill   • varenicline (CHANTIX STARTING MONTH PAK) 0.5 MG X 11 & 1 MG X 42 tablet Use the pak as instructed 53 tablet 0     No current facility-administered medications for this visit.        ALLERGIES:  Allergies no known allergies    No past medical history on file.    History reviewed. No pertinent surgical history.  Family History   Adopted: Yes       Review of Systems  See attached form  Objective     Visit Vitals  /76   Pulse 108   Temp 98.7 °F (37.1 °C) (Oral)   Ht 5' 9\" (1.753 m)   Wt 98.9 kg (218 lb)   BMI 32.19 kg/m²       Physical Exam   Constitutional: He is oriented to person, place, and time. He appears well-developed and well-nourished.   HENT:   Head: Normocephalic and atraumatic.   Right Ear: External ear normal.   Left Ear: External ear normal.   Mouth/Throat: Oropharynx is clear and moist.   Eyes: Conjunctivae and EOM are normal.   Neck: Normal range of motion. Neck supple. No thyromegaly present.   Cardiovascular: Normal rate and regular rhythm.   Pulmonary/Chest: Effort normal and breath sounds normal.   Abdominal: Soft. Bowel sounds are normal. Musculoskeletal:         General: No tenderness or edema.      Comments: Non tender firm mobile mass+ in mid back     Neurological: He is alert and  oriented to person, place, and time.   Skin: Skin is warm.   Psychiatric: He has a normal mood and affect.         No visits with results within 30 Day(s) from this visit.   Latest known visit with results is:   No results found for any previous visit.         Assessment and plan:  Lipoma of torso  - SERVICE TO SURGERY GENERAL    Tobacco dependence  - varenicline (CHANTIX STARTING MONTH CANDACE) 0.5 MG X 11 & 1 MG X 42 tablet; Use the candace as instructed  Dispense: 53 tablet; Refill: 0  side effects of medication including depression & suicidal ideas was discussed with Patient., Patient wants to use Chantix to quit smoking. Advised again about other options including Nicotine patches & Wellbutrin               stated

## 2023-07-22 NOTE — ED PROVIDER NOTE - OBJECTIVE STATEMENT
83 y/o female with PMHx of Afib on Xarelto and Plavix, HLD, HTN, C. diff colitis s/p 2 fecal transplants, UTI, renal calculi, parathyroid acinous adenoma, asthma, gout, and kidney stone ambulatory to the ED c/o diarrhea, abdominal pain, and fatigue. Pt states she has received multiple courses of antibiotics for a UTI she had since early July. Pt states symptoms started with fatigue last week Saturday and progressed to a syncopal episode while at the supermarket on Monday. Pt started to experience diarrhea on Wednesday night which worsened into Thursday, states she has been having around 20-25 BMs daily since then. Pt states symptoms are similar to previous C. diff episodes. Called her GI and was unable to get an appointment but completed a stool kit for testing at [facility]. Pt states she received Vancomycin for C.diff infections in the past.     Urologist: Dr. Mark Sorenson  GI: Dr. Jones

## 2023-07-22 NOTE — ED ADULT NURSE NOTE - OBJECTIVE STATEMENT
Pt presents to ED c/o diarrhea. Pt has hx of cdif. Pt states she received multiple courses of abx for a UTI in early July. Pt states since Wed she has been having diarrhea, about 2-25 BMs since then. Pt states symptoms are similar to previous cdif episodes. Pt completed a stool kit for testing at [facility], but states results have not come back yet.

## 2023-07-22 NOTE — ED ADULT NURSE NOTE - NSFALLUNIVINTERV_ED_ALL_ED
Bed/Stretcher in lowest position, wheels locked, appropriate side rails in place/Call bell, personal items and telephone in reach/Instruct patient to call for assistance before getting out of bed/chair/stretcher/Non-slip footwear applied when patient is off stretcher/[place] to call system/Physically safe environment - no spills, clutter or unnecessary equipment/Purposeful proactive rounding/Room/bathroom lighting operational, light cord in reach

## 2023-07-22 NOTE — ED PROVIDER NOTE - NSFOLLOWUPINSTRUCTIONS_ED_ALL_ED_FT
Clostridioides Difficile Infection  Clostridioides difficile infection, also known as C. difficile or C. diff infection, happens when too much C. diff bacteria grows. This can cause severe diarrhea and inflammation of the colon (colitis). It is linked to recent use of antibiotic medicine.    This infection can be passed from person to person (is contagious). You also may be exposed to the bacteria from contact with food, water, or surfaces that have the bacteria on them.    What are the causes?  Certain bacteria live in the colon and help to digest food. This infection develops when the balance of helpful bacteria in the colon changes and the C. diff bacteria grow out of control. This is often caused by taking antibiotics.    What increases the risk?  You may be more likely to develop this condition if you:  Take certain antibiotics that kill many types of bacteria or take antibiotics for a long time.  Have an extended stay in a hospital or long-term care facility.  Are older than age 65.  Have had a C. diff infection before or have been exposed to C. diff bacteria.  Have a weakened disease-fighting system (immune system).  Take a medicine to reduce stomach acid, such as a proton pump inhibitor, for a long time.  Have a serious underlying condition, such as colon cancer or inflammatory bowel disease (IBD).  Have had a gastrointestinal (GI) tract procedure.  What are the signs or symptoms?  Symptoms of this condition include:  Diarrhea (three or more times a day) for several days.  Fever.  Loss of appetite.  Nausea.  Swelling, pain, cramping, or tenderness in the abdomen.  How is this diagnosed?  This condition is diagnosed with:  Your medical history and a physical exam.  Tests, which may include:  A test for C. diff in your stool (feces).  Blood tests.  Imaging tests, such as a CT scan of your abdomen.  A procedure in which your colon is examined. This is rare.  How is this treated?  Treatment for this condition may include:  Stopping the antibiotics that you were taking when the C. diff infection began. Do this only as told by your health care provider.  Taking certain antibiotics to stop C. diff growth.  Taking stool from a healthy person and placing it into your colon (fecal transplant). This may be done if the infection keeps coming back.  Having surgery to remove the infected part of the colon. This is rare.  Follow these instructions at home:  Medicines    Take over-the-counter and prescription medicines only as told by your health care provider.  Take antibiotic medicine as told by your health care provider. Do not stop taking the antibiotic even if you start to feel better.  Do not treat diarrhea with medicines unless your health care provider tells you to.  Eating and drinking      Follow instructions from your health care provider about eating and drinking restrictions.  Eat bland foods in small amounts that are easy to digest. These include bananas, applesauce, rice, lean meats, toast, and crackers.  Follow instructions on replacing body fluid that has been lost (rehydrate). This may include:  Drinking clear fluids, such as water, clear fruit juice that is diluted with water, and low-calorie sports drinks.  Sucking on ice chips.  Taking an oral rehydration solution (ORS). This drink is sold at pharmacies and retail stores.  Avoid milk, caffeine, and alcohol.  Drink enough fluid to keep your urine pale yellow.  General instructions    Wash your hands often with soap and water for at least 20 seconds. Bathe or shower using soap and water daily.  Return to your normal activities as told by your health care provider. Ask your health care provider what activities are safe for you.  Be sure your home is clean before you leave the hospital or clinic to go home. Then continue daily cleaning for at least a week.  Keep all follow-up visits. This is important.  How is this prevented?  Hand hygiene      Wash your hands with soap and water for at least 20 seconds before preparing food and after using the bathroom. Make sure the people you live with also wash their hands often with soap and water for at least 20 seconds.  If you are being treated at a hospital or clinic, make sure that all health care providers and visitors wash their hands with soap and water before touching you.  Contact precautions    Tell your health care team right away if you develop diarrhea while in a hospital or long-term care facility.  When visiting someone in a hospital or a long-term care facility, follow guidelines for wearing a gown, gloves, or other protective equipment.  If possible, avoid contact with people who have diarrhea.  Use a separate bathroom if you are sick and live with other people, if possible.  Clean environment    Clean surfaces that are touched often every day. C. diff bacteria are killed only by cleaning products that contain 10% chlorine bleach solution. Be sure to:  Read the product's label to make sure the product will kill the bacteria on the surface you are cleaning.  Clean frequently touched surfaces, such as toilet seats and flush handles, bathtubs, sinks, doorknobs, and work surfaces.  If you are in the hospital, make sure that staff members clean the surfaces in your room daily. Let a staff person know right away if body fluids have splashed or spilled.  Washing clothes and linens    Use a powder laundry detergent containing chlorine bleach instead of liquid detergent. Powder detergents contain chlorine bleach in low levels to help kill bacteria.  Run your empty washing machine on the hot setting once a month with enough detergent for a full load. This will kill any remaining C. diff bacteria.  Contact a health care provider if:  Your symptoms do not get better, or they get worse, even with treatment.  Your symptoms go away and then come back.  You have a fever.  You develop new symptoms.  Get help right away if:  You have more pain or tenderness in your abdomen.  You have stool that is mostly bloody, or looks black and tarry.  You cannot eat or drink without vomiting.  You have signs of dehydration, such as:  Dark urine, very little urine, or no urine.  Cracked lips or dry mouth.  Not making tears when you cry.  Sunken eyes.  Sleepiness.  Weakness or dizziness.  Summary  Clostridioides difficile infection, or C. diff infection, can cause severe diarrhea and inflammation of the colon (colitis). It is linked to recent antibiotic use.  C. diff infection can spread from person to person (is contagious). You also may be exposed to the bacteria from contact with food, water, or surfaces that have the bacteria on them.  This infection may be treated by stopping the antibiotics you were using when the infection began. Fecal transplant or surgery may be needed for repeat or severe infections.  Washing hands with soap and water for at least 20 seconds after you use the bathroom and before you eat, and cleaning surfaces with a 10% bleach solution, can help prevent or limit spread of this infection.  This information is not intended to replace advice given to you by your health care provider. Make sure you discuss any questions you have with your health care provider.

## 2023-07-22 NOTE — ED PROVIDER NOTE - PROGRESS NOTE DETAILS
Patient with leukocytosis with nontender abdomen.  No other actionable findings at this time.  Patient feels comfortable with treatment of her C. difficile as an outpatient.  She will follow-up with her GI doctor.  We will plan for discharge.  Plan to discharge patient. Return to ED precautions were discussed with the patient/family. All questions were answered. Beto Abraham MD.

## 2023-07-22 NOTE — ED PROVIDER NOTE - PATIENT PORTAL LINK FT
You can access the FollowMyHealth Patient Portal offered by [facility] by registering at the following website: [email or web address]. By joining [employer]’s FollowMyHealth portal, you will also be able to view your health information using other applications (apps) compatible with our system.

## 2023-07-22 NOTE — ED ADULT TRIAGE NOTE - CHIEF COMPLAINT QUOTE
pt presents to ED c/o diarrhea, nausea, fever since Wednesday afternoon. pt with hx recent UTI's, abx use and c-diff. pt denies chest pain, SOB at this time.

## 2023-07-22 NOTE — ED PROVIDER NOTE - CARE PROVIDER_API CALL
Mario Magaña.  Gastroenterology  [address] [place] [id number]  Phone: [phone]  Fax: [phone]  Follow Up Time: 4-6 Days

## 2023-07-23 VITALS
TEMPERATURE: 99 F | RESPIRATION RATE: 18 BRPM | HEART RATE: 91 BPM | DIASTOLIC BLOOD PRESSURE: 54 MMHG | OXYGEN SATURATION: 97 % | SYSTOLIC BLOOD PRESSURE: 127 MMHG

## 2023-07-23 LAB
RAPID RVP RESULT: SIGNIFICANT CHANGE UP
SARS-COV-2 RNA SPEC QL NAA+PROBE: SIGNIFICANT CHANGE UP

## 2023-07-23 RX ORDER — VANCOMYCIN HCL 1 G
1 VIAL (EA) INTRAVENOUS
Qty: 40 | Refills: 0
Start: 2023-07-23 | End: 2023-08-01

## 2023-07-28 ENCOUNTER — APPOINTMENT (OUTPATIENT)
Dept: GASTROENTEROLOGY | Facility: CLINIC | Age: 83
End: 2023-07-28
Payer: MEDICARE

## 2023-07-28 VITALS
SYSTOLIC BLOOD PRESSURE: 130 MMHG | DIASTOLIC BLOOD PRESSURE: 67 MMHG | WEIGHT: 132 LBS | BODY MASS INDEX: 27.71 KG/M2 | HEIGHT: 58 IN

## 2023-07-28 DIAGNOSIS — Z09 ENCOUNTER FOR FOLLOW-UP EXAMINATION AFTER COMPLETED TREATMENT FOR CONDITIONS OTHER THAN MALIGNANT NEOPLASM: ICD-10-CM

## 2023-07-28 PROCEDURE — 99213 OFFICE O/P EST LOW 20 MIN: CPT

## 2023-07-28 NOTE — ED ADULT TRIAGE NOTE - ESI TRIAGE ACUITY LEVEL, MLM
Case Management Discharge Planning Note    Patient name Scarlet Valle  Location [address] [address] [id number] [place] [address] [place] [address] [place] [address]-* MRN [id number]  : 1959 Date 2023       Current Admission Date: 2023  Current Admission Diagnosis:Colonic ischemia [facility])   Patient Active Problem List    Diagnosis Date Noted   • Acute postoperative pain 2023   • Colitis 2023   • Chronic migraine w/o aura w/o status migrainosus, not intractable 2023   • IIH (idiopathic intracranial hypertension) 2023   • Hematochezia 2023   • Left carotid stenosis 2023   • Colonic ischemia ([address]) 2023   • Injury of left facial nerve 2023   • Partial facial nerve palsy 2023   • Hepatic steatosis 2022   • Asymptomatic stenosis of left carotid artery 10/05/2022   • Stenosis of left carotid artery 2022   • Carotid artery stenosis 2022   • Appendix disease 2022   • Urinary retention 2022   • Peripheral vascular disease ([address]) 2022   • 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   • COPD (chronic obstructive pulmonary disease) ([address]) 2021   • Unspecified diastolic (congestive) heart failure ([address]) 2021   • Hypertensive heart disease with congestive heart failure ([address]) 2020   • CAMILLE (obstructive sleep apnea)    • Lumbar radiculopathy 10/01/2020   • Paroxysmal atrial fibrillation ([address]) 2020   • Allergic conjunctivitis of both eyes 2020   • Angio-edema 2020   • Gastroesophageal reflux disease without esophagitis 2019   • Allergic reaction 2018   • AMD (age-related macular degeneration), bilateral 2018   • Angina pectoris ([address]) 11/15/2017   • Migraines 10/14/2016   • Essential hypertension 2015      LOS (days): 9  Geometric Mean LOS (GMLOS) (days): 5.30  Days to GMLOS:-4.1     OBJECTIVE:  Risk of Unplanned Readmission Score: 26.41         Current admission status: Inpatient   Preferred Pharmacy:   [id number] - [place] - [address]  Phone: [phone] Fax: [phone]    [address] [place] [address] [address] [place] [address] [address] [facility]  Phone: [phone] Fax: [address] [facility] [address],  [address],  [facility]  Phone: [phone] Fax: [phone]    Primary Care Provider: Isis Pate DO    Primary Insurance: [facility]  Secondary Insurance:     DISCHARGE DETAILS:                                [facility] Agency Name[de-identified] Timothy Jones ASHLEY              Additional Comments: [facility] stated pt got walker in 3/16/2023 and they can not give her another one or do co-pay since she has [address] 3

## 2023-07-31 LAB
ADENOVIRUS F 40/41: DETECTED
CDIFF BY PCR: DETECTED
GI PCR PANEL: DETECTED

## 2023-07-31 NOTE — PROCEDURAL SAFETY CHECKLIST WITH OR WITHOUT SEDATION - NSSIDESITEMARKD_GEN_ALL_CORE
Patient Name: Celestina Sullivan  MRN: [id number]   :  1960     This provider is located at her home office through the [facility] (through [facility]), [address], [address] using a secure [employer] Video Visit through [employer]. Patient is being seen remotely via telehealth at their home address in [place], and stated they are in a secure environment for this session. The patient's condition being diagnosed/treated is appropriate for telemedicine. The provider identified herself as well as her credentials.   The patient, and/or patients guardian, consent to be seen remotely, and when consent is given they understand that the consent allows for patient identifiable information to be sent to a third party as needed.   They may refuse to be seen remotely at any time. The electronic data is encrypted and password protected, and the patient and/or guardian has been advised of the potential risks to privacy not withstanding such measures.    You have chosen to receive care through a telehealth visit.  Do you consent to use a video/audio connection for your medical care today? Yes    Chief Complaint:      ICD-10-CM ICD-9-CM   1. Attention deficit hyperactivity disorder (ADHD), combined type  F90.2 314.01   2. Dysthymia  F34.1 300.4       History of Present Illness: Celestina Sullivan is a 63 y.o. female is here today for medication management follow up.  Patient is working at [employer].  Very busy.  States mood is good and sleep is good as well.  States she is doing much better.    The following portions of the patient's history were reviewed and updated as appropriate: allergies, current medications, past family history, past medical history, past social history, past surgical history, and problem list.    Review of Systems;;  Review of Systems   Constitutional:  Negative for activity change, appetite change, fatigue, unexpected weight gain and unexpected weight loss.    Respiratory:  Negative for shortness of breath and wheezing.    Gastrointestinal:  Negative for constipation, diarrhea, nausea and vomiting.   Musculoskeletal:  Negative for gait problem.   Skin:  Negative for dry skin and rash.   Neurological:  Negative for dizziness, speech difficulty, weakness, light-headedness, headache, memory problem and confusion.   Psychiatric/Behavioral:  Negative for agitation, behavioral problems, decreased concentration, dysphoric mood, hallucinations, self-injury, sleep disturbance, suicidal ideas, negative for hyperactivity, depressed mood and stress. The patient is not nervous/anxious.      Physical Exam;;  Physical Exam  Constitutional:       General: She is not in acute distress.     Appearance: She is well-developed. She is not diaphoretic.   HENT:      Head: Normocephalic and atraumatic.   Eyes:      Conjunctiva/sclera: Conjunctivae normal.   Pulmonary:      Effort: Pulmonary effort is normal. No respiratory distress.   Musculoskeletal:         General: Normal range of motion.      Cervical back: Full passive range of motion without pain and normal range of motion.   Neurological:      Mental Status: She is alert and oriented to person, place, and time.   Psychiatric:         Mood and Affect: Mood is not anxious or depressed. Affect is not labile, blunt, angry or inappropriate.         Speech: Speech is not rapid and pressured or tangential.         Behavior: Behavior normal. Behavior is not agitated, slowed, aggressive, withdrawn, hyperactive or combative. Behavior is cooperative.         Thought Content: Thought content normal. Thought content is not paranoid or delusional. Thought content does not include homicidal or suicidal ideation. Thought content does not include homicidal or suicidal plan.         Judgment: Judgment normal.     There were no vitals taken for this visit.  There is no height or weight on file to calculate BMI. Video appt unable to obtain.     Current  Medications;;    Current Outpatient Medications:     amphetamine-dextroamphetamine (Adderall) 20 MG tablet, Take 20 mg orally every morning and afternoon., Disp: 60 tablet, Rfl: 0    ibuprofen (ADVIL,MOTRIN) 800 MG tablet, Take 1 tablet by mouth Every 6 (Six) Hours As Needed for Moderate Pain., Disp: 60 tablet, Rfl: 3    levothyroxine (SYNTHROID, LEVOTHROID) 137 MCG tablet, TAKE 1 TABLET BY MOUTH DAILY, Disp: 90 tablet, Rfl: 0    rosuvastatin (Crestor) 10 MG tablet, Take 1 tablet by mouth Daily., Disp: 90 tablet, Rfl: 1    Lab Results:   Orders Only on 05/31/2023   Component Date Value Ref Range Status    WBC 05/31/2023 7.2  3.4 - 10.8 x10E3/uL Final    RBC 05/31/2023 4.65  3.77 - 5.28 x10E6/uL Final    Hemoglobin 05/31/2023 15.1  11.1 - 15.9 g/dL Final    Hematocrit 05/31/2023 44.3  34.0 - 46.6 % Final    MCV 05/31/2023 95  79 - 97 fL Final    MCH 05/31/2023 32.5  26.6 - 33.0 pg Final    MCHC 05/31/2023 34.1  31.5 - 35.7 g/dL Final    RDW 05/31/2023 14.1  11.7 - 15.4 % Final    Platelets 05/31/2023 240  150 - 450 x10E3/uL Final    Neutrophil Rel % 05/31/2023 59  Not Estab. % Final    Lymphocyte Rel % 05/31/2023 26  Not Estab. % Final    Monocyte Rel % 05/31/2023 11  Not Estab. % Final    Eosinophil Rel % 05/31/2023 3  Not Estab. % Final    Basophil Rel % 05/31/2023 1  Not Estab. % Final    Neutrophils Absolute 05/31/2023 4.3  1.4 - 7.0 x10E3/uL Final    Lymphocytes Absolute 05/31/2023 1.8  0.7 - 3.1 x10E3/uL Final    Monocytes Absolute 05/31/2023 0.8  0.1 - 0.9 x10E3/uL Final    Eosinophils Absolute 05/31/2023 0.2  0.0 - 0.4 x10E3/uL Final    Basophils Absolute 05/31/2023 0.1  0.0 - 0.2 x10E3/uL Final    Immature Granulocyte Rel % 05/31/2023 0  Not Estab. % Final    Immature Grans Absolute 05/31/2023 0.0  0.0 - 0.1 x10E3/uL Final    Glucose 05/31/2023 90  70 - 99 mg/dL Final    BUN 05/31/2023 13  8 - 27 mg/dL Final    Creatinine 05/31/2023 0.83  0.57 - 1.00 mg/dL Final    EGFR Result 05/31/2023 79  >59 mL/min/1.73  Final    BUN/Creatinine Ratio 05/31/2023 16  12 - 28 Final    Sodium 05/31/2023 137  134 - 144 mmol/L Final    Potassium 05/31/2023 4.3  3.5 - 5.2 mmol/L Final    Chloride 05/31/2023 97  96 - 106 mmol/L Final    Total CO2 05/31/2023 25  20 - 29 mmol/L Final    Calcium 05/31/2023 9.5  8.7 - 10.3 mg/dL Final    Total Protein 05/31/2023 7.3  6.0 - 8.5 g/dL Final    Albumin 05/31/2023 4.6  3.8 - 4.8 g/dL Final    Globulin 05/31/2023 2.7  1.5 - 4.5 g/dL Final    A/G Ratio 05/31/2023 1.7  1.2 - 2.2 Final    Total Bilirubin 05/31/2023 0.4  0.0 - 1.2 mg/dL Final    Alkaline Phosphatase 05/31/2023 115  44 - 121 IU/L Final    AST (SGOT) 05/31/2023 23  0 - 40 IU/L Final    ALT (SGPT) 05/31/2023 18  0 - 32 IU/L Final    Total Cholesterol 05/31/2023 199  100 - 199 mg/dL Final    Triglycerides 05/31/2023 223 (H)  0 - 149 mg/dL Final    HDL Cholesterol 05/31/2023 53  >39 mg/dL Final    VLDL Cholesterol Doug 05/31/2023 38  5 - 40 mg/dL Final    LDL Chol Calc (NIH) 05/31/2023 108 (H)  0 - 99 mg/dL Final    Hemoglobin A1C 05/31/2023 5.5  4.8 - 5.6 % Final    Comment:          Prediabetes: 5.7 - 6.4           Diabetes: >6.4           Glycemic control for adults with diabetes: <7.0      Free T4 05/31/2023 1.25  0.82 - 1.77 ng/dL Final    TSH 05/31/2023 2.170  0.450 - 4.500 uIU/mL Final    25 Hydroxy, Vitamin D 05/31/2023 30.1  30.0 - 100.0 ng/mL Final    Comment: Vitamin D deficiency has been defined by the [place] of  Medicine and an Endocrine Society practice guideline as a  level of serum 25-OH vitamin D less than 20 ng/mL (1,2).  The Endocrine Society went on to further define vitamin D  insufficiency as a level between 21 and 29 ng/mL (2).  1. IOM ([place] of Medicine). 2010. Dietary reference     intakes for calcium and D. [place]: The     National Academies Press.  2. Tru MARTINEZ, Chaya JEONG, Duane GORDILLO, et al.     Evaluation, treatment, and prevention of vitamin D     deficiency: an Endocrine Society clinical  practice     guideline. JCEM. 2011 Jul; 96(7):1911-30.      Hep C Virus Ab 05/31/2023 Non Reactive  Non Reactive Final    Comment: HCV antibody alone does not differentiate between previously  resolved infection and active infection. Equivocal and Reactive  HCV antibody results should be followed up with an HCV RNA test  to support the diagnosis of active HCV infection.      Miriam Rivera CMP14 Default 05/31/2023 Comment   Final    Comment: A hand-written panel/profile was received from your office. In  accordance with the [facility] Ambiguous Test Code Policy dated July 2003, we have completed your order by using the closest currently  or formerly recognized AMA panel.  We have assigned Comprehensive  Metabolic Panel (14), Test Code #[id number] to this request.  If this  is not the testing you wished to receive on this specimen, please  contact the [facility] Client Inquiry/Technical Services Department  to clarify the test order.  We appreciate your business.      Miriam Lima LP Default 05/31/2023 Comment   Final    Comment: A hand-written panel/profile was received from your office. In  accordance with the [facility] Ambiguous Test Code Policy dated July 2003, we have completed your order by using the closest currently  or formerly recognized AMA panel.  We have assigned Lipid Panel,  Test Code #814977 to this request. If this is not the testing you  wished to receive on this specimen, please contact the [facility]  Client Inquiry/Technical Services Department to clarify the test  order.  We appreciate your business.         Mental Status Exam:   Hygiene:   good  Cooperation:  Cooperative  Eye Contact:  Good  Psychomotor Behavior:  Appropriate  Mood:within normal limits  Affect:  Appropriate  Hopelessness: Denies  Speech:  Normal  Thought Process:  Goal directed  Thought Content:  Normal  Suicidal:  None  Homicidal:  None  Hallucinations:  None  Delusion:  None  Memory:  Intact  Orientation:  Person, Place, Time, and  Situation  Reliability:  good  Insight:  Good  Judgement:  Good  Impulse Control:  Good    PHQ-9 Depression Screening  Little interest or pleasure in doing things? (P) 2-->more than half the days   Feeling down, depressed, or hopeless? (P) 2-->more than half the days   Trouble falling or staying asleep, or sleeping too much? (P) 1-->several days   Feeling tired or having little energy? (P) 1-->several days   Poor appetite or overeating? (P) 0-->not at all   Feeling bad about yourself - or that you are a failure or have let yourself or your family down? (P) 1-->several days   Trouble concentrating on things, such as reading the newspaper or watching television? (P) 1-->several days   Moving or speaking so slowly that other people could have noticed? Or the opposite - being so fidgety or restless that you have been moving around a lot more than usual? (P) 1-->several days   Thoughts that you would be better off dead, or of hurting yourself in some way? (P) 0-->not at all   PHQ-9 Total Score (P) 9   If you checked off any problems, how difficult have these problems made it for you to do your work, take care of things at home, or get along with other people? (P) somewhat difficult        Assessment/Plan:  Diagnoses and all orders for this visit:    1. Attention deficit hyperactivity disorder (ADHD), combined type (Primary)  -     amphetamine-dextroamphetamine (Adderall) 20 MG tablet; Take 20 mg orally every morning and afternoon.  Dispense: 60 tablet; Refill: 0    2. Dysthymia      Reviewed patient's office visit note with her primary care provider.  Pt Told her primary care that the Adderall was giving her more depression symptoms and she had been feeling like she has very little motivation.  In speaking to me she states everything is great and Adderall is working very well.  Asked patient twice if mood was okay and patient stated yes it was.  We will continue medication as ordered.    A psychological evaluation was  conducted in order to assess past and current level of functioning. Areas assessed included, but were not limited to: perception of social support, perception of ability to face and deal with challenges in life (positive functioning), anxiety symptoms, depressive symptoms, perspective on beliefs/belief system, coping skills for stress, intelligence level,  Therapeutic rapport was established. Interventions conducted today were geared towards incorporating medication management along with support for continued therapy. Education was also provided as to the med management with this provider and what to expect in subsequent sessions.    We discussed risks, benefits,goals and side effects of the above medication and the patient was agreeable with the plan.Patient was educated on the importance of compliance with treatment and follow-up appointments. Patient is aware to contact the [facility] [phone] with any worsening of symptoms. To call for questions or concerns and return early if necessary. Patent is agreeable to go to the Emergency Department or call 911 should they begin SI/HI.     Part of this note may be an electronic transcription/translation of spoken language to printed text using the Dragon Dictation System.    Return in about 4 weeks (around 8/28/2023) for Follow Up 30 min, Video visit.    TYLER Vasquez   done

## 2023-08-01 NOTE — HISTORY OF PRESENT ILLNESS
[FreeTextEntry1] : Ms. CHAVEZ BORNFRIEND is a 82 year old female with history of recurrent C diff infection. Patient has had several fecal transplants, and recently was given antibiotics for a UTI. Developed classic C diff symptoms, and started vancomycin with resolution. No fever, chills, bleeding.

## 2023-08-01 NOTE — PHYSICAL EXAM
[Alert] : alert [Normal Voice/Communication] : normal voice/communication [Healthy Appearing] : healthy appearing [No Acute Distress] : no acute distress [Sclera] : the sclera and conjunctiva were normal [Hearing Threshold Finger Rub Not [place]] : hearing was normal [Normal Lips/Gums] : the lips and gums were normal [Oropharynx] : the oropharynx was normal [Normal Appearance] : the appearance of the neck was normal [No Neck Mass] : no neck mass was observed [No Respiratory Distress] : no respiratory distress [No Acc Muscle Use] : no accessory muscle use [Respiration, Rhythm And Depth] : normal respiratory rhythm and effort [Auscultation Breath Sounds / Voice Sounds] : lungs were clear to auscultation bilaterally [Heart Rate And Rhythm] : heart rate was normal and rhythm regular [Normal S1, S2] : normal S1 and S2 [Murmurs] : no murmurs [Abdomen Tenderness] : non-tender [Bowel Sounds] : normal bowel sounds [No Masses] : no abdominal mass palpated [Abdomen Soft] : soft [] : no hepatosplenomegaly [Oriented To Time, Place, And Person] : oriented to person, place, and time

## 2023-08-01 NOTE — ASSESSMENT
[FreeTextEntry1] : 83 yo female with history of recurrent C diff. Will complete course of vancomycin. Patient may benefit from monoclonal antibodies. Follow up in one month.

## 2023-08-09 ENCOUNTER — APPOINTMENT (OUTPATIENT)
Dept: UROLOGY | Facility: CLINIC | Age: 83
End: 2023-08-09
Payer: MEDICARE

## 2023-08-09 VITALS
WEIGHT: 137 LBS | BODY MASS INDEX: 28.76 KG/M2 | RESPIRATION RATE: 17 BRPM | TEMPERATURE: 98.3 F | HEIGHT: 58 IN | SYSTOLIC BLOOD PRESSURE: 116 MMHG | DIASTOLIC BLOOD PRESSURE: 70 MMHG | HEART RATE: 86 BPM

## 2023-08-09 DIAGNOSIS — K59.09 OTHER CONSTIPATION: ICD-10-CM

## 2023-08-09 PROCEDURE — 99214 OFFICE O/P EST MOD 30 MIN: CPT

## 2023-08-09 RX ORDER — MULTIVIT-MIN/FOLIC/VIT K/LYCOP 400-300MCG
1000 TABLET ORAL
Qty: 180 | Refills: 3 | Status: ACTIVE | COMMUNITY
Start: 2019-11-13 | End: 1900-01-01

## 2023-08-09 RX ORDER — METHENAMINE HIPPURATE 1 G/1
1 TABLET ORAL TWICE DAILY
Qty: 180 | Refills: 3 | Status: ACTIVE | COMMUNITY
Start: 2019-11-13 | End: 1900-01-01

## 2023-08-09 NOTE — ASSESSMENT
[FreeTextEntry1] : Kidney stones Dietary counseling Increase fluids intake Reduce animal proteins intake Continue Potassium citrate 20mEq twice daily Allopurinol 100mg daily from PCP for gout  UTI suppression: Restart Hiprex and vitamin C Consulted with her gastroenterologist regarding bowel regimen to manage constipation Increase fluid intake  24 hr urine and renal sono before next visit follow up 6 months

## 2023-08-09 NOTE — HISTORY OF PRESENT ILLNESS
[FreeTextEntry1] : 81y/o woman Here for follow up Hx of kidney stones, recurrent UTIs  Recurrent UTIs: Several bouts of cystitis treated with antibiotics Subsequent C. difficile now on vancomycin orally She was previously on Hiprex and vitamin C for UTI suppression which we discontinued after about a year.  During the time that she was on Hiprex, she had fewer infections Resulting from constipation except when she was C. difficile She states that she is not on a bowel regimen to manage her constipation  kidney stones: on K-citrate and Allopurinol (for gout from PCP)  CT ([facility] June 2023): no stones  24 hr urine: low vol, low citrate--- patient admits to increasing her intake of fluids for the 24 urine test.  Otherwise she does not hydrate

## 2023-08-21 ENCOUNTER — NON-APPOINTMENT (OUTPATIENT)
Age: 83
End: 2023-08-21

## 2023-08-21 ENCOUNTER — APPOINTMENT (OUTPATIENT)
Dept: ELECTROPHYSIOLOGY | Facility: CLINIC | Age: 83
End: 2023-08-21
Payer: MEDICARE

## 2023-08-22 PROCEDURE — 93296 REM INTERROG EVL PM/IDS: CPT

## 2023-08-22 PROCEDURE — 93294 REM INTERROG EVL PM/LDLS PM: CPT

## 2023-09-15 ENCOUNTER — APPOINTMENT (OUTPATIENT)
Dept: GASTROENTEROLOGY | Facility: CLINIC | Age: 83
End: 2023-09-15
Payer: MEDICARE

## 2023-09-15 VITALS — WEIGHT: 137 LBS | HEIGHT: 58 IN | BODY MASS INDEX: 28.76 KG/M2

## 2023-09-15 DIAGNOSIS — A04.72 ENTEROCOLITIS DUE TO CLOSTRIDIUM DIFFICILE, NOT SPECIFIED AS RECURRENT: ICD-10-CM

## 2023-09-15 DIAGNOSIS — Z09 ENCOUNTER FOR FOLLOW-UP EXAMINATION AFTER COMPLETED TREATMENT FOR CONDITIONS OTHER THAN MALIGNANT NEOPLASM: ICD-10-CM

## 2023-09-15 PROCEDURE — 99213 OFFICE O/P EST LOW 20 MIN: CPT

## 2023-11-02 ENCOUNTER — APPOINTMENT (OUTPATIENT)
Dept: ORTHOPEDIC SURGERY | Facility: CLINIC | Age: 83
End: 2023-11-02
Payer: MEDICARE

## 2023-11-02 VITALS — WEIGHT: 137 LBS | HEIGHT: 58 IN | BODY MASS INDEX: 28.76 KG/M2

## 2023-11-02 DIAGNOSIS — M17.11 UNILATERAL PRIMARY OSTEOARTHRITIS, RIGHT KNEE: ICD-10-CM

## 2023-11-02 PROCEDURE — 20611 DRAIN/INJ JOINT/BURSA W/US: CPT | Mod: RT

## 2023-11-02 PROCEDURE — 99213 OFFICE O/P EST LOW 20 MIN: CPT | Mod: 25

## 2023-11-02 RX ORDER — AMOXICILLIN AND CLAVULANATE POTASSIUM 875; 125 MG/1; MG/1
875-125 TABLET, COATED ORAL
Qty: 20 | Refills: 0 | Status: COMPLETED | COMMUNITY
Start: 2023-07-03 | End: 2023-11-02

## 2023-11-02 RX ORDER — METHYLPREDNISOLONE ACETATE 40 MG/ML
40 INJECTION, SUSPENSION INTRA-ARTICULAR; INTRALESIONAL; INTRAMUSCULAR; SOFT TISSUE
Refills: 0 | Status: COMPLETED | OUTPATIENT
Start: 2023-11-02

## 2023-11-02 RX ORDER — VANCOMYCIN HYDROCHLORIDE 125 MG/1
125 CAPSULE ORAL
Qty: 84 | Refills: 0 | Status: COMPLETED | COMMUNITY
Start: 2023-07-03 | End: 2023-11-02

## 2023-11-02 RX ADMIN — METHYLPREDNISOLONE ACETATE MG/ML: 40 INJECTION, SUSPENSION INTRA-ARTICULAR; INTRALESIONAL; INTRAMUSCULAR; SOFT TISSUE at 00:00

## 2023-11-12 NOTE — DISCHARGE NOTE ADULT - NS AS DC STROKE DX YN
personally saw and examined patient  labs and vital reviewed  agree with above assessment and plan  64M with hx of testicular CA, lung CA with mets, sent in for syncope and elevated Cr, found to have b/l PE  no cp, some hess, and presyncope at home over past week  HR, BP stable  CTA showing RV strain  tte pending  ck and trop neg, pro-bnp 2844  no prev hx of dvt/pe  cont heparin drip for now  cont tele  will follow with you
no

## 2023-11-20 ENCOUNTER — APPOINTMENT (OUTPATIENT)
Dept: ELECTROPHYSIOLOGY | Facility: CLINIC | Age: 83
End: 2023-11-20
Payer: MEDICARE

## 2023-11-21 ENCOUNTER — NON-APPOINTMENT (OUTPATIENT)
Age: 83
End: 2023-11-21

## 2023-11-21 PROCEDURE — 93296 REM INTERROG EVL PM/IDS: CPT

## 2023-11-21 PROCEDURE — 93294 REM INTERROG EVL PM/LDLS PM: CPT

## 2024-02-20 ENCOUNTER — APPOINTMENT (OUTPATIENT)
Dept: ELECTROPHYSIOLOGY | Facility: CLINIC | Age: 84
End: 2024-02-20
Payer: MEDICARE

## 2024-02-20 ENCOUNTER — NON-APPOINTMENT (OUTPATIENT)
Age: 84
End: 2024-02-20

## 2024-02-20 PROCEDURE — 93294 REM INTERROG EVL PM/LDLS PM: CPT

## 2024-02-20 PROCEDURE — 93296 REM INTERROG EVL PM/IDS: CPT

## 2024-02-23 ENCOUNTER — APPOINTMENT (OUTPATIENT)
Dept: UROLOGY | Facility: CLINIC | Age: 84
End: 2024-02-23
Payer: MEDICARE

## 2024-02-23 DIAGNOSIS — R82.992 HYPEROXALURIA: ICD-10-CM

## 2024-02-23 DIAGNOSIS — N20.0 CALCULUS OF KIDNEY: ICD-10-CM

## 2024-02-23 DIAGNOSIS — N28.1 CYST OF KIDNEY, ACQUIRED: ICD-10-CM

## 2024-02-23 DIAGNOSIS — R82.6 ABNORMAL URINE LEVELS OF SUBSTANCES CHIEFLY NONMEDICINAL AS TO SOURCE: ICD-10-CM

## 2024-02-23 DIAGNOSIS — R82.991 HYPOCITRATURIA: ICD-10-CM

## 2024-02-23 DIAGNOSIS — R39.9 UNSPECIFIED SYMPTOMS AND SIGNS INVOLVING THE GENITOURINARY SYSTEM: ICD-10-CM

## 2024-02-23 PROCEDURE — 99443: CPT | Mod: 93

## 2024-02-23 RX ORDER — POTASSIUM CITRATE 10 MEQ/1
10 MEQ TABLET, EXTENDED RELEASE ORAL TWICE DAILY
Qty: 360 | Refills: 3 | Status: ACTIVE | COMMUNITY
Start: 2018-02-28 | End: 1900-01-01

## 2024-02-23 NOTE — ASSESSMENT
[FreeTextEntry1] : Kidney stones Dietary counseling Increase fluids intake Reduce animal proteins intake Continue Potassium citrate 20mEq twice daily Allopurinol 100mg daily from PCP for gout  UTI suppression: Continue Hiprex and vitamin C Increase fluids intake  [place] 2F left renal cyst: Slight increase in size Monitor for now  24 hr urine and renal sono before next visit follow up 6 months

## 2024-02-23 NOTE — HISTORY OF PRESENT ILLNESS
[FreeTextEntry1] : 82y/o woman Telehealth appointment for follow-up visit today Hx of kidney stones, recurrent UTIs  Recurrent UTIs: We started her on Hiprex and vitamin C as of last visit  kidney stones: on K-citrate and Allopurinol (for gout from PCP)  CT ([facility] June 2023): no stones  24 hr urine: Volume improved to 2 L, citrate ok, other parameters wnl Renal sono: No kidney stones better, no hydronephrosis, left [place] 2F cyst slightly increased in size from 3.9 cm to 4.3 cm

## 2024-02-23 NOTE — REASON FOR VISIT
[Home] : at home, [unfilled] , at the time of the visit. [Verbal consent obtained from patient] : the patient, [unfilled] [Medical Office: ([facility])___] : at the medical office located in  [Follow-up Visit ___] : a follow-up visit  for [unfilled]

## 2024-02-26 NOTE — ED ADULT TRIAGE NOTE - NSWEIGHTCALCTOOLDRUG_GEN_A_CORE
CHIEF COMPLAINT:  Left   knee   Current Symptoms:  Pt states that he noticed a few weeks ago he twisted wrong on the left knee. Pt state that he does have medial knee pains. Pt states that he reyes shave pains to the right knee also. Pt is wanting to have cortisone to both knees if able    PREVIOUS X-RAYS/SCANS: Yes,  X-Ray  PREVIOUS INJURY: None  PREVIOUS TREATMENT: cortisone to the right knee     Are you currently taking anything for Pain relief?  Yes tylenol or iburpfen  Patient is ambulating today on his/her own with no weight-bearing issues / ambulatory aids.    WORK RELATED:  No  OCCUPATION: n/a  CURRENT WORK STATUS:  does not apply    We last saw the patient for right knee and right hip, on  12/6/2021.   Status of that injury / condition: see above    REFERRING PHYSICIAN:  himself  @PMD@      PAST MEDICAL HISTORY:  Past Medical History:   Diagnosis Date   • Bilateral sensorineural hearing loss 05/03/2011   • Essential hypertension, benign 12/12/2012   • GERD (gastroesophageal reflux disease)    • Hyperlipidemia 12/12/2012   • Osteoarthritis    • Other chronic sinusitis    • Restrictive lung disease secondary to obesity    • Rotator cuff tear, left 05/12/2010   • Sleep apnea 12/12/2012    CPAP   • Type II diabetes mellitus 12/12/2012       PAST SURGICAL HISTORY:  Past Surgical History:   Procedure Laterality Date   • Colonoscopy w biopsy  07/01/2012    Dr Meyers-Return 10 years   • Pft(vital cap & flow vol loop)  06/14/2000    Dr Elmore, mild to mod obstruction, partially reversable   • Shoulder arthroscopy  07/28/2010    Left rotator cuff repair   • Sinus surgery Bilateral 09/17/2018    turbinate reduction and septoplasty         At today’s visit, patient needs:  - Work Status record updated?:    No  - Medication refill?:  No   Quality 226: Preventive Care And Screening: Tobacco Use: Screening And Cessation Intervention: Patient screened for tobacco use and is an ex/non-smoker Detail Level: Detailed  used

## 2024-04-08 ENCOUNTER — APPOINTMENT (OUTPATIENT)
Dept: OTOLARYNGOLOGY | Facility: CLINIC | Age: 84
End: 2024-04-08
Payer: MEDICARE

## 2024-04-08 VITALS — BODY MASS INDEX: 28.55 KG/M2 | HEIGHT: 58 IN | WEIGHT: 136 LBS

## 2024-04-08 DIAGNOSIS — H69.93 UNSPECIFIED EUSTACHIAN TUBE DISORDER, BILATERAL: ICD-10-CM

## 2024-04-08 DIAGNOSIS — H93.13 TINNITUS, BILATERAL: ICD-10-CM

## 2024-04-08 DIAGNOSIS — J30.9 ALLERGIC RHINITIS, UNSPECIFIED: ICD-10-CM

## 2024-04-08 DIAGNOSIS — H61.21 IMPACTED CERUMEN, RIGHT EAR: ICD-10-CM

## 2024-04-08 DIAGNOSIS — H90.3 SENSORINEURAL HEARING LOSS, BILATERAL: ICD-10-CM

## 2024-04-08 PROCEDURE — G0268 REMOVAL OF IMPACTED WAX MD: CPT

## 2024-04-08 PROCEDURE — 99203 OFFICE O/P NEW LOW 30 MIN: CPT | Mod: 25

## 2024-04-08 PROCEDURE — 92557 COMPREHENSIVE HEARING TEST: CPT

## 2024-04-08 PROCEDURE — 92567 TYMPANOMETRY: CPT

## 2024-04-08 NOTE — REVIEW OF SYSTEMS
[Seasonal Allergies] : seasonal allergies [Ear Noises] : ear noises [Patient Intake Form Reviewed] : Patient intake form was reviewed [Negative] : Ear [de-identified] : right ear

## 2024-04-08 NOTE — ASSESSMENT
[FreeTextEntry1] : cerumen cleared ad audio au high frequencly loss  as tinnitus Noise or tinnitus can be masked with external sounds such as white noise.  Fans or ac at night can help or the use of a sound generating ej or device. Hearing aids if indicated can have a masking function programed in. allergic rhinits-fluticasone

## 2024-04-08 NOTE — HISTORY OF PRESENT ILLNESS
[de-identified] : co ear ringing mostly left x 6 weeks occasional whoosh rt ear no uri seasonal allergies nasal congestion using flonase and saline

## 2024-04-08 NOTE — DATA REVIEWED
[de-identified] : Asymmetry noted AD. Mild to moderately-severe SNHL, 9326-4524 Hz, left ear. Mild to severe SNHL 7224-4309 Hz, right ear. Type As tymps.

## 2024-04-11 RX ORDER — FLUTICASONE PROPIONATE 50 UG/1
50 SPRAY, METERED NASAL
Qty: 3 | Refills: 3 | Status: ACTIVE | COMMUNITY
Start: 2024-04-08 | End: 1900-01-01

## 2024-04-18 RX ORDER — FLUTICASONE PROPIONATE 50 UG/1
50 SPRAY, METERED NASAL
Qty: 3 | Refills: 3 | Status: ACTIVE | COMMUNITY
Start: 2024-04-18 | End: 1900-01-01

## 2024-04-21 ENCOUNTER — EMERGENCY (EMERGENCY)
Facility: HOSPITAL | Age: 84
LOS: 0 days | Discharge: HOME | End: 2024-04-22
Attending: EMERGENCY MEDICINE
Payer: MEDICARE

## 2024-04-21 VITALS — HEIGHT: 58 IN | WEIGHT: 134.92 LBS

## 2024-04-21 DIAGNOSIS — Z98.890 OTHER SPECIFIED POSTPROCEDURAL STATES: Chronic | ICD-10-CM

## 2024-04-21 DIAGNOSIS — Z87.440 PERSONAL HISTORY OF URINARY (TRACT) INFECTIONS: Chronic | ICD-10-CM

## 2024-04-21 DIAGNOSIS — Z87.442 PERSONAL HISTORY OF URINARY CALCULI: ICD-10-CM

## 2024-04-21 DIAGNOSIS — R11.0 NAUSEA: ICD-10-CM

## 2024-04-21 DIAGNOSIS — Z98.891 HISTORY OF UTERINE SCAR FROM PREVIOUS SURGERY: Chronic | ICD-10-CM

## 2024-04-21 DIAGNOSIS — E78.5 HYPERLIPIDEMIA, UNSPECIFIED: ICD-10-CM

## 2024-04-21 DIAGNOSIS — R00.2 PALPITATIONS: ICD-10-CM

## 2024-04-21 DIAGNOSIS — Z91.013 ALLERGY TO SEAFOOD: ICD-10-CM

## 2024-04-21 DIAGNOSIS — M10.9 GOUT, UNSPECIFIED: ICD-10-CM

## 2024-04-21 DIAGNOSIS — R35.0 FREQUENCY OF MICTURITION: ICD-10-CM

## 2024-04-21 DIAGNOSIS — M54.50 LOW BACK PAIN, UNSPECIFIED: ICD-10-CM

## 2024-04-21 DIAGNOSIS — I48.0 PAROXYSMAL ATRIAL FIBRILLATION: ICD-10-CM

## 2024-04-21 DIAGNOSIS — Z86.69 PERSONAL HISTORY OF OTHER DISEASES OF THE NERVOUS SYSTEM AND SENSE ORGANS: ICD-10-CM

## 2024-04-21 DIAGNOSIS — Z87.448 PERSONAL HISTORY OF OTHER DISEASES OF URINARY SYSTEM: Chronic | ICD-10-CM

## 2024-04-21 DIAGNOSIS — J45.909 UNSPECIFIED ASTHMA, UNCOMPLICATED: ICD-10-CM

## 2024-04-21 DIAGNOSIS — R10.9 UNSPECIFIED ABDOMINAL PAIN: ICD-10-CM

## 2024-04-21 DIAGNOSIS — R39.15 URGENCY OF URINATION: ICD-10-CM

## 2024-04-21 DIAGNOSIS — I10 ESSENTIAL (PRIMARY) HYPERTENSION: ICD-10-CM

## 2024-04-21 DIAGNOSIS — Z91.048 OTHER NONMEDICINAL SUBSTANCE ALLERGY STATUS: ICD-10-CM

## 2024-04-21 DIAGNOSIS — Z88.1 ALLERGY STATUS TO OTHER ANTIBIOTIC AGENTS STATUS: ICD-10-CM

## 2024-04-21 DIAGNOSIS — Z79.01 LONG TERM (CURRENT) USE OF ANTICOAGULANTS: ICD-10-CM

## 2024-04-21 DIAGNOSIS — Z96.0 PRESENCE OF UROGENITAL IMPLANTS: Chronic | ICD-10-CM

## 2024-04-21 PROCEDURE — 93005 ELECTROCARDIOGRAM TRACING: CPT

## 2024-04-21 PROCEDURE — 83735 ASSAY OF MAGNESIUM: CPT

## 2024-04-21 PROCEDURE — 85610 PROTHROMBIN TIME: CPT

## 2024-04-21 PROCEDURE — 81001 URINALYSIS AUTO W/SCOPE: CPT

## 2024-04-21 PROCEDURE — 93010 ELECTROCARDIOGRAM REPORT: CPT

## 2024-04-21 PROCEDURE — 96374 THER/PROPH/DIAG INJ IV PUSH: CPT

## 2024-04-21 PROCEDURE — 74177 CT ABD & PELVIS W/CONTRAST: CPT | Mod: MC

## 2024-04-21 PROCEDURE — 87086 URINE CULTURE/COLONY COUNT: CPT

## 2024-04-21 PROCEDURE — 85025 COMPLETE CBC W/AUTO DIFF WBC: CPT

## 2024-04-21 PROCEDURE — 96375 TX/PRO/DX INJ NEW DRUG ADDON: CPT

## 2024-04-21 PROCEDURE — 71045 X-RAY EXAM CHEST 1 VIEW: CPT

## 2024-04-21 PROCEDURE — 99285 EMERGENCY DEPT VISIT HI MDM: CPT | Mod: 25

## 2024-04-21 PROCEDURE — 87186 SC STD MICRODIL/AGAR DIL: CPT

## 2024-04-21 PROCEDURE — 85730 THROMBOPLASTIN TIME PARTIAL: CPT

## 2024-04-21 PROCEDURE — 84484 ASSAY OF TROPONIN QUANT: CPT

## 2024-04-21 PROCEDURE — 80053 COMPREHEN METABOLIC PANEL: CPT

## 2024-04-21 PROCEDURE — 87077 CULTURE AEROBIC IDENTIFY: CPT

## 2024-04-21 PROCEDURE — 36415 COLL VENOUS BLD VENIPUNCTURE: CPT

## 2024-04-21 PROCEDURE — 99285 EMERGENCY DEPT VISIT HI MDM: CPT

## 2024-04-21 RX ORDER — SODIUM CHLORIDE 9 MG/ML
1000 INJECTION INTRAMUSCULAR; INTRAVENOUS; SUBCUTANEOUS ONCE
Refills: 0 | Status: COMPLETED | OUTPATIENT
Start: 2024-04-21 | End: 2024-04-21

## 2024-04-21 RX ORDER — ONDANSETRON 8 MG/1
4 TABLET, FILM COATED ORAL ONCE
Refills: 0 | Status: COMPLETED | OUTPATIENT
Start: 2024-04-21 | End: 2024-04-21

## 2024-04-21 RX ORDER — MORPHINE SULFATE 50 MG/1
4 CAPSULE, EXTENDED RELEASE ORAL ONCE
Refills: 0 | Status: DISCONTINUED | OUTPATIENT
Start: 2024-04-21 | End: 2024-04-21

## 2024-04-21 RX ADMIN — SODIUM CHLORIDE 1000 MILLILITER(S): 9 INJECTION INTRAMUSCULAR; INTRAVENOUS; SUBCUTANEOUS at 23:58

## 2024-04-21 NOTE — ED PROVIDER NOTE - CARE PROVIDER_API CALL
Roberto Heredia  Cardiovascular Disease  [address]  [place] [address]  Phone: [phone]  Fax: [phone]  Follow Up Time:

## 2024-04-21 NOTE — ED PROVIDER NOTE - OBJECTIVE STATEMENT
Pt. is a 84 yo F with hx of asthma, paroxysmal atrial fibrillation on metoprolol and xarelto, HTN, hyperlipidemia, ocular migraines on verapamil, hx of UTI, hx of kidney stone and lithotripsy, gout presents with 5 days of palpitations and afib off and on, elevated blood pressure, left sided low back pain radiating into left hip, abdominal pain, urinary frequency and nausea.  Temp 99F at home.  Denies chest pain or SOB.  Denies congestion, sore throat or coughing. PMD: [place]

## 2024-04-21 NOTE — ED ADULT NURSE NOTE - NSFALLRISKINTERV_ED_ALL_ED
Assistance OOB with selected safe patient handling equipment if applicable/Assistance with ambulation/Communicate fall risk and risk factors to all staff, patient, and family/Monitor gait and stability/Provide visual cue: yellow wristband, yellow gown, etc/Reinforce activity limits and safety measures with patient and family/Call bell, personal items and telephone in reach/Instruct patient to call for assistance before getting out of bed/chair/stretcher/Non-slip footwear applied when patient is off stretcher/[place] to call system/Physically safe environment - no spills, clutter or unnecessary equipment/Purposeful Proactive Rounding/Room/bathroom lighting operational, light cord in reach

## 2024-04-21 NOTE — ED ADULT NURSE NOTE - OBJECTIVE STATEMENT
Pt coming into the ED with c/o flank pain, increased urination, AFIB episodes, HTN and abdominal pain. Pt has hx of Afib. Pt denies CP or SOB at this time on assessment. Pt has IV access, EKG done. Side rails up and call bell light In reach.

## 2024-04-21 NOTE — ED PROVIDER NOTE - CLINICAL SUMMARY MEDICAL DECISION MAKING FREE TEXT BOX
84 yo F with back pain and palpitations. EKG within normal limits. CXR normal.  UA negative for UTI.  Labs including 2 sets of troponins negative. Pain improved.  Hydration given.  Patient to f/u with PMD

## 2024-04-21 NOTE — ED ADULT TRIAGE NOTE - CHIEF COMPLAINT QUOTE
history of afib- complains of intermittent palpitations since friday.   hypertension - blood pressure readings of 170 systolic on home monitor, patient reports this is uncommon and she usually runs low.   left flank pain since wednesday morning. urinary frequency on friday. denies hematuria.   nausea. subjective fever. --takes plavix, metoprolol, verapamil, xarelto daily

## 2024-04-21 NOTE — ED PROVIDER NOTE - NSFOLLOWUPINSTRUCTIONS_ED_ALL_ED_FT
Heart Palpitations    WHAT YOU NEED TO KNOW:    Heart palpitations are feelings that your heart races, jumps, throbs, or flutters. You may feel extra beats, no beats for a short time, or skipped beats. You may have these feelings in your chest, throat, or neck. They may happen when you are sitting, standing, or lying. Heart palpitations may be frightening, but are usually not caused by a serious problem.     DISCHARGE INSTRUCTIONS:    Call 911 or have someone else call for any of the following:     You have any of the following signs of a heart attack:   Squeezing, pressure, or pain in your chest       and any of the following:   Discomfort or pain in your back, neck, jaw, stomach, or arm       Shortness of breath      Nausea or vomiting      Lightheadedness or a sudden cold sweat      You have any of the following signs of a stroke:   Numbness or drooping on one side of your face       Weakness in an arm or leg      Confusion or difficulty speaking      Dizziness, a severe headache, or vision loss      You faint or lose consciousness.     Return to the emergency department if:     Your palpitations happen more often or get more intense.         Contact your healthcare provider if:     You have new or worsening swelling in your feet or ankles.      You have questions or concerns about your condition or care.    Follow up with your healthcare provider as directed: You may need to follow up with a cardiologist. You may need tests to check for heart problems that cause palpitations. Write down your questions so you remember to ask them during your visits.     Keep a record: Write down when your palpitations start and stop, what you were doing when they started, and your symptoms. Keep track of what you ate or drank within a few hours of your palpitations. Include anything that seemed to help your symptoms, such as lying down or holding your breath. This record will help you and your healthcare provider learn what triggers your palpitations. Bring this record with you to your follow up visits.    Help prevent heart palpitations:     Manage stress and anxiety. Find ways to relax such as listening to music, meditating, or doing yoga. Exercise can also help decrease stress and anxiety. Talk to someone you trust about your stress or anxiety. You can also talk to a therapist.       Get plenty of sleep every night. Ask your healthcare provider how much sleep you need each night.       Do not drink caffeine or alcohol. Caffeine and alcohol can make your palpitations worse. Caffeine is found in soda, coffee, tea, chocolate, and drinks that increase your energy.       Do not smoke. Nicotine and other chemicals in cigarettes and cigars may damage your heart and blood vessels. Ask your healthcare provider for information if you currently smoke and need help to quit. E-cigarettes or smokeless tobacco still contain nicotine. Talk to your healthcare provider before you use these products.       Do not use illegal drugs. Talk to your healthcare provider if you use illegal drugs and want help to quit.  Back Pain    WHAT YOU NEED TO KNOW:    Back pain is common. It can be caused by many conditions, such as arthritis or the breakdown of spinal discs. Your risk for back pain is increased by injuries, lack of activity, or repeated bending and twisting. You may feel sore or stiff on one or both sides of your back. The pain may spread to your buttocks or thighs.    DISCHARGE INSTRUCTIONS:    Return to the emergency department if:     You have pain, numbness, or weakness in one or both legs.      Your pain becomes so severe that you cannot walk.      You cannot control your urine or bowel movements.      You have severe back pain with chest pain.      You have severe back pain, nausea, and vomiting.      You have severe back pain that spreads to your side or genital area.    Contact your healthcare provider if:     You have back pain that does not get better with rest and pain medicine.      You have a fever.      You have pain that worsens when you are on your back or when you rest.      You have pain that worsens when you cough or sneeze.      You lose weight without trying.      You have questions or concerns about your condition or care.    Medicines:     NSAIDs help decrease swelling and pain. This medicine is available with or without a doctor's order. NSAIDs can cause stomach bleeding or kidney problems in certain people. If you take blood thinner medicine, always ask your healthcare provider if NSAIDs are safe for you. Always read the medicine label and follow directions.      Acetaminophen decreases pain and fever. It is available without a doctor's order. Ask how much to take and how often to take it. Follow directions. Read the labels of all other medicines you are using to see if they also contain acetaminophen, or ask your doctor or pharmacist. Acetaminophen can cause liver damage if not taken correctly. Do not use more than 4 grams (4,000 milligrams) total of acetaminophen in one day.       Muscle relaxers help decrease muscle spasms and back pain.      Prescription pain medicine may be given. Ask your healthcare provider how to take this medicine safely. Some prescription pain medicines contain acetaminophen. Do not take other medicines that contain acetaminophen without talking to your healthcare provider. Too much acetaminophen may cause liver damage. Prescription pain medicine may cause constipation. Ask your healthcare provider how to prevent or treat constipation.       Take your medicine as directed. Contact your healthcare provider if you think your medicine is not helping or if you have side effects. Tell him or her if you are allergic to any medicine. Keep a list of the medicines, vitamins, and herbs you take. Include the amounts, and when and why you take them. Bring the list or the pill bottles to follow-up visits. Carry your medicine list with you in case of an emergency.    How to manage your back pain:     Apply ice on your back for 15 to 20 minutes every hour or as directed. Use an ice pack, or put crushed ice in a plastic bag. Cover it with a towel before you apply it to your skin. Ice helps prevent tissue damage and decreases pain.      Apply heat on your back for 20 to 30 minutes every 2 hours for as many days as directed. Heat helps decrease pain and muscle spasms.      Stay active as much as you can without causing more pain. Bed rest could make your back pain worse. Avoid heavy lifting until your pain is gone.      Go to physical therapy as directed. A physical therapist can teach you exercises to help improve movement and strength, and to decrease pain.    Follow up with your healthcare provider in 2 weeks, or as directed: Write down your questions so you remember to ask them during your visits.

## 2024-04-21 NOTE — ED PROVIDER NOTE - IV ALTEPLASE ADMIN OUTSIDE HIDDEN
show Spray Paint Text: The liquid nitrogen was applied to the skin utilizing a spray paint frosting technique. Render Note In Bullet Format When Appropriate: No Medical Necessity Information: It is in your best interest to select a reason for this procedure from the list below. All of these items fulfill various CMS LCD requirements except the new and changing color options. Show Aperture Variable?: Yes Medical Necessity Clause: This procedure was medically necessary because the lesions that were treated were: Consent: The patient's consent was obtained including but not limited to risks of crusting, scabbing, blistering, scarring, darker or lighter pigmentary change, recurrence, incomplete removal and infection. Post-Care Instructions: I reviewed with the patient in detail post-care instructions. Patient is to wear sunprotection, and avoid picking at any of the treated lesions. Pt may apply Vaseline to crusted or scabbing areas. Detail Level: Detailed

## 2024-04-22 VITALS
HEART RATE: 64 BPM | DIASTOLIC BLOOD PRESSURE: 63 MMHG | RESPIRATION RATE: 18 BRPM | SYSTOLIC BLOOD PRESSURE: 123 MMHG | TEMPERATURE: 99 F | OXYGEN SATURATION: 99 %

## 2024-04-22 LAB
ALBUMIN SERPL ELPH-MCNC: 3.5 G/DL — SIGNIFICANT CHANGE UP (ref 3.3–5)
ALP SERPL-CCNC: 91 U/L — SIGNIFICANT CHANGE UP (ref 40–120)
ALT FLD-CCNC: 57 U/L — SIGNIFICANT CHANGE UP (ref 12–78)
ANION GAP SERPL CALC-SCNC: 5 MMOL/L — SIGNIFICANT CHANGE UP (ref 5–17)
APPEARANCE UR: CLEAR — SIGNIFICANT CHANGE UP
APTT BLD: 45.7 SEC — HIGH (ref 24.5–35.6)
AST SERPL-CCNC: 31 U/L — SIGNIFICANT CHANGE UP (ref 15–37)
BACTERIA # UR AUTO: NEGATIVE /HPF — SIGNIFICANT CHANGE UP
BASOPHILS # BLD AUTO: 0.07 K/UL — SIGNIFICANT CHANGE UP (ref 0–0.2)
BASOPHILS NFR BLD AUTO: 0.5 % — SIGNIFICANT CHANGE UP (ref 0–2)
BILIRUB SERPL-MCNC: 0.6 MG/DL — SIGNIFICANT CHANGE UP (ref 0.2–1.2)
BILIRUB UR-MCNC: NEGATIVE — SIGNIFICANT CHANGE UP
BUN SERPL-MCNC: 35 MG/DL — HIGH (ref 7–23)
CALCIUM SERPL-MCNC: 9.6 MG/DL — SIGNIFICANT CHANGE UP (ref 8.5–10.1)
CAST: 1 /LPF — SIGNIFICANT CHANGE UP (ref 0–4)
CHLORIDE SERPL-SCNC: 111 MMOL/L — HIGH (ref 96–108)
CO2 SERPL-SCNC: 23 MMOL/L — SIGNIFICANT CHANGE UP (ref 22–31)
COLOR SPEC: YELLOW — SIGNIFICANT CHANGE UP
CREAT SERPL-MCNC: 0.87 MG/DL — SIGNIFICANT CHANGE UP (ref 0.5–1.3)
DIFF PNL FLD: ABNORMAL
EGFR: 66 ML/MIN/1.73M2 — SIGNIFICANT CHANGE UP
EOSINOPHIL # BLD AUTO: 0.03 K/UL — SIGNIFICANT CHANGE UP (ref 0–0.5)
EOSINOPHIL NFR BLD AUTO: 0.2 % — SIGNIFICANT CHANGE UP (ref 0–6)
GLUCOSE SERPL-MCNC: 179 MG/DL — HIGH (ref 70–99)
GLUCOSE UR QL: NEGATIVE MG/DL — SIGNIFICANT CHANGE UP
HCT VFR BLD CALC: 46 % — HIGH (ref 34.5–45)
HGB BLD-MCNC: 14.9 G/DL — SIGNIFICANT CHANGE UP (ref 11.5–15.5)
IMM GRANULOCYTES NFR BLD AUTO: 0.3 % — SIGNIFICANT CHANGE UP (ref 0–0.9)
INR BLD: 2.22 RATIO — HIGH (ref 0.85–1.18)
KETONES UR-MCNC: NEGATIVE MG/DL — SIGNIFICANT CHANGE UP
LEUKOCYTE ESTERASE UR-ACNC: NEGATIVE — SIGNIFICANT CHANGE UP
LYMPHOCYTES # BLD AUTO: 19.2 % — SIGNIFICANT CHANGE UP (ref 13–44)
LYMPHOCYTES # BLD AUTO: 2.47 K/UL — SIGNIFICANT CHANGE UP (ref 1–3.3)
MAGNESIUM SERPL-MCNC: 1.9 MG/DL — SIGNIFICANT CHANGE UP (ref 1.6–2.6)
MCHC RBC-ENTMCNC: 29.4 PG — SIGNIFICANT CHANGE UP (ref 27–34)
MCHC RBC-ENTMCNC: 32.4 GM/DL — SIGNIFICANT CHANGE UP (ref 32–36)
MCV RBC AUTO: 90.7 FL — SIGNIFICANT CHANGE UP (ref 80–100)
MONOCYTES # BLD AUTO: 0.79 K/UL — SIGNIFICANT CHANGE UP (ref 0–0.9)
MONOCYTES NFR BLD AUTO: 6.1 % — SIGNIFICANT CHANGE UP (ref 2–14)
NEUTROPHILS # BLD AUTO: 9.46 K/UL — HIGH (ref 1.8–7.4)
NEUTROPHILS NFR BLD AUTO: 73.7 % — SIGNIFICANT CHANGE UP (ref 43–77)
NITRITE UR-MCNC: NEGATIVE — SIGNIFICANT CHANGE UP
PH UR: 6.5 — SIGNIFICANT CHANGE UP (ref 5–8)
PLATELET # BLD AUTO: 296 K/UL — SIGNIFICANT CHANGE UP (ref 150–400)
POTASSIUM SERPL-MCNC: 3.8 MMOL/L — SIGNIFICANT CHANGE UP (ref 3.5–5.3)
POTASSIUM SERPL-SCNC: 3.8 MMOL/L — SIGNIFICANT CHANGE UP (ref 3.5–5.3)
PROT SERPL-MCNC: 7.8 GM/DL — SIGNIFICANT CHANGE UP (ref 6–8.3)
PROT UR-MCNC: NEGATIVE MG/DL — SIGNIFICANT CHANGE UP
PROTHROM AB SERPL-ACNC: 24.5 SEC — HIGH (ref 9.5–13)
RBC # BLD: 5.07 M/UL — SIGNIFICANT CHANGE UP (ref 3.8–5.2)
RBC # FLD: 14.9 % — HIGH (ref 10.3–14.5)
RBC CASTS # UR COMP ASSIST: 4 /HPF — SIGNIFICANT CHANGE UP (ref 0–4)
SODIUM SERPL-SCNC: 139 MMOL/L — SIGNIFICANT CHANGE UP (ref 135–145)
SP GR SPEC: >1.03 — HIGH (ref 1–1.03)
SQUAMOUS # UR AUTO: 1 /HPF — SIGNIFICANT CHANGE UP (ref 0–5)
TROPONIN I, HIGH SENSITIVITY RESULT: 4.93 NG/L — SIGNIFICANT CHANGE UP
TROPONIN I, HIGH SENSITIVITY RESULT: 7.54 NG/L — SIGNIFICANT CHANGE UP
UROBILINOGEN FLD QL: 0.2 MG/DL — SIGNIFICANT CHANGE UP (ref 0.2–1)
WBC # BLD: 12.86 K/UL — HIGH (ref 3.8–10.5)
WBC # FLD AUTO: 12.86 K/UL — HIGH (ref 3.8–10.5)
WBC UR QL: 2 /HPF — SIGNIFICANT CHANGE UP (ref 0–5)

## 2024-04-22 PROCEDURE — 71045 X-RAY EXAM CHEST 1 VIEW: CPT | Mod: 26

## 2024-04-22 PROCEDURE — 74177 CT ABD & PELVIS W/CONTRAST: CPT | Mod: 26,MC

## 2024-04-22 RX ORDER — HYDRALAZINE HCL 50 MG
10 TABLET ORAL ONCE
Refills: 0 | Status: COMPLETED | OUTPATIENT
Start: 2024-04-22 | End: 2024-04-22

## 2024-04-22 RX ADMIN — ONDANSETRON 4 MILLIGRAM(S): 8 TABLET, FILM COATED ORAL at 01:10

## 2024-04-22 RX ADMIN — Medication 10 MILLIGRAM(S): at 01:23

## 2024-04-22 RX ADMIN — MORPHINE SULFATE 4 MILLIGRAM(S): 50 CAPSULE, EXTENDED RELEASE ORAL at 01:10

## 2024-04-23 RX ORDER — AZITHROMYCIN 500 MG/1
1 TABLET, FILM COATED ORAL
Qty: 6 | Refills: 0
Start: 2024-04-23 | End: 2024-04-27

## 2024-04-23 NOTE — ED POST DISCHARGE NOTE - RESULT SUMMARY
+ hilar left lung new infiltrate.  Pt. feeling well.  She was advised to follow with her PMD but does not have appt. until next month.  Stressed improtance of close follow up and will see one of the PA.  DC with Zithromax, Augmentin.  Pt. aware of all results and return isntructions.  Celina ORNELAS

## 2024-05-09 ENCOUNTER — NON-APPOINTMENT (OUTPATIENT)
Age: 84
End: 2024-05-09

## 2024-05-09 ENCOUNTER — APPOINTMENT (OUTPATIENT)
Dept: ELECTROPHYSIOLOGY | Facility: CLINIC | Age: 84
End: 2024-05-09
Payer: MEDICARE

## 2024-05-09 VITALS
SYSTOLIC BLOOD PRESSURE: 153 MMHG | DIASTOLIC BLOOD PRESSURE: 71 MMHG | WEIGHT: 136 LBS | BODY MASS INDEX: 28.55 KG/M2 | OXYGEN SATURATION: 97 % | HEIGHT: 58 IN | RESPIRATION RATE: 16 BRPM | HEART RATE: 87 BPM

## 2024-05-09 DIAGNOSIS — I48.0 PAROXYSMAL ATRIAL FIBRILLATION: ICD-10-CM

## 2024-05-09 DIAGNOSIS — Z86.79 PERSONAL HISTORY OF OTHER DISEASES OF THE CIRCULATORY SYSTEM: ICD-10-CM

## 2024-05-09 PROCEDURE — 93280 PM DEVICE PROGR EVAL DUAL: CPT

## 2024-06-12 ENCOUNTER — NON-APPOINTMENT (OUTPATIENT)
Age: 84
End: 2024-06-12

## 2024-07-31 PROBLEM — Z86.69 HISTORY OF BELL'S PALSY: Status: RESOLVED | Noted: 2020-09-23 | Resolved: 2024-07-31

## 2024-08-12 ENCOUNTER — NON-APPOINTMENT (OUTPATIENT)
Age: 84
End: 2024-08-12

## 2024-08-19 ENCOUNTER — APPOINTMENT (OUTPATIENT)
Dept: ELECTROPHYSIOLOGY | Facility: CLINIC | Age: 84
End: 2024-08-19
Payer: MEDICARE

## 2024-08-19 ENCOUNTER — APPOINTMENT (OUTPATIENT)
Dept: UROLOGY | Facility: CLINIC | Age: 84
End: 2024-08-19
Payer: MEDICARE

## 2024-08-19 ENCOUNTER — NON-APPOINTMENT (OUTPATIENT)
Age: 84
End: 2024-08-19

## 2024-08-19 VITALS
TEMPERATURE: 97.8 F | WEIGHT: 137 LBS | HEART RATE: 76 BPM | RESPIRATION RATE: 17 BRPM | DIASTOLIC BLOOD PRESSURE: 71 MMHG | BODY MASS INDEX: 28.76 KG/M2 | SYSTOLIC BLOOD PRESSURE: 125 MMHG | HEIGHT: 58 IN

## 2024-08-19 DIAGNOSIS — R82.991 HYPOCITRATURIA: ICD-10-CM

## 2024-08-19 DIAGNOSIS — R39.9 UNSPECIFIED SYMPTOMS AND SIGNS INVOLVING THE GENITOURINARY SYSTEM: ICD-10-CM

## 2024-08-19 DIAGNOSIS — N20.0 CALCULUS OF KIDNEY: ICD-10-CM

## 2024-08-19 DIAGNOSIS — N28.1 CYST OF KIDNEY, ACQUIRED: ICD-10-CM

## 2024-08-19 DIAGNOSIS — R82.992 HYPEROXALURIA: ICD-10-CM

## 2024-08-19 DIAGNOSIS — R82.6 ABNORMAL URINE LEVELS OF SUBSTANCES CHIEFLY NONMEDICINAL AS TO SOURCE: ICD-10-CM

## 2024-08-19 PROCEDURE — 99214 OFFICE O/P EST MOD 30 MIN: CPT

## 2024-08-19 NOTE — REASON FOR VISIT
[Home] : at home, [unfilled] , at the time of the visit. [Medical Office: ([facility])___] : at the medical office located in  [Verbal consent obtained from patient] : the patient, [unfilled] [Follow-up Visit ___] : a follow-up visit  for [unfilled]

## 2024-08-19 NOTE — ASSESSMENT
[FreeTextEntry1] : Kidney stones Dietary counseling Increase fluids intake Reduce animal proteins intake Continue Potassium citrate 20mEq twice daily Allopurinol 100mg daily from PCP for gout  UTI suppression: reduce Hiprex to 1 gram daily plus vitamin C Increase fluids intake  Bosniak 2F left renal cyst: stable Monitor for now  24 hr urine ordered to be done before next visit Renal sono ordered to be done before next visit Follow up in 9 months

## 2024-08-19 NOTE — HISTORY OF PRESENT ILLNESS
[FreeTextEntry1] : 84y/o woman Hx of kidney stones, recurrent UTIs  Recurrent UTIs: We started her on Hiprex and vitamin C as of last visit  kidney stones: on K-citrate and Allopurinol (for gout from PCP)  CT (April 2024): no stones  24 hr urine: All parameters wnl Renal sono Michael Pesiri: No kidney stones better, no hydronephrosis, left Bosniak 2F cyst stable

## 2024-08-20 PROCEDURE — 93296 REM INTERROG EVL PM/IDS: CPT

## 2024-08-20 PROCEDURE — 93294 REM INTERROG EVL PM/LDLS PM: CPT

## 2024-11-19 ENCOUNTER — APPOINTMENT (OUTPATIENT)
Dept: ELECTROPHYSIOLOGY | Facility: CLINIC | Age: 84
End: 2024-11-19
Payer: MEDICARE

## 2024-11-19 ENCOUNTER — NON-APPOINTMENT (OUTPATIENT)
Age: 84
End: 2024-11-19

## 2024-11-19 PROCEDURE — 93296 REM INTERROG EVL PM/IDS: CPT

## 2024-11-19 PROCEDURE — 93294 REM INTERROG EVL PM/LDLS PM: CPT

## 2024-11-24 NOTE — ASU DISCHARGE PLAN (ADULT/PEDIATRIC). - PATIENT/GUARDIAN NAME (SIGNATURE): ____________________________________
Statement Selected
Bed/Stretcher in lowest position, wheels locked, appropriate side rails in place/Call bell, personal items and telephone in reach/Instruct patient to call for assistance before getting out of bed/chair/stretcher/Non-slip footwear applied when patient is off stretcher/[place] to call system/Physically safe environment - no spills, clutter or unnecessary equipment/Purposeful proactive rounding/Room/bathroom lighting operational, light cord in reach
no abdominal pain, no bloating, no constipation, no diarrhea, no nausea and no vomiting.

## 2025-02-21 ENCOUNTER — NON-APPOINTMENT (OUTPATIENT)
Age: 85
End: 2025-02-21

## 2025-02-21 ENCOUNTER — APPOINTMENT (OUTPATIENT)
Dept: ELECTROPHYSIOLOGY | Facility: CLINIC | Age: 85
End: 2025-02-21
Payer: MEDICARE

## 2025-02-21 PROCEDURE — 93296 REM INTERROG EVL PM/IDS: CPT

## 2025-02-21 PROCEDURE — 93294 REM INTERROG EVL PM/LDLS PM: CPT

## 2025-05-08 ENCOUNTER — NON-APPOINTMENT (OUTPATIENT)
Age: 85
End: 2025-05-08

## 2025-05-13 ENCOUNTER — APPOINTMENT (OUTPATIENT)
Dept: ELECTROPHYSIOLOGY | Facility: CLINIC | Age: 85
End: 2025-05-13
Payer: MEDICARE

## 2025-05-13 ENCOUNTER — NON-APPOINTMENT (OUTPATIENT)
Age: 85
End: 2025-05-13

## 2025-05-13 VITALS
DIASTOLIC BLOOD PRESSURE: 77 MMHG | RESPIRATION RATE: 16 BRPM | BODY MASS INDEX: 27.71 KG/M2 | HEIGHT: 58 IN | OXYGEN SATURATION: 100 % | SYSTOLIC BLOOD PRESSURE: 135 MMHG | WEIGHT: 132 LBS | HEART RATE: 69 BPM

## 2025-05-13 PROCEDURE — 93280 PM DEVICE PROGR EVAL DUAL: CPT

## 2025-05-13 RX ORDER — ALLOPURINOL 100 MG/1
100 TABLET ORAL DAILY
Refills: 0 | Status: ACTIVE | COMMUNITY

## 2025-05-26 ENCOUNTER — NON-APPOINTMENT (OUTPATIENT)
Age: 85
End: 2025-05-26

## 2025-05-28 ENCOUNTER — APPOINTMENT (OUTPATIENT)
Dept: UROLOGY | Facility: CLINIC | Age: 85
End: 2025-05-28
Payer: MEDICARE

## 2025-05-28 VITALS
TEMPERATURE: 98.3 F | SYSTOLIC BLOOD PRESSURE: 128 MMHG | DIASTOLIC BLOOD PRESSURE: 82 MMHG | HEIGHT: 58 IN | BODY MASS INDEX: 27.71 KG/M2 | RESPIRATION RATE: 17 BRPM | WEIGHT: 132 LBS | HEART RATE: 98 BPM

## 2025-05-28 DIAGNOSIS — N39.0 SEPSIS, UNSPECIFIED ORGANISM: ICD-10-CM

## 2025-05-28 DIAGNOSIS — A41.9 SEPSIS, UNSPECIFIED ORGANISM: ICD-10-CM

## 2025-05-28 DIAGNOSIS — N20.0 CALCULUS OF KIDNEY: ICD-10-CM

## 2025-05-28 DIAGNOSIS — R82.991 HYPOCITRATURIA: ICD-10-CM

## 2025-05-28 DIAGNOSIS — N28.1 CYST OF KIDNEY, ACQUIRED: ICD-10-CM

## 2025-05-28 DIAGNOSIS — A04.72 ENTEROCOLITIS DUE TO CLOSTRIDIUM DIFFICILE, NOT SPECIFIED AS RECURRENT: ICD-10-CM

## 2025-05-28 DIAGNOSIS — R82.992 HYPEROXALURIA: ICD-10-CM

## 2025-05-28 DIAGNOSIS — R82.6 ABNORMAL URINE LEVELS OF SUBSTANCES CHIEFLY NONMEDICINAL AS TO SOURCE: ICD-10-CM

## 2025-05-28 DIAGNOSIS — M10.9 GOUT, UNSPECIFIED: ICD-10-CM

## 2025-05-28 DIAGNOSIS — R39.9 UNSPECIFIED SYMPTOMS AND SIGNS INVOLVING THE GENITOURINARY SYSTEM: ICD-10-CM

## 2025-05-28 PROCEDURE — 99214 OFFICE O/P EST MOD 30 MIN: CPT

## 2025-05-28 PROCEDURE — G2211 COMPLEX E/M VISIT ADD ON: CPT

## 2025-05-29 LAB
APPEARANCE: CLEAR
BACTERIA: NEGATIVE /HPF
BILIRUBIN URINE: NEGATIVE
BLOOD URINE: NEGATIVE
CAST: 0 /LPF
COLOR: YELLOW
EPITHELIAL CELLS: 1 /HPF
GLUCOSE QUALITATIVE U: NEGATIVE MG/DL
KETONES URINE: NEGATIVE MG/DL
LEUKOCYTE ESTERASE URINE: NEGATIVE
MICROSCOPIC-UA: NORMAL
NITRITE URINE: NEGATIVE
PH URINE: 6
PROTEIN URINE: NEGATIVE MG/DL
RED BLOOD CELLS URINE: 1 /HPF
SPECIFIC GRAVITY URINE: 1.02
UROBILINOGEN URINE: 0.2 MG/DL
WHITE BLOOD CELLS URINE: 0 /HPF

## 2025-05-30 LAB — BACTERIA UR CULT: NORMAL

## 2025-06-13 ENCOUNTER — RX RENEWAL (OUTPATIENT)
Age: 85
End: 2025-06-13

## 2025-08-12 ENCOUNTER — NON-APPOINTMENT (OUTPATIENT)
Age: 85
End: 2025-08-12

## 2025-08-12 ENCOUNTER — APPOINTMENT (OUTPATIENT)
Dept: ELECTROPHYSIOLOGY | Facility: CLINIC | Age: 85
End: 2025-08-12
Payer: MEDICARE

## 2025-08-12 PROCEDURE — 93294 REM INTERROG EVL PM/LDLS PM: CPT

## 2025-08-12 PROCEDURE — 93296 REM INTERROG EVL PM/IDS: CPT
